# Patient Record
Sex: FEMALE | Race: WHITE | NOT HISPANIC OR LATINO | Employment: FULL TIME | URBAN - METROPOLITAN AREA
[De-identification: names, ages, dates, MRNs, and addresses within clinical notes are randomized per-mention and may not be internally consistent; named-entity substitution may affect disease eponyms.]

---

## 2021-03-16 ENCOUNTER — OCCMED (OUTPATIENT)
Dept: URGENT CARE | Facility: CLINIC | Age: 56
End: 2021-03-16

## 2021-03-16 DIAGNOSIS — Z02.1 PRE-EMPLOYMENT HEALTH SCREENING EXAMINATION: Primary | ICD-10-CM

## 2021-03-16 LAB — RUBV IGG SERPL IA-ACNC: <0.2 IU/ML

## 2021-03-16 PROCEDURE — 86735 MUMPS ANTIBODY: CPT | Performed by: PHYSICIAN ASSISTANT

## 2021-03-16 PROCEDURE — 86765 RUBEOLA ANTIBODY: CPT | Performed by: PHYSICIAN ASSISTANT

## 2021-03-16 PROCEDURE — 86762 RUBELLA ANTIBODY: CPT | Performed by: PHYSICIAN ASSISTANT

## 2021-03-16 PROCEDURE — 86480 TB TEST CELL IMMUN MEASURE: CPT | Performed by: PHYSICIAN ASSISTANT

## 2021-03-16 PROCEDURE — 86787 VARICELLA-ZOSTER ANTIBODY: CPT | Performed by: PHYSICIAN ASSISTANT

## 2021-03-17 LAB
GAMMA INTERFERON BACKGROUND BLD IA-ACNC: 0.03 IU/ML
M TB IFN-G BLD-IMP: NEGATIVE
M TB IFN-G CD4+ BCKGRND COR BLD-ACNC: 0.01 IU/ML
M TB IFN-G CD4+ BCKGRND COR BLD-ACNC: 0.01 IU/ML
MITOGEN IGNF BCKGRD COR BLD-ACNC: 2.88 IU/ML

## 2021-03-18 LAB
MEV IGG SER QL: NORMAL
MUV IGG SER QL: NORMAL
VZV IGG SER IA-ACNC: NORMAL

## 2021-03-20 ENCOUNTER — HOSPITAL ENCOUNTER (EMERGENCY)
Facility: HOSPITAL | Age: 56
Discharge: HOME | End: 2021-03-20
Attending: EMERGENCY MEDICINE
Payer: COMMERCIAL

## 2021-03-20 VITALS
DIASTOLIC BLOOD PRESSURE: 76 MMHG | RESPIRATION RATE: 18 BRPM | SYSTOLIC BLOOD PRESSURE: 139 MMHG | HEART RATE: 89 BPM | TEMPERATURE: 99.1 F | OXYGEN SATURATION: 97 %

## 2021-03-20 DIAGNOSIS — N39.0 URINARY TRACT INFECTION: Primary | ICD-10-CM

## 2021-03-20 LAB
BACTERIA UR QL AUTO: ABNORMAL /HPF
BILIRUB UR QL STRIP: NEGATIVE
CLARITY UR: CLEAR
COLOR UR: YELLOW
GLUCOSE UR STRIP-MCNC: NEGATIVE MG/DL
HGB UR QL STRIP.AUTO: ABNORMAL
KETONES UR STRIP-MCNC: NEGATIVE MG/DL
LEUKOCYTE ESTERASE UR QL STRIP: ABNORMAL
NITRITE UR QL STRIP: NEGATIVE
NON-SQ EPI CELLS URNS QL MICRO: ABNORMAL /HPF
PH UR STRIP.AUTO: 6.5 [PH]
PROT UR STRIP-MCNC: NEGATIVE MG/DL
RBC #/AREA URNS AUTO: ABNORMAL /HPF
SP GR UR STRIP.AUTO: <=1.005 (ref 1–1.03)
UROBILINOGEN UR QL STRIP.AUTO: 0.2 E.U./DL
WBC #/AREA URNS AUTO: ABNORMAL /HPF

## 2021-03-20 PROCEDURE — 87186 SC STD MICRODIL/AGAR DIL: CPT | Performed by: PHYSICIAN ASSISTANT

## 2021-03-20 PROCEDURE — 99284 EMERGENCY DEPT VISIT MOD MDM: CPT | Performed by: PHYSICIAN ASSISTANT

## 2021-03-20 PROCEDURE — 87077 CULTURE AEROBIC IDENTIFY: CPT | Performed by: PHYSICIAN ASSISTANT

## 2021-03-20 PROCEDURE — 99283 EMERGENCY DEPT VISIT LOW MDM: CPT

## 2021-03-20 PROCEDURE — 81001 URINALYSIS AUTO W/SCOPE: CPT | Performed by: PHYSICIAN ASSISTANT

## 2021-03-20 PROCEDURE — 87086 URINE CULTURE/COLONY COUNT: CPT | Performed by: PHYSICIAN ASSISTANT

## 2021-03-20 RX ORDER — PHENAZOPYRIDINE HYDROCHLORIDE 200 MG/1
200 TABLET, FILM COATED ORAL 3 TIMES DAILY
Qty: 6 TABLET | Refills: 0 | Status: SHIPPED | OUTPATIENT
Start: 2021-03-20 | End: 2021-08-13

## 2021-03-20 RX ORDER — CEPHALEXIN 500 MG/1
500 CAPSULE ORAL EVERY 12 HOURS SCHEDULED
Qty: 14 CAPSULE | Refills: 0 | Status: SHIPPED | OUTPATIENT
Start: 2021-03-20 | End: 2021-03-27

## 2021-03-20 NOTE — ED PROVIDER NOTES
History  Chief Complaint   Patient presents with    Possible UTI     pt complaints of urinary urgency and burning while urinating  was put on bactrim by family doctor with no relief      Patient is a 65 y/o F with h/o anxiety, depression that presents to the ED with urinary urgency and dysuria for 4 days  She saw her PCP 3 days ago and was started on bactrim  She states she started feeling better, but then her symptoms worsened  SHe denies fevers, nausea, vomiting or back pain  She states she was shaking today so she called her PCP and was told she would have to f/u with urologist         History provided by:  Patient  Urinary Frequency  Location:  Urinary urgency and dysuria  Severity:  Moderate  Onset quality:  Gradual  Duration:  4 days  Timing:  Constant  Progression:  Unchanged  Chronicity:  New  Context:  Paitent with urgency and dysuria  Relieved by:  Nothing  Worsened by:  Nothing  Ineffective treatments:  Bactrim ds  Associated symptoms: no abdominal pain, no congestion, no cough, no diarrhea, no fever, no nausea, no rash, no shortness of breath and no vomiting        Prior to Admission Medications   Prescriptions Last Dose Informant Patient Reported? Taking?    Methylphenidate HCl (RITALIN PO)  Self Yes No   Sig: Take 18 mg by mouth daily   QUEtiapine (SEROQUEL) 100 mg tablet  Self Yes No   Sig: Take 100 mg by mouth daily at bedtime   Sertraline HCl (ZOLOFT PO)  Self Yes No   Sig: Take 150 mg by mouth daily   Vortioxetine HBr (TRINTELLIX) 5 MG TABS  Self Yes No   Sig: Take 1 tablet by mouth daily   buPROPion (FORFIVO XL) 450 MG 24 hr tablet  Self Yes No   Sig: Take 450 mg by mouth daily   losartan (COZAAR) 100 MG tablet   Yes No   Sig: Take 100 mg by mouth daily      Facility-Administered Medications: None       Past Medical History:   Diagnosis Date    ADHD (attention deficit hyperactivity disorder)     Depression     Hypertension     Panic attack     Psychiatric disorder        Past Surgical History:   Procedure Laterality Date    HYSTEROSCOPY         History reviewed  No pertinent family history  I have reviewed and agree with the history as documented  E-Cigarette/Vaping     E-Cigarette/Vaping Substances     Social History     Tobacco Use    Smoking status: Never Smoker    Smokeless tobacco: Never Used   Substance Use Topics    Alcohol use: No    Drug use: No       Review of Systems   Constitutional: Negative for chills and fever  HENT: Negative for congestion  Respiratory: Negative for cough and shortness of breath  Gastrointestinal: Negative for abdominal pain, diarrhea, nausea and vomiting  Genitourinary: Positive for dysuria, frequency and urgency  Negative for flank pain and hematuria  Musculoskeletal: Negative for back pain and neck pain  Skin: Negative for color change and rash  Neurological: Negative for dizziness, weakness, light-headedness and numbness  Psychiatric/Behavioral: The patient is nervous/anxious  All other systems reviewed and are negative  Physical Exam  Physical Exam  Vitals signs and nursing note reviewed  Constitutional:       General: She is not in acute distress  Appearance: Normal appearance  She is well-developed, well-groomed and overweight  She is not ill-appearing or diaphoretic  HENT:      Head: Normocephalic and atraumatic  Right Ear: Hearing and external ear normal       Left Ear: Hearing and external ear normal       Nose: Nose normal    Eyes:      Conjunctiva/sclera: Conjunctivae normal       Pupils: Pupils are equal    Neck:      Musculoskeletal: Normal range of motion  Cardiovascular:      Rate and Rhythm: Normal rate and regular rhythm  Heart sounds: Normal heart sounds  Pulmonary:      Effort: Pulmonary effort is normal       Breath sounds: Normal breath sounds  No wheezing, rhonchi or rales  Abdominal:      General: Abdomen is flat  Bowel sounds are normal       Palpations: Abdomen is soft  Tenderness: There is no abdominal tenderness  There is no right CVA tenderness or left CVA tenderness  Musculoskeletal: Normal range of motion  Right lower leg: No edema  Left lower leg: No edema  Skin:     General: Skin is warm and dry  Coloration: Skin is not pale  Findings: No rash  Neurological:      General: No focal deficit present  Mental Status: She is alert and oriented to person, place, and time  Motor: No weakness  Psychiatric:         Mood and Affect: Mood is anxious  Speech: Speech normal          Behavior: Behavior is cooperative  Cognition and Memory: Cognition normal          Vital Signs  ED Triage Vitals [03/20/21 1334]   Temperature Pulse Respirations Blood Pressure SpO2   99 1 °F (37 3 °C) 97 18 139/76 97 %      Temp Source Heart Rate Source Patient Position - Orthostatic VS BP Location FiO2 (%)   Tympanic Monitor Sitting Left arm --      Pain Score       --           Vitals:    03/20/21 1334 03/20/21 1429   BP: 139/76    Pulse: 97 89   Patient Position - Orthostatic VS: Sitting          Visual Acuity      ED Medications  Medications - No data to display    Diagnostic Studies  Results Reviewed     Procedure Component Value Units Date/Time    Urine Microscopic [[id number]]  (Abnormal) Collected: 03/20/21 1345    Lab Status: Final result Specimen: Urine, Clean Catch Updated: 03/20/21 1426     RBC, UA 30-50 /hpf      WBC, UA 20-30 /hpf      Epithelial Cells None Seen /hpf      Bacteria, UA Occasional /hpf     Urine culture [[id number]] Collected: 03/20/21 1345    Lab Status:  In process Specimen: Urine, Clean Catch Updated: 03/20/21 1426    UA w Reflex to Microscopic w Reflex to Culture [[id number]]  (Abnormal) Collected: 03/20/21 1345    Lab Status: Final result Specimen: Urine, Clean Catch Updated: 03/20/21 1403     Color, UA Yellow     Clarity, UA Clear     Specific Gravity, UA <=1 005     pH, UA 6 5     Leukocytes, UA Moderate     Nitrite, UA Negative     Protein, UA Negative mg/dl      Glucose, UA Negative mg/dl      Ketones, UA Negative mg/dl      Urobilinogen, UA 0 2 E U /dl      Bilirubin, UA Negative     Blood, UA Large                 No orders to display              Procedures  Procedures         ED Course                             SBIRT 20yo+      Most Recent Value   SBIRT (24 yo +)   In order to provide better care to our patients, we are screening all of our patients for alcohol and drug use  Would it be okay to ask you these screening questions? Yes Filed at: 03/20/2021 1347   Initial Alcohol Screen: US AUDIT-C    1  How often do you have a drink containing alcohol?  0 Filed at: 03/20/2021 1347   2  How many drinks containing alcohol do you have on a typical day you are drinking? 0 Filed at: 03/20/2021 1347   3a  Male UNDER 65: How often do you have five or more drinks on one occasion? 0 Filed at: 03/20/2021 1347   3b  FEMALE Any Age, or MALE 65+: How often do you have 4 or more drinks on one occassion? 0 Filed at: 03/20/2021 1347   Audit-C Score  0 Filed at: 03/20/2021 1347   RUTH: How many times in the past year have you    Used an illegal drug or used a prescription medication for non-medical reasons? Never Filed at: 03/20/2021 1347                    MDM  Number of Diagnoses or Management Options  Urinary tract infection: established and worsening  Diagnosis management comments: Patient with UTI symptoms, no abdominal pain, will recheck urine, prior urine culture not available  VItals stable, will start on keflex, urine culture pending  ADvised f/u with urology if symptoms continue  Return precautions given          Amount and/or Complexity of Data Reviewed  Clinical lab tests: ordered and reviewed    Patient Progress  Patient progress: stable      Disposition  Final diagnoses:   Urinary tract infection     Time reflects when diagnosis was documented in both MDM as applicable and the Disposition within this note     Time User Action Codes Description Comment    3/20/2021  2:29 PM Brian Treviño Add [N39 0] Urinary tract infection       ED Disposition     ED Disposition Condition Date/Time Comment    Discharge Stable Sat Mar 20, 2021  2:29 PM Jeff Moran discharge to home/self care  Follow-up Information     Follow up With Specialties Details Why Contact Info Additional [address] [facility] Schedule an appointment as soon as possible for a visit  For recheck [address] [address] [address] [facility] [address]  706  [facility] [place] [facility] [place], [facility], [facility], [place], [place], [address]          Discharge Medication List as of 3/20/2021  2:31 PM      START taking these medications    Details   cephalexin (KEFLEX) 500 mg capsule Take 1 capsule (500 mg total) by mouth every 12 (twelve) hours for 7 days, Starting Sat 3/20/2021, Until Sat 3/27/2021, Normal      phenazopyridine (PYRIDIUM) 200 mg tablet Take 1 tablet (200 mg total) by mouth 3 (three) times a day, Starting Sat 3/20/2021, Normal         CONTINUE these medications which have NOT CHANGED    Details   buPROPion (FORFIVO XL) 450 MG 24 hr tablet Take 450 mg by mouth daily, Until Discontinued, Historical Med      losartan (COZAAR) 100 MG tablet Take 100 mg by mouth daily, Until Discontinued, Historical Med      Methylphenidate HCl (RITALIN PO) Take 18 mg by mouth daily, Until Discontinued, Historical Med      QUEtiapine (SEROQUEL) 100 mg tablet Take 100 mg by mouth daily at bedtime, Until Discontinued, Historical Med      Sertraline HCl (ZOLOFT PO) Take 150 mg by mouth daily, Until Discontinued, Historical Med      Vortioxetine HBr (TRINTELLIX) 5 MG TABS Take 1 tablet by mouth daily, Starting 9/26/2016, Until Discontinued, Historical Med           No discharge procedures on file      PDMP Review     None          ED Provider  Electronically Signed by           Axel Medeiros Warden Ludy PA-C  03/20/21 6405

## 2021-03-20 NOTE — DISCHARGE INSTRUCTIONS
Rest, increase fluids, especially cranberry juice  Stop taking bactrim, start taking keflex  Take pyridium for discomfort  Return to ER if symptoms worsen, vomiting, fevers  Follow up with urologist for recheck

## 2021-03-22 LAB — BACTERIA UR CULT: ABNORMAL

## 2021-04-13 ENCOUNTER — IMMUNIZATIONS (OUTPATIENT)
Dept: FAMILY MEDICINE CLINIC | Facility: HOSPITAL | Age: 56
End: 2021-04-13

## 2021-04-13 DIAGNOSIS — Z23 ENCOUNTER FOR IMMUNIZATION: Primary | ICD-10-CM

## 2021-04-13 PROCEDURE — 0001A SARS-COV-2 / COVID-19 MRNA VACCINE (PFIZER-BIONTECH) 30 MCG: CPT

## 2021-04-13 PROCEDURE — 91300 SARS-COV-2 / COVID-19 MRNA VACCINE (PFIZER-BIONTECH) 30 MCG: CPT

## 2021-05-05 ENCOUNTER — APPOINTMENT (OUTPATIENT)
Dept: RADIOLOGY | Facility: CLINIC | Age: 56
End: 2021-05-05
Payer: COMMERCIAL

## 2021-05-05 ENCOUNTER — OFFICE VISIT (OUTPATIENT)
Dept: OBGYN CLINIC | Facility: CLINIC | Age: 56
End: 2021-05-05
Payer: COMMERCIAL

## 2021-05-05 VITALS
SYSTOLIC BLOOD PRESSURE: 120 MMHG | BODY MASS INDEX: 33.59 KG/M2 | HEIGHT: 67 IN | DIASTOLIC BLOOD PRESSURE: 72 MMHG | WEIGHT: 214 LBS

## 2021-05-05 DIAGNOSIS — M54.42 CHRONIC LEFT-SIDED LOW BACK PAIN WITH LEFT-SIDED SCIATICA: Primary | ICD-10-CM

## 2021-05-05 DIAGNOSIS — G89.29 CHRONIC LEFT-SIDED LOW BACK PAIN WITH LEFT-SIDED SCIATICA: ICD-10-CM

## 2021-05-05 DIAGNOSIS — G89.29 CHRONIC LEFT-SIDED LOW BACK PAIN WITH LEFT-SIDED SCIATICA: Primary | ICD-10-CM

## 2021-05-05 DIAGNOSIS — M54.42 CHRONIC LEFT-SIDED LOW BACK PAIN WITH LEFT-SIDED SCIATICA: ICD-10-CM

## 2021-05-05 PROCEDURE — 99204 OFFICE O/P NEW MOD 45 MIN: CPT | Performed by: FAMILY MEDICINE

## 2021-05-05 PROCEDURE — 72110 X-RAY EXAM L-2 SPINE 4/>VWS: CPT

## 2021-05-05 RX ORDER — DIAZEPAM 5 MG/1
5 TABLET ORAL
Qty: 2 TABLET | Refills: 0 | Status: SHIPPED | OUTPATIENT
Start: 2021-05-05 | End: 2022-04-20 | Stop reason: ALTCHOICE

## 2021-05-05 RX ORDER — METHYLPREDNISOLONE 4 MG/1
TABLET ORAL
Qty: 21 TABLET | Refills: 0 | Status: SHIPPED | OUTPATIENT
Start: 2021-05-05 | End: 2021-07-12

## 2021-05-05 NOTE — PATIENT INSTRUCTIONS
Patient have MRI performed of the lumbar spine as she has had severe back pain for 1 year and worsen last few weeks with radicular symptoms    I have also ordered Medrol Dosepak as well as physical therapy for home exercise program   Patient follow-up with Pain Management

## 2021-05-05 NOTE — PROGRESS NOTES
1  Chronic left-sided low back pain with left-sided sciatica  XR spine lumbar minimum 4 views non injury    MRI lumbar spine wo contrast    diazepam (VALIUM) 5 mg tablet    methylPREDNISolone 4 MG tablet therapy pack    [facility]    Ambulatory referral to Pain Management     Orders Placed This Encounter   Procedures    XR spine lumbar minimum 4 views non injury    MRI lumbar spine wo contrast    Ambulatory referral to Pain Management    [facility]        Imaging Studies (I personally reviewed images in PACS and report):   x-ray lumbar spine 05/05/2021:   Significant  Concave left lumbar scoliosis with significant disc height loss on the right side at L3-4  Anterior listhesis grade 1 L2-3    IMPRESSION:  Chronic back pain with severe flare and left-sided radiculopathy      Repeat X-ray next visit:   None      Return for Follow-up with Pain  Patient Instructions   Patient have MRI performed of the lumbar spine as she has had severe back pain for 1 year and worsen last few weeks with radicular symptoms  I have also ordered Medrol Dosepak as well as physical therapy for home exercise program   Patient follow-up with Pain Management          CHIEF COMPLAINT:  Back pain    HPI:  Roseline Barros is a 64 y o  female  who presents for       Visit 5/5/2021 :  PMH:  Herniated disc 2014 MRI, chronic intermittent back pain    Evaluation of back pain ongoing intermittently for 7 hand years and with constant recurrent flare within the last 1 year worsen last few weeks  Patient describes moderate to severe intensity knee lower back pain with radiation into the left leg  Associated symptoms do include numbness and tingling of the left leg below and above knee  Her pain is exacerbated by walking, stairs, squatting  Patient described as achy throbbing  Review of Systems   Constitutional: Negative for chills, fever and unexpected weight change     HENT: Negative for hearing loss, nosebleeds and sore throat  Eyes: Negative for pain, redness and visual disturbance  Respiratory: Negative for cough, shortness of breath and wheezing  Cardiovascular: Negative for chest pain, palpitations and leg swelling  Gastrointestinal: Negative for abdominal distention, nausea and vomiting  Endocrine: Negative for polydipsia and polyuria  Genitourinary: Negative for dysuria and hematuria  Skin: Negative for rash and wound  Neurological: Negative for dizziness, numbness and headaches  Psychiatric/Behavioral: Negative for decreased concentration and suicidal ideas  Following history reviewed and update:    Past Medical History:   Diagnosis Date    ADHD (attention deficit hyperactivity disorder)     Depression     Hypertension     Panic attack     Psychiatric disorder      Past Surgical History:   Procedure Laterality Date    HYSTEROSCOPY       Social History   Social History     Substance and Sexual Activity   Alcohol Use No     Social History     Substance and Sexual Activity   Drug Use No     Social History     Tobacco Use   Smoking Status Never Smoker   Smokeless Tobacco Never Used     History reviewed  No pertinent family history  No Known Allergies       Physical Exam  /72   Ht 5' 7" (1 702 m)   Wt 97 1 kg (214 lb)   BMI 33 52 kg/m²     Constitutional:  see vital signs  Gen: well-developed, normocephalic/atraumatic, well-groomed  Eyes: No inflammation or discharge of conjunctiva or lids; sclera clear   Pharynx: no inflammation, lesion, or mass of lips  Neck: supple, no masses, non-distended  MSK: no inflammation, lesion, mass, or clubbing of nails and digits except for other than mentioned below  SKIN: no visible rashes or skin lesions  Pulmonary/Chest: Effort normal  No respiratory distress     NEURO: cranial nerves grossly intact  PSYCH:  Alert and oriented to person, place, and time; recent and remote memory intact; mood normal, no depression, anxiety, or agitation, judgment and insight good and intact     Ortho Exam    BACK EXAM:  Gait: normal, no trendelenberg gait, no antalgic gait    BACK TENDERNESS:  Spinous Processes: no  Paraspinal Muscles: + bilateral lower lumbar  SI Joint: no  Sacrum: no    ROM:  Flexion: intact  Extension: intact  Sidebending: intact    DERMATOMAL SENSATION:  L1: normal   L2: normal   L3: normal   L4: normal   L5: normal   S1: normal    STRENGTH (bilateral):  Knee Extension: 5/5  Knee Flexion: 5/5  Foot Dorsiflexion: 5/5  Great Toe Extension: 5/5  Foot Plantarflexion: 5/5  Hip Flexion: 5/5  Hip Abduction: 5/5    REFLEXES:  Patellar: 2+ bilateral  Achilles: 2+ bilateral  Clonus: negative bilateral    BACK:   SUPINE STRAIGHT LEG: negative  PRONE STRAIGHT LEG:  SLUMP: negative    RIGHT HIP:  LOG ROLL: negative  BRADFORD: negative  FADIR: negative    LEFT HIP:  LOG ROLL: negative  BRADFORD: negative  FADIR: negative    Procedures

## 2021-05-07 ENCOUNTER — IMMUNIZATIONS (OUTPATIENT)
Dept: FAMILY MEDICINE CLINIC | Facility: HOSPITAL | Age: 56
End: 2021-05-07

## 2021-05-07 DIAGNOSIS — Z23 ENCOUNTER FOR IMMUNIZATION: Primary | ICD-10-CM

## 2021-05-07 PROCEDURE — 91300 SARS-COV-2 / COVID-19 MRNA VACCINE (PFIZER-BIONTECH) 30 MCG: CPT

## 2021-05-07 PROCEDURE — 0002A SARS-COV-2 / COVID-19 MRNA VACCINE (PFIZER-BIONTECH) 30 MCG: CPT

## 2021-05-13 NOTE — PROGRESS NOTES
PT Evaluation     Today's date: 2021  Patient name: Roseline Barros  : 1965  MRN: [id number]  Referring provider: David Corea  Dx:   Encounter Diagnosis     ICD-10-CM    1  Chronic left-sided low back pain with left-sided sciatica  M54 42     G89 29                   Assessment  Assessment details: Roseline Barros is a 64 y o  female who presents with increased pain consistent with referring diagnosis of Chronic left-sided low back pain with left-sided sciatica  (primary encounter diagnosis) that is highly complex secondary to chronic onset, high fear avoidance and high pain levels with positive imaging  Clinically demonstrates decreased lumbar ROM, strength, and stability, + NT signs and slight slump testing with potential lateral protrusion and limited proprioception leading to pain with ADLs and exercise  This suggests the need for skilled OPPT to address the above listed impairments, achieve established goals and return to PLOF pain-free  If you have any questions or concerns please contact me at [phone]  Thank you!   Impairments: abnormal coordination, abnormal muscle firing, abnormal or restricted ROM, abnormal movement, activity intolerance, impaired balance, impaired physical strength, lacks appropriate home exercise program, pain with function, safety issue, poor posture  and poor body mechanics    Symptom irritability: highBarriers to therapy: + imaging, chronic sxs, fear avoidance  Understanding of Dx/Px/POC: good   Prognosis: fair    Goals  Short Term Goals (4 weeks)  1 ) Establish independence with HEP  2 ) Decrease subjective pain levels from NPRS at least to 2-5/10 at rest and with activity  3 ) Improve lumbar ROM at least 5-10 degrees into all planes to allow for improved ease of movement with less guarding    Long Term Goals (8 weeks)  1 ) Improve lumbar ROM to [facility] in all planes to restore normal movement with ADLs and function and no referral down LEs  2 ) Improve hip ABD and extension strength to 5/5 in all planes in order to return to pain-free ADLs and function  3 ) Improve FOTO score at least to 75 points showing improved self reported disability   4 ) No longer with radicular sxs down LEs    Plan  Plan details: Initiate POC for L/S stability, centralization, monitor sxs and progress as able  Patient would benefit from: PT eval and skilled physical therapy  Planned modality interventions: biofeedback, electrical stimulation/Russian stimulation, cryotherapy, TENS and traction  Planned therapy interventions: IADL retraining, joint mobilization, ADL retraining, ADL training, balance, massage, manual therapy, muscle pump exercises, neuromuscular re-education, postural training, patient education, body mechanics training, behavior modification, strengthening, sensory integrative techniques, self care, coordination, flexibility, functional ROM exercises, gait training, graded activity, graded exercise, therapeutic exercise, therapeutic activities, therapeutic training and home exercise program  Frequency: 2x week  Duration in visits: 16  Duration in weeks: 8  Plan of Care beginning date: 5/14/2021  Plan of Care expiration date: 7/16/2021  Treatment plan discussed with: patient        Subjective Evaluation    History of Present Illness  Date of onset: 5/14/2020  Mechanism of injury: Marika Ley is a 64 y o  female who presents with increased L sided LBP and sciatica starting ~11 months ago with LULÚ where she feels unable to walk (the leg gets numb and hurts and feels like she is just dragging it along)  Notes having a long history of lumbar pain starting in 2014- notes having trouble moving from sitting to standing  Now reports only relief in sxs with sitting postures  Had prior OPPT Decided to seek out MD consult where X-rays were (-) for fx and script for OPPT provided    Denies night pain or changes in bowel or bladder function (slight incontinence related to post menopausal sxs according to patient report)  Denies any NT signs or sxs with sitting worse with standing or walking  F/U with MD in 1 month  Wishes to return to PLOF pain-free  Recurrent probem    Quality of life: good    Pain  Current pain ratin  At best pain ratin  At worst pain ratin  Location: L sided L/S  Quality: radiating, tight, sharp, knife-like and cramping  Relieving factors: change in position, relaxation, ice, rest and medications  Aggravating factors: lifting, standing and walking  Progression: worsening    Social Support  Steps to enter house: no  Stairs in house: yes   12  Lives in: multiple-level home  Lives with: brother      Employment status: working  Exercise history: None now  Life stress: New Job      Diagnostic Tests  X-ray: abnormal (DDD and scoliosis, lateral shift)  Treatments  Previous treatment: physical therapy and medication  Current treatment: physical therapy  Patient Goals  Patient goals for therapy: decreased edema, decreased pain, improved balance, increased motion, increased strength, independence with ADLs/IADLs and return to sport/leisure activities          Objective     Postural Observations  Seated posture: fair  Standing posture: fair  Correction of posture: makes symptoms worse        Neurological Testing     Sensation     Lumbar   Left   Diminished: light touch    Comments   Left light touch: L2-L3    Reflexes   Left   Patellar (L4): brisk (3+)  Achilles (S1): trace (1+)    Right   Patellar (L4): brisk (3+)  Achilles (S1): trace (1+)    Active Range of Motion     Lumbar   Flexion: 100 degrees   Extension: 25 degrees   Left lateral flexion: 30 degrees       Right lateral flexion: 20 degrees  with pain Restriction level: minimal  Left rotation:  WFL  Right rotation:  WFL  Left Hip   External rotation (90/90): 44 degrees   Internal rotation (90/90): 20 degrees     Right Hip   External rotation (90/90): 35 degrees   Internal rotation (90/90): 34 degrees     Additional Active Range of Motion Details  Pain with SBing   Lumbar quadrant B/L without radicular referral    Strength/Myotome Testing     Lumbar   Left   Heel walk: normal  Toe walk: normal    Right   Toe walk: normal    Left Hip   Planes of Motion   Flexion: 4+  Adduction: 5  External rotation: 5  Internal rotation: 4+    Right Hip   Planes of Motion   Flexion: 4+  Adduction: 5  External rotation: 5  Internal rotation: 5    Left Knee   Flexion: 4  Extension: 4+    Right Knee   Flexion: 5  Extension: 5    Left Ankle/Foot   Dorsiflexion: 4+  Plantar flexion: 5    Right Ankle/Foot   Dorsiflexion: 4+  Plantar flexion: 5    Tests     Lumbar     Left   Positive passive SLR and quadrant  Negative slump test      Right   Positive passive SLR and quadrant  Negative slump test      Functional Assessment      Squat    Pain and trunk lean right       Single Leg Stance   Left: 2 seconds  Right: 10 seconds             Precautions: Chronic pain, HTN      Manuals 5/14            LE PROM, HS stretching             Lumbar STM                                       Neuro Re-Ed             NSP marching 2x10            NSP heel slides 2x10            NSP BKFO                                                                 Ther Ex             Hip ABD 10x            Clamshell PeachTB 2x10            Bridges                                                                              Ther Activity                                       Gait Training                                       Modalities

## 2021-05-14 ENCOUNTER — EVALUATION (OUTPATIENT)
Dept: PHYSICAL THERAPY | Facility: CLINIC | Age: 56
End: 2021-05-14
Payer: COMMERCIAL

## 2021-05-14 DIAGNOSIS — M54.42 CHRONIC LEFT-SIDED LOW BACK PAIN WITH LEFT-SIDED SCIATICA: Primary | ICD-10-CM

## 2021-05-14 DIAGNOSIS — G89.29 CHRONIC LEFT-SIDED LOW BACK PAIN WITH LEFT-SIDED SCIATICA: Primary | ICD-10-CM

## 2021-05-14 PROCEDURE — 97162 PT EVAL MOD COMPLEX 30 MIN: CPT | Performed by: PHYSICAL THERAPIST

## 2021-05-14 PROCEDURE — 97110 THERAPEUTIC EXERCISES: CPT | Performed by: PHYSICAL THERAPIST

## 2021-05-19 ENCOUNTER — OFFICE VISIT (OUTPATIENT)
Dept: PHYSICAL THERAPY | Facility: CLINIC | Age: 56
End: 2021-05-19
Payer: COMMERCIAL

## 2021-05-19 DIAGNOSIS — M54.42 CHRONIC LEFT-SIDED LOW BACK PAIN WITH LEFT-SIDED SCIATICA: Primary | ICD-10-CM

## 2021-05-19 DIAGNOSIS — G89.29 CHRONIC LEFT-SIDED LOW BACK PAIN WITH LEFT-SIDED SCIATICA: Primary | ICD-10-CM

## 2021-05-19 PROCEDURE — 97110 THERAPEUTIC EXERCISES: CPT | Performed by: PHYSICAL THERAPIST

## 2021-05-19 PROCEDURE — 97140 MANUAL THERAPY 1/> REGIONS: CPT | Performed by: PHYSICAL THERAPIST

## 2021-05-19 NOTE — PROGRESS NOTES
Daily Note     Today's date: 2021  Patient name: Lo Mc  : 1965  MRN: [id number]  Referring provider: Marivel Aceves  Dx:   Encounter Diagnosis     ICD-10-CM    1  Chronic left-sided low back pain with left-sided sciatica  M54 42     G89 29        Start Time: 1715  Stop Time: 1803  Total time in clinic (min): 48 minutes    Subjective: Notes compliance with HEP  Still with R sided LBP and discomfort overall  Objective: See treatment diary below      Assessment: Good control of NSP with performance of marching and heel slides  Still with pain and weakness with performance of Sling hip ABD and clamshells  Trial of MFR and deep pressure to gluteal mms with fair response  Increased B/L hip flexor restriction- provided home elliot stretch for HEP  Noting increased LBP with B/L prone quad stretch improved with cues for TA and PA mobs to sacrum  Plan: Continue per plan of care        Precautions: Chronic pain, HTN      Manuals            LE PROM, HS stretching  PF           Lumbar STM  PF             20'                        Neuro Re-Ed             NSP marching 2x10 2x10           NSP heel slides 2x10 2x10           NSP BKFO  2x10                                                               Ther Ex             Hip ABD 10x 2x10           Clamshell PeachTB 2x10 OTB 2x10           Bridges  2x10           Iso ABD/ADD  10x"10                                                               Ther Activity                                       Gait Training                                       Modalities

## 2021-05-25 ENCOUNTER — HOSPITAL ENCOUNTER (OUTPATIENT)
Dept: MRI IMAGING | Facility: HOSPITAL | Age: 56
Discharge: HOME | End: 2021-05-25
Attending: FAMILY MEDICINE
Payer: COMMERCIAL

## 2021-05-25 DIAGNOSIS — G89.29 CHRONIC LEFT-SIDED LOW BACK PAIN WITH LEFT-SIDED SCIATICA: ICD-10-CM

## 2021-05-25 DIAGNOSIS — M54.42 CHRONIC LEFT-SIDED LOW BACK PAIN WITH LEFT-SIDED SCIATICA: ICD-10-CM

## 2021-05-25 PROCEDURE — G1004 CDSM NDSC: HCPCS

## 2021-05-25 PROCEDURE — 72148 MRI LUMBAR SPINE W/O DYE: CPT

## 2021-05-26 ENCOUNTER — OFFICE VISIT (OUTPATIENT)
Dept: PHYSICAL THERAPY | Facility: CLINIC | Age: 56
End: 2021-05-26
Payer: COMMERCIAL

## 2021-05-26 DIAGNOSIS — M54.42 CHRONIC LEFT-SIDED LOW BACK PAIN WITH LEFT-SIDED SCIATICA: Primary | ICD-10-CM

## 2021-05-26 DIAGNOSIS — G89.29 CHRONIC LEFT-SIDED LOW BACK PAIN WITH LEFT-SIDED SCIATICA: Primary | ICD-10-CM

## 2021-05-26 PROCEDURE — 97112 NEUROMUSCULAR REEDUCATION: CPT | Performed by: PHYSICAL THERAPIST

## 2021-05-26 PROCEDURE — 97110 THERAPEUTIC EXERCISES: CPT | Performed by: PHYSICAL THERAPIST

## 2021-05-26 NOTE — PROGRESS NOTES
Daily Note     Today's date: 2021  Patient name: Cara Wills  : 1965  MRN: [id number]  Referring provider: Jenni Vogel  Dx:   Encounter Diagnosis     ICD-10-CM    1  Chronic left-sided low back pain with left-sided sciatica  M54 42     G89 29                   Subjective: Reports having okay sxs today but still severe sharp pain and stiffness in the AM   Notes being a side sleeper still contributing to sxs  Objective: See treatment diary below      Assessment: Deferred manual treatment today- more active approach in order to work on improved core activation and tolerance to all TE  Improved LBP with performance of good morning and hip extension off table  Stabilization bias still most beneficial        Plan: Continue per plan of care        Precautions: Chronic pain, HTN      Manuals           LE PROM, HS stretching  PF NV          Lumbar STM  PF NV pt deferred            '                        Neuro Re-Ed             NSP marching 2x10 2x10 2x10          NSP heel slides 2x10 2x10 2x10          NSP BKFO  2x10 2x10          Quad Alt UE/LE   20x          Cat/ cow with cues for pelvic tilting   10x5" each                                    Ther Ex             HS stretch seated/piriformis seated    3x15" ea          Hip ABD 10x 2x10 2x10          Clamshell PeachTB 2x10 OTB 2x10 OTB 2x10          Bridges  2x10 2x10          Iso ABD/ADD  10x"10 10x10"          Good morning   10x          LE lift off table   20x          Sit to stands   2x10          PB rollout    10x5"          Ther Activity                                       Gait Training                                       Modalities

## 2021-06-01 ENCOUNTER — CONSULT (OUTPATIENT)
Dept: PAIN MEDICINE | Facility: CLINIC | Age: 56
End: 2021-06-01
Payer: COMMERCIAL

## 2021-06-01 ENCOUNTER — TELEPHONE (OUTPATIENT)
Dept: PAIN MEDICINE | Facility: CLINIC | Age: 56
End: 2021-06-01

## 2021-06-01 VITALS
HEIGHT: 67 IN | WEIGHT: 219 LBS | BODY MASS INDEX: 34.37 KG/M2 | HEART RATE: 81 BPM | SYSTOLIC BLOOD PRESSURE: 114 MMHG | TEMPERATURE: 97.3 F | DIASTOLIC BLOOD PRESSURE: 70 MMHG

## 2021-06-01 DIAGNOSIS — M51.26 LUMBAR DISC HERNIATION: Primary | ICD-10-CM

## 2021-06-01 DIAGNOSIS — M48.062 SPINAL STENOSIS OF LUMBAR REGION WITH NEUROGENIC CLAUDICATION: ICD-10-CM

## 2021-06-01 DIAGNOSIS — M54.16 LUMBAR RADICULOPATHY: ICD-10-CM

## 2021-06-01 DIAGNOSIS — M54.42 CHRONIC LEFT-SIDED LOW BACK PAIN WITH LEFT-SIDED SCIATICA: ICD-10-CM

## 2021-06-01 DIAGNOSIS — G89.29 CHRONIC LEFT-SIDED LOW BACK PAIN WITH LEFT-SIDED SCIATICA: ICD-10-CM

## 2021-06-01 PROCEDURE — 99244 OFF/OP CNSLTJ NEW/EST MOD 40: CPT | Performed by: ANESTHESIOLOGY

## 2021-06-01 RX ORDER — OMEPRAZOLE 40 MG/1
40 CAPSULE, DELAYED RELEASE ORAL DAILY
COMMUNITY
Start: 2021-04-02

## 2021-06-01 RX ORDER — HYDROCHLOROTHIAZIDE 12.5 MG/1
12.5 TABLET ORAL DAILY
COMMUNITY
Start: 2021-04-21

## 2021-06-01 NOTE — TELEPHONE ENCOUNTER
S/w pt, advised of above  Pt verbalized understanding and appreciation  Will proceed w/ injection as planned

## 2021-06-01 NOTE — PROGRESS NOTES
Assessment  1  Lumbar disc herniation    2  Chronic left-sided low back pain with left-sided sciatica    3  Lumbar radiculopathy        Plan    The patient's pain persists despite time, relative rest, activity modification and therapy  I believe that Oralia Joseph would benefit from a lumbar epidural steroid injection to diminish any inflammatory component of her pain  I will initially use a transforaminal approach to better concentrate the steroid along the affected nerve root  The injection may need to be repeated based on the degree of pain relief following the initial injection  In the office today, we reviewed the nature of the patient's pathology in depth using  diagrams and models  I discussed the approach I would use for the epidural steroid injection and provided literature for home review  The patient understands the risks associated with the procedure including but not limited to bleeding, infection, tissue injury, decreased immunity secondary to steroid injection, exacerbation of symptoms, allergic reaction, spinal headache, and paralysis and provided verbal consent  My impressions and treatment recommendations were discussed in detail with the patient who verbalized understanding and had no further questions  Discharge instructions were provided  I personally saw and examined the patient and I agree with the above discussed plan of care  This note is created using dictation transcription  It may contain typographical errors, grammatical errors, improperly dictated words, background noise and other errors        New Medications Ordered This Visit   Medications    sertraline (ZOLOFT) 50 mg tablet     Sig: Take 50 mg by mouth daily    hydrochlorothiazide (HYDRODIURIL) 12 5 mg tablet     Sig: Take 12 5 mg by mouth daily    omeprazole (PriLOSEC) 40 MG capsule     Sig: Take 40 mg by mouth daily Take before a meal     Referred By: Derrek Collins III, DO  History of Present Illness    Oralia Joseph NGUYEN HENRY [facility] is a 64 y o  female with longstanding history of low back and left plain gradually worsening over the past few months  She is unaware of any clear precipitating event denies any trauma or injury  Pain is severe she rates her pain eight to 9/10 on the visual analog scale most completely interfering with daily living activities  Pain is nearly constant worse in the morning her side is cramping pins needle pressure-like with a numbing sensation and subjective weakness of her lower limbs  She reports that sitting decreases symptoms will most activities such as standing bending and walking aggravate her pain  She has been undergoing physical therapy but her pain persists  She denies bowel or bladder dysfunction  I have personally reviewed and/or updated the patient's past medical history, past surgical history, family history, social history, current medications, allergies, and vital signs today  Review of Systems   Constitutional: Negative for fever and unexpected weight change  HENT: Negative for trouble swallowing  Eyes: Negative for visual disturbance  Respiratory: Negative for shortness of breath and wheezing  Cardiovascular: Negative for chest pain and palpitations  Gastrointestinal: Negative for constipation, diarrhea, nausea and vomiting  Endocrine: Negative for cold intolerance, heat intolerance and polydipsia  Genitourinary: Negative for difficulty urinating and frequency  Musculoskeletal: Positive for arthralgias, joint swelling and myalgias  Negative for gait problem  Skin: Negative for rash  Neurological: Positive for numbness  Negative for dizziness, seizures, syncope, weakness and headaches  Hematological: Does not bruise/bleed easily  Psychiatric/Behavioral: Positive for dysphoric mood  All other systems reviewed and are negative  There is no problem list on file for this patient        Past Medical History:   Diagnosis Date    ADHD (attention deficit hyperactivity disorder)     Anxiety     Arthritis     Depression     Fibromyalgia, primary     GERD (gastroesophageal reflux disease)     History of stomach ulcers     Hypertension     Panic attack     Psychiatric disorder        Past Surgical History:   Procedure Laterality Date    HYSTEROSCOPY         History reviewed  No pertinent family history  Social History     Occupational History    Not on file   Tobacco Use    Smoking status: Never Smoker    Smokeless tobacco: Never Used   Substance and Sexual Activity    Alcohol use: No    Drug use: No    Sexual activity: Not on file       Current Outpatient Medications on File Prior to Visit   Medication Sig    buPROPion (FORFIVO XL) 450 MG 24 hr tablet Take 450 mg by mouth daily    hydrochlorothiazide (HYDRODIURIL) 12 5 mg tablet Take 12 5 mg by mouth daily    losartan (COZAAR) 100 MG tablet Take 100 mg by mouth daily    omeprazole (PriLOSEC) 40 MG capsule Take 40 mg by mouth daily Take before a meal    QUEtiapine (SEROQUEL) 100 mg tablet Take 100 mg by mouth daily at bedtime    sertraline (ZOLOFT) 50 mg tablet Take 50 mg by mouth daily    diazepam (VALIUM) 5 mg tablet Take 1 tablet (5 mg total) by mouth once in imaging for anxiety for up to 2 doses (Patient not taking: Reported on 6/1/2021)    Methylphenidate HCl (RITALIN PO) Take 18 mg by mouth daily    methylPREDNISolone 4 MG tablet therapy pack Use as directed on package    phenazopyridine (PYRIDIUM) 200 mg tablet Take 1 tablet (200 mg total) by mouth 3 (three) times a day    Sertraline HCl (ZOLOFT PO) Take 150 mg by mouth daily    Vortioxetine HBr (TRINTELLIX) 5 MG TABS Take 1 tablet by mouth daily     No current facility-administered medications on file prior to visit          No Known Allergies    Physical Exam    /70 (BP Location: Left arm, Patient Position: Sitting, Cuff Size: Standard)   Pulse 81   Temp (!) 97 3 °F (36 3 °C)   Ht 5' 7" (1 702 m)   Wt 99 3 kg (219 lb) BMI 34 30 kg/m²     Constitutional: normal, well developed, well nourished, alert, in no distress and non-toxic and no overt pain behavior  and overweight  Eyes: anicteric  HEENT: grossly intact  Neck: supple, symmetric, trachea midline and no masses   Pulmonary:even and unlabored  Cardiovascular:No edema or pitting edema present  Skin:Normal without rashes or lesions and well hydrated  Psychiatric:Mood and affect appropriate  Neurologic:Cranial Nerves II-XII grossly intact  Musculoskeletal:normal, difficulty going from sitting to standing sitting position, no obvious skin lesions or erythema lumbar sacral spine, no tenderness to palpation lumbar sacral spine spinous process sacroiliac joint or greater trochanter bilateral, deep tendon reflexes diminished but symmetrical bilateral patella Achilles, decreased sensation left L5 distribution to pinwheel, no focal motor deficit appreciated lower limbs, positive straight leg raising left negative on the right    Imaging  LUMBAR SPINE @  5-5-21     INDICATION:   M54 42: Lumbago with sciatica, left side  G89 29: Other chronic pain      COMPARISON:  Lumbar spine radiograph 11/28/2014     VIEWS:  XR SPINE LUMBAR MINIMUM 4 VIEWS NON INJURY        FINDINGS:     There are 5 non rib bearing lumbar vertebral bodies       There is no evidence of acute fracture or destructive osseous lesion      Moderate scoliotic deformity is noted  Alignment is otherwise unremarkable       There are endplate and facet joint degenerative changes at L2-3 through L5-S1      The pedicles appear intact      Soft tissues are unremarkable      IMPRESSION:     No acute osseous abnormality        Degenerative changes as described  MRI LUMBAR SPINE WITHOUT CONTRAST     INDICATION: M54 42: Lumbago with sciatica, left side  G89 29: Other chronic pain     Chronic low back pain with left lower extremity numbness     COMPARISON:  5/5/2021; n 11/28/2014     TECHNIQUE:  Sagittal T1, sagittal T2, sagittal inversion recovery, axial T1 and axial T2, coronal T2     IMAGE QUALITY:  Diagnostic     FINDINGS:     VERTEBRAL BODIES:  There are 5 lumbar type vertebral bodies  Mild to moderate levoscoliosis of the midlumbar spine centered at the L3 segment  There is a grade 1 anterolisthesis of L2 on L3  There is a grade 1 anterolisthesis L5  Scattered   degenerative endplate changes  No focally suspicious marrow lesions  No bone marrow edema or compression abnormality      SACRUM:  Normal signal within the sacrum  No evidence of insufficiency or stress fracture      DISTAL CORD AND CONUS:  Normal size and signal within the distal cord and conus  The conus medullaris terminates at the L1 level      PARASPINAL SOFT TISSUES:  Paraspinal soft tissues are unremarkable      LOWER THORACIC DISC SPACES:  T10-T11: There is a tiny central disc herniation, protrusion type  No significant central canal or neural foraminal narrowing      T11-T12: There is no focal disk herniation, central canal or neural foraminal narrowing      T12-L1: There is no focal disk herniation, central canal or neural foraminal narrowing      LUMBAR DISC SPACES:     L1-L2:  Normal      L2-L3:  There is mild uncovering of the intervertebral disc space  There is bilateral facet hypertrophy  Mild tricompartmental narrowing      L3-L4:  There is a right paracentral/foraminal disc herniation, protrusion type  Moderate to severe central canal narrowing  Severe right neural foraminal narrowing  Mild left neural foraminal narrowing      L4-L5:  There is bilateral facet hypertrophy  There is a diffuse disc bulge  Mild central canal narrowing  Moderate bilateral neural foraminal narrowing      L5-S1:  There is bilateral facet hypertrophy  There is a left neural foraminal disc protrusion  Severe left neural foraminal narrowing  Mild central canal narrowing    Mild right neural foraminal narrowing      IMPRESSION:     Multilevel spondylosis most pronounced at L3-4 and L5-S1 as described  I have personally reviewed pertinent films in PACS and my interpretation is Multilevel lumbar spondylosis

## 2021-06-01 NOTE — PATIENT INSTRUCTIONS
Epidural Steroid Injection   AMBULATORY CARE:   What you need to know about an epidural steroid injection (NEVILLE):  An NEVILLE is a procedure to inject steroid medicine into the epidural space  The epidural space is between your spinal cord and vertebrae  Steroids reduce inflammation and fluid buildup in your spine that may be causing pain  You may be given pain medicine along with the steroids  How to prepare for an NEVILLE:  Your healthcare provider will talk to you about how to prepare for your procedure  He or she will tell you what medicines to take or not take on the day of your procedure  You may need to stop taking blood thinners or other medicines several days before your procedure  You may need to adjust any diabetes medicine you take on the day of your procedure  Steroid medicine can increase your blood sugar level  Arrange for someone to drive you home when you are discharged  What will happen during an NEVILLE:   · You will be given medicine to numb the procedure area  You will be awake for the procedure, but you will not feel pain  You may also be given medicine to help you relax  Contrast liquid will be used to help your healthcare provider see the area better  Tell the healthcare provider if you have ever had an allergic reaction to contrast liquid  · Your healthcare provider may place the needle into your neck area, middle of your back, or tailbone area  He may inject the medicine next to the nerves that are causing your pain  He may instead inject the medicine into a larger area of the epidural space  This helps the medicine spread to more nerves  Your healthcare provider will use a fluoroscope to help guide the needle to the right place  A fluoroscope is a type of x-ray  After the procedure, a bandage will be placed over the injection site to prevent infection  What will happen after an NEVILLE:  You will have a bandage over the injection site to prevent infection   Your healthcare provider will tell you when you can bathe and any activity guidelines  You will be able to go home  Risks of an NEVILLE:  You may have temporary or permanent nerve damage or paralysis  You may have bleeding or develop a serious infection, such as meningitis (swelling of the brain coverings)  An abscess may also develop  An abscess is a pus-filled area under the skin  You may need surgery to fix the abscess  You may have a seizure, anxiety, or trouble sleeping  If you are a man, you may have temporary erectile dysfunction (not able to have an erection)  Call your local emergency number (911 in the [address] [place] [address],[address]) if:   · You have a seizure  · You have trouble moving your legs  Seek care immediately if:   · Blood soaks through your bandage  · You have a fever or chills, severe back pain, and the procedure area is sensitive to the touch  · You cannot control when you urinate or have a bowel movement  Call your doctor if:   · You have weakness or numbness in your legs  · Your wound is red, swollen, or draining pus  · You have nausea or are vomiting  · Your face or neck is red and you feel warm  · You have more pain than you had before the procedure  · You have swelling in your hands or feet  · You have questions or concerns about your condition or care  Care for your wound as directed: You may remove the bandage before you go to bed the day of your procedure  You may take a shower, but do not take a bath for at least 24 hours  Self-care:   · Do not drive,  use machines, or do strenuous activity for 24 hours after your procedure or as directed  · Continue other treatments  as directed  Steroid injections alone will not control your pain  The injections are meant to be used with other treatments, such as physical therapy  Follow up with your doctor as directed:  Write down your questions so you remember to ask them during your visits     © Copyright [employer] 2020 Information is for End User's use only and may not be sold, redistributed or otherwise used for commercial purposes  All illustrations and images included in CareNotes® are the copyrighted property of A D [employer] M [employer] Inc  or Patricia Nicolas  The above information is an  only  It is not intended as medical advice for individual conditions or treatments  Talk to your doctor, nurse or pharmacist before following any medical regimen to see if it is safe and effective for you

## 2021-06-01 NOTE — TELEPHONE ENCOUNTER
----- Message from Susanne Acosta DO sent at 6/1/2021  9:33 AM EDT -----  Please let patient know that as I mentioned the official report agrees that her MRI reveals disc protrusion at L3-4 producing moderate central stenosis as well as severe left foraminal stenosis at L5-S1    Will proceed with the injection as scheduled

## 2021-06-02 ENCOUNTER — OFFICE VISIT (OUTPATIENT)
Dept: PHYSICAL THERAPY | Facility: CLINIC | Age: 56
End: 2021-06-02
Payer: COMMERCIAL

## 2021-06-02 DIAGNOSIS — G89.29 CHRONIC LEFT-SIDED LOW BACK PAIN WITH LEFT-SIDED SCIATICA: Primary | ICD-10-CM

## 2021-06-02 DIAGNOSIS — M54.42 CHRONIC LEFT-SIDED LOW BACK PAIN WITH LEFT-SIDED SCIATICA: Primary | ICD-10-CM

## 2021-06-02 PROCEDURE — 97112 NEUROMUSCULAR REEDUCATION: CPT | Performed by: PHYSICAL THERAPIST

## 2021-06-02 PROCEDURE — 97140 MANUAL THERAPY 1/> REGIONS: CPT | Performed by: PHYSICAL THERAPIST

## 2021-06-02 PROCEDURE — 97110 THERAPEUTIC EXERCISES: CPT | Performed by: PHYSICAL THERAPIST

## 2021-06-02 NOTE — PROGRESS NOTES
Daily Note     Today's date: 2021  Patient name: Petra Neff  : 1965  MRN: [id number]  Referring provider: Esteban Chacon  Dx:   Encounter Diagnosis     ICD-10-CM    1  Chronic left-sided low back pain with left-sided sciatica  M54 42     G89 29        Start Time: 1706  Stop Time: 1805  Total time in clinic (min): 59 minutes    Subjective: Reports feeling okay, still with L sided leg pain and burning  Continues to have discomfort with prolonged sitting or standing  Compliant with HEP  Will be getting an injection for pain this upcoming  from Dr Lima Duvall  Objective: See treatment diary below      Assessment: Continues to have SLR signs on R LE but no referral with flexion or repeated SARAH or roadkill position  Improved hip ABD strength to 4+/5 B/L but still weakness at L hip flexor leading to quad referral and pain  Will re-assess next session  Plan: Continue per plan of care        Precautions: Chronic pain, HTN      Manuals          LE PROM, HS stretching  PF NV PF         Lumbar STM  PF NV pt deferred PF           20'  10'                      Neuro Re-Ed             NSP marching 2x10 2x10 2x10 2x10         NSP heel slides 2x10 2x10 2x10 2x10         NSP BKFO  2x10 2x10 2x10         Quad Alt UE/LE   20x 20x         Cat/ cow with cues for pelvic tilting   10x5" each 10x5"                                   Ther Ex             HS stretch seated/piriformis seated    3x15" ea 3x15"         Hip ABD 10x 2x10 2x10 2x10         Clamshell PeachTB 2x10 OTB 2x10 OTB 2x10 OTB 2x10         Bridges  2x10 2x10          Iso ABD/ADD  10x"10 10x10"          Good morning   10x 10x         LE lift off table   20x 20x         Sit to stands   2x10 2x10         PB rollout    10x5" 10x5"         Ther Activity                                       Gait Training                                       Modalities

## 2021-06-09 ENCOUNTER — EVALUATION (OUTPATIENT)
Dept: PHYSICAL THERAPY | Facility: CLINIC | Age: 56
End: 2021-06-09
Payer: COMMERCIAL

## 2021-06-09 DIAGNOSIS — M54.42 CHRONIC LEFT-SIDED LOW BACK PAIN WITH LEFT-SIDED SCIATICA: Primary | ICD-10-CM

## 2021-06-09 DIAGNOSIS — G89.29 CHRONIC LEFT-SIDED LOW BACK PAIN WITH LEFT-SIDED SCIATICA: Primary | ICD-10-CM

## 2021-06-09 PROCEDURE — 97164 PT RE-EVAL EST PLAN CARE: CPT | Performed by: PHYSICAL THERAPIST

## 2021-06-09 PROCEDURE — 97112 NEUROMUSCULAR REEDUCATION: CPT | Performed by: PHYSICAL THERAPIST

## 2021-06-09 PROCEDURE — 97110 THERAPEUTIC EXERCISES: CPT | Performed by: PHYSICAL THERAPIST

## 2021-06-09 NOTE — PROGRESS NOTES
PT Discharge   Failure to F/U with PT resulting in D/C    Today's date: 2021  Patient name: Rachel Baker  : 1965  MRN: [id number]  Referring provider: Tenisha Vang  Dx:   Encounter Diagnosis     ICD-10-CM    1  Chronic left-sided low back pain with left-sided sciatica  M54 42     G89 29                   Assessment  Assessment details: Rachel Baker is a 64 y o  female who presents with increased pain consistent with referring diagnosis of Chronic left-sided low back pain with left-sided sciatica  (primary encounter diagnosis) that is highly complex secondary to chronic onset, high fear avoidance and high pain levels with positive imaging  Clinically demonstrates decreased lumbar ROM, strength, and stability, + NT signs and slight slump testing with potential lateral protrusion and limited proprioception leading to pain with ADLs and exercise  This suggests the need for skilled OPPT to address the above listed impairments, achieve established goals and return to PLOF pain-free  If you have any questions or concerns please contact me at [phone]  Thank you! Re-assessment 21:  Reji Stephenson reports a 20% GROC since the initiation of PT  Demonstrates improved lumbar ROM and hip strength in all planes as well as improved core stability improvements noted with BP cuff feedback- still limited ability to perform planks as a result of continued weakness and pain  Still with L sided SLR signs and slump signs suggesting continued nerve root irritation- scheduled for injection for pain management on (21)  Despite continued NT signs, her overall strength and core improvements and understanding of pain control serve to bolster continued progression in strength and function  This suggests the need for continued skilled OPPT to address remaining deficits, achieve established goals and return to PLOF pain-free     Impairments: abnormal coordination, abnormal muscle firing, abnormal or restricted ROM, abnormal movement, activity intolerance, impaired balance, impaired physical strength, lacks appropriate home exercise program, pain with function, safety issue, poor posture  and poor body mechanics    Symptom irritability: highBarriers to therapy: + imaging, chronic sxs, fear avoidance  Understanding of Dx/Px/POC: good   Prognosis: fair    Goals  Short Term Goals (4 weeks)  1 ) Establish independence with HEP- MET  2 ) Decrease subjective pain levels from NPRS at least to 2-5/10 at rest and with activity- MET  3 ) Improve lumbar ROM at least 5-10 degrees into all planes to allow for improved ease of movement with less guarding- MET    Long Term Goals (8 weeks)  1 ) Improve lumbar ROM to [facility] in all planes to restore normal movement with ADLs and function and no referral down LEs- Not met  2 ) Improve hip ABD and extension strength to 5/5 in all planes in order to return to pain-free ADLs and function- NOT met 4/5 ABD now  3 ) Improve FOTO score at least to 75 points showing improved self reported disability- Not Met   4 ) No longer with radicular sxs down LEs- Not met    Plan  Plan details: Continue POC for L/S stability and strength, pain control and monitor sxs as able  Patient would benefit from: PT eval and skilled physical therapy  Planned modality interventions: biofeedback, electrical stimulation/Russian stimulation, cryotherapy, TENS and traction  Planned therapy interventions: IADL retraining, joint mobilization, ADL retraining, ADL training, balance, massage, manual therapy, muscle pump exercises, neuromuscular re-education, postural training, patient education, body mechanics training, behavior modification, strengthening, sensory integrative techniques, self care, coordination, flexibility, functional ROM exercises, gait training, graded activity, graded exercise, therapeutic exercise, therapeutic activities, therapeutic training and home exercise program  Frequency: 2x week  Duration in visits: 16  Duration in weeks: 6  Plan of Care beginning date: 2021  Plan of Care expiration date: 2021  Treatment plan discussed with: patient        Subjective Evaluation    History of Present Illness  Date of onset: 2020  Mechanism of injury: Maria R Zhang is a 64 y o  female who presents with increased L sided LBP and sciatica starting ~11 months ago with LULÚ where she feels unable to walk (the leg gets numb and hurts and feels like she is just dragging it along)  Notes having a long history of lumbar pain starting in - notes having trouble moving from sitting to standing  Now reports only relief in sxs with sitting postures  Had prior OPPT Decided to seek out MD consult where X-rays were (-) for fx and script for OPPT provided  Denies night pain or changes in bowel or bladder function (slight incontinence related to post menopausal sxs according to patient report)  Denies any NT signs or sxs with sitting worse with standing or walking  F/U with MD in 1 month  Wishes to return to PLOF pain-free  Re-evaluation 21: Julius Adjutant reports a GROC of 20% since the initiation of PT  Reports that sxs are more manageable with performance of HEP and exercise  Still having major pain and discomfort with getting in and out of bed with stiffness and discomfort  Still with L leg pain and discomfort but now able to go to the store and able to manage it due to continued L hip numbness and not being able to walk  "I don't dread going into the store now"  Denies numbness with sitting  Getting out of chair is easier but still harder getting out of a deep chair  Still having pain going up and down stairs  Getting epidural at L/S on 21  No F/U with Martir del angel    Quality of life: good    Pain  Current pain ratin  At best pain ratin  At worst pain ratin  Location: L sided L/S  Quality: radiating, tight, sharp, knife-like and cramping  Relieving factors: change in position, relaxation, ice, rest and medications  Aggravating factors: lifting, standing and walking  Progression: worsening    Social Support  Steps to enter house: no  Stairs in house: yes   12  Lives in: multiple-level home  Lives with: brother      Employment status: working  Exercise history: None now  Life stress: New Job      Diagnostic Tests  X-ray: abnormal (DDD and scoliosis, lateral shift)  Treatments  Previous treatment: physical therapy and medication  Current treatment: physical therapy  Patient Goals  Patient goals for therapy: decreased edema, decreased pain, improved balance, increased motion, increased strength, independence with ADLs/IADLs and return to sport/leisure activities          Objective     Postural Observations  Seated posture: fair  Standing posture: fair  Correction of posture: makes symptoms worse        Neurological Testing     Sensation     Lumbar   Left   Diminished: light touch    Comments   Left light touch: L2-L3    Reflexes   Left   Patellar (L4): brisk (3+)  Achilles (S1): trace (1+)    Right   Patellar (L4): brisk (3+)  Achilles (S1): trace (1+)    Active Range of Motion     Lumbar   Flexion: 120 degrees   Extension: 30 degrees   Left lateral flexion: 35 degrees       Right lateral flexion: 35 degrees  with pain Restriction level: minimal  Left rotation: 90 degrees  WFL  Right rotation: 90 degrees  WFL  Left Hip   External rotation (90/90): 44 degrees   Internal rotation (90/90): 20 degrees     Right Hip   External rotation (90/90): 35 degrees   Internal rotation (90/90): 34 degrees     Additional Active Range of Motion Details  Pain with SBing   Lumbar quadrant B/L without radicular referral    Strength/Myotome Testing     Lumbar   Left   Heel walk: normal  Toe walk: normal    Right   Toe walk: normal    Left Hip   Planes of Motion   Flexion: 5  Abduction: 4  Adduction: 5  External rotation: 5  Internal rotation: 5    Right Hip   Planes of Motion   Flexion: 5  Abduction: 4  Adduction: 5  External rotation: 5  Internal rotation: 5    Left Knee   Flexion: 5  Extension: 5    Right Knee   Flexion: 5  Extension: 5    Left Ankle/Foot   Dorsiflexion: 5  Plantar flexion: 5    Right Ankle/Foot   Dorsiflexion: 5  Plantar flexion: 5    Tests     Lumbar     Left   Positive passive SLR, quadrant and slump test      Right   Positive passive SLR and quadrant  Negative slump test      Functional Assessment      Squat    Pain  No trunk lean right       Single Leg Stance   Left: 8 seconds  Right: 15 seconds             Precautions: Chronic pain, HTN  EPOC: 7/21/21    Manuals 5/14 5/19 5/26 6/2 6/9        LE PROM, HS stretching  PF NV PF PF        Lumbar STM  PF NV pt deferred PF           20'  10'         Re-eval     25'        Neuro Re-Ed             NSP marching 2x10 2x10 2x10 2x10 2x10        NSP heel slides 2x10 2x10 2x10 2x10 2x10        NSP BKFO  2x10 2x10 2x10 2x10        Quad Alt UE/LE   20x 20x 20x        Cat/ cow with cues for pelvic tilting   10x5" each 10x5" 10x5"        Table planks     3x30"                     Ther Ex             HS stretch seated/piriformis seated    3x15" ea 3x15" 3x15"        Hip ABD 10x 2x10 2x10 2x10 2x10        Clamshell PeachTB 2x10 OTB 2x10 OTB 2x10 OTB 2x10 OTB 2x10        Bridges  2x10 2x10          Iso ABD/ADD  10x"10 10x10"          Good morning   10x 10x 10x        LE lift off table   20x 20x 20x        Sit to stands   2x10 2x10         Lateral step down     4" 2x10        STD hip ABD      OTB 2x10 each        Side stepping     OTB 5 laps        PB rollout    10x5" 10x5"         Ther Activity                                       Gait Training                                       Modalities

## 2021-06-14 ENCOUNTER — HOSPITAL ENCOUNTER (OUTPATIENT)
Dept: RADIOLOGY | Facility: CLINIC | Age: 56
Discharge: HOME | End: 2021-06-14
Attending: ANESTHESIOLOGY | Admitting: ANESTHESIOLOGY
Payer: COMMERCIAL

## 2021-06-14 VITALS
HEART RATE: 70 BPM | DIASTOLIC BLOOD PRESSURE: 78 MMHG | OXYGEN SATURATION: 97 % | SYSTOLIC BLOOD PRESSURE: 123 MMHG | TEMPERATURE: 97.5 F | RESPIRATION RATE: 18 BRPM

## 2021-06-14 DIAGNOSIS — M51.26 LUMBAR DISC HERNIATION: ICD-10-CM

## 2021-06-14 DIAGNOSIS — M48.062 SPINAL STENOSIS OF LUMBAR REGION WITH NEUROGENIC CLAUDICATION: ICD-10-CM

## 2021-06-14 PROCEDURE — 64483 NJX AA&/STRD TFRM EPI L/S 1: CPT | Performed by: ANESTHESIOLOGY

## 2021-06-14 PROCEDURE — 64484 NJX AA&/STRD TFRM EPI L/S EA: CPT | Performed by: ANESTHESIOLOGY

## 2021-06-14 RX ORDER — METHYLPREDNISOLONE ACETATE 80 MG/ML
160 INJECTION, SUSPENSION INTRA-ARTICULAR; INTRALESIONAL; INTRAMUSCULAR; PARENTERAL; SOFT TISSUE ONCE
Status: COMPLETED | OUTPATIENT
Start: 2021-06-14 | End: 2021-06-14

## 2021-06-14 RX ADMIN — IOHEXOL 1 ML: 300 INJECTION, SOLUTION INTRAVENOUS at 09:31

## 2021-06-14 RX ADMIN — LIDOCAINE HYDROCHLORIDE 3 ML: 20 INJECTION, SOLUTION EPIDURAL; INFILTRATION; INTRACAUDAL at 09:30

## 2021-06-14 RX ADMIN — METHYLPREDNISOLONE ACETATE 160 MG: 80 INJECTION, SUSPENSION INTRA-ARTICULAR; INTRALESIONAL; INTRAMUSCULAR; SOFT TISSUE at 09:31

## 2021-06-14 NOTE — DISCHARGE INSTRUCTIONS
Epidural Steroid Injection   WHAT YOU NEED TO KNOW:   An epidural steroid injection (NEVILLE) is a procedure to inject steroid medicine into the epidural space  The epidural space is between your spinal cord and vertebrae  Steroids reduce inflammation and fluid buildup in your spine that may be causing pain  You may be given pain medicine along with the steroids  ACTIVITY  · Do not drive or operate machinery today  · No strenuous activity today - bending, lifting, etc   · You may resume normal activites starting tomorrow - start slowly and as tolerated  · You may shower today, but no tub baths or hot tubs  · You may have numbness for several hours from the local anesthetic  Please use caution and common sense, especially with weight-bearing activities  CARE OF THE INJECTION SITE  · If you have soreness or pain, apply ice to the area today (20 minutes on/20 minutes off)  · Starting tomorrow, you may use warm, moist heat or ice if needed  · You may have an increase or change in your discomfort for 36-48 hours after your treatment  · Apply ice and continue with any pain medication you have been prescribed  · [facility]ify [facility] if you have any of the following: redness, drainage, swelling, headache, stiff neck or fever above 100°F     SPECIAL INSTRUCTIONS  · Our office will contact you in approximately 7 days for a progress report  MEDICATIONS  · Continue to take all routine medications  · Our office may have instructed you to hold some medications  As no general anesthesia was used in today's procedure, you should not experience any side effects related to anesthesia  If you have a problem specifically related to your procedure, please call our office at [phone]  Problems not related to your procedure should be directed to your primary care physician

## 2021-06-14 NOTE — H&P
History of Present Illness: The patient is a 64 y o  female who presents with complaints of low back and leg pain      Patient Active Problem List   Diagnosis    Lumbar disc herniation    Spinal stenosis of lumbar region with neurogenic claudication       Past Medical History:   Diagnosis Date    ADHD (attention deficit hyperactivity disorder)     Anxiety     Arthritis     Depression     Fibromyalgia, primary     GERD (gastroesophageal reflux disease)     History of stomach ulcers     Hypertension     Panic attack     Psychiatric disorder        Past Surgical History:   Procedure Laterality Date    HYSTEROSCOPY           Current Outpatient Medications:     buPROPion (FORFIVO XL) 450 MG 24 hr tablet, Take 450 mg by mouth daily, Disp: , Rfl:     diazepam (VALIUM) 5 mg tablet, Take 1 tablet (5 mg total) by mouth once in imaging for anxiety for up to 2 doses (Patient not taking: Reported on 6/1/2021), Disp: 2 tablet, Rfl: 0    hydrochlorothiazide (HYDRODIURIL) 12 5 mg tablet, Take 12 5 mg by mouth daily, Disp: , Rfl:     losartan (COZAAR) 100 MG tablet, Take 100 mg by mouth daily, Disp: , Rfl:     Methylphenidate HCl (RITALIN PO), Take 18 mg by mouth daily, Disp: , Rfl:     methylPREDNISolone 4 MG tablet therapy pack, Use as directed on package, Disp: 21 tablet, Rfl: 0    omeprazole (PriLOSEC) 40 MG capsule, Take 40 mg by mouth daily Take before a meal, Disp: , Rfl:     phenazopyridine (PYRIDIUM) 200 mg tablet, Take 1 tablet (200 mg total) by mouth 3 (three) times a day, Disp: 6 tablet, Rfl: 0    QUEtiapine (SEROQUEL) 100 mg tablet, Take 100 mg by mouth daily at bedtime, Disp: , Rfl:     sertraline (ZOLOFT) 50 mg tablet, Take 50 mg by mouth daily, Disp: , Rfl:     Sertraline HCl (ZOLOFT PO), Take 150 mg by mouth daily, Disp: , Rfl:     Vortioxetine HBr (TRINTELLIX) 5 MG TABS, Take 1 tablet by mouth daily, Disp: , Rfl:     Current Facility-Administered Medications:     iohexol (OMNIPAQUE) 300 mg/mL injection 50 mL, 50 mL, Epidural, Once, Remigio Arenas DO    lidocaine (PF) (XYLOCAINE-MPF) 2 % injection 5 mL, 5 mL, Epidural, Once, Remigio Arenas DO    methylPREDNISolone acetate (DEPO-MEDROL) injection 160 mg, 160 mg, Epidural, Once, Remigio Arenas DO    Allergies   Allergen Reactions    Latex Rash       Physical Exam:   Vitals:    06/14/21 0922   BP: 128/76   Pulse: 74   Resp: 20   Temp: 97 5 °F (36 4 °C)   SpO2: 98%     General: Awake, Alert, Oriented x 3, Mood and affect appropriate  Respiratory: Respirations even and unlabored  Cardiovascular: Peripheral pulses intact; no edema  Musculoskeletal Exam:  Decreased range of motion lumbar spine    ASA Score: II    Patient/Chart Verification  Patient ID Verified: Verbal  ID Band Applied: No  Consents Confirmed: Procedural  H&P( within 30 days) Verified: To be obtained in the Pre-Procedure area  Interval H&P(within 24 hr) Complete (required for Outpatients and Surgery Admit only): To be obtained in the Pre-Procedure area  Allergies Reviewed: Yes  Anticoag/NSAID held?: No  Currently on antibiotics?: No  Pregnancy denied?: Yes  Pre-op Lab/Test Results Available: In chart  Pregnancy Lab Collected: N/A comment    Assessment:   1  Lumbar disc herniation    2  Spinal stenosis of lumbar region with neurogenic claudication        Plan: Left L5 and left S1 TFESI 1

## 2021-06-21 ENCOUNTER — TELEPHONE (OUTPATIENT)
Dept: PAIN MEDICINE | Facility: CLINIC | Age: 56
End: 2021-06-21

## 2021-06-21 NOTE — TELEPHONE ENCOUNTER
1st attempt  Lm to cb with % improvement and pain level.       S/p Lt L5 and Lt S1 TFESI #1 6/14, 7/12 TFESI #2

## 2021-06-23 ENCOUNTER — APPOINTMENT (OUTPATIENT)
Dept: PHYSICAL THERAPY | Facility: CLINIC | Age: 56
End: 2021-06-23
Payer: COMMERCIAL

## 2021-06-28 ENCOUNTER — TELEPHONE (OUTPATIENT)
Dept: RADIOLOGY | Facility: CLINIC | Age: 56
End: 2021-06-28

## 2021-06-28 NOTE — TELEPHONE ENCOUNTER
Pt messaged me to to ask questions about her procedure and PT  Please call pt back for more information and what she needs to do  Barbee Ahumada Dora  Am I still okay to have the 2nd epidural on July 12? I have a little bit of improvement but not a lot  Do you know if I should continue with PT? I got a lot of exercises to do at home  Anirudh Bonilla will send nursing staff a message and they will call you back to get that information  i am not clinical yes you are okay on an ins stand point to get the next one but you will have to talk to the nurses

## 2021-06-29 ENCOUNTER — TELEPHONE (OUTPATIENT)
Dept: PAIN MEDICINE | Facility: CLINIC | Age: 56
End: 2021-06-29

## 2021-06-29 NOTE — TELEPHONE ENCOUNTER
S/w pt, questioned if she should continue PT as ordered by Dr Shaheed Ford  Advised pt, unless she is having an issue with PT such as increased pain / decreased or worsening mobility - ok to continue PT from [facility] perspective  Advised pt to confirm w/ Dr Shaheed Ford  Pt stated that she did discuss her repeat procedure scheduled for 7/12 w/ spa surg coord and has been advised that there are no issues w/ insurance  Advised pt, as long as she continues with pain - ok to proceed w/ injection as scheduled  Advised pt to cb if pain changes or if her pain resolves  Pt verbalized understanding and appreciation

## 2021-06-29 NOTE — TELEPHONE ENCOUNTER
Patient returning call please call patient back, patient can be reached at work [phone] does not have her cell phone

## 2021-07-07 ENCOUNTER — TELEPHONE (OUTPATIENT)
Dept: OBGYN CLINIC | Facility: CLINIC | Age: 56
End: 2021-07-07

## 2021-07-07 NOTE — TELEPHONE ENCOUNTER
Dr Jayson Tiwari was wondering if she should continue with PT  She think she might still benefit from it but needs a new referral from you  She had one epidural with Dr Kt Kay and the second one is scheduled for 7/12

## 2021-07-12 ENCOUNTER — HOSPITAL ENCOUNTER (OUTPATIENT)
Dept: RADIOLOGY | Facility: CLINIC | Age: 56
Discharge: HOME | End: 2021-07-12
Attending: ANESTHESIOLOGY
Payer: COMMERCIAL

## 2021-07-12 VITALS
DIASTOLIC BLOOD PRESSURE: 72 MMHG | TEMPERATURE: 97.2 F | HEART RATE: 76 BPM | OXYGEN SATURATION: 97 % | RESPIRATION RATE: 20 BRPM | SYSTOLIC BLOOD PRESSURE: 112 MMHG

## 2021-07-12 DIAGNOSIS — M48.062 SPINAL STENOSIS OF LUMBAR REGION WITH NEUROGENIC CLAUDICATION: ICD-10-CM

## 2021-07-12 DIAGNOSIS — M51.26 LUMBAR DISC HERNIATION: ICD-10-CM

## 2021-07-12 PROCEDURE — 64484 NJX AA&/STRD TFRM EPI L/S EA: CPT | Performed by: ANESTHESIOLOGY

## 2021-07-12 PROCEDURE — 64483 NJX AA&/STRD TFRM EPI L/S 1: CPT | Performed by: ANESTHESIOLOGY

## 2021-07-12 RX ORDER — METHYLPREDNISOLONE ACETATE 80 MG/ML
160 INJECTION, SUSPENSION INTRA-ARTICULAR; INTRALESIONAL; INTRAMUSCULAR; PARENTERAL; SOFT TISSUE ONCE
Status: COMPLETED | OUTPATIENT
Start: 2021-07-12 | End: 2021-07-12

## 2021-07-12 RX ORDER — PAPAVERINE HCL 150 MG
20 CAPSULE, EXTENDED RELEASE ORAL ONCE
Status: DISCONTINUED | OUTPATIENT
Start: 2021-07-12 | End: 2021-07-13 | Stop reason: HOSPADM

## 2021-07-12 RX ADMIN — METHYLPREDNISOLONE ACETATE 160 MG: 80 INJECTION, SUSPENSION INTRA-ARTICULAR; INTRALESIONAL; INTRAMUSCULAR; SOFT TISSUE at 09:40

## 2021-07-12 RX ADMIN — LIDOCAINE HYDROCHLORIDE 2 ML: 20 INJECTION, SOLUTION EPIDURAL; INFILTRATION; INTRACAUDAL at 09:39

## 2021-07-12 RX ADMIN — IOHEXOL 1 ML: 300 INJECTION, SOLUTION INTRAVENOUS at 09:40

## 2021-07-12 NOTE — H&P
History of Present Illness: The patient is a 64 y o  female who presents with complaints of low back and leg pain      Patient Active Problem List   Diagnosis    Lumbar disc herniation    Spinal stenosis of lumbar region with neurogenic claudication       Past Medical History:   Diagnosis Date    ADHD (attention deficit hyperactivity disorder)     Anxiety     Arthritis     Depression     Fibromyalgia, primary     GERD (gastroesophageal reflux disease)     History of stomach ulcers     Hypertension     Panic attack     Papanicolaou smear 2020    Psychiatric disorder        Past Surgical History:   Procedure Laterality Date    HYSTEROSCOPY  2015    MAMMO (HISTORICAL)  2020         Current Outpatient Medications:     buPROPion (FORFIVO XL) 450 MG 24 hr tablet, Take 450 mg by mouth daily, Disp: , Rfl:     diazepam (VALIUM) 5 mg tablet, Take 1 tablet (5 mg total) by mouth once in imaging for anxiety for up to 2 doses (Patient not taking: Reported on 6/1/2021), Disp: 2 tablet, Rfl: 0    hydrochlorothiazide (HYDRODIURIL) 12 5 mg tablet, Take 12 5 mg by mouth daily, Disp: , Rfl:     losartan (COZAAR) 100 MG tablet, Take 100 mg by mouth daily, Disp: , Rfl:     Methylphenidate HCl (RITALIN PO), Take 18 mg by mouth daily, Disp: , Rfl:     methylPREDNISolone 4 MG tablet therapy pack, Use as directed on package, Disp: 21 tablet, Rfl: 0    omeprazole (PriLOSEC) 40 MG capsule, Take 40 mg by mouth daily Take before a meal, Disp: , Rfl:     phenazopyridine (PYRIDIUM) 200 mg tablet, Take 1 tablet (200 mg total) by mouth 3 (three) times a day, Disp: 6 tablet, Rfl: 0    QUEtiapine (SEROQUEL) 100 mg tablet, Take 100 mg by mouth daily at bedtime, Disp: , Rfl:     sertraline (ZOLOFT) 50 mg tablet, Take 50 mg by mouth daily, Disp: , Rfl:     Sertraline HCl (ZOLOFT PO), Take 150 mg by mouth daily, Disp: , Rfl:     Vortioxetine HBr (TRINTELLIX) 5 MG TABS, Take 1 tablet by mouth daily, Disp: , Rfl:     Current Facility-Administered Medications:     dexamethasone (PF) (DECADRON) injection 20 mg, 20 mg, Epidural, Once, Remigio Arenas DO    iohexol (OMNIPAQUE) 300 mg/mL injection 50 mL, 50 mL, Epidural, Once, Remigio Arenas DO    lidocaine (PF) (XYLOCAINE-MPF) 2 % injection 5 mL, 5 mL, Epidural, Once, Remigio Arenas DO    Allergies   Allergen Reactions    Latex Rash       Physical Exam:   General: Awake, Alert, Oriented x 3, Mood and affect appropriate  Respiratory: Respirations even and unlabored  Cardiovascular: Peripheral pulses intact; no edema  Musculoskeletal Exam:  Decreased range of motion lumbar spine    ASA Score: III         Assessment:   1  Lumbar disc herniation    2  Spinal stenosis of lumbar region with neurogenic claudication        Plan: Left L5 and left S1 TFESI 2

## 2021-07-12 NOTE — DISCHARGE INSTRUCTIONS
Epidural Steroid Injection   WHAT YOU NEED TO KNOW:   An epidural steroid injection (NEVILLE) is a procedure to inject steroid medicine into the epidural space  The epidural space is between your spinal cord and vertebrae  Steroids reduce inflammation and fluid buildup in your spine that may be causing pain  You may be given pain medicine along with the steroids  ACTIVITY  · Do not drive or operate machinery today  · No strenuous activity today - bending, lifting, etc   · You may resume normal activites starting tomorrow - start slowly and as tolerated  · You may shower today, but no tub baths or hot tubs  · You may have numbness for several hours from the local anesthetic  Please use caution and common sense, especially with weight-bearing activities  CARE OF THE INJECTION SITE  · If you have soreness or pain, apply ice to the area today (20 minutes on/20 minutes off)  · Starting tomorrow, you may use warm, moist heat or ice if needed  · You may have an increase or change in your discomfort for 36-48 hours after your treatment  · Apply ice and continue with any pain medication you have been prescribed  · [facility]ify [facility] if you have any of the following: redness, drainage, swelling, headache, stiff neck or fever above 100°F     SPECIAL INSTRUCTIONS  · Our office will contact you in approximately 7 days for a progress report  MEDICATIONS  · Continue to take all routine medications  · Our office may have instructed you to hold some medications  As no general anesthesia was used in today's procedure, you should not experience any side effects related to anesthesia  If you have a problem specifically related to your procedure, please call our office at [phone]  Problems not related to your procedure should be directed to your primary care physician

## 2021-07-13 ENCOUNTER — VBI (OUTPATIENT)
Dept: ADMINISTRATIVE | Facility: OTHER | Age: 56
End: 2021-07-13

## 2021-07-13 NOTE — TELEPHONE ENCOUNTER
Upon review of the In Basket request we were able to locate, review, and update the patient chart as requested for Mammogram and Pap Smear (HPV) aka Cervical Cancer Screening  Any additional questions or concerns should be emailed to the Practice Liaisons via [email or web address] email, please do not reply via In Basket      Thank you  Santosh Montiel

## 2021-07-17 ENCOUNTER — LAB (OUTPATIENT)
Dept: LAB | Facility: HOSPITAL | Age: 56
End: 2021-07-17
Payer: COMMERCIAL

## 2021-07-17 DIAGNOSIS — E55.9 AVITAMINOSIS D: ICD-10-CM

## 2021-07-17 DIAGNOSIS — E66.3 SEVERELY OVERWEIGHT: ICD-10-CM

## 2021-07-17 DIAGNOSIS — I51.9 MYXEDEMA HEART DISEASE: ICD-10-CM

## 2021-07-17 DIAGNOSIS — E03.9 MYXEDEMA HEART DISEASE: ICD-10-CM

## 2021-07-17 DIAGNOSIS — I10 ESSENTIAL HYPERTENSION, MALIGNANT: ICD-10-CM

## 2021-07-17 DIAGNOSIS — E78.00 PURE HYPERCHOLESTEROLEMIA: ICD-10-CM

## 2021-07-17 LAB
25(OH)D3 SERPL-MCNC: 48.5 NG/ML (ref 30–100)
ALBUMIN SERPL BCP-MCNC: 3.6 G/DL (ref 3.5–5)
ALP SERPL-CCNC: 107 U/L (ref 46–116)
ALT SERPL W P-5'-P-CCNC: 26 U/L (ref 12–78)
ANION GAP SERPL CALCULATED.3IONS-SCNC: 4 MMOL/L (ref 4–13)
AST SERPL W P-5'-P-CCNC: 10 U/L (ref 5–45)
BASOPHILS # BLD AUTO: 0.05 THOUSANDS/ΜL (ref 0–0.1)
BASOPHILS NFR BLD AUTO: 1 % (ref 0–1)
BILIRUB SERPL-MCNC: 0.32 MG/DL (ref 0.2–1)
BUN SERPL-MCNC: 16 MG/DL (ref 5–25)
CALCIUM SERPL-MCNC: 9.4 MG/DL (ref 8.3–10.1)
CHLORIDE SERPL-SCNC: 104 MMOL/L (ref 100–108)
CHOLEST SERPL-MCNC: 203 MG/DL (ref 50–200)
CO2 SERPL-SCNC: 29 MMOL/L (ref 21–32)
CREAT SERPL-MCNC: 0.74 MG/DL (ref 0.6–1.3)
EOSINOPHIL # BLD AUTO: 0.15 THOUSAND/ΜL (ref 0–0.61)
EOSINOPHIL NFR BLD AUTO: 2 % (ref 0–6)
ERYTHROCYTE [DISTWIDTH] IN BLOOD BY AUTOMATED COUNT: 15.9 % (ref 11.6–15.1)
GFR SERPL CREATININE-BSD FRML MDRD: 91 ML/MIN/1.73SQ M
GLUCOSE P FAST SERPL-MCNC: 89 MG/DL (ref 65–99)
HCT VFR BLD AUTO: 37.1 % (ref 34.8–46.1)
HDLC SERPL-MCNC: 119 MG/DL
HGB BLD-MCNC: 11.6 G/DL (ref 11.5–15.4)
IMM GRANULOCYTES # BLD AUTO: 0.04 THOUSAND/UL (ref 0–0.2)
IMM GRANULOCYTES NFR BLD AUTO: 1 % (ref 0–2)
LDLC SERPL CALC-MCNC: 68 MG/DL (ref 0–100)
LYMPHOCYTES # BLD AUTO: 1.24 THOUSANDS/ΜL (ref 0.6–4.47)
LYMPHOCYTES NFR BLD AUTO: 16 % (ref 14–44)
MCH RBC QN AUTO: 25.4 PG (ref 26.8–34.3)
MCHC RBC AUTO-ENTMCNC: 31.3 G/DL (ref 31.4–37.4)
MCV RBC AUTO: 81 FL (ref 82–98)
MONOCYTES # BLD AUTO: 0.75 THOUSAND/ΜL (ref 0.17–1.22)
MONOCYTES NFR BLD AUTO: 10 % (ref 4–12)
NEUTROPHILS # BLD AUTO: 5.36 THOUSANDS/ΜL (ref 1.85–7.62)
NEUTS SEG NFR BLD AUTO: 70 % (ref 43–75)
NONHDLC SERPL-MCNC: 84 MG/DL
NRBC BLD AUTO-RTO: 0 /100 WBCS
PLATELET # BLD AUTO: 381 THOUSANDS/UL (ref 149–390)
PMV BLD AUTO: 11.1 FL (ref 8.9–12.7)
POTASSIUM SERPL-SCNC: 3.9 MMOL/L (ref 3.5–5.3)
PROT SERPL-MCNC: 7.6 G/DL (ref 6.4–8.2)
RBC # BLD AUTO: 4.57 MILLION/UL (ref 3.81–5.12)
SODIUM SERPL-SCNC: 137 MMOL/L (ref 136–145)
T4 FREE SERPL-MCNC: 0.66 NG/DL (ref 0.76–1.46)
TRIGL SERPL-MCNC: 78 MG/DL
TSH SERPL DL<=0.05 MIU/L-ACNC: 2.18 UIU/ML (ref 0.36–3.74)
VIT B12 SERPL-MCNC: 435 PG/ML (ref 100–900)
WBC # BLD AUTO: 7.59 THOUSAND/UL (ref 4.31–10.16)

## 2021-07-17 PROCEDURE — 82607 VITAMIN B-12: CPT

## 2021-07-17 PROCEDURE — 80053 COMPREHEN METABOLIC PANEL: CPT

## 2021-07-17 PROCEDURE — 84439 ASSAY OF FREE THYROXINE: CPT

## 2021-07-17 PROCEDURE — 80061 LIPID PANEL: CPT

## 2021-07-17 PROCEDURE — 36415 COLL VENOUS BLD VENIPUNCTURE: CPT

## 2021-07-17 PROCEDURE — 84443 ASSAY THYROID STIM HORMONE: CPT

## 2021-07-17 PROCEDURE — 85025 COMPLETE CBC W/AUTO DIFF WBC: CPT

## 2021-07-17 PROCEDURE — 82306 VITAMIN D 25 HYDROXY: CPT

## 2021-07-19 ENCOUNTER — TELEPHONE (OUTPATIENT)
Dept: PAIN MEDICINE | Facility: CLINIC | Age: 56
End: 2021-07-19

## 2021-07-19 NOTE — TELEPHONE ENCOUNTER
1st attempt  Lm to cb with % improvement and pain level.       S/p Lt L5 and Lt S1 #2 7/12 F/u 8/13

## 2021-07-22 ENCOUNTER — TELEPHONE (OUTPATIENT)
Dept: PAIN MEDICINE | Facility: CLINIC | Age: 56
End: 2021-07-22

## 2021-07-22 NOTE — TELEPHONE ENCOUNTER
Procedure  /  07/12/21    Patient is returning a call from RN regarding her pain     Still has pain when she is standing however it  does seem to improve when she is up and about walking      Over all she is feeling better     pain level 4/10        Best Contact number for José Luis Ford at  [facility]    She is down in Ortho

## 2021-07-23 NOTE — TELEPHONE ENCOUNTER
S/w pt, states she has 80 - 85% improvement after her most recent procedure and is happy with the amt of relief  Advised pt to fu as scheduled w/ DG on 8/13  Cb sooner if questions / concerns arise  Pt verbalized understanding and appreciation  Pt stated that she did get a denial for procedure #2 s/t poor relief after the first procedure  Pt stated that she has reached out to [address] [place] [address] to discuss

## 2021-07-27 NOTE — TELEPHONE ENCOUNTER
Pt states that her pain level and % of releif have gotten better and that she feels that she has about 45-50% relief at the 2 week maude

## 2021-08-13 ENCOUNTER — OFFICE VISIT (OUTPATIENT)
Dept: PAIN MEDICINE | Facility: CLINIC | Age: 56
End: 2021-08-13
Payer: COMMERCIAL

## 2021-08-13 VITALS
HEIGHT: 67 IN | TEMPERATURE: 97.9 F | SYSTOLIC BLOOD PRESSURE: 116 MMHG | BODY MASS INDEX: 34.37 KG/M2 | HEART RATE: 64 BPM | WEIGHT: 219 LBS | DIASTOLIC BLOOD PRESSURE: 70 MMHG

## 2021-08-13 DIAGNOSIS — M48.062 SPINAL STENOSIS OF LUMBAR REGION WITH NEUROGENIC CLAUDICATION: ICD-10-CM

## 2021-08-13 DIAGNOSIS — M54.16 LUMBAR RADICULOPATHY: ICD-10-CM

## 2021-08-13 DIAGNOSIS — G89.29 CHRONIC BILATERAL LOW BACK PAIN WITHOUT SCIATICA: Primary | ICD-10-CM

## 2021-08-13 DIAGNOSIS — M54.50 CHRONIC BILATERAL LOW BACK PAIN WITHOUT SCIATICA: Primary | ICD-10-CM

## 2021-08-13 DIAGNOSIS — M51.26 LUMBAR DISC HERNIATION: ICD-10-CM

## 2021-08-13 PROCEDURE — 99213 OFFICE O/P EST LOW 20 MIN: CPT | Performed by: NURSE PRACTITIONER

## 2021-08-13 NOTE — PROGRESS NOTES
Assessment:  1  Chronic bilateral low back pain without sciatica    2  Lumbar radiculopathy    3  Lumbar disc herniation    4  Spinal stenosis of lumbar region with neurogenic claudication        Plan:    While the patient was in the office today, I discussed with the patient that at this point time, since it is her wishes and she is noting moderate to significant overall relief, for now, we will hold off any repeat injections and see how she does  However, explained to the patient that if in the next 2-3 months she would start to feel as though her pain symptoms or returning and she would like to proceed with a repeat injection, she should call our office and we will proceed from there as appropriate  The patient was agreeable and verbalized an understanding  I did discuss with the patient that another option would be to consider proceeding with a surgical consultation to see what her surgical options would be, however, for now, the patient was not interested in a surgical consultation  I encouraged the patient to continue and be more diligent about her home exercise and stretching program, advising her to slowly and steadily increase her activity, as tolerated, allowing pain be her guide  The patient was agreeable and verbalized an understanding  I discussed with the patient that at this point time she can followup with our office on an as-needed basis  I did review the patient that if her pain symptoms should change, worsen, and/or if she would experience any new symptoms as she would like to be evaluated for, she should give our office a call  The patient was agreeable and verbalized an understanding  History of Present Illness: The patient is a 64 y o  female last seen on 7/12/2021 who presents for a follow up office visit in regards to Chronic low back pain with radiculopathysecondary to herniated lumbar disc with stenosis    The patient currently reports that since her last office visit as she is status post her 2nd left L5 and S1 transforaminal epidural steroid injection on July 12, 2021 with Dr Ge Bragg, that she is noting at least 65% overall relief of her low back and leg pain as a result of the injections  The patient reports that the leg pain itself is what is improve the most and although she does continue with the chronic and dull aching low back pain she does feel it is a little bit more manageable since the injections  The patient reports that this point she feels what would help her back pain is if she was more diligent in consistent with her home exercise and stretching program and is really going to take the next several weeks to work on that  She presents today for regular postprocedure follow-up visit  I have personally reviewed and/or updated the patient's past medical history, past surgical history, family history, social history, current medications, allergies, and vital signs today  Review of Systems:    Review of Systems   Respiratory: Negative for shortness of breath  Cardiovascular: Negative for chest pain  Gastrointestinal: Negative for constipation, diarrhea, nausea and vomiting  Musculoskeletal: Positive for gait problem  Negative for arthralgias, joint swelling (joint stiffness) and myalgias  Skin: Positive for rash  Neurological: Positive for weakness  Negative for dizziness and seizures  All other systems reviewed and are negative          Past Medical History:   Diagnosis Date    ADHD (attention deficit hyperactivity disorder)     Anxiety     Arthritis     Depression     Fibromyalgia, primary     GERD (gastroesophageal reflux disease)     History of stomach ulcers     Hypertension     Panic attack     Papanicolaou smear 2020    Psychiatric disorder        Past Surgical History:   Procedure Laterality Date    HYSTEROSCOPY  2015    MAMMO (HISTORICAL)  2020       Family History   Problem Relation Age of Onset    Hypertension Mother     Lung cancer Mother     Skin cancer Father        Social History     Occupational History    Occupation: Tech  Tobacco Use    Smoking status: Never Smoker    Smokeless tobacco: Never Used   Substance and Sexual Activity    Alcohol use: No    Drug use: No    Sexual activity: Not Currently     Birth control/protection: Post-menopausal         Current Outpatient Medications:     buPROPion (FORFIVO XL) 450 MG 24 hr tablet, Take 450 mg by mouth daily, Disp: , Rfl:     hydrochlorothiazide (HYDRODIURIL) 12 5 mg tablet, Take 12 5 mg by mouth daily, Disp: , Rfl:     losartan (COZAAR) 100 MG tablet, Take 100 mg by mouth daily, Disp: , Rfl:     omeprazole (PriLOSEC) 40 MG capsule, Take 40 mg by mouth daily Take before a meal, Disp: , Rfl:     QUEtiapine (SEROQUEL) 100 mg tablet, Take 100 mg by mouth daily at bedtime, Disp: , Rfl:     sertraline (ZOLOFT) 50 mg tablet, Take 50 mg by mouth daily, Disp: , Rfl:     diazepam (VALIUM) 5 mg tablet, Take 1 tablet (5 mg total) by mouth once in imaging for anxiety for up to 2 doses (Patient not taking: Reported on 6/1/2021), Disp: 2 tablet, Rfl: 0    Allergies   Allergen Reactions    Latex Rash       Physical Exam:    /70 (BP Location: Left arm, Patient Position: Sitting, Cuff Size: Standard)   Pulse 64   Temp 97 9 °F (36 6 °C)   Ht 5' 7" (1 702 m)   Wt 99 3 kg (219 lb)   BMI 34 30 kg/m²     Constitutional:normal, well developed, well nourished, alert, in no distress and non-toxic and no overt pain behavior    Eyes:anicteric  HEENT:grossly intact  Neck:supple, symmetric, trachea midline and no masses   Pulmonary:even and unlabored  Cardiovascular:No edema or pitting edema present  Skin:Normal without rashes or lesions and well hydrated  Psychiatric:Mood and affect appropriate  Neurologic:Cranial Nerves II-XII grossly intact  Musculoskeletal:The patient's gait was slightly antalgic, but steady without the use of any assistive devices  Imaging  No orders to display         No orders of the defined types were placed in this encounter

## 2021-08-18 NOTE — PROGRESS NOTES
Assessment/Plan:  Calcium 1200-1500mg + 600-1000 IU Vit D daily  Exercise 150 minutes per week minimum including weight bearing exercises  Pap with high risk HPV Q 3- 5 years  Annual mammogram and monthly breast self exam recommended  Colonoscopy-UTD   Kegels 20 times twice daily  Eval with urogynecologist for increased incont grade 1-2 cystocele  Left breast pain Dx mammo and US ordered  Limit caffiene       Diagnoses and all orders for this visit:    Encounter for gynecological examination without abnormal finding    Mastodynia of left breast  Comments:  normal exam  Orders:  -     Mammo diagnostic bilateral w 3d & cad; Future  -     US breast left limited (diagnostic); Future    Mixed stress and urge urinary incontinence  Comments:  recommend eval urogyn  Slight bladder prolapse  Other orders  -     Ferrous Sulfate (IRON PO); Take by mouth          Subjective:      Patient ID: Antoinette Bhakta is a 64 y o  female  Here for annual gyn Last [facility] 1/2020  PAP neg/neg HPV  NSA x 10 years  G0  H/o mixed UI  Starting to effect her life  Get up to pee in middle of night and is incont  Had elevated ALT followed w/ PCP all ok since then  New Pain left breast few weeks around areola/nipple  when takes bra off it aches  + caffiene Has gained weight ? Contributing  Overdue for mammo New iron deficiency anemia On Iron supplement  through PCP  No vaginal bleeding  Bowels normal  Colonoscopy UTD 2 years in Sept normal 5 year repeat  Weight is bad making back hurt more Seems when back hurts she cannot control her urine  ?  Nerve involvement  Had bad UTI in April and one the year before  Nocturia past 5-6 months  Not doing Kegel exercise  H/o spinal stenosis and herniated disc spine is curving did PT  2 epidurals  Works at [employer]         The following portions of the patient's history were reviewed and updated as appropriate: allergies, current medications, past family history, past medical history, past social history, past surgical history and problem list     Review of Systems   Constitutional: Positive for unexpected weight change  Negative for fatigue  Gastrointestinal: Negative for abdominal distention, abdominal pain, constipation and diarrhea  Genitourinary: Positive for enuresis and urgency  Negative for difficulty urinating, dyspareunia, dysuria, frequency, genital sores, pelvic pain, vaginal bleeding, vaginal discharge and vaginal pain  Musculoskeletal: Positive for back pain and myalgias  Neurological: Negative for headaches  Psychiatric/Behavioral: Negative  Negative for dysphoric mood  The patient is not nervous/anxious  Objective:      /70   Ht 5' 6" (1 676 m)   Wt 99 2 kg (218 lb 9 6 oz)   BMI 35 28 kg/m²          Physical Exam  Vitals and nursing note reviewed  Constitutional:       General: She is not in acute distress  Appearance: Normal appearance  HENT:      Head: Normocephalic and atraumatic  Cardiovascular:      Rate and Rhythm: Normal rate and regular rhythm  Pulmonary:      Effort: Pulmonary effort is normal       Breath sounds: Normal breath sounds  Chest:      Breasts: Breasts are symmetrical          Right: Normal  No mass, nipple discharge, skin change or tenderness  Left: Normal  No mass, nipple discharge, skin change or tenderness  Abdominal:      General: There is no distension  Palpations: Abdomen is soft  Tenderness: There is no abdominal tenderness  There is no guarding or rebound  Genitourinary:     General: Normal vulva  Exam position: Lithotomy position  Labia:         Right: No rash, tenderness, lesion or injury  Left: No rash, tenderness, lesion or injury  Urethra: No prolapse, urethral pain, urethral swelling or urethral lesion  Vagina: Normal  No erythema or lesions  Cervix: No cervical motion tenderness, discharge, lesion or cervical bleeding        Uterus: Normal        Adnexa: Right adnexa normal and left adnexa normal         Right: No mass or tenderness  Left: No mass or tenderness  Rectum: No mass or external hemorrhoid  Comments: Grade 1-2 cystocele, atrophic mucosa  Musculoskeletal:         General: Normal range of motion  Lymphadenopathy:      Upper Body:      Right upper body: No axillary adenopathy  Left upper body: No axillary adenopathy  Lower Body: No right inguinal adenopathy  No left inguinal adenopathy  Skin:     General: Skin is warm and dry  Neurological:      Mental Status: She is alert and oriented to person, place, and time  Psychiatric:         Mood and Affect: Mood normal          Behavior: Behavior normal          Thought Content:  Thought content normal          Judgment: Judgment normal

## 2021-08-18 NOTE — PATIENT INSTRUCTIONS
Calcium 1200-1500mg + 600-1000 IU Vit D daily  Exercise 150 minutes per week minimum including weight bearing exercises  Pap with high risk HPV Q 3- 5 years  Annual mammogram and monthly breast self exam recommended  Colonoscopy-UTD   Kegels 20 times twice daily    Eval with urogynecologist for increased incont grade 1-2 cystocele

## 2021-08-19 ENCOUNTER — ANNUAL EXAM (OUTPATIENT)
Dept: OBGYN CLINIC | Facility: CLINIC | Age: 56
End: 2021-08-19
Payer: COMMERCIAL

## 2021-08-19 VITALS
SYSTOLIC BLOOD PRESSURE: 118 MMHG | WEIGHT: 218.6 LBS | DIASTOLIC BLOOD PRESSURE: 70 MMHG | HEIGHT: 66 IN | BODY MASS INDEX: 35.13 KG/M2

## 2021-08-19 DIAGNOSIS — N64.4 MASTODYNIA OF LEFT BREAST: ICD-10-CM

## 2021-08-19 DIAGNOSIS — Z01.419 ENCOUNTER FOR GYNECOLOGICAL EXAMINATION WITHOUT ABNORMAL FINDING: Primary | ICD-10-CM

## 2021-08-19 DIAGNOSIS — N39.46 MIXED STRESS AND URGE URINARY INCONTINENCE: ICD-10-CM

## 2021-08-19 PROCEDURE — S0612 ANNUAL GYNECOLOGICAL EXAMINA: HCPCS | Performed by: NURSE PRACTITIONER

## 2021-11-02 ENCOUNTER — HOSPITAL ENCOUNTER (OUTPATIENT)
Dept: ULTRASOUND IMAGING | Facility: CLINIC | Age: 56
Discharge: HOME | End: 2021-11-02
Payer: COMMERCIAL

## 2021-11-02 ENCOUNTER — HOSPITAL ENCOUNTER (OUTPATIENT)
Dept: MAMMOGRAPHY | Facility: CLINIC | Age: 56
Discharge: HOME | End: 2021-11-02
Payer: COMMERCIAL

## 2021-11-02 VITALS — WEIGHT: 218 LBS | BODY MASS INDEX: 35.03 KG/M2 | HEIGHT: 66 IN

## 2021-11-02 DIAGNOSIS — N64.4 MASTODYNIA OF LEFT BREAST: ICD-10-CM

## 2021-11-02 PROCEDURE — 76642 ULTRASOUND BREAST LIMITED: CPT

## 2021-11-02 PROCEDURE — G0279 TOMOSYNTHESIS, MAMMO: HCPCS

## 2021-11-02 PROCEDURE — 77066 DX MAMMO INCL CAD BI: CPT

## 2021-11-19 ENCOUNTER — APPOINTMENT (OUTPATIENT)
Dept: RADIOLOGY | Facility: CLINIC | Age: 56
End: 2021-11-19
Payer: COMMERCIAL

## 2021-11-19 ENCOUNTER — OFFICE VISIT (OUTPATIENT)
Dept: OBGYN CLINIC | Facility: CLINIC | Age: 56
End: 2021-11-19
Payer: COMMERCIAL

## 2021-11-19 VITALS
SYSTOLIC BLOOD PRESSURE: 130 MMHG | BODY MASS INDEX: 34.69 KG/M2 | WEIGHT: 221 LBS | HEIGHT: 67 IN | DIASTOLIC BLOOD PRESSURE: 85 MMHG

## 2021-11-19 DIAGNOSIS — M25.561 ACUTE PAIN OF RIGHT KNEE: ICD-10-CM

## 2021-11-19 DIAGNOSIS — M17.0 PRIMARY OSTEOARTHRITIS OF BOTH KNEES: Primary | ICD-10-CM

## 2021-11-19 DIAGNOSIS — M25.562 ACUTE PAIN OF LEFT KNEE: ICD-10-CM

## 2021-11-19 PROCEDURE — 73562 X-RAY EXAM OF KNEE 3: CPT

## 2021-11-19 PROCEDURE — 99243 OFF/OP CNSLTJ NEW/EST LOW 30: CPT | Performed by: ORTHOPAEDIC SURGERY

## 2021-11-19 PROCEDURE — 73564 X-RAY EXAM KNEE 4 OR MORE: CPT

## 2021-12-06 ENCOUNTER — EVALUATION (OUTPATIENT)
Dept: PHYSICAL THERAPY | Facility: CLINIC | Age: 56
End: 2021-12-06
Payer: COMMERCIAL

## 2021-12-06 DIAGNOSIS — M25.561 ACUTE PAIN OF RIGHT KNEE: ICD-10-CM

## 2021-12-06 DIAGNOSIS — M25.562 ACUTE PAIN OF LEFT KNEE: ICD-10-CM

## 2021-12-06 DIAGNOSIS — M17.0 PRIMARY OSTEOARTHRITIS OF BOTH KNEES: Primary | ICD-10-CM

## 2021-12-06 PROCEDURE — 97162 PT EVAL MOD COMPLEX 30 MIN: CPT | Performed by: PHYSICAL THERAPIST

## 2021-12-06 PROCEDURE — 97110 THERAPEUTIC EXERCISES: CPT | Performed by: PHYSICAL THERAPIST

## 2021-12-09 ENCOUNTER — OFFICE VISIT (OUTPATIENT)
Dept: PHYSICAL THERAPY | Facility: CLINIC | Age: 56
End: 2021-12-09
Payer: COMMERCIAL

## 2021-12-09 DIAGNOSIS — M25.561 ACUTE PAIN OF RIGHT KNEE: ICD-10-CM

## 2021-12-09 DIAGNOSIS — M25.562 ACUTE PAIN OF LEFT KNEE: ICD-10-CM

## 2021-12-09 DIAGNOSIS — M17.0 PRIMARY OSTEOARTHRITIS OF BOTH KNEES: Primary | ICD-10-CM

## 2021-12-09 PROCEDURE — 97110 THERAPEUTIC EXERCISES: CPT | Performed by: PHYSICAL THERAPIST

## 2021-12-09 PROCEDURE — 97140 MANUAL THERAPY 1/> REGIONS: CPT | Performed by: PHYSICAL THERAPIST

## 2021-12-13 ENCOUNTER — APPOINTMENT (OUTPATIENT)
Dept: PHYSICAL THERAPY | Facility: CLINIC | Age: 56
End: 2021-12-13
Payer: COMMERCIAL

## 2021-12-14 ENCOUNTER — OFFICE VISIT (OUTPATIENT)
Dept: PHYSICAL THERAPY | Facility: CLINIC | Age: 56
End: 2021-12-14
Payer: COMMERCIAL

## 2021-12-14 DIAGNOSIS — M17.0 PRIMARY OSTEOARTHRITIS OF BOTH KNEES: Primary | ICD-10-CM

## 2021-12-14 DIAGNOSIS — M25.561 ACUTE PAIN OF RIGHT KNEE: ICD-10-CM

## 2021-12-14 DIAGNOSIS — M25.562 ACUTE PAIN OF LEFT KNEE: ICD-10-CM

## 2021-12-14 PROCEDURE — 97110 THERAPEUTIC EXERCISES: CPT

## 2021-12-16 ENCOUNTER — OFFICE VISIT (OUTPATIENT)
Dept: PHYSICAL THERAPY | Facility: CLINIC | Age: 56
End: 2021-12-16
Payer: COMMERCIAL

## 2021-12-16 DIAGNOSIS — M25.562 ACUTE PAIN OF LEFT KNEE: ICD-10-CM

## 2021-12-16 DIAGNOSIS — M25.561 ACUTE PAIN OF RIGHT KNEE: ICD-10-CM

## 2021-12-16 DIAGNOSIS — M17.0 PRIMARY OSTEOARTHRITIS OF BOTH KNEES: Primary | ICD-10-CM

## 2021-12-16 PROCEDURE — 97110 THERAPEUTIC EXERCISES: CPT | Performed by: PHYSICAL THERAPIST

## 2021-12-16 PROCEDURE — 97140 MANUAL THERAPY 1/> REGIONS: CPT | Performed by: PHYSICAL THERAPIST

## 2021-12-21 ENCOUNTER — OFFICE VISIT (OUTPATIENT)
Dept: PHYSICAL THERAPY | Facility: CLINIC | Age: 56
End: 2021-12-21
Payer: COMMERCIAL

## 2021-12-21 DIAGNOSIS — M25.562 ACUTE PAIN OF LEFT KNEE: ICD-10-CM

## 2021-12-21 DIAGNOSIS — M25.561 ACUTE PAIN OF RIGHT KNEE: ICD-10-CM

## 2021-12-21 DIAGNOSIS — M17.0 PRIMARY OSTEOARTHRITIS OF BOTH KNEES: Primary | ICD-10-CM

## 2021-12-21 PROCEDURE — 97110 THERAPEUTIC EXERCISES: CPT | Performed by: PHYSICAL THERAPIST

## 2021-12-21 PROCEDURE — 97112 NEUROMUSCULAR REEDUCATION: CPT | Performed by: PHYSICAL THERAPIST

## 2021-12-21 PROCEDURE — 97140 MANUAL THERAPY 1/> REGIONS: CPT | Performed by: PHYSICAL THERAPIST

## 2021-12-23 ENCOUNTER — OFFICE VISIT (OUTPATIENT)
Dept: PHYSICAL THERAPY | Facility: CLINIC | Age: 56
End: 2021-12-23
Payer: COMMERCIAL

## 2021-12-23 DIAGNOSIS — M25.562 ACUTE PAIN OF LEFT KNEE: ICD-10-CM

## 2021-12-23 DIAGNOSIS — M25.561 ACUTE PAIN OF RIGHT KNEE: ICD-10-CM

## 2021-12-23 DIAGNOSIS — M17.0 PRIMARY OSTEOARTHRITIS OF BOTH KNEES: Primary | ICD-10-CM

## 2021-12-23 PROCEDURE — 97112 NEUROMUSCULAR REEDUCATION: CPT | Performed by: PHYSICAL THERAPIST

## 2021-12-23 PROCEDURE — 97140 MANUAL THERAPY 1/> REGIONS: CPT | Performed by: PHYSICAL THERAPIST

## 2021-12-23 PROCEDURE — 97110 THERAPEUTIC EXERCISES: CPT | Performed by: PHYSICAL THERAPIST

## 2021-12-28 ENCOUNTER — OFFICE VISIT (OUTPATIENT)
Dept: PHYSICAL THERAPY | Facility: CLINIC | Age: 56
End: 2021-12-28
Payer: COMMERCIAL

## 2021-12-28 DIAGNOSIS — M17.0 PRIMARY OSTEOARTHRITIS OF BOTH KNEES: Primary | ICD-10-CM

## 2021-12-28 DIAGNOSIS — M25.561 ACUTE PAIN OF RIGHT KNEE: ICD-10-CM

## 2021-12-28 DIAGNOSIS — M25.562 ACUTE PAIN OF LEFT KNEE: ICD-10-CM

## 2021-12-28 PROCEDURE — 97110 THERAPEUTIC EXERCISES: CPT | Performed by: PHYSICAL THERAPIST

## 2021-12-28 PROCEDURE — 97112 NEUROMUSCULAR REEDUCATION: CPT | Performed by: PHYSICAL THERAPIST

## 2021-12-28 PROCEDURE — 97140 MANUAL THERAPY 1/> REGIONS: CPT | Performed by: PHYSICAL THERAPIST

## 2021-12-30 ENCOUNTER — OFFICE VISIT (OUTPATIENT)
Dept: PHYSICAL THERAPY | Facility: CLINIC | Age: 56
End: 2021-12-30
Payer: COMMERCIAL

## 2021-12-30 DIAGNOSIS — M25.561 ACUTE PAIN OF RIGHT KNEE: ICD-10-CM

## 2021-12-30 DIAGNOSIS — M25.562 ACUTE PAIN OF LEFT KNEE: ICD-10-CM

## 2021-12-30 DIAGNOSIS — M17.0 PRIMARY OSTEOARTHRITIS OF BOTH KNEES: Primary | ICD-10-CM

## 2021-12-30 PROCEDURE — 97110 THERAPEUTIC EXERCISES: CPT

## 2021-12-30 PROCEDURE — 97140 MANUAL THERAPY 1/> REGIONS: CPT

## 2022-01-04 ENCOUNTER — OFFICE VISIT (OUTPATIENT)
Dept: PHYSICAL THERAPY | Facility: CLINIC | Age: 57
End: 2022-01-04
Payer: COMMERCIAL

## 2022-01-04 DIAGNOSIS — M17.0 PRIMARY OSTEOARTHRITIS OF BOTH KNEES: Primary | ICD-10-CM

## 2022-01-04 DIAGNOSIS — M25.561 ACUTE PAIN OF RIGHT KNEE: ICD-10-CM

## 2022-01-04 DIAGNOSIS — M25.562 ACUTE PAIN OF LEFT KNEE: ICD-10-CM

## 2022-01-04 PROCEDURE — 97110 THERAPEUTIC EXERCISES: CPT | Performed by: PHYSICAL THERAPIST

## 2022-01-04 PROCEDURE — 97140 MANUAL THERAPY 1/> REGIONS: CPT | Performed by: PHYSICAL THERAPIST

## 2022-01-04 PROCEDURE — 97112 NEUROMUSCULAR REEDUCATION: CPT | Performed by: PHYSICAL THERAPIST

## 2022-01-04 NOTE — PROGRESS NOTES
Daily Note     Today's date: 2022  Patient name: Anand Mosher  : 1965  MRN: [id number]  Referring provider: Tito Alvarado DO  Dx:   Encounter Diagnosis     ICD-10-CM    1  Primary osteoarthritis of both knees  M17 0    2  Acute pain of right knee  M25 561    3  Acute pain of left knee  M25 562                   Subjective: Patient reports increased pain over the weekend caring for her dog  She notes her hips and back are really painful today  Objective: See treatment diary below      Assessment:   Stage: acute on chronic   Stability of Symptoms:  At Evaluation: stable - unchanged  Global Rating of Change:   Symptom Irritability Level: moderate  Primary Movement Diagnosis: poor motor control   Patient Specific Functional Scale:   21: return to an active lifestyle - walk dogs 0/10, return to regular exercise program 0/10, return to hiking - over 2 miles 0/10; Total   FOTO Prediction: 57 by visit 12  FOTO Progress: 40 at evaluation; 22: 72  Greatest Concern: not being as active as she would like   Current Activity Recommendations: added to HEP ()  Current Educational Needs: progressions    Patient had good tolerance to manual treatment - decreased knee and hip pain following  She had good overall tolerance to her exercise treatment - demonstrated I with HEP  She improved significantly with FOTO score for her knee        Plan: Evaluate Lower Back NV       Daily Treatment Diary     DX: (B) Knee pain  EPOC: 22  Precautions: standard  CO-MORBIDITIES: Fibromyalgia, HTN   HEP ACCESS CODE: [facility]      Treatment Diary        Manual Therapy         (B) Knee Traction 2 min ea 2 min ea 2 min ea      (B) Hip Mobs Inf, LAD  Gr 3/4  4 min  Total  LAD Inf, LAD  Gr 3/4  4 min  Total                                                    Therapeutic Exercise  HEP         Recumbent Bike  6 min 6 min 6 min                 Heel Slides          SLR  15x ea 15x ea 15x ea S/L Hip ABD 12/6 15x ea 15x ea 15x ea      Bridge 12/6 5"x15 15x5" 5"x15      PHE 12/6 15x ea 15x ea 15x ea                LAQ  3#   20x ea 3#   20x ea 3#  20x ea                Neuromuscular Reeducation          TB Counter Balance                    FWD Mini Lunge          LAT Mini Lunge          Mini Squat                    FWD Step Up  6" Step  15x ea 6" Step  15x ea 6" step  15x ea      LAT Step Up                                        Therapeutic Activity                              Gait Training                                        Modalities

## 2022-01-06 ENCOUNTER — OFFICE VISIT (OUTPATIENT)
Dept: PHYSICAL THERAPY | Facility: CLINIC | Age: 57
End: 2022-01-06
Payer: COMMERCIAL

## 2022-01-06 DIAGNOSIS — G89.29 CHRONIC MIDLINE LOW BACK PAIN WITH BILATERAL SCIATICA: Primary | ICD-10-CM

## 2022-01-06 DIAGNOSIS — M54.41 CHRONIC MIDLINE LOW BACK PAIN WITH BILATERAL SCIATICA: Primary | ICD-10-CM

## 2022-01-06 DIAGNOSIS — M54.42 CHRONIC MIDLINE LOW BACK PAIN WITH BILATERAL SCIATICA: Primary | ICD-10-CM

## 2022-01-06 PROCEDURE — 97162 PT EVAL MOD COMPLEX 30 MIN: CPT | Performed by: PHYSICAL THERAPIST

## 2022-01-06 PROCEDURE — 97110 THERAPEUTIC EXERCISES: CPT | Performed by: PHYSICAL THERAPIST

## 2022-01-06 NOTE — PROGRESS NOTES
PT Evaluation     Today's date: 2022  Patient name: Leticia Diaz  : 1965  MRN: [id number]  Referring provider: Janes Singh PT  Dx:   Encounter Diagnosis     ICD-10-CM    1  Chronic midline low back pain with bilateral sciatica  M54 41     M54 42     G89 29                   Assessment  Assessment details: Leticia Diaz is a 64 y o  female presenting to outpatient physical therapy with chief complaints of lower back pain with (B) radiating symptoms  Patient describes onset of pain about 7 years ago - progressively worsened over the past 7 years  Patient displays with abnormal gait, abnormal posture, no myotomal strength deficits, (-) SLR/ Crossed SLR, minimal lumbar ROM restrictions, (-) Hip testing, extension bias with mechanical testing, and functional restrictions  Patient's symptoms are multifactorial in nature with a primary movement diagnosis of poor motor control resulting in pathoanatomical signs and symptoms consistent with Chronic midline low back pain with bilateral sciatica  (primary encounter diagnosis) resulting in limitations in her ability to walk, perform household tasks, and exercise as desired  Based upon examination results it has been recommended that the patient follow up with pelvic  to address incontinence as well as completing plan of care for lower back pain  Impairments: abnormal gait, abnormal muscle tone, abnormal or restricted ROM, abnormal movement, activity intolerance, impaired physical strength, lacks appropriate home exercise program, pain with function, poor posture  and poor body mechanics    Symptom irritability: moderateUnderstanding of Dx/Px/POC: good   Prognosis: fair  Prognosis details: Negative prognostic indicators include chronicity of symptoms  Goals  STG to be met in 2 weeks:  - Increase Lumbar AROM: unrestricted all planes  - Increase (B) LE strength by 1/2 MMT grade    - I with HEP   - Patient will be able to return to household cooking, baking etc    LTG to be met in 4 weeks:  - Increase (B) LE strength to 4+-5/5 all planes  - I with updated HEP   - Patient will be able to return to walking over 1 mile  - Patient will be able to perform exercise program for weight loss  Plan  Plan details: Prognosis above is given PT services 2x/week tapering to 1x/week over the next 4 weeks and home program adherence  Patient would benefit from: skilled physical therapy  Planned modality interventions: cryotherapy and thermotherapy: hydrocollator packs  Planned therapy interventions: activity modification, joint mobilization, manual therapy, neuromuscular re-education, patient education, postural training, strengthening, stretching, therapeutic exercise, therapeutic activities, functional ROM exercises, home exercise program, gait training and body mechanics training  Plan of Care beginning date: 1/6/2022  Plan of Care expiration date: 2/3/2022  Treatment plan discussed with: patient        Subjective Evaluation    History of Present Illness  Mechanism of injury: At Evaluation (January 6, 2022): Patient reports onset of lower back pain about 7 years ago  She reports having difficulty standing up that day - reports no radiating symptoms at the time  She reports over the years her back pain has remained consistent - leg pain has been fluctuating - recently she has experienced (B) radiating symptoms - back of the legs to the ankles  Over the years she has been treated with PT, chiropractic, massage, injections, and OTC medication  Recently she was being treated for knee pain - lumbar spine has been limiting her more        Red Flag Screen:  Patient denies recent fever, nausea and vomiting, weakness, unexplained weight loss, pain with coughing, or traumatic MARY   Patient reports urinary incontinence, intermittent numbness in both legs (L>R)       Greatest concern: "being in a wheelchair before I'm 60"            Recurrent probem    Quality of life: good    Pain  Current pain ratin  At best pain ratin  At worst pain ratin  Location: Central lower back - radiating down (B) LE (L>R)  Quality: radiating, needle-like and dull ache    Social Support  Steps to enter house: yes  Stairs in house: yes   Lives in: multiple-level home  Lives with: Family  Employment status: working    Diagnostic Tests  MRI studies: abnormal  Treatments  Previous treatment: chiropractic, injection treatment, physical therapy, medication and massage  Patient Goals  Patient goal: Patient Specific Functional Scale: return to walking around the block 0/10, return to standing for housework - cooking, baking, and cleaning 1/10, exercise for weight loss 0/10; Total         Objective     Static Posture   General Observations  Symmetrical weight bearing  Lumbar Spine   Increased lordosis  Postural Observations  Seated posture: fair  Standing posture: fair        Palpation     Additional Palpation Details  TTP lower lumbar spine     Active Range of Motion     Lumbar   Flexion:  WFL  Extension:  Restriction level: minimal    Additional Active Range of Motion Details  (B) SG: minimal restriction - no increased pain     Strength/Myotome Testing     Left Hip   Planes of Motion   Flexion: 4+    Right Hip   Planes of Motion   Flexion: 4+    Left Knee   Flexion: 4+  Extension: 4+    Right Knee   Flexion: 4+  Extension: 5    Left Ankle/Foot   Dorsiflexion: 4+  Plantar flexion: 4+  Great toe extension: 4+    Right Ankle/Foot   Dorsiflexion: 4+  Plantar flexion: 4+  Great toe extension: 4+    Tests     Lumbar   Negative SIJ compression and sacroiliac distraction  Left   Negative crossed SLR and passive SLR  Right   Negative crossed SLR and passive SLR  Left Hip   Negative BRADFORD and FADIR  Right Hip   Negative BRADFORD and FADIR       Additional Tests Details  Repeated Movement Testing:   Repeated Flexion in Standing: Increased, Worse  Repeated Extension in Standing: Decreased, Better    Static Position Testing:   Prone position: Decreased pain  Prone on elbows: Decreased pain        Ambulation     Observational Gait   Gait: antalgic   Left step length and right step length within functional limits  Decreased walking speed and left stance time  Left foot contact pattern: foot flat  Right foot contact pattern: foot flat             Daily Treatment Diary     DX: LBP  EPOC: 2/3/22  Precautions: NA  CO-MORBIDITIES: Fibromyalgia, HTN  HEP ACCESS CODE: [id number]    Stage: chronic  Stability of Symptoms:  At Evaluation: stable - unchanged  Global Rating of Change:   Symptom Irritability Level: moderate  Primary Movement Diagnosis: poor motor control   Patient Specific Functional Scale:   1/6/22: return to walking around the block 0/10, return to standing for housework - cooking, baking, and cleaning 1/10, exercise for weight loss 0/10;  Total 1/30  FOTO Prediction: 55 by visit 12  FOTO Progress: 40 at evaluation   Greatest Concern:  "being in a wheelchair before I'm 60"  Current Activity Recommendations: added to HEP, sit with towel roll (1/6)  Current Educational Needs: progressions      Treatment Diary          Manual Therapy         Lumbar Mobs                                                               Therapeutic Exercise  HEP         Recumbent Bike                    Standing Lumbar Ext 1/6         Prone on Elbows 1/6                   Abdominal Brace          LFS: Alt LE          H/L Hip ADD Isometric                    SLR          S/L Hip ABD          Bridge                    LAQ                    Neuromuscular Reeducation                    FWD Mini Lunge          LAT Mini Lunge          Mini Squat                    TB Side Stepping                              FWD Step Up          LAT Step Up                    Therapeutic Activity                              Gait Training                                        Modalities

## 2022-01-10 ENCOUNTER — OFFICE VISIT (OUTPATIENT)
Dept: PHYSICAL THERAPY | Facility: CLINIC | Age: 57
End: 2022-01-10
Payer: COMMERCIAL

## 2022-01-10 DIAGNOSIS — M54.42 CHRONIC MIDLINE LOW BACK PAIN WITH BILATERAL SCIATICA: Primary | ICD-10-CM

## 2022-01-10 DIAGNOSIS — M54.41 CHRONIC MIDLINE LOW BACK PAIN WITH BILATERAL SCIATICA: Primary | ICD-10-CM

## 2022-01-10 DIAGNOSIS — G89.29 CHRONIC MIDLINE LOW BACK PAIN WITH BILATERAL SCIATICA: Primary | ICD-10-CM

## 2022-01-10 PROCEDURE — 97110 THERAPEUTIC EXERCISES: CPT | Performed by: PHYSICAL THERAPIST

## 2022-01-10 PROCEDURE — 97140 MANUAL THERAPY 1/> REGIONS: CPT | Performed by: PHYSICAL THERAPIST

## 2022-01-10 NOTE — PROGRESS NOTES
Daily Note     Today's date: 1/10/2022  Patient name: Cara Wills  : 1965  MRN: [id number]  Referring provider: Faith Workman PT  Dx:   Encounter Diagnosis     ICD-10-CM    1  Chronic midline low back pain with bilateral sciatica  M54 41     M54 42     G89 29                 Subjective: Patient reports good tolerance to her IE and initial HEP  She notes some relief with performing repeated movements  She also notes having difficulty using a towel roll with sitting at work - is working on getting a better chair which will allow her to use the towel roll  Objective: See treatment diary below      Assessment:   Stage: chronic  Stability of Symptoms:  At Evaluation: stable - unchanged  Global Rating of Change:   Symptom Irritability Level: moderate  Primary Movement Diagnosis: poor motor control   Patient Specific Functional Scale:   22: return to walking around the block 0/10, return to standing for housework - cooking, baking, and cleaning 1/10, exercise for weight loss 0/10; Total   FOTO Prediction: 55 by visit 12  FOTO Progress: 40 at evaluation   Greatest Concern:  "being in a wheelchair before I'm 60"  Current Activity Recommendations: added to HEP, sit with towel roll (); added to HEP (1/10)  Current Educational Needs: progressions    Patient had good tolerance to manual treatment - improved mobility and decreased pain following  She continues to respond well to extension based exercises  HEP was updated with gentle core exercises  She had no complaints of increased hip or knee pain post treatment  Skilled PT continues to be required to guide progression of lumbar mobility and control to allow her to return to regular walking and exercise program        Plan: Continue with POC - monitor tolerance to treatment and updated HEP        Daily Treatment Diary     DX: LBP  EPOC: 2/3/22  Precautions: NA  CO-MORBIDITIES: Fibromyalgia, HTN  HEP ACCESS CODE: [facility]      Treatment Diary 1/10        Manual Therapy         Lumbar Mobs PA Gr 2/3  8 min                                                              Therapeutic Exercise  HEP         Recumbent Bike  6 min                  Standing Lumbar Ext 1/6 15x        Prone on Elbows 1/6 2 min                  Abdominal Brace 1/10 5"x10        LFS: Alt LE 1/10 15x ea        H/L Hip ADD Isometric 1/10 5"x15                  SLR 1/6 15x ea        S/L Hip ABD 1/6 15x ea        Bridge 1/6 5"x15                  LAQ                    Neuromuscular Reeducation                    FWD Mini Lunge          LAT Mini Lunge          Mini Squat                    TB Side Stepping                              FWD Step Up          LAT Step Up                    Therapeutic Activity                              Gait Training                                        Modalities

## 2022-01-12 ENCOUNTER — OFFICE VISIT (OUTPATIENT)
Dept: PHYSICAL THERAPY | Facility: CLINIC | Age: 57
End: 2022-01-12
Payer: COMMERCIAL

## 2022-01-12 DIAGNOSIS — M54.41 CHRONIC MIDLINE LOW BACK PAIN WITH BILATERAL SCIATICA: Primary | ICD-10-CM

## 2022-01-12 DIAGNOSIS — M54.42 CHRONIC MIDLINE LOW BACK PAIN WITH BILATERAL SCIATICA: Primary | ICD-10-CM

## 2022-01-12 DIAGNOSIS — G89.29 CHRONIC MIDLINE LOW BACK PAIN WITH BILATERAL SCIATICA: Primary | ICD-10-CM

## 2022-01-12 PROCEDURE — 97110 THERAPEUTIC EXERCISES: CPT | Performed by: PHYSICAL THERAPIST

## 2022-01-12 PROCEDURE — 97140 MANUAL THERAPY 1/> REGIONS: CPT | Performed by: PHYSICAL THERAPIST

## 2022-01-17 ENCOUNTER — APPOINTMENT (OUTPATIENT)
Dept: PHYSICAL THERAPY | Facility: CLINIC | Age: 57
End: 2022-01-17
Payer: COMMERCIAL

## 2022-01-20 ENCOUNTER — OFFICE VISIT (OUTPATIENT)
Dept: PHYSICAL THERAPY | Facility: CLINIC | Age: 57
End: 2022-01-20
Payer: COMMERCIAL

## 2022-01-20 DIAGNOSIS — G89.29 CHRONIC MIDLINE LOW BACK PAIN WITH BILATERAL SCIATICA: Primary | ICD-10-CM

## 2022-01-20 DIAGNOSIS — M54.42 CHRONIC MIDLINE LOW BACK PAIN WITH BILATERAL SCIATICA: Primary | ICD-10-CM

## 2022-01-20 DIAGNOSIS — M54.41 CHRONIC MIDLINE LOW BACK PAIN WITH BILATERAL SCIATICA: Primary | ICD-10-CM

## 2022-01-20 PROCEDURE — 97110 THERAPEUTIC EXERCISES: CPT | Performed by: PHYSICAL THERAPIST

## 2022-01-20 PROCEDURE — 97112 NEUROMUSCULAR REEDUCATION: CPT | Performed by: PHYSICAL THERAPIST

## 2022-01-20 NOTE — PROGRESS NOTES
Daily Note     Today's date: 2022  Patient name: Cara Wills  : 1965  MRN: [id number]  Referring provider: Jay España DO  Dx:   Encounter Diagnosis     ICD-10-CM    1  Chronic midline low back pain with bilateral sciatica  M54 41     M54 42     G89 29                 Subjective: Patient reports good tolerance to her LV  She notes her back has been bothering her - attributed to carrying her dog around  She notes her sitting at work has been difficult and painful  She notes consistently riding her bike is helpful for her back  Objective: See treatment diary below      Assessment:   Stage: chronic  Stability of Symptoms:  At Evaluation: stable - unchanged  Global Rating of Change:   Symptom Irritability Level: moderate  Primary Movement Diagnosis: poor motor control   Patient Specific Functional Scale:   22: return to walking around the block 0/10, return to standing for housework - cooking, baking, and cleaning 1/10, exercise for weight loss 0/10; Total   FOTO Prediction: 55 by visit 12  FOTO Progress: 40 at evaluation   Greatest Concern:  "being in a wheelchair before I'm 60"  Current Activity Recommendations: added to HEP, sit with towel roll (); added to HEP (1/10)  Current Educational Needs: progressions    Patient continues to respond well to exercise treatment - held manual treatment today  She had good form throughout treatment - was able to progress with standing exercises  Skilled PT continues to be required to guide progression of lumbar mobility and control to allow her to return to regular walking and exercise program        Plan: Continue with POC - monitor tolerance to treatment and updated HEP        Daily Treatment Diary     DX: LBP  EPOC: 2/3/22  Precautions: NA  CO-MORBIDITIES: Fibromyalgia, HTN  HEP ACCESS CODE: [email or web address]      Treatment Diary  1/10 1/12 1/20      Manual Therapy         Lumbar Mobs PA Gr 2/3  8 min PA Gr 2/3  8 min Therapeutic Exercise  HEP         Recumbent Bike  6 min 6 min 6 min                Standing Lumbar Ext 1/6 15x 15x  15x      Prone on Elbows 1/6 2 min 2 min 2 min                Abdominal Brace 1/10 5"x10 5"x10 5"x10      LFS: Alt LE 1/10 15x ea 15x ea 15x ea      H/L Hip ADD Isometric 1/10 5"x15 5"x15 5"x15                SLR 1/6 15x ea 15x ea 15x ea      S/L Hip ABD 1/6 15x ea 15x ea 15x ea      Bridge 1/6 5"x15 5"x15 5"x15                LAQ                    Neuromuscular Reeducation                    FWD Mini Lunge    10x ea      LAT Mini Lunge    10x ea      Mini Squat                    TB Side Stepping                              FWD Step Up          LAT Step Up                    Therapeutic Activity                              Gait Training                                        Modalities

## 2022-01-24 ENCOUNTER — APPOINTMENT (OUTPATIENT)
Dept: PHYSICAL THERAPY | Facility: CLINIC | Age: 57
End: 2022-01-24
Payer: COMMERCIAL

## 2022-01-26 ENCOUNTER — APPOINTMENT (OUTPATIENT)
Dept: PHYSICAL THERAPY | Facility: CLINIC | Age: 57
End: 2022-01-26
Payer: COMMERCIAL

## 2022-01-31 ENCOUNTER — APPOINTMENT (OUTPATIENT)
Dept: PHYSICAL THERAPY | Facility: CLINIC | Age: 57
End: 2022-01-31
Payer: COMMERCIAL

## 2022-02-02 ENCOUNTER — APPOINTMENT (OUTPATIENT)
Dept: PHYSICAL THERAPY | Facility: CLINIC | Age: 57
End: 2022-02-02
Payer: COMMERCIAL

## 2022-02-09 ENCOUNTER — OFFICE VISIT (OUTPATIENT)
Dept: PHYSICAL THERAPY | Facility: CLINIC | Age: 57
End: 2022-02-09
Payer: COMMERCIAL

## 2022-02-09 DIAGNOSIS — G89.29 CHRONIC MIDLINE LOW BACK PAIN WITH BILATERAL SCIATICA: Primary | ICD-10-CM

## 2022-02-09 DIAGNOSIS — M54.42 CHRONIC MIDLINE LOW BACK PAIN WITH BILATERAL SCIATICA: Primary | ICD-10-CM

## 2022-02-09 DIAGNOSIS — M54.41 CHRONIC MIDLINE LOW BACK PAIN WITH BILATERAL SCIATICA: Primary | ICD-10-CM

## 2022-02-09 PROCEDURE — 97110 THERAPEUTIC EXERCISES: CPT | Performed by: PHYSICAL THERAPIST

## 2022-02-09 NOTE — PROGRESS NOTES
PT Re-Evaluation     Today's date: 2022  Patient name: Mahad Found  : 1965  MRN: [id number]  Referring provider: Jessa Sorenson PT  Dx:   Encounter Diagnosis     ICD-10-CM    1  Chronic midline low back pain with bilateral sciatica  M54 41     M54 42     G89 29                   Assessment  Assessment details: Patient has attended 5 PT treatment sessions from 22 to 22  Since initial evaluation patient displays with objective improvements with pain levels, strength, ROM, and function  Patient has achieved a 2 point increase on the Global Rating of Change scale  Patient demonstrates a 7 point improvement on the Patient Specific Functional Scale and a 21 point improvement on FOTO indicating good progress towards her goals  She continues to have some difficulty standing for extended times and walking longer distances  Skilled PT continues to be required to guide progression of strength and activity tolerance to help her increase her walking tolerance and participate in a regular exercise program for weight loss  Please contact me if you have any questions ([phone])  Thank you for the opportunity to share in the care of this patient             Impairments: abnormal gait, abnormal muscle tone, abnormal or restricted ROM, abnormal movement, activity intolerance, impaired physical strength, lacks appropriate home exercise program, pain with function, poor posture  and poor body mechanics    Symptom irritability: moderateUnderstanding of Dx/Px/POC: good   Prognosis: fair  Prognosis details: Negative prognostic indicators include chronicity of symptoms  Goals  STG to be met in 2 weeks:  - Increase Lumbar AROM: unrestricted all planes  - in progress  - Increase (B) LE strength by 1/2 MMT grade  - met  - I with HEP  - met  - Patient will be able to return to household cooking, baking etc  - in progress  LTG to be met in 4 weeks:  - Increase (B) LE strength to 4+-5/5 all planes    - I with updated HEP   - Patient will be able to return to walking over 1 mile  - in progress  - Patient will be able to perform exercise program for weight loss  - in progress       Plan  Plan details: Prognosis above is given PT services 2x/week tapering to 1x/week over the next 4 weeks and home program adherence  Patient would benefit from: skilled physical therapy  Planned modality interventions: cryotherapy and thermotherapy: hydrocollator packs  Planned therapy interventions: activity modification, joint mobilization, manual therapy, neuromuscular re-education, patient education, postural training, strengthening, stretching, therapeutic exercise, therapeutic activities, functional ROM exercises, home exercise program, gait training and body mechanics training  Plan of Care beginning date: 2/9/2022  Plan of Care expiration date: 3/9/2022  Treatment plan discussed with: patient        Subjective Evaluation    History of Present Illness  Mechanism of injury: At Evaluation (January 6, 2022): Patient reports onset of lower back pain about 7 years ago  She reports having difficulty standing up that day - reports no radiating symptoms at the time  She reports over the years her back pain has remained consistent - leg pain has been fluctuating - recently she has experienced (B) radiating symptoms - back of the legs to the ankles  Over the years she has been treated with PT, chiropractic, massage, injections, and OTC medication  Recently she was being treated for knee pain - lumbar spine has been limiting her more  Red Flag Screen:  Patient denies recent fever, nausea and vomiting, weakness, unexplained weight loss, pain with coughing, or traumatic MARY   Patient reports urinary incontinence, intermittent numbness in both legs (L>R)       Greatest concern: "being in a wheelchair before I'm 60"    (2/9/22):  Patient reports since starting PT she sees improvements in pain levels (intensity), mobility, strength, and function  Global Rating of Change: +2   She notes particular improvement in being able to walk at the grocery store with minimal pain  She also notes improvement in standing tolerance  She sees some improvement in her ability to exercise  She has been performing her HEP regularly  Recurrent probem    Quality of life: good    Pain  Current pain ratin  At best pain ratin  At worst pain ratin  Location: Central lower back - radiating down (B) LE (L>R)  Quality: radiating, needle-like and dull ache    Social Support  Steps to enter house: yes  Stairs in house: yes   Lives in: multiple-level home  Lives with: Family  Employment status: working    Diagnostic Tests  MRI studies: abnormal  Treatments  Previous treatment: chiropractic, injection treatment, physical therapy, medication and massage  Patient Goals  Patient goal: Patient Specific Functional Scale: return to walking around the block 3/10, return to standing for housework - cooking, baking, and cleaning 3/10, exercise for weight loss 2/10; Total         Objective     Static Posture   General Observations  Symmetrical weight bearing  Lumbar Spine   Increased lordosis  Postural Observations  Seated posture: fair  Standing posture: fair        Palpation     Additional Palpation Details  TTP lower lumbar spine     Active Range of Motion     Lumbar   Flexion:  WFL  Extension:  WFL    Additional Active Range of Motion Details  (B) SG: minimal restriction - no increased pain     Strength/Myotome Testing     Left Hip   Planes of Motion   Flexion: 5    Right Hip   Planes of Motion   Flexion: 5    Left Knee   Flexion: 5  Extension: 5    Right Knee   Flexion: 5  Extension: 5    Left Ankle/Foot   Dorsiflexion: 5  Plantar flexion: 4+  Great toe extension: 4+    Right Ankle/Foot   Dorsiflexion: 5  Plantar flexion: 4+  Great toe extension: 4+    Tests     Lumbar   Negative SIJ compression and sacroiliac distraction       Left Negative crossed SLR and passive SLR  Right   Negative crossed SLR and passive SLR  Left Hip   Negative BRADFORD and FADIR  Right Hip   Negative BRADFORD and FADIR  Additional Tests Details  Repeated Movement Testing:   Repeated Flexion in Standing: Increased, Worse  Repeated Extension in Standing: Decreased, Better    Static Position Testing:   Prone position: Decreased pain  Prone on elbows: Decreased pain        Ambulation     Observational Gait   Gait: within functional limits   Left stance time, right stance time, left step length and right step length within functional limits  Decreased walking speed  Left foot contact pattern: foot flat  Right foot contact pattern: foot flat             Daily Treatment Diary     DX: LBP  EPOC: 2/3/22  Precautions: NA  CO-MORBIDITIES: Fibromyalgia, HTN  HEP ACCESS CODE: [email or web address]    Stage: chronic  Stability of Symptoms:  At Evaluation: stable - unchanged  Global Rating of Change:   Symptom Irritability Level: moderate  Primary Movement Diagnosis: poor motor control   Patient Specific Functional Scale:   1/6/22: return to walking around the block 0/10, return to standing for housework - cooking, baking, and cleaning 1/10, exercise for weight loss 0/10; Total 1/30 2/9/22: return to walking around the block 3/10, return to standing for housework - cooking, baking, and cleaning 3/10, exercise for weight loss 2/10;  Total 8/30  FOTO Prediction: 55 by visit 12  FOTO Progress: 40 at evaluation; 2/9/22: 61  Greatest Concern:  "being in a wheelchair before I'm 60"  Current Activity Recommendations: added to HEP, sit with towel roll (1/6); added to HEP (1/10)  Current Educational Needs: progressions      Treatment Diary  1/10 1/12 1/20 2/9     Manual Therapy         Lumbar Mobs PA Gr 2/3  8 min PA Gr 2/3  8 min                                                             Therapeutic Exercise  HEP         Recumbent Bike  6 min 6 min 6 min 6 min               Standing Lumbar Ext 1/6 15x 15x  15x      Prone on Elbows 1/6 2 min 2 min 2 min                Abdominal Brace 1/10 5"x10 5"x10 5"x10      LFS: Alt LE 1/10 15x ea 15x ea 15x ea      H/L Hip ADD Isometric 1/10 5"x15 5"x15 5"x15                SLR 1/6 15x ea 15x ea 15x ea      S/L Hip ABD 1/6 15x ea 15x ea 15x ea      Bridge 1/6 5"x15 5"x15 5"x15                LAQ                    Neuromuscular Reeducation                    FWD Mini Lunge    10x ea      LAT Mini Lunge    10x ea      Mini Squat                    TB Side Stepping                              FWD Step Up          LAT Step Up                    Therapeutic Activity                              Gait Training                                        Modalities

## 2022-02-16 ENCOUNTER — OFFICE VISIT (OUTPATIENT)
Dept: PHYSICAL THERAPY | Facility: CLINIC | Age: 57
End: 2022-02-16
Payer: COMMERCIAL

## 2022-02-16 DIAGNOSIS — M54.41 CHRONIC MIDLINE LOW BACK PAIN WITH BILATERAL SCIATICA: Primary | ICD-10-CM

## 2022-02-16 DIAGNOSIS — G89.29 CHRONIC MIDLINE LOW BACK PAIN WITH BILATERAL SCIATICA: Primary | ICD-10-CM

## 2022-02-16 DIAGNOSIS — M54.42 CHRONIC MIDLINE LOW BACK PAIN WITH BILATERAL SCIATICA: Primary | ICD-10-CM

## 2022-02-16 PROCEDURE — 97110 THERAPEUTIC EXERCISES: CPT | Performed by: PHYSICAL THERAPIST

## 2022-02-16 PROCEDURE — 97112 NEUROMUSCULAR REEDUCATION: CPT | Performed by: PHYSICAL THERAPIST

## 2022-02-16 NOTE — PROGRESS NOTES
Daily Note     Today's date: 2022  Patient name: Rachel Baker  : 1965  MRN: [id number]  Referring provider: Shaka Lieberman, PT  Dx:   Encounter Diagnosis     ICD-10-CM    1  Chronic midline low back pain with bilateral sciatica  M54 41     M54 42     G89 29                   Subjective: Patient reports good tolerance to her LV  She notes her back is feeling ok - had some increased pain with lifting her dog this morning  Objective: See treatment diary below      Assessment:   Stage: chronic  Stability of Symptoms:  At Evaluation: stable - unchanged  Global Rating of Change:   Symptom Irritability Level: moderate  Primary Movement Diagnosis: poor motor control   Patient Specific Functional Scale:   22: return to walking around the block 0/10, return to standing for housework - cooking, baking, and cleaning 1/10, exercise for weight loss 0/10; Total 22: return to walking around the block 3/10, return to standing for housework - cooking, baking, and cleaning 3/10, exercise for weight loss 2/10; Total   FOTO Prediction: 55 by visit 12  FOTO Progress: 40 at evaluation; 22: 61  Greatest Concern:  "being in a wheelchair before I'm 60"  Current Activity Recommendations: added to HEP, sit with towel roll (); added to HEP (1/10)  Current Educational Needs: progressions    Patient had good tolerance to strengthening exercises today  She was able to perform more weightbearing exercises without increased pain  Overall she is making good progress with PT  Skilled PT continues to be required to guide progression of strength and activity tolerance to help her increase her walking tolerance and participate in a regular exercise program for weight loss       Plan: Continue with POC - monitor tolerance to treatment - progress as able     Daily Treatment Diary     DX: LBP  EPOC: 3/9/22  Precautions: NA  CO-MORBIDITIES: Fibromyalgia, HTN  HEP ACCESS CODE: [facility]      Treatment Diary 1/20 2/9 2/16      Manual Therapy         Lumbar Mobs                                                               Therapeutic Exercise  HEP         Recumbent Bike  6 min 6 min 6 min                Standing Lumbar Ext 1/6 15x        Prone on Elbows 1/6 2 min                  Abdominal Brace 1/10 5"x10  5"x10      LFS: Alt LE 1/10 15x ea  15x ea      H/L Hip ADD Isometric 1/10 5"x15  5"x15                SLR 1/6 15x ea  15x ea      S/L Hip ABD 1/6 15x ea  15x ea      Bridge 1/6 5"x15  5"x15                LAQ                    Neuromuscular Reeducation                    FWD Mini Lunge  10x ea  10x ea      LAT Mini Lunge  10x ea  10x ea      Mini Squat    10x                TB Side Stepping                              FWD Step Up    6" Step  10x ea      LAT Step Up    6" Step  10x ea                Therapeutic Activity                              Gait Training                                        Modalities

## 2022-02-21 ENCOUNTER — OFFICE VISIT (OUTPATIENT)
Dept: PHYSICAL THERAPY | Facility: CLINIC | Age: 57
End: 2022-02-21
Payer: COMMERCIAL

## 2022-02-21 DIAGNOSIS — G89.29 CHRONIC MIDLINE LOW BACK PAIN WITH BILATERAL SCIATICA: Primary | ICD-10-CM

## 2022-02-21 DIAGNOSIS — M54.41 CHRONIC MIDLINE LOW BACK PAIN WITH BILATERAL SCIATICA: Primary | ICD-10-CM

## 2022-02-21 DIAGNOSIS — M54.42 CHRONIC MIDLINE LOW BACK PAIN WITH BILATERAL SCIATICA: Primary | ICD-10-CM

## 2022-02-21 PROCEDURE — 97112 NEUROMUSCULAR REEDUCATION: CPT | Performed by: PHYSICAL THERAPIST

## 2022-02-21 PROCEDURE — 97110 THERAPEUTIC EXERCISES: CPT | Performed by: PHYSICAL THERAPIST

## 2022-02-21 NOTE — PROGRESS NOTES
Daily Note     Today's date: 2022  Patient name: Michelle Polo  : 1965  MRN: [id number]  Referring provider: Josue Colunga PT  Dx:   Encounter Diagnosis     ICD-10-CM    1  Chronic midline low back pain with bilateral sciatica  M54 41     M54 42     G89 29                   Subjective: Patient reports no adverse reaction to her LV  She notes lifting her dog continues to be painful although she notes improvements in pain levels with lifting and how long increased pain lasts after lifting  Objective: See treatment diary below      Assessment:   Stage: chronic  Stability of Symptoms:  At Evaluation: stable - unchanged  Global Rating of Change:   Symptom Irritability Level: moderate  Primary Movement Diagnosis: poor motor control   Patient Specific Functional Scale:   22: return to walking around the block 0/10, return to standing for housework - cooking, baking, and cleaning 1/10, exercise for weight loss 0/10; Total 22: return to walking around the block 3/10, return to standing for housework - cooking, baking, and cleaning 3/10, exercise for weight loss 2/10; Total   FOTO Prediction: 55 by visit 12  FOTO Progress: 40 at evaluation; 22: 61  Greatest Concern:  "being in a wheelchair before I'm 60"  Current Activity Recommendations: added to HEP, sit with towel roll (); added to HEP (1/10)  Current Educational Needs: progressions    Patient continues to tolerate strengthening exercises well  She had no complaints of increased pain with exercises and noted feeling less fatigued as the treatment went on  She continues to make good progress with PT  Skilled PT continues to be required to guide progression of strength and activity tolerance to help her increase her walking tolerance and participate in a regular exercise program for weight loss       Plan: Continue with POC - monitor tolerance to treatment - progress as able     Daily Treatment Diary     DX: LBP  EPOC: 3/9/22  Precautions: NA  CO-MORBIDITIES: Fibromyalgia, HTN  HEP ACCESS CODE: [facility]      Treatment Diary  1/20 2/9 2/16 2/21     Manual Therapy         Lumbar Mobs                                                               Therapeutic Exercise  HEP         Recumbent Bike  6 min 6 min 6 min 6 min               Standing Lumbar Ext 1/6 15x        Prone on Elbows 1/6 2 min                  Abdominal Brace 1/10 5"x10  5"x10 5"x10     LFS: Alt LE 1/10 15x ea  15x ea 15x ea     H/L Hip ADD Isometric 1/10 5"x15  5"x15 5"x15               SLR 1/6 15x ea  15x ea 15x ea     S/L Hip ABD 1/6 15x ea  15x ea 15x ea     Bridge 1/6 5"x15  5"x15 5"x15               LAQ                    Neuromuscular Reeducation                    FWD Mini Lunge  10x ea  10x ea 10x ea     LAT Mini Lunge  10x ea  10x ea 10x ea     Mini Squat    10x 10x                TB Trunk Rotatoin     GTB  10x ea                         FWD Step Up    6" Step  10x ea 6" Step  10x ea     LAT Step Up    6" Step  10x ea 6" Step  10x ea               Therapeutic Activity                              Gait Training                                        Modalities

## 2022-02-23 ENCOUNTER — OFFICE VISIT (OUTPATIENT)
Dept: PHYSICAL THERAPY | Facility: CLINIC | Age: 57
End: 2022-02-23
Payer: COMMERCIAL

## 2022-02-23 DIAGNOSIS — M54.41 CHRONIC MIDLINE LOW BACK PAIN WITH BILATERAL SCIATICA: Primary | ICD-10-CM

## 2022-02-23 DIAGNOSIS — M54.42 CHRONIC MIDLINE LOW BACK PAIN WITH BILATERAL SCIATICA: Primary | ICD-10-CM

## 2022-02-23 DIAGNOSIS — G89.29 CHRONIC MIDLINE LOW BACK PAIN WITH BILATERAL SCIATICA: Primary | ICD-10-CM

## 2022-02-23 PROCEDURE — 97110 THERAPEUTIC EXERCISES: CPT | Performed by: PHYSICAL THERAPIST

## 2022-02-23 PROCEDURE — 97112 NEUROMUSCULAR REEDUCATION: CPT | Performed by: PHYSICAL THERAPIST

## 2022-02-23 NOTE — PROGRESS NOTES
Daily Note     Today's date: 2022  Patient name: Terence Lucio  : 1965  MRN: [id number]  Referring provider: Chuckie Aragon, PT  Dx:   Encounter Diagnosis     ICD-10-CM    1  Chronic midline low back pain with bilateral sciatica  M54 41     M54 42     G89 29                   Subjective: Patient reports good tolerance to her LV  She notes having less pain following her LV  She is starting to see some steady improvements in function  Objective: See treatment diary below      Assessment:   Stage: chronic  Stability of Symptoms:  At Evaluation: stable - unchanged  Global Rating of Change:   Symptom Irritability Level: moderate  Primary Movement Diagnosis: poor motor control   Patient Specific Functional Scale:   22: return to walking around the block 0/10, return to standing for housework - cooking, baking, and cleaning 1/10, exercise for weight loss 0/10; Total 22: return to walking around the block 3/10, return to standing for housework - cooking, baking, and cleaning 3/10, exercise for weight loss 2/10; Total   FOTO Prediction: 55 by visit 12  FOTO Progress: 40 at evaluation; 22: 61  Greatest Concern:  "being in a wheelchair before I'm 60"  Current Activity Recommendations: added to HEP, sit with towel roll (); added to HEP (1/10)  Current Educational Needs: progressions    Patient continues to progress well with strengthening exercises - tolerated progressions with repetitions without increased pain  She demonstrated good form throughout treatment  She had no complaints of increased lower back pain post treatment  Skilled PT continues to be required to guide progression of strength and activity tolerance to help her increase her walking tolerance and participate in a regular exercise program for weight loss       Plan: Continue with POC - monitor tolerance to treatment - progress as able     Daily Treatment Diary     DX: LBP  EPOC: 3/9/22  Precautions: NA  CO-MORBIDITIES: Fibromyalgia, HTN  HEP ACCESS CODE: [facility]      Treatment Diary  2/16 2/21 2/23      Manual Therapy         Lumbar Mobs                                                               Therapeutic Exercise  HEP         Recumbent Bike  6 min 6 min 6 min                Standing Lumbar Ext 1/6         Prone on Elbows 1/6                   Abdominal Brace 1/10 5"x10 5"x10 5"x10      LFS: Alt LE 1/10 15x ea 15x ea 15x ea      H/L Hip ADD Isometric 1/10 5"x15 5"x15 5"x15                SLR 1/6 15x ea 15x ea 20x ea      S/L Hip ABD 1/6 15x ea 15x ea 20x ea      Bridge 1/6 5"x15 5"x15 5"x20                LAQ                    Neuromuscular Reeducation                    FWD Mini Lunge  10x ea 10x ea 15x ea      LAT Mini Lunge  10x ea 10x ea 15x ea      Mini Squat  10x 10x  15x                TB Trunk Rotatoin   GTB  10x ea GTB  15x ea                          FWD Step Up  6" Step  10x ea 6" Step  10x ea       LAT Step Up  6" Step  10x ea 6" Step  10x ea                 Therapeutic Activity                              Gait Training                                        Modalities

## 2022-02-28 ENCOUNTER — OFFICE VISIT (OUTPATIENT)
Dept: PHYSICAL THERAPY | Facility: CLINIC | Age: 57
End: 2022-02-28
Payer: COMMERCIAL

## 2022-02-28 DIAGNOSIS — G89.29 CHRONIC MIDLINE LOW BACK PAIN WITH BILATERAL SCIATICA: Primary | ICD-10-CM

## 2022-02-28 DIAGNOSIS — M54.41 CHRONIC MIDLINE LOW BACK PAIN WITH BILATERAL SCIATICA: Primary | ICD-10-CM

## 2022-02-28 DIAGNOSIS — M54.42 CHRONIC MIDLINE LOW BACK PAIN WITH BILATERAL SCIATICA: Primary | ICD-10-CM

## 2022-02-28 PROCEDURE — 97112 NEUROMUSCULAR REEDUCATION: CPT | Performed by: PHYSICAL THERAPIST

## 2022-02-28 PROCEDURE — 97110 THERAPEUTIC EXERCISES: CPT | Performed by: PHYSICAL THERAPIST

## 2022-02-28 NOTE — PROGRESS NOTES
Daily Note     Today's date: 2022  Patient name: Kirill Haile  : 1965  MRN: [id number]  Referring provider: Lezlie Hamman, SUBHASH  Dx:   Encounter Diagnosis     ICD-10-CM    1  Chronic midline low back pain with bilateral sciatica  M54 41     M54 42     G89 29                   Subjective: Patient reports no adverse reaction to her LV  She notes her lower back was very sore over the weekend with performing housework   She was happy that her knees felt good but her back was very achy- particularly with vacuuming  Objective: See treatment diary below      Assessment:   Stage: chronic  Stability of Symptoms:  At Evaluation: stable - unchanged  Global Rating of Change:   Symptom Irritability Level: moderate  Primary Movement Diagnosis: poor motor control   Patient Specific Functional Scale:   22: return to walking around the block 0/10, return to standing for housework - cooking, baking, and cleaning 1/10, exercise for weight loss 0/10; Total 22: return to walking around the block 3/10, return to standing for housework - cooking, baking, and cleaning 3/10, exercise for weight loss 2/10; Total   FOTO Prediction: 55 by visit 12  FOTO Progress: 40 at evaluation; 22: 61  Greatest Concern:  "being in a wheelchair before I'm 60"  Current Activity Recommendations: added to HEP, sit with towel roll (); added to HEP (1/10)  Current Educational Needs: progressions    Patient had good tolerance to exercise treatment  She continues to fatigue easily with WB exercises  She had better form and control with mat-based exercises  Skilled PT continues to be required to guide progression of strength and activity tolerance to help her increase her walking tolerance and participate in a regular exercise program for weight loss       Plan: Continue with POC - monitor tolerance to treatment - progress as able     Daily Treatment Diary     DX: LBP  EPOC: 3/9/22  Precautions: NA  CO-MORBIDITIES: Fibromyalgia, HTN  HEP ACCESS CODE: [facility]      Treatment Diary  2/16 2/21 2/23 2/28     Manual Therapy         Lumbar Mobs                                                               Therapeutic Exercise  HEP         Recumbent Bike  6 min 6 min 6 min 6 min               Standing Lumbar Ext 1/6         Prone on Elbows 1/6                   Abdominal Brace 1/10 5"x10 5"x10 5"x10 5"x10     LFS: Alt LE 1/10 15x ea 15x ea 15x ea 15x ea     H/L Hip ADD Isometric 1/10 5"x15 5"x15 5"x15 5"x15               SLR 1/6 15x ea 15x ea 20x ea 20x ea     S/L Hip ABD 1/6 15x ea 15x ea 20x ea 20x ea     Bridge 1/6 5"x15 5"x15 5"x20 5"x20               LAQ                    Neuromuscular Reeducation                    FWD Mini Lunge  10x ea 10x ea 15x ea 15x ea     LAT Mini Lunge  10x ea 10x ea 15x ea 15x ea     Mini Squat  10x 10x  15x 15x               TB Trunk Rotatoin   GTB  10x ea GTB  15x ea                          FWD Step Up  6" Step  10x ea 6" Step  10x ea       LAT Step Up  6" Step  10x ea 6" Step  10x ea                 Therapeutic Activity                              Gait Training                                        Modalities

## 2022-03-02 ENCOUNTER — OFFICE VISIT (OUTPATIENT)
Dept: PHYSICAL THERAPY | Facility: CLINIC | Age: 57
End: 2022-03-02
Payer: COMMERCIAL

## 2022-03-02 DIAGNOSIS — M54.42 CHRONIC MIDLINE LOW BACK PAIN WITH BILATERAL SCIATICA: Primary | ICD-10-CM

## 2022-03-02 DIAGNOSIS — M54.41 CHRONIC MIDLINE LOW BACK PAIN WITH BILATERAL SCIATICA: Primary | ICD-10-CM

## 2022-03-02 DIAGNOSIS — G89.29 CHRONIC MIDLINE LOW BACK PAIN WITH BILATERAL SCIATICA: Primary | ICD-10-CM

## 2022-03-02 PROCEDURE — 97110 THERAPEUTIC EXERCISES: CPT | Performed by: PHYSICAL THERAPIST

## 2022-03-02 PROCEDURE — 97112 NEUROMUSCULAR REEDUCATION: CPT | Performed by: PHYSICAL THERAPIST

## 2022-03-02 NOTE — PROGRESS NOTES
Daily Note     Today's date: 3/2/2022  Patient name: Karen Mendez  : 1965  MRN: [id number]  Referring provider: Arianna Ramos, SUBHASH  Dx:   Encounter Diagnosis     ICD-10-CM    1  Chronic midline low back pain with bilateral sciatica  M54 41     M54 42     G89 29                   Subjective: Patient reports good tolerance to her LV  She reports her back continues to be painful but she is managing her pain with activity  She reports she feels ready to transition to an I HEP for now  Objective: See treatment diary below      Assessment:   Stage: chronic  Stability of Symptoms:  At Evaluation: stable - unchanged  Global Rating of Change:   Symptom Irritability Level: moderate  Primary Movement Diagnosis: poor motor control   Patient Specific Functional Scale:   22: return to walking around the block 010, return to standing for housework - cooking, baking, and cleaning 1/10, exercise for weight loss 0/10; Total 22: return to walking around the block 3/10, return to standing for housework - cooking, baking, and cleaning 3/10, exercise for weight loss 2/10; Total   FOTO Prediction: 55 by visit 12  FOTO Progress: 40 at evaluation; 22: 61  Greatest Concern:  "being in a wheelchair before I'm 60"  Current Activity Recommendations: added to HEP, sit with towel roll (); added to HEP (1/10)  Current Educational Needs: progressions    Patient had good tolerance to exercises  She was able to complete exercises with good form throughout treatment  She appears ready to transition to an I HEP  At this time it is recommended that she continue with an I HEP  She is to contact me if she has return of symptoms or any questions/ concerns          Plan: DC to I HEP      Daily Treatment Diary     DX: LBP  EPOC: 3/9/22  Precautions: NA  CO-MORBIDITIES: Fibromyalgia, HTN  HEP ACCESS CODE: [facility]      Treatment Diary  2/23 2/28 3/2      Manual Therapy         Lumbar Mobs Therapeutic Exercise  HEP         Recumbent Bike  6 min 6 min 6 min                Standing Lumbar Ext 1/6         Prone on Elbows 1/6                   Abdominal Brace 1/10 5"x10 5"x10 5"x10      LFS: Alt LE 1/10 15x ea 15x ea 20x ea      H/L Hip ADD Isometric 1/10 5"x15 5"x15 5"x20                SLR 1/6 20x ea 20x ea 20x ea       S/L Hip ABD 1/6 20x ea 20x ea 20x ea      Bridge 1/6 5"x20 5"x20 5"x20                LAQ                    Neuromuscular Reeducation                    FWD Mini Lunge  15x ea 15x ea 15x ea      LAT Mini Lunge  15x ea 15x ea 15x ea      Mini Squat  15x 15x 15x                 TB Trunk Rotatoin  GTB  15x ea  GTB  15x ea                          FWD Step Up          LAT Step Up                    Therapeutic Activity                              Gait Training                                        Modalities

## 2022-03-26 ENCOUNTER — APPOINTMENT (OUTPATIENT)
Dept: LAB | Facility: HOSPITAL | Age: 57
End: 2022-03-26
Payer: COMMERCIAL

## 2022-03-26 DIAGNOSIS — E78.5 HYPERLIPIDEMIA, UNSPECIFIED HYPERLIPIDEMIA TYPE: ICD-10-CM

## 2022-03-26 DIAGNOSIS — I51.9 MYXEDEMA HEART DISEASE: ICD-10-CM

## 2022-03-26 DIAGNOSIS — E55.9 AVITAMINOSIS D: ICD-10-CM

## 2022-03-26 DIAGNOSIS — E03.9 MYXEDEMA HEART DISEASE: ICD-10-CM

## 2022-03-26 DIAGNOSIS — I10 ESSENTIAL HYPERTENSION, MALIGNANT: ICD-10-CM

## 2022-03-26 DIAGNOSIS — E53.8 BIOTIN-(PROPIONYL-COA-CARBOXYLASE) LIGASE DEFICIENCY: ICD-10-CM

## 2022-03-26 DIAGNOSIS — E11.9 TYPE 2 DIABETES MELLITUS WITHOUT COMPLICATION, UNSPECIFIED WHETHER LONG TERM INSULIN USE (HCC): ICD-10-CM

## 2022-03-26 LAB
25(OH)D3 SERPL-MCNC: 56.6 NG/ML (ref 30–100)
ALBUMIN SERPL BCP-MCNC: 3.5 G/DL (ref 3.5–5)
ALP SERPL-CCNC: 93 U/L (ref 46–116)
ALT SERPL W P-5'-P-CCNC: 30 U/L (ref 12–78)
ANION GAP SERPL CALCULATED.3IONS-SCNC: 6 MMOL/L (ref 4–13)
AST SERPL W P-5'-P-CCNC: 22 U/L (ref 5–45)
BASOPHILS # BLD AUTO: 0.03 THOUSANDS/ΜL (ref 0–0.1)
BASOPHILS NFR BLD AUTO: 1 % (ref 0–1)
BILIRUB SERPL-MCNC: 0.2 MG/DL (ref 0.2–1)
BUN SERPL-MCNC: 15 MG/DL (ref 5–25)
CALCIUM SERPL-MCNC: 8.6 MG/DL (ref 8.3–10.1)
CHLORIDE SERPL-SCNC: 104 MMOL/L (ref 100–108)
CHOLEST SERPL-MCNC: 189 MG/DL
CO2 SERPL-SCNC: 30 MMOL/L (ref 21–32)
CREAT SERPL-MCNC: 0.68 MG/DL (ref 0.6–1.3)
EOSINOPHIL # BLD AUTO: 0.27 THOUSAND/ΜL (ref 0–0.61)
EOSINOPHIL NFR BLD AUTO: 5 % (ref 0–6)
ERYTHROCYTE [DISTWIDTH] IN BLOOD BY AUTOMATED COUNT: 13.1 % (ref 11.6–15.1)
EST. AVERAGE GLUCOSE BLD GHB EST-MCNC: 114 MG/DL
GFR SERPL CREATININE-BSD FRML MDRD: 97 ML/MIN/1.73SQ M
GLUCOSE P FAST SERPL-MCNC: 81 MG/DL (ref 65–99)
HBA1C MFR BLD: 5.6 %
HCT VFR BLD AUTO: 39.4 % (ref 34.8–46.1)
HDLC SERPL-MCNC: 102 MG/DL
HGB BLD-MCNC: 13.2 G/DL (ref 11.5–15.4)
IMM GRANULOCYTES # BLD AUTO: 0.02 THOUSAND/UL (ref 0–0.2)
IMM GRANULOCYTES NFR BLD AUTO: 0 % (ref 0–2)
LDLC SERPL CALC-MCNC: 75 MG/DL (ref 0–100)
LYMPHOCYTES # BLD AUTO: 0.97 THOUSANDS/ΜL (ref 0.6–4.47)
LYMPHOCYTES NFR BLD AUTO: 17 % (ref 14–44)
MCH RBC QN AUTO: 28.4 PG (ref 26.8–34.3)
MCHC RBC AUTO-ENTMCNC: 33.5 G/DL (ref 31.4–37.4)
MCV RBC AUTO: 85 FL (ref 82–98)
MONOCYTES # BLD AUTO: 0.62 THOUSAND/ΜL (ref 0.17–1.22)
MONOCYTES NFR BLD AUTO: 11 % (ref 4–12)
NEUTROPHILS # BLD AUTO: 3.74 THOUSANDS/ΜL (ref 1.85–7.62)
NEUTS SEG NFR BLD AUTO: 66 % (ref 43–75)
NONHDLC SERPL-MCNC: 87 MG/DL
NRBC BLD AUTO-RTO: 0 /100 WBCS
PLATELET # BLD AUTO: 260 THOUSANDS/UL (ref 149–390)
PMV BLD AUTO: 10.7 FL (ref 8.9–12.7)
POTASSIUM SERPL-SCNC: 3.7 MMOL/L (ref 3.5–5.3)
PROT SERPL-MCNC: 7 G/DL (ref 6.4–8.2)
RBC # BLD AUTO: 4.65 MILLION/UL (ref 3.81–5.12)
SODIUM SERPL-SCNC: 140 MMOL/L (ref 136–145)
TRIGL SERPL-MCNC: 61 MG/DL
TSH SERPL DL<=0.05 MIU/L-ACNC: 1.81 UIU/ML (ref 0.36–3.74)
VIT B12 SERPL-MCNC: 540 PG/ML (ref 100–900)
WBC # BLD AUTO: 5.65 THOUSAND/UL (ref 4.31–10.16)

## 2022-03-26 PROCEDURE — 82607 VITAMIN B-12: CPT

## 2022-03-26 PROCEDURE — 80053 COMPREHEN METABOLIC PANEL: CPT

## 2022-03-26 PROCEDURE — 83036 HEMOGLOBIN GLYCOSYLATED A1C: CPT

## 2022-03-26 PROCEDURE — 80061 LIPID PANEL: CPT

## 2022-03-26 PROCEDURE — 84443 ASSAY THYROID STIM HORMONE: CPT

## 2022-03-26 PROCEDURE — 82306 VITAMIN D 25 HYDROXY: CPT

## 2022-03-26 PROCEDURE — 36415 COLL VENOUS BLD VENIPUNCTURE: CPT

## 2022-03-26 PROCEDURE — 85025 COMPLETE CBC W/AUTO DIFF WBC: CPT

## 2022-04-20 ENCOUNTER — OFFICE VISIT (OUTPATIENT)
Dept: FAMILY MEDICINE CLINIC | Facility: CLINIC | Age: 57
End: 2022-04-20
Payer: COMMERCIAL

## 2022-04-20 VITALS
WEIGHT: 217.8 LBS | HEIGHT: 66 IN | BODY MASS INDEX: 35 KG/M2 | RESPIRATION RATE: 16 BRPM | HEART RATE: 76 BPM | SYSTOLIC BLOOD PRESSURE: 122 MMHG | DIASTOLIC BLOOD PRESSURE: 70 MMHG

## 2022-04-20 DIAGNOSIS — F33.1 MODERATE EPISODE OF RECURRENT MAJOR DEPRESSIVE DISORDER (HCC): ICD-10-CM

## 2022-04-20 DIAGNOSIS — I10 PRIMARY HYPERTENSION: ICD-10-CM

## 2022-04-20 DIAGNOSIS — B35.1 TOENAIL FUNGUS: ICD-10-CM

## 2022-04-20 DIAGNOSIS — Z00.00 ANNUAL PHYSICAL EXAM: Primary | ICD-10-CM

## 2022-04-20 DIAGNOSIS — Z12.11 COLON CANCER SCREENING: ICD-10-CM

## 2022-04-20 PROBLEM — E61.1 LOW IRON: Status: ACTIVE | Noted: 2022-04-20

## 2022-04-20 PROCEDURE — 99204 OFFICE O/P NEW MOD 45 MIN: CPT | Performed by: NURSE PRACTITIONER

## 2022-04-20 PROCEDURE — 99396 PREV VISIT EST AGE 40-64: CPT | Performed by: NURSE PRACTITIONER

## 2022-04-20 RX ORDER — 1.1% SODIUM FLUORIDE PRESCRIPTION DENTAL CREAM 5 MG/G
CREAM DENTAL
COMMUNITY
Start: 2022-02-01

## 2022-04-20 RX ORDER — CLOCORTOLONE PIVALATE 0 G/G
CREAM TOPICAL 2 TIMES DAILY
COMMUNITY

## 2022-04-20 NOTE — PROGRESS NOTES
Assessment/Plan:     Diagnoses and all orders for this visit:    Colon cancer screening  -     Ambulatory referral for colonoscopy; Future    Toenail fungus  -     ciclopirox (PENLAC) 8 % solution; Apply topically daily at bedtime    Patient may apply this nightly knowing that this may take 40+ weeks to resolve  Patient is encouraged to call our office for any questions/concerns, persistent or worsening symptoms  Patient states they understand and agree with treatment plan  BMI 35 0-35 9,adult  -     Ambulatory Referral to Weight Management; Future    We discussed appropriate lifestyle recommendations & goal BMI range  Pt was educated on appropriate diet and exercise modifications  We discussed appropriate lifestyle recommendations & goal BMI range  Pt was educated on appropriate diet and exercise modifications  Primary hypertension    Stable with Lisinopril & HCTZ  Continue same  Moderate episode of recurrent major depressive disorder (HCC)    Stable with medications  Continue to refill meds until patient finds psychiatrist         Pt to f/u in 6 months for recheck or sooner PRN  Subjective:      Patient ID: Tess Gonzales is a 62 y o  female  New patient here to establish from Dr Aimee Zaragoza office  Overall she notes she is feeling well  She does have 40+ year history of depression and sees a psychiatrist but now needs to change providers as her insurance has changed  Patient also has hx of HTN which she is managed with medication  She does have hx of toenail fungus and would like penlac solution  The following portions of the patient's history were reviewed and updated as appropriate: allergies, current medications, past family history, past medical history, past social history, past surgical history and problem list     Review of Systems   Constitutional: Negative  Negative for chills and fatigue  HENT: Negative  Respiratory: Negative    Negative for cough and shortness of breath  Cardiovascular: Negative  Negative for chest pain  Gastrointestinal: Negative  Genitourinary: Negative  Musculoskeletal: Negative  Negative for myalgias  Neurological: Negative  Objective:      /70 (BP Location: Left arm, Patient Position: Sitting, Cuff Size: Large)   Pulse 76   Resp 16   Ht 5' 6" (1 676 m)   Wt 98 8 kg (217 lb 12 8 oz)   Breastfeeding No   BMI 35 15 kg/m²          Physical Exam  Vitals reviewed  Constitutional:       General: She is not in acute distress  Appearance: Normal appearance  She is not ill-appearing  HENT:      Head: Normocephalic  Neck:      Vascular: No carotid bruit  Cardiovascular:      Rate and Rhythm: Normal rate and regular rhythm  Pulses: Normal pulses  Heart sounds: Normal heart sounds  No murmur heard  Pulmonary:      Effort: Pulmonary effort is normal  No respiratory distress  Breath sounds: Normal breath sounds  No wheezing  Abdominal:      General: There is no distension  Palpations: Abdomen is soft  There is no mass  Tenderness: There is no abdominal tenderness  There is no guarding or rebound  Hernia: No hernia is present  Musculoskeletal:         General: Normal range of motion  Skin:     General: Skin is warm and dry  Neurological:      Mental Status: She is alert and oriented to person, place, and time  Mental status is at baseline  Psychiatric:         Mood and Affect: Mood normal          Behavior: Behavior normal          Thought Content:  Thought content normal          Judgment: Judgment normal

## 2022-04-20 NOTE — PROGRESS NOTES
ADULT ANNUAL PHYSICAL  [address]  FAMILY PRACTICE    NAME: Emeterio Herr  AGE: 62 y o  SEX: female  : 1965     DATE: 2022     Assessment and Plan:  1  Labs UTD  2  PAP & mammo through GYN  3  Colonoscopy ordered  4  F/u in 6 months or sooner PRN  Problem List Items Addressed This Visit        Cardiovascular and Mediastinum    Primary hypertension       Other    Moderate episode of recurrent major depressive disorder (HCC)    Low iron      Other Visit Diagnoses     Annual physical exam    -  Primary    Colon cancer screening        Relevant Orders    Ambulatory referral for colonoscopy    Toenail fungus        Relevant Medications    Clocortolone Pivalate (Cloderm) 0 1 % cream    ciclopirox (PENLAC) 8 % solution    BMI 35 0-35 9,adult        Relevant Orders    Ambulatory Referral to Weight Management          Immunizations and preventive care screenings were discussed with patient today  Appropriate education was printed on patient's after visit summary  Counseling:  Alcohol/drug use: discussed moderation in alcohol intake, the recommendations for healthy alcohol use, and avoidance of illicit drug use  Dental Health: discussed importance of regular tooth brushing, flossing, and dental visits  Injury prevention: discussed safety/seat belts, safety helmets, smoke detectors, carbon dioxide detectors, and smoking near bedding or upholstery  Sexual health: discussed sexually transmitted diseases, partner selection, use of condoms, avoidance of unintended pregnancy, and contraceptive alternatives  · Exercise: the importance of regular exercise/physical activity was discussed  Recommend exercise 3-5 times per week for at least 30 minutes  BMI Counseling: Body mass index is 35 15 kg/m²   The BMI is above normal  Nutrition recommendations include decreasing portion sizes, encouraging healthy choices of fruits and vegetables, decreasing fast food intake, consuming healthier snacks, limiting drinks that contain sugar, moderation in carbohydrate intake, increasing intake of lean protein, reducing intake of saturated and trans fat and reducing intake of cholesterol  Exercise recommendations include moderate physical activity 150 minutes/week  No pharmacotherapy was ordered  Rationale for BMI follow-up plan is due to patient being overweight or obese  Return in about 6 months (around 10/20/2022) for Recheck  Chief Complaint:     Chief Complaint   Patient presents with    Np to be established      History of Present Illness:     Adult Annual Physical   Patient here for a comprehensive physical exam  The patient reports no problems  Diet and Physical Activity  · Diet/Nutrition: well balanced diet and consuming 3-5 servings of fruits/vegetables daily  · Exercise: no formal exercise  Depression Screening  PHQ-2/9 Depression Screening    Little interest or pleasure in doing things: 0 - not at all  Feeling down, depressed, or hopeless: 0 - not at all  PHQ-2 Score: 0  PHQ-2 Interpretation: Negative depression screen       General Health  · Sleep: sleeps well and gets 7-8 hours of sleep on average  · Hearing: normal - bilateral   · Vision: no vision problems and wears glasses  · Dental: regular dental visits, brushes teeth twice daily and flosses teeth occasionally  /GYN Health  · Patient is: postmenopausal     Review of Systems:     Review of Systems   Constitutional: Negative  Negative for chills and fatigue  HENT: Negative  Respiratory: Negative  Negative for cough and shortness of breath  Cardiovascular: Negative  Negative for chest pain  Gastrointestinal: Negative  Genitourinary: Negative  Musculoskeletal: Negative  Negative for myalgias  Neurological: Negative         Past Medical History:     Past Medical History:   Diagnosis Date    ADHD (attention deficit hyperactivity disorder)     Anxiety     Arthritis     Depression     Fibromyalgia, primary     GERD (gastroesophageal reflux disease)     History of stomach ulcers     Hypertension     Panic attack     Papanicolaou smear 2020    Psychiatric disorder       Past Surgical History:     Past Surgical History:   Procedure Laterality Date    HYSTEROSCOPY  2015    MAMMO (HISTORICAL)  2020      Social History:     Social History     Socioeconomic History    Marital status: Single     Spouse name: None    Number of children: None    Years of education: None    Highest education level: None   Occupational History    Occupation: Tech  Tobacco Use    Smoking status: Former Smoker    Smokeless tobacco: Never Used    Tobacco comment: teen years   Vaping Use    Vaping Use: Never used   Substance and Sexual Activity    Alcohol use: Yes     Comment: Socially    Drug use: No    Sexual activity: Not Currently     Birth control/protection: Post-menopausal   Other Topics Concern    None   Social History Narrative    Caffeine Intake: 1-2 cups per day    Do you smoke marijuana? Denies    Do you drink alcohol?  Socially    Exercise: Occassionally    Domestic violence: No    History of drug/alcohol abuse denies     Social Determinants of Health     Financial Resource Strain: Not on file   Food Insecurity: Not on file   Transportation Needs: Not on file   Physical Activity: Not on file   Stress: Not on file   Social Connections: Not on file   Intimate Partner Violence: Not on file   Housing Stability: Not on file      Family History:     Family History   Problem Relation Age of Onset    Hypertension Mother     Lung cancer Mother     Skin cancer Father       Current Medications:     Current Outpatient Medications   Medication Sig Dispense Refill    buPROPion (FORFIVO XL) 450 MG 24 hr tablet Take 450 mg by mouth daily      Clocortolone Pivalate (Cloderm) 0 1 % cream Apply topically 2 (two) times a day      hydrochlorothiazide (HYDRODIURIL) 12 5 mg tablet Take 12 5 mg by mouth daily      losartan (COZAAR) 100 MG tablet Take 100 mg by mouth daily      Mirabegron (MYRBETRIQ PO) Take by mouth      omeprazole (PriLOSEC) 40 MG capsule Take 40 mg by mouth daily Take before a meal      QUEtiapine (SEROQUEL) 100 mg tablet Take 100 mg by mouth daily at bedtime      SERTRALINE HCL PO Take 200 mg by mouth daily         SF 5000 Plus 1 1 % CREA       ciclopirox (PENLAC) 8 % solution Apply topically daily at bedtime 6 6 mL 0     No current facility-administered medications for this visit  Allergies: Allergies   Allergen Reactions    Latex Rash      Physical Exam:     /70 (BP Location: Left arm, Patient Position: Sitting, Cuff Size: Large)   Pulse 76   Resp 16   Ht 5' 6" (1 676 m)   Wt 98 8 kg (217 lb 12 8 oz)   Breastfeeding No   BMI 35 15 kg/m²     Physical Exam  Vitals and nursing note reviewed  Constitutional:       General: She is not in acute distress  Appearance: Normal appearance  She is well-developed  She is not ill-appearing  HENT:      Head: Normocephalic and atraumatic  Eyes:      Conjunctiva/sclera: Conjunctivae normal    Neck:      Vascular: No carotid bruit  Cardiovascular:      Rate and Rhythm: Normal rate and regular rhythm  Pulses: Normal pulses  Heart sounds: Normal heart sounds  No murmur heard  Pulmonary:      Effort: Pulmonary effort is normal  No respiratory distress  Breath sounds: Normal breath sounds  No wheezing  Abdominal:      General: There is no distension  Palpations: Abdomen is soft  There is no mass  Tenderness: There is no abdominal tenderness  There is no guarding or rebound  Hernia: No hernia is present  Musculoskeletal:         General: Normal range of motion  Cervical back: Normal range of motion and neck supple  Right lower leg: No edema  Left lower leg: No edema  Skin:     General: Skin is warm and dry        Capillary Refill: Capillary refill takes less than 2 seconds  Neurological:      Mental Status: She is alert and oriented to person, place, and time  Mental status is at baseline  Motor: No weakness  Gait: Gait normal    Psychiatric:         Mood and Affect: Mood normal          Behavior: Behavior normal          Thought Content:  Thought content normal          Judgment: Judgment normal           [place], [address]

## 2022-04-20 NOTE — PATIENT INSTRUCTIONS

## 2022-06-09 DIAGNOSIS — F33.1 MODERATE EPISODE OF RECURRENT MAJOR DEPRESSIVE DISORDER (HCC): Primary | ICD-10-CM

## 2022-06-09 RX ORDER — BUPROPION HYDROCHLORIDE 300 MG/1
300 TABLET ORAL DAILY
Qty: 90 TABLET | Refills: 2 | Status: SHIPPED | OUTPATIENT
Start: 2022-06-09

## 2022-06-09 RX ORDER — BUPROPION HYDROCHLORIDE 150 MG/1
150 TABLET, EXTENDED RELEASE ORAL DAILY
Qty: 90 TABLET | Refills: 2 | Status: SHIPPED | OUTPATIENT
Start: 2022-06-09

## 2022-06-15 ENCOUNTER — OFFICE VISIT (OUTPATIENT)
Dept: BARIATRICS | Facility: CLINIC | Age: 57
End: 2022-06-15

## 2022-06-15 VITALS — BODY MASS INDEX: 35.07 KG/M2 | WEIGHT: 218.2 LBS | HEIGHT: 66 IN

## 2022-06-15 DIAGNOSIS — R63.5 ABNORMAL WEIGHT GAIN: Primary | ICD-10-CM

## 2022-06-15 PROCEDURE — RECHECK: Performed by: DIETITIAN, REGISTERED

## 2022-06-15 PROCEDURE — WMDI30: Performed by: DIETITIAN, REGISTERED

## 2022-06-15 NOTE — PROGRESS NOTES
Weight Management Medical Nutrition Assessment    Patient presents for menu planning session  Reports that she often feel bloated with eating with occasional nausea and increased gas  Reports that this has been going on for 1 year  Reports no change in her bowel habits over the past year- reports her normal is 2-3 BM per day-does take metamucil daily and has for years  Is interested in weight loss as well       Patient seen by Medical Provider in past 6 months:  no  Requested to schedule appointment with Medical Provider: No      Anthropometric Measurements  Start Weight (#): 218 2  Current Weight (#): 218 2  TBW % Change from start weight: n/a  Ideal Body Weight (#):  Goal Weight (#):180    Weight Loss History  Previous weight loss attempts: Commercial Programs ([employer], [employer], etc )- in 2003, lost 35lbs (155-160#)    Food and Nutrition Related History  Wake up: 6:30am   Bed Time: 12am   -no sleep issues, takes her antidepressant at night which in turn helps her sleep    Food Recall  Works 8:30a-5p  7am Breakfast: bowl of cereal (1 cup wheat chex or shredded wheat) it 8oz 1% milk, 20oz coffee (2 10oz cups) with 1/2 and 1/2 (2 tbsps)    10am Snack: pretzels at work (something at the office, she doesn't pack)  12-12:30p Lunch: packs, weight watchers or lean cuisine pizza (frozen), banana, 12oz gingerale  Snack:apple on way home from work OR gets a milkshake 3-4 days a week on her way home ([employer])  -will snack on things like a box of cheese-its (1/2 box)  6-6:30p Dinner:leftovers from dining out on Sunday (grilled chicken with tara hair pasta and tomatoes with oil), may have another lean cuisine or salad with cheese on it  Snack: bowl of cereal    Beverages: water, 1% milk and coffee/tea  Volume of beverage intake: 20 oz coffee, 8-16 oz 1% milk, 64-80oz water daily    Weekends: Improved, don't keep as much food at home, not a morning person so delayed meals or skips a meal  Cravings: salty and milkshakes  Trouble area of day: when gets home from work    Frequency of Eating out: milkshake 3-4 times a week, take out 2-3 nights a week (renuka marcos)  Food restrictions: none  Cooking: self , dislikes cooking  Food Shopping: self  -lives with her brother    Physical Activity Intake  Activity:liked to walk but due to her back she cannot walk any more, does own a stationary bike but can act up her knee pain  Frequency:rarely  Physical limitations/barriers to exercise: back pain/issues, pain in knees    Estimated Needs  Energy  SECA: BMR:n/a     X 1 3 -1000 =  [place] Energy Needs: BMR : 1392  1-2# loss weekly sedentary: 2977-1661             1-2# loss weekly lightly active:0366-8601  Maintenance calories for sedentary activity level: 2102  Protein:71-86g     (1 2-1 5g/kg IBW)  Fluid: 69oz     (35mL/kg IBW)    Nutrition Diagnosis  Yes; Overweight/obesity  related to Excess energy intake as evidenced by  BMI more than normative standard for age and sex (obesity-grade II 35-39  9)       Nutrition Intervention    Nutrition Prescription  Calories:1500  Protein:75-85  Fluid:69oz    Meal Plan (Jayson/Pro/Carb)  Breakfast:200-300/15-20  Snack:100-150/5-10  Lunch:300-400/20  Snack:100-150/5-10  Dinner:300-400/20  Snack:100-150/5-10    Nutrition Education:    Calorie controlled menu  Lean protein food choices  Healthy snack options  Food journaling tips      Nutrition Counseling:  Strategies: meal planning, portion sizes, healthy snack choices, hydration, fiber intake, protein intake, exercise, food journal      Monitoring and Evaluation:  Evaluation criteria:  Energy Intake  Meet protein needs  Maintain adequate hydration  Monitor weekly weight  Meal planning/preparation  Food journal   Decreased portions at mealtimes and snacks  Physical activity     Barriers to learning:emotional  Readiness to change: Preparation:  (Getting ready to change)   Comprehension: good  Expected Compliance: good

## 2022-06-21 ENCOUNTER — HOSPITAL ENCOUNTER (EMERGENCY)
Facility: HOSPITAL | Age: 57
Discharge: HOME | End: 2022-06-21
Attending: EMERGENCY MEDICINE
Payer: COMMERCIAL

## 2022-06-21 ENCOUNTER — APPOINTMENT (EMERGENCY)
Dept: RADIOLOGY | Facility: HOSPITAL | Age: 57
End: 2022-06-21
Payer: COMMERCIAL

## 2022-06-21 VITALS
DIASTOLIC BLOOD PRESSURE: 67 MMHG | HEIGHT: 66 IN | WEIGHT: 218 LBS | SYSTOLIC BLOOD PRESSURE: 147 MMHG | BODY MASS INDEX: 35.03 KG/M2 | TEMPERATURE: 97.3 F | RESPIRATION RATE: 18 BRPM | OXYGEN SATURATION: 96 % | HEART RATE: 94 BPM
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
## 2022-06-21 DIAGNOSIS — S61.452A DOG BITE, HAND, LEFT, INITIAL ENCOUNTER: Primary | ICD-10-CM
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
## 2022-06-21 DIAGNOSIS — S51.851A DOG BITE OF RIGHT FOREARM, INITIAL ENCOUNTER: ICD-10-CM

## 2022-06-21 PROCEDURE — 90715 TDAP VACCINE 7 YRS/> IM: CPT | Performed by: EMERGENCY MEDICINE

## 2022-06-21 PROCEDURE — 99284 EMERGENCY DEPT VISIT MOD MDM: CPT | Performed by: EMERGENCY MEDICINE

## 2022-06-21 PROCEDURE — 73090 X-RAY EXAM OF FOREARM: CPT

## 2022-06-21 PROCEDURE — 73130 X-RAY EXAM OF HAND: CPT

## 2022-06-21 PROCEDURE — 99283 EMERGENCY DEPT VISIT LOW MDM: CPT

## 2022-06-21 PROCEDURE — 90471 IMMUNIZATION ADMIN: CPT

## 2022-06-21 RX ORDER — GINSENG 100 MG
1 CAPSULE ORAL ONCE
Status: COMPLETED | OUTPATIENT
Start: 2022-06-21 | End: 2022-06-21

## 2022-06-21 RX ORDER — IBUPROFEN 600 MG/1
600 TABLET ORAL ONCE
Status: COMPLETED | OUTPATIENT
Start: 2022-06-21 | End: 2022-06-21

## 2022-06-21 RX ORDER — ACETAMINOPHEN 325 MG/1
975 TABLET ORAL ONCE
Status: COMPLETED | OUTPATIENT
Start: 2022-06-21 | End: 2022-06-21

## 2022-06-21 RX ORDER — AMOXICILLIN AND CLAVULANATE POTASSIUM 875; 125 MG/1; MG/1
1 TABLET, FILM COATED ORAL ONCE
Status: COMPLETED | OUTPATIENT
Start: 2022-06-21 | End: 2022-06-21

## 2022-06-21 RX ORDER — LIDOCAINE HYDROCHLORIDE 10 MG/ML
20 INJECTION, SOLUTION EPIDURAL; INFILTRATION; INTRACAUDAL; PERINEURAL ONCE
Status: COMPLETED | OUTPATIENT
Start: 2022-06-21 | End: 2022-06-21

## 2022-06-21 RX ORDER — AMOXICILLIN AND CLAVULANATE POTASSIUM 875; 125 MG/1; MG/1
1 TABLET, FILM COATED ORAL EVERY 12 HOURS
Qty: 14 TABLET | Refills: 0 | Status: SHIPPED | OUTPATIENT
Start: 2022-06-21 | End: 2022-06-28

## 2022-06-21 RX ADMIN — TETANUS TOXOID, REDUCED DIPHTHERIA TOXOID AND ACELLULAR PERTUSSIS VACCINE, ADSORBED 0.5 ML: 5; 2.5; 8; 8; 2.5 SUSPENSION INTRAMUSCULAR at 23:08

## 2022-06-21 RX ADMIN — ACETAMINOPHEN 975 MG: 325 TABLET, FILM COATED ORAL at 23:09

## 2022-06-21 RX ADMIN — IBUPROFEN 600 MG: 600 TABLET, FILM COATED ORAL at 23:09

## 2022-06-21 RX ADMIN — BACITRACIN 1 SMALL APPLICATION: 500 OINTMENT TOPICAL at 23:46

## 2022-06-21 RX ADMIN — AMOXICILLIN AND CLAVULANATE POTASSIUM 1 TABLET: 875; 125 TABLET, FILM COATED ORAL at 23:46

## 2022-06-21 RX ADMIN — LIDOCAINE HYDROCHLORIDE 20 ML: 10 INJECTION, SOLUTION EPIDURAL; INFILTRATION; INTRACAUDAL at 23:08

## 2022-06-22 ENCOUNTER — OFFICE VISIT (OUTPATIENT)
Dept: URGENT CARE | Facility: CLINIC | Age: 57
End: 2022-06-22
Payer: COMMERCIAL

## 2022-06-22 VITALS
TEMPERATURE: 99.1 F | DIASTOLIC BLOOD PRESSURE: 80 MMHG | HEART RATE: 82 BPM | OXYGEN SATURATION: 96 % | SYSTOLIC BLOOD PRESSURE: 131 MMHG
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
## 2022-06-22 PROCEDURE — 99213 OFFICE O/P EST LOW 20 MIN: CPT | Performed by: PHYSICIAN ASSISTANT

## 2022-06-22 RX ORDER — MELATONIN
1000 DAILY
COMMUNITY

## 2022-06-22 RX ORDER — GINSENG 100 MG
1 CAPSULE ORAL ONCE
Status: COMPLETED | OUTPATIENT
Start: 2022-06-22 | End: 2022-06-22

## 2022-06-22 RX ADMIN — Medication 1 SMALL APPLICATION: at 16:09

## 2022-06-22 NOTE — ED PROVIDER NOTES
History  Chief Complaint   Patient presents with    Dog Bite     Pulled apart 2 dogs at home, dogs UTD on rabies, right arm with multiple bites, L index finger with bite     78-year-old female with past medical history of anxiety, ADHD, depression, fibromyalgia, GERD hypertension presents for evaluation of dog bites that were sustained while she was trying to keep her 2 dogs apart at home, they are rescue dogs, rabies up-to-date, 1 of the dogs is a bulldog mix that bit onto her right forearm, released fairly quickly   She has pain in her right forearm worse with touching, also has puncture wound to her left index finger from the other dog, no medications taken prior to arrival   Unknown last tetanus otherwise no allergies          Prior to Admission Medications   Prescriptions Last Dose Informant Patient Reported? Taking?    Clocortolone Pivalate (Cloderm) 0 1 % cream   Yes No   Sig: Apply topically 2 (two) times a day   Mirabegron (MYRBETRIQ PO)   Yes No   Sig: Take by mouth   QUEtiapine (SEROQUEL) 100 mg tablet  Self Yes No   Sig: Take 100 mg by mouth daily at bedtime   SERTRALINE HCL PO  Self Yes No   Sig: Take 200 mg by mouth daily      SF 5000 Plus 1 1 % CREA   Yes No   buPROPion (WELLBUTRIN XL) 300 mg 24 hr tablet   No No   Sig: Take 1 tablet (300 mg total) by mouth daily   buPROPion (Wellbutrin SR) 150 mg 12 hr tablet   No No   Sig: Take 1 tablet (150 mg total) by mouth in the morning   ciclopirox (PENLAC) 8 % solution   No No   Sig: Apply topically daily at bedtime   hydrochlorothiazide (HYDRODIURIL) 12 5 mg tablet  Self Yes No   Sig: Take 12 5 mg by mouth daily   losartan (COZAAR) 100 MG tablet  Self Yes No   Sig: Take 100 mg by mouth daily   omeprazole (PriLOSEC) 40 MG capsule  Self Yes No   Sig: Take 40 mg by mouth daily Take before a meal      Facility-Administered Medications: None       Past Medical History:   Diagnosis Date    ADHD (attention deficit hyperactivity disorder)     Anxiety     Arthritis     Depression     Fibromyalgia, primary     GERD (gastroesophageal reflux disease)     History of stomach ulcers     Hypertension     Panic attack     Papanicolaou smear 2020    Psychiatric disorder        Past Surgical History:   Procedure Laterality Date    HYSTEROSCOPY  2015    MAMMO (HISTORICAL)  2020       Family History   Problem Relation Age of Onset    Hypertension Mother     Lung cancer Mother     Skin cancer Father      I have reviewed and agree with the history as documented  E-Cigarette/Vaping    E-Cigarette Use Never User      E-Cigarette/Vaping Substances    Nicotine No     THC No     CBD No     Flavoring No     Other No     Unknown No      Social History     Tobacco Use    Smoking status: Former Smoker    Smokeless tobacco: Never Used    Tobacco comment: teen years   Vaping Use    Vaping Use: Never used   Substance Use Topics    Alcohol use: Yes     Comment: Socially    Drug use: No       Review of Systems   Constitutional: Negative for appetite change and fever  HENT: Negative for rhinorrhea and sore throat  Eyes: Negative for photophobia and visual disturbance  Respiratory: Negative for cough, chest tightness and wheezing  Cardiovascular: Negative for chest pain, palpitations and leg swelling  Gastrointestinal: Negative for abdominal distention, abdominal pain, blood in stool, constipation and diarrhea  Genitourinary: Negative for dysuria, flank pain, frequency, hematuria and urgency  Musculoskeletal: Negative for back pain  Skin: Positive for wound  Negative for rash  Neurological: Negative for dizziness, weakness and headaches  All other systems reviewed and are negative  Physical Exam  Physical Exam  Vitals and nursing note reviewed  Constitutional:       Appearance: She is well-developed  HENT:      Head: Normocephalic and atraumatic  Eyes:      Pupils: Pupils are equal, round, and reactive to light     Cardiovascular: Rate and Rhythm: Normal rate and regular rhythm  Heart sounds: No murmur heard  No friction rub  No gallop  Pulmonary:      Effort: Pulmonary effort is normal       Breath sounds: No wheezing or rales  Chest:      Chest wall: No tenderness  Abdominal:      General: There is no distension  Palpations: Abdomen is soft  There is no mass  Tenderness: There is no guarding or rebound  Musculoskeletal:        Arms:       Cervical back: Normal range of motion and neck supple  Comments: Tenderness to palpation right forearm there are multiple subcentimeter puncture wounds from the dog's teeth right forearm as well as to the distal phalanx of the left index finger    Otherwise compartments of the forearm soft    No pain with motion of the left index finger   Skin:     General: Skin is warm and dry  Neurological:      Mental Status: She is alert and oriented to person, place, and time           Vital Signs  ED Triage Vitals [06/21/22 2232]   Temperature Pulse Respirations Blood Pressure SpO2   (!) 97 3 °F (36 3 °C) 94 18 147/67 96 %      Temp Source Heart Rate Source Patient Position - Orthostatic VS BP Location FiO2 (%)   Temporal Monitor Sitting Left arm --      Pain Score       9           Vitals:    06/21/22 2232   BP: 147/67   Pulse: 94   Patient Position - Orthostatic VS: Sitting         Visual Acuity      ED Medications  Medications   tetanus-diphtheria-acellular pertussis (BOOSTRIX) IM injection 0 5 mL (0 5 mL Intramuscular Given 6/21/22 2308)   acetaminophen (TYLENOL) tablet 975 mg (975 mg Oral Given 6/21/22 2309)   ibuprofen (MOTRIN) tablet 600 mg (600 mg Oral Given 6/21/22 2309)   lidocaine (PF) (XYLOCAINE-MPF) 1 % injection 20 mL (20 mL Infiltration Given 6/21/22 2308)   bacitracin topical ointment 1 small application (1 small application Topical Given 6/21/22 2346)   amoxicillin-clavulanate (AUGMENTIN) 875-125 mg per tablet 1 tablet (1 tablet Oral Given 6/21/22 2346) Diagnostic Studies  Results Reviewed     None                 XR forearm 2 views RIGHT   ED Interpretation by Merlene Copeland DO (06/21 2309)   No fracture, no foreign bodies      XR hand 3+ views LEFT   ED Interpretation by Merlene Copeland DO (06/21 2309)   No fracture no radioopaque foreign bodies                 Procedures  Procedures         ED Course  ED Course as of 06/21/22 2347   Tue Jun 21, 2022   2332 Area right forearm as well as left index finger puncture wounds cleaned with saline, injected with 4 cc of lidocaine and further cleaned extensively with saline using a 16 gauge catheter using copious amount of sterile saline,                                                [facility]  Number of Diagnoses or Management Options  Diagnosis management comments: 35-year-old female status post dog bite at home, does have multiple puncture lacerations which will be cleaned extensively and tetanus updated, will start on antibiotics and she will follow-up with PCP      Disposition  Final diagnoses:   Dog bite, hand, left, initial encounter   Dog bite of right forearm, initial encounter     Time reflects when diagnosis was documented in both MDM as applicable and the Disposition within this note     Time User Action Codes Description Comment    6/21/2022 11:33 PM Tamara Ivy Add [place]  0XXA] Dog bite, hand, left, initial encounter     6/21/2022 11:33 PM Tamara Ivy Add [G45 289G,  W54  0XXA] Dog bite of right forearm, initial encounter       ED Disposition     ED Disposition   Discharge    Condition   Stable    Date/Time   Tue Jun 21, 2022 11:34 PM    Comment   Anand Mosher discharge to home/self care                 Follow-up Information     Follow up With Specialties Details Why Contact Info Additional [phone] Schedule an appointment as soon as possible for a visit   [address] [address] [facility] Emergency Department Emergency Medicine  If symptoms worsen [address] [place],[address] [address]  [address] [facility] Emergency Department, [address] [place], [facility], [facility] [address]          Patient's Medications   Discharge Prescriptions    AMOXICILLIN-CLAVULANATE (AUGMENTIN) 875-125 MG PER TABLET    Take 1 tablet by mouth every 12 (twelve) hours for 7 days       Start Date: 6/21/2022 End Date: 6/28/2022       Order Dose: 1 tablet       Quantity: 14 tablet    Refills: 0       No discharge procedures on file      PDMP Review       Value Time User    PDMP Reviewed  Yes 5/5/2021  3:56 PM Hamida Burt DO          ED Provider  Electronically Signed by           Merlene Copeland DO  06/21/22 8701

## 2022-06-22 NOTE — PROGRESS NOTES
[facility] [facility]        NAME: Arin Mitchell is a 62 y o  female  : 1965    MRN: [id number]  DATE: 2022  TIME: 4:53 PM    Assessment and Plan   Dog bite, subsequent encounter [W54  0XXD]  1  Dog bite, subsequent encounter  bacitracin topical ointment 1 small application         Patient Instructions     Take antibiotic as directed with food  Recommend probiotics for side effects  Continue with over-the-counter symptom relief  Monitor for worsening symptoms   Keep wounds clean  Follow up with PCP in 3-5 days  Proceed to  ER if symptoms worsen  Chief Complaint     Chief Complaint   Patient presents with   Mignon Pichardo     Was bit by her dogs yesterday during a dog fight, went to ER last night and has wounds to left index finger and to right forearm, wounds are bothering her and would like them evaluated         History of Present Illness       Patient presents with complaint of right forearm pain since yesterday  She states that she was bitten by her dog is accidentally as she was trying to break them up from fighting  She states that she was seen in ER yesterday and had her tetanus booster updated  She states that her dogs are up-to-date on vaccinations  She reports that she started Augmentin yesterday and has taken a total of 2 doses  She reports some stomach upset with the abx  She reports elevating the arm but denies trying other palliative measures for the pain  Review of Systems   Review of Systems   Constitutional: Negative for chills and fever  HENT: Negative for ear pain and sore throat  Eyes: Negative for pain and visual disturbance  Respiratory: Negative for cough and shortness of breath  Cardiovascular: Negative for chest pain and palpitations  Gastrointestinal: Negative for abdominal pain and vomiting  Genitourinary: Negative for dysuria and hematuria  Musculoskeletal: Positive for myalgias  Negative for arthralgias and back pain     Skin: Positive for color change and wound  Negative for rash  Neurological: Negative for seizures and syncope  All other systems reviewed and are negative  Current Medications       Current Outpatient Medications:     amoxicillin-clavulanate (AUGMENTIN) 875-125 mg per tablet, Take 1 tablet by mouth every 12 (twelve) hours for 7 days, Disp: 14 tablet, Rfl: 0    buPROPion (Wellbutrin SR) 150 mg 12 hr tablet, Take 1 tablet (150 mg total) by mouth in the morning, Disp: 90 tablet, Rfl: 2    buPROPion (WELLBUTRIN XL) 300 mg 24 hr tablet, Take 1 tablet (300 mg total) by mouth daily, Disp: 90 tablet, Rfl: 2    cholecalciferol (VITAMIN D3) 1,000 units tablet, Take 1,000 Units by mouth daily, Disp: , Rfl:     ciclopirox (PENLAC) 8 % solution, Apply topically daily at bedtime, Disp: 6 6 mL, Rfl: 0    Clocortolone Pivalate 0 1 % cream, Apply topically 2 (two) times a day, Disp: , Rfl:     hydrochlorothiazide (HYDRODIURIL) 12 5 mg tablet, Take 12 5 mg by mouth daily, Disp: , Rfl:     losartan (COZAAR) 100 MG tablet, Take 100 mg by mouth daily, Disp: , Rfl:     omeprazole (PriLOSEC) 40 MG capsule, Take 40 mg by mouth daily Take before a meal, Disp: , Rfl:     QUEtiapine (SEROquel) 100 mg tablet, Take 100 mg by mouth daily at bedtime, Disp: , Rfl:     SERTRALINE HCL PO, Take 200 mg by mouth daily   , Disp: , Rfl:     SF 5000 Plus 1 1 % CREA, , Disp: , Rfl:     Mirabegron (MYRBETRIQ PO), Take by mouth, Disp: , Rfl:   No current facility-administered medications for this visit      Current Allergies     Allergies as of 06/22/2022 - Reviewed 06/22/2022   Allergen Reaction Noted    Latex Rash 06/14/2021            The following portions of the patient's history were reviewed and updated as appropriate: allergies, current medications, past family history, past medical history, past social history, past surgical history and problem list      Past Medical History:   Diagnosis Date    ADHD (attention deficit hyperactivity disorder)  Anxiety     Arthritis     Depression     Fibromyalgia, primary     GERD (gastroesophageal reflux disease)     History of stomach ulcers     Hypertension     Panic attack     Papanicolaou smear 2020    Psychiatric disorder        Past Surgical History:   Procedure Laterality Date    HYSTEROSCOPY  2015    MAMMO (HISTORICAL)  2020       Family History   Problem Relation Age of Onset    Hypertension Mother     Lung cancer Mother     Skin cancer Father          Medications have been verified  Objective   /80   Pulse 82   Temp 99 1 °F (37 3 °C)   SpO2 96%   No LMP recorded  Patient is postmenopausal        Physical Exam     Physical Exam  Vitals and nursing note reviewed  Constitutional:       General: She is not in acute distress  Appearance: Normal appearance  She is well-developed  She is not ill-appearing or diaphoretic  HENT:      Head: Normocephalic and atraumatic  Eyes:      Conjunctiva/sclera: Conjunctivae normal       Pupils: Pupils are equal, round, and reactive to light  Cardiovascular:      Rate and Rhythm: Normal rate and regular rhythm  Heart sounds: Normal heart sounds  Pulmonary:      Effort: Pulmonary effort is normal  No respiratory distress  Breath sounds: Normal breath sounds  No stridor  Musculoskeletal:         General: Swelling and tenderness present  Cervical back: Normal range of motion and neck supple  Lymphadenopathy:      Cervical: No cervical adenopathy  Skin:     General: Skin is warm and dry  Capillary Refill: Capillary refill takes less than 2 seconds  Findings: Bruising present  No erythema or rash        Comments: 6 puncture wounds of right forearm w/ surrounding swelling and ecchymosis extending into distal bicep  1 puncture wound of left index finger w/ scant serosanguinous fluid oozing; AROM with pain; distal sensation intact   Neurological:      Mental Status: She is alert and oriented to person, place, and time       Cranial Nerves: No cranial nerve deficit  Sensory: No sensory deficit  Psychiatric:         Behavior: Behavior normal          Thought Content:  Thought content normal

## 2022-06-26 ENCOUNTER — APPOINTMENT (EMERGENCY)
Dept: CT IMAGING | Facility: HOSPITAL | Age: 57
End: 2022-06-26
Payer: COMMERCIAL

## 2022-06-26 ENCOUNTER — HOSPITAL ENCOUNTER (EMERGENCY)
Facility: HOSPITAL | Age: 57
Discharge: HOME | End: 2022-06-26
Attending: EMERGENCY MEDICINE
Payer: COMMERCIAL

## 2022-06-26 VITALS
DIASTOLIC BLOOD PRESSURE: 69 MMHG | TEMPERATURE: 96.3 F | SYSTOLIC BLOOD PRESSURE: 140 MMHG | HEART RATE: 88 BPM | OXYGEN SATURATION: 98 % | RESPIRATION RATE: 18 BRPM

## 2022-06-26 DIAGNOSIS — K52.9 ENTEROCOLITIS: Primary | ICD-10-CM

## 2022-06-26 LAB
ALBUMIN SERPL BCP-MCNC: 3.9 G/DL (ref 3.5–5)
ALP SERPL-CCNC: 130 U/L (ref 46–116)
ALT SERPL W P-5'-P-CCNC: 51 U/L (ref 12–78)
ANION GAP SERPL CALCULATED.3IONS-SCNC: 6 MMOL/L (ref 4–13)
AST SERPL W P-5'-P-CCNC: 26 U/L (ref 5–45)
BASOPHILS # BLD AUTO: 0.03 THOUSANDS/ΜL (ref 0–0.1)
BASOPHILS NFR BLD AUTO: 0 % (ref 0–1)
BILIRUB SERPL-MCNC: 0.4 MG/DL (ref 0.2–1)
BUN SERPL-MCNC: 18 MG/DL (ref 5–25)
CALCIUM SERPL-MCNC: 9 MG/DL (ref 8.3–10.1)
CHLORIDE SERPL-SCNC: 105 MMOL/L (ref 100–108)
CO2 SERPL-SCNC: 28 MMOL/L (ref 21–32)
CREAT SERPL-MCNC: 0.89 MG/DL (ref 0.6–1.3)
EOSINOPHIL # BLD AUTO: 0.18 THOUSAND/ΜL (ref 0–0.61)
EOSINOPHIL NFR BLD AUTO: 1 % (ref 0–6)
ERYTHROCYTE [DISTWIDTH] IN BLOOD BY AUTOMATED COUNT: 12.8 % (ref 11.6–15.1)
GFR SERPL CREATININE-BSD FRML MDRD: 72 ML/MIN/1.73SQ M
GLUCOSE SERPL-MCNC: 117 MG/DL (ref 65–140)
HCT VFR BLD AUTO: 44.9 % (ref 34.8–46.1)
HGB BLD-MCNC: 14.8 G/DL (ref 11.5–15.4)
IMM GRANULOCYTES # BLD AUTO: 0.09 THOUSAND/UL (ref 0–0.2)
IMM GRANULOCYTES NFR BLD AUTO: 1 % (ref 0–2)
LYMPHOCYTES # BLD AUTO: 0.29 THOUSANDS/ΜL (ref 0.6–4.47)
LYMPHOCYTES NFR BLD AUTO: 2 % (ref 14–44)
MCH RBC QN AUTO: 29.2 PG (ref 26.8–34.3)
MCHC RBC AUTO-ENTMCNC: 33 G/DL (ref 31.4–37.4)
MCV RBC AUTO: 89 FL (ref 82–98)
MONOCYTES # BLD AUTO: 0.41 THOUSAND/ΜL (ref 0.17–1.22)
MONOCYTES NFR BLD AUTO: 3 % (ref 4–12)
NEUTROPHILS # BLD AUTO: 12.89 THOUSANDS/ΜL (ref 1.85–7.62)
NEUTS SEG NFR BLD AUTO: 93 % (ref 43–75)
NRBC BLD AUTO-RTO: 0 /100 WBCS
PLATELET # BLD AUTO: 256 THOUSANDS/UL (ref 149–390)
PMV BLD AUTO: 10.3 FL (ref 8.9–12.7)
POTASSIUM SERPL-SCNC: 4.1 MMOL/L (ref 3.5–5.3)
PROT SERPL-MCNC: 7.7 G/DL (ref 6.4–8.2)
RBC # BLD AUTO: 5.06 MILLION/UL (ref 3.81–5.12)
SODIUM SERPL-SCNC: 139 MMOL/L (ref 136–145)
WBC # BLD AUTO: 13.89 THOUSAND/UL (ref 4.31–10.16)

## 2022-06-26 PROCEDURE — 80053 COMPREHEN METABOLIC PANEL: CPT

## 2022-06-26 PROCEDURE — 85025 COMPLETE CBC W/AUTO DIFF WBC: CPT

## 2022-06-26 PROCEDURE — 96375 TX/PRO/DX INJ NEW DRUG ADDON: CPT

## 2022-06-26 PROCEDURE — G1004 CDSM NDSC: HCPCS

## 2022-06-26 PROCEDURE — 99284 EMERGENCY DEPT VISIT MOD MDM: CPT

## 2022-06-26 PROCEDURE — 83690 ASSAY OF LIPASE: CPT

## 2022-06-26 PROCEDURE — 36415 COLL VENOUS BLD VENIPUNCTURE: CPT

## 2022-06-26 PROCEDURE — 96361 HYDRATE IV INFUSION ADD-ON: CPT

## 2022-06-26 PROCEDURE — 74176 CT ABD & PELVIS W/O CONTRAST: CPT

## 2022-06-26 PROCEDURE — 96374 THER/PROPH/DIAG INJ IV PUSH: CPT

## 2022-06-26 RX ORDER — ONDANSETRON 2 MG/ML
4 INJECTION INTRAMUSCULAR; INTRAVENOUS ONCE
Status: COMPLETED | OUTPATIENT
Start: 2022-06-26 | End: 2022-06-26

## 2022-06-26 RX ORDER — METOCLOPRAMIDE HYDROCHLORIDE 5 MG/ML
10 INJECTION INTRAMUSCULAR; INTRAVENOUS ONCE
Status: COMPLETED | OUTPATIENT
Start: 2022-06-26 | End: 2022-06-26

## 2022-06-26 RX ORDER — ONDANSETRON 4 MG/1
4 TABLET, ORALLY DISINTEGRATING ORAL EVERY 6 HOURS PRN
Qty: 12 TABLET | Refills: 0 | Status: SHIPPED | OUTPATIENT
Start: 2022-06-26

## 2022-06-26 RX ADMIN — ONDANSETRON 4 MG: 2 INJECTION INTRAMUSCULAR; INTRAVENOUS at 18:40

## 2022-06-26 RX ADMIN — METOCLOPRAMIDE 10 MG: 5 INJECTION, SOLUTION INTRAMUSCULAR; INTRAVENOUS at 19:47

## 2022-06-26 RX ADMIN — SODIUM CHLORIDE 1000 ML: 0.9 INJECTION, SOLUTION INTRAVENOUS at 18:40

## 2022-06-27 LAB — LIPASE SERPL-CCNC: 158 U/L (ref 73–393)

## 2022-06-27 NOTE — DISCHARGE INSTRUCTIONS
You can take zofran under the tongue every 6 hours for nausea symptoms    Eat bland foods - bananas, apple sauce, toast  Make sure you are staying hydrated with water or electrolyte drinks such as gatorade  Return to the ER if you develop intense abdominal pain, chest pain, shortness of breath, blood in vomit or stool, abdomen becomes rigid or distended, fevers > 106 F, you feel lightheaded, confused, dizzy or weak

## 2022-06-27 NOTE — ED PROVIDER NOTES
History  Chief Complaint   Patient presents with    Vomiting    Diarrhea     63 y/o female with PMH anxiety, GERD, fibromyalgia, HTN presents to ED today via ambulance for vomiting and diarrhea that started at 1300 today  She states that she ate cereal and coffee for breakfast this morning and a yogurt smoothie for lunch  She states that she had too many episodes of vomiting and diarrhea to count  Has not eaten since and states she feels weak  Denies any abdominal pain  Feels nauseous  Denies any changes in urination, blood in urination or stool, headaches, dizziness, URI symptoms  She has not tried any medications  She is currently on augmentin for a dog bite started on Thursday  Denies any other recent illness, travel, medication changes      History provided by:  Patient   used: No    Vomiting  Severity:  Moderate  Duration:  1 day  Timing:  Intermittent  Number of daily episodes:  5  Quality:  Stomach contents  Progression:  Worsening  Recent urination:  Normal  Ineffective treatments:  None tried  Associated symptoms: diarrhea    Associated symptoms: no abdominal pain, no chills, no cough, no fever, no headaches, no myalgias, no sore throat and no URI    Diarrhea  Associated symptoms: vomiting    Associated symptoms: no abdominal pain, no chills, no fever, no headaches, no myalgias and no URI        Prior to Admission Medications   Prescriptions Last Dose Informant Patient Reported? Taking?    Clocortolone Pivalate 0 1 % cream   Yes No   Sig: Apply topically 2 (two) times a day   Mirabegron (MYRBETRIQ PO)   Yes No   Sig: Take by mouth   QUEtiapine (SEROquel) 100 mg tablet  Self Yes No   Sig: Take 100 mg by mouth daily at bedtime   SERTRALINE HCL PO  Self Yes No   Sig: Take 200 mg by mouth daily      SF 5000 Plus 1 1 % CREA   Yes No   amoxicillin-clavulanate (AUGMENTIN) 875-125 mg per tablet   No No   Sig: Take 1 tablet by mouth every 12 (twelve) hours for 7 days   buPROPion (WELLBUTRIN XL) 300 mg 24 hr tablet   No No   Sig: Take 1 tablet (300 mg total) by mouth daily   buPROPion (Wellbutrin SR) 150 mg 12 hr tablet   No No   Sig: Take 1 tablet (150 mg total) by mouth in the morning   cholecalciferol (VITAMIN D3) 1,000 units tablet   Yes No   Sig: Take 1,000 Units by mouth daily   ciclopirox (PENLAC) 8 % solution   No No   Sig: Apply topically daily at bedtime   hydrochlorothiazide (HYDRODIURIL) 12 5 mg tablet  Self Yes No   Sig: Take 12 5 mg by mouth daily   losartan (COZAAR) 100 MG tablet  Self Yes No   Sig: Take 100 mg by mouth daily   omeprazole (PriLOSEC) 40 MG capsule  Self Yes No   Sig: Take 40 mg by mouth daily Take before a meal      Facility-Administered Medications: None       Past Medical History:   Diagnosis Date    ADHD (attention deficit hyperactivity disorder)     Anxiety     Arthritis     Depression     Fibromyalgia, primary     GERD (gastroesophageal reflux disease)     History of stomach ulcers     Hypertension     Panic attack     Papanicolaou smear 2020    Psychiatric disorder        Past Surgical History:   Procedure Laterality Date    HYSTEROSCOPY  2015    MAMMO (HISTORICAL)  2020       Family History   Problem Relation Age of Onset    Hypertension Mother     Lung cancer Mother     Skin cancer Father      I have reviewed and agree with the history as documented  E-Cigarette/Vaping    E-Cigarette Use Never User      E-Cigarette/Vaping Substances    Nicotine No     THC No     CBD No     Flavoring No     Other No     Unknown No      Social History     Tobacco Use    Smoking status: Former Smoker    Smokeless tobacco: Never Used    Tobacco comment: teen years   Vaping Use    Vaping Use: Never used   Substance Use Topics    Alcohol use: Yes     Comment: Socially    Drug use: No       Review of Systems   Constitutional: Negative for chills and fever  HENT: Negative for congestion and sore throat      Respiratory: Negative for cough, chest tightness and shortness of breath  Cardiovascular: Negative for chest pain  Gastrointestinal: Positive for diarrhea and vomiting  Negative for abdominal pain, blood in stool, nausea and rectal pain  Genitourinary: Negative for decreased urine volume, difficulty urinating, dysuria and hematuria  Musculoskeletal: Negative for myalgias  Skin: Negative for color change  Neurological: Negative for light-headedness and headaches  Psychiatric/Behavioral: Negative for behavioral problems and sleep disturbance  All other systems reviewed and are negative  Physical Exam  Physical Exam  Vitals and nursing note reviewed  Constitutional:       General: She is awake  Appearance: Normal appearance  She is well-developed  HENT:      Head: Normocephalic and atraumatic  Right Ear: External ear normal       Left Ear: External ear normal       Nose: Nose normal    Eyes:      General: No scleral icterus  Extraocular Movements: Extraocular movements intact  Cardiovascular:      Rate and Rhythm: Normal rate and regular rhythm  Heart sounds: Normal heart sounds, S1 normal and S2 normal  No murmur heard  No gallop  Pulmonary:      Effort: Pulmonary effort is normal       Breath sounds: Normal breath sounds  No wheezing, rhonchi or rales  Abdominal:      General: Abdomen is flat  Bowel sounds are normal       Palpations: Abdomen is soft  Tenderness: There is no abdominal tenderness  Comments: BS Normal  Abdomen is soft and flat  There is no tenderness, rebound, guarding, rigidity, masses noted  Musculoskeletal:         General: Normal range of motion  Cervical back: Normal range of motion  Skin:     General: Skin is warm and dry  Neurological:      General: No focal deficit present  Mental Status: She is alert     Psychiatric:         Attention and Perception: Attention and perception normal          Mood and Affect: Mood normal          Behavior: Behavior normal  Behavior is cooperative           Vital Signs  ED Triage Vitals   Temperature Pulse Respirations Blood Pressure SpO2   06/26/22 1814 06/26/22 1813 06/26/22 1813 06/26/22 1813 06/26/22 1813   (!) 96 3 °F (35 7 °C) 98 20 131/68 96 %      Temp Source Heart Rate Source Patient Position - Orthostatic VS BP Location FiO2 (%)   06/26/22 1814 06/26/22 1845 06/26/22 1813 06/26/22 1813 --   Tympanic Monitor Lying Right arm       Pain Score       06/26/22 1813       No Pain           Vitals:    06/26/22 1813 06/26/22 1845 06/26/22 2000   BP: 131/68 134/76 140/69   Pulse: 98 95 88   Patient Position - Orthostatic VS: Lying  Lying         Visual Acuity      ED Medications  Medications   ondansetron (ZOFRAN) injection 4 mg (4 mg Intravenous Given 6/26/22 1840)   sodium chloride 0 9 % bolus 1,000 mL (0 mL Intravenous Stopped 6/26/22 1940)   metoclopramide (REGLAN) injection 10 mg (10 mg Intravenous Given 6/26/22 1947)       Diagnostic Studies  Results Reviewed     Procedure Component Value Units Date/Time    [facility] [[id number]]  (Abnormal) Collected: 06/26/22 1840    Lab Status: Final result Specimen: Blood from Arm, Left Updated: 06/26/22 1932     Sodium 139 mmol/L      Potassium 4 1 mmol/L      Chloride 105 mmol/L      CO2 28 mmol/L      ANION GAP 6 mmol/L      BUN 18 mg/dL      Creatinine 0 89 mg/dL      Glucose 117 mg/dL      Calcium 9 0 mg/dL      AST 26 U/L      ALT 51 U/L      Alkaline Phosphatase 130 U/L      Total Protein 7 7 g/dL      Albumin 3 9 g/dL      Total Bilirubin 0 40 mg/dL      eGFR 72 ml/min/1 73sq m     Narrative:      [place] guidelines for Chronic Kidney Disease (CKD):     Stage 1 with normal or high GFR (GFR > 90 mL/min/1 73 square meters)    Stage 2 Mild CKD (GFR = 60-89 mL/min/1 73 square meters)    Stage 3A Moderate CKD (GFR = 45-59 mL/min/1 73 square meters)    Stage 3B Moderate CKD (GFR = 30-44 mL/min/1 73 square meters)    Stage 4 Severe CKD (GFR = 15-29 mL/min/1 73 square meters)   Stage 5 End Stage CKD (GFR <15 mL/min/1 73 square meters)  Note: GFR calculation is accurate only with a steady state creatinine    CBC and differential [[id number]]  (Abnormal) Collected: 06/26/22 1840    Lab Status: Final result Specimen: Blood from Arm, Left Updated: 06/26/22 1930     WBC 13 89 Thousand/uL      RBC 5 06 Million/uL      Hemoglobin 14 8 g/dL      Hematocrit 44 9 %      MCV 89 fL      MCH 29 2 pg      MCHC 33 0 g/dL      RDW 12 8 %      MPV 10 3 fL      Platelets 424 Thousands/uL      nRBC 0 /100 WBCs      Neutrophils Relative 93 %      Immat GRANS % 1 %      Lymphocytes Relative 2 %      Monocytes Relative 3 %      Eosinophils Relative 1 %      Basophils Relative 0 %      Neutrophils Absolute 12 89 Thousands/µL      Immature Grans Absolute 0 09 Thousand/uL      Lymphocytes Absolute 0 29 Thousands/µL      Monocytes Absolute 0 41 Thousand/µL      Eosinophils Absolute 0 18 Thousand/µL      Basophils Absolute 0 03 Thousands/µL     Narrative: This is an appended report  These results have been appended to a previously verified report  Lipase [[id number]] Collected: 06/26/22 1840    Lab Status: In process Specimen: Blood from Arm, Left Updated: 06/26/22 1855    UA w Reflex to Microscopic w Reflex to Culture [[id number]]     Lab Status: No result Specimen: Urine                  CT abdomen pelvis wo contrast   Final Result by Karthik Reyes MD (06/26 2029)   Exam is limited without IV and oral contrast particularly for soft tissue and bowel evaluation  1   Mild-to-moderate distention of small and large bowel loops  Multiple fluid-filled bowel loops extending to the transverse colon  Findings raise suspicion for underlying enterocolitis  The study was marked in [facility] for immediate notification        Workstation performed: [id number]                    Procedures  Procedures         ED Course  ED Course as of 06/26/22 2232   Vitor Kay Jun 26, 2022 2009 Patient feels nausea relief after receiving the reglan             SBIRT 22yo+    Flowsheet Row Most Recent Value   SBIRT (23 yo +)    In order to provide better care to our patients, we are screening all of our patients for alcohol and drug use  Would it be okay to ask you these screening questions? Yes Filed at: 06/26/2022 2005   Initial Alcohol Screen: US AUDIT-C     1  How often do you have a drink containing alcohol? 0 Filed at: 06/26/2022 2005   2  How many drinks containing alcohol do you have on a typical day you are drinking? 0 Filed at: 06/26/2022 2005   3b  FEMALE Any Age, or MALE 65+: How often do you have 4 or more drinks on one occassion? 0 Filed at: 06/26/2022 2005   Audit-C Score 0 Filed at: 06/26/2022 2005   RUTH: How many times in the past year have you    Used an illegal drug or used a prescription medication for non-medical reasons? Never Filed at: 06/26/2022 2005                    MDM  Number of Diagnoses or Management Options  Enterocolitis: new and requires workup  Diagnosis management comments: 63 y/o female presents to the ER today via ambulance today for nausea, vomiting and diarrhea  Pt denies any abdominal pain  Vitals stable in ED  Differential gastroenteritis, enterocolitis, appendicitis, dehydration  Ordered CBC, CMP, Lipase, UA, CT of abdomen  Gave pt zofran and fluids in ED  Labs showed slightly elevated WBC at 13 89 but otherwise unremarkable  CT showed enterocolitis  Pt stated zofran did not help so she was given reglan in ED  Helped symptoms significantly  Pt was informed that she could be experiencing side effects due to her antibiotics, or it could be a virus or bacteria  Explained that there is an infection called clostridium difficile that can occur on antibiotics and it can be tested by stool culture but patient was unable to give a sample at this time  She was given care instructions for at home and was told to follow up with her PCP in the next week to ensure her symptoms are improving  Pt agreed with this plan  She was prescribed zofran for at home  Pt was given strict return to ED Precautions both verbally and in discharge papers    This patient was evaluated during a time of global shortage of iodinated contrast media  Based on guidance from the [employer] of Radiology, best practices and local institutional approaches, an alternative path for evaluating and managing the patient may have been employed in order to provide optimal care during this shortage  The current situation has been discussed with the patient  Amount and/or Complexity of Data Reviewed  Clinical lab tests: ordered and reviewed  Tests in the radiology section of CPT®: ordered and reviewed    Risk of Complications, Morbidity, and/or Mortality  Presenting problems: low  Diagnostic procedures: low  Management options: low    Patient Progress  Patient progress: improved      Disposition  Final diagnoses:   Enterocolitis     Time reflects when diagnosis was documented in both MDM as applicable and the Disposition within this note     Time User Action Codes Description Comment    6/26/2022  9:05 PM Arias Ivan [K52 9] Enterocolitis       ED Disposition     ED Disposition   Discharge    Condition   Stable    Date/Time   Sun Jun 26, 2022  9:05 PM    [facility] discharge to home/self care                 Follow-up Information     Follow up With Specialties Details Why Contact Info    Viktoria Rockwell, [address] In 1 week to ensure symptoms are improving [address] [facility] 25      Viktoria Rockwell, [address] In 3 days  [facility] [facility] [address] [place] [address]  [phone]            Discharge Medication List as of 6/26/2022  9:13 PM      START taking these medications    Details   ondansetron (Zofran ODT) 4 mg disintegrating tablet Take 1 tablet (4 mg total) by mouth every 6 (six) hours as needed for nausea or vomiting, Starting Sun 6/26/2022, Normal         CONTINUE these medications which have NOT CHANGED    Details   amoxicillin-clavulanate (AUGMENTIN) 875-125 mg per tablet Take 1 tablet by mouth every 12 (twelve) hours for 7 days, Starting Tue 6/21/2022, Until Tue 6/28/2022, Normal      buPROPion (Wellbutrin SR) 150 mg 12 hr tablet Take 1 tablet (150 mg total) by mouth in the morning, Starting Thu 6/9/2022, Normal      buPROPion (WELLBUTRIN XL) 300 mg 24 hr tablet Take 1 tablet (300 mg total) by mouth daily, Starting Thu 6/9/2022, Normal      cholecalciferol (VITAMIN D3) 1,000 units tablet Take 1,000 Units by mouth daily, Historical Med      ciclopirox (PENLAC) 8 % solution Apply topically daily at bedtime, Starting Wed 4/20/2022, Normal      Clocortolone Pivalate 0 1 % cream Apply topically 2 (two) times a day, Historical Med      hydrochlorothiazide (HYDRODIURIL) 12 5 mg tablet Take 12 5 mg by mouth daily, Starting Wed 4/21/2021, Historical Med      losartan (COZAAR) 100 MG tablet Take 100 mg by mouth daily, Historical Med      Mirabegron (MYRBETRIQ PO) Take by mouth, Historical Med      omeprazole (PriLOSEC) 40 MG capsule Take 40 mg by mouth daily Take before a meal, Starting Fri 4/2/2021, Historical Med      QUEtiapine (SEROquel) 100 mg tablet Take 100 mg by mouth daily at bedtime, Historical Med      SERTRALINE HCL PO Take 200 mg by mouth daily   , Starting Mon 5/10/2021, Historical Med      SF 5000 Plus 1 1 % CREA Starting Tue 2/1/2022, Historical Med             No discharge procedures on file      PDMP Review       Value Time User    PDMP Reviewed  Yes 5/5/2021  3:56 PM Ashlee Sinclair DO          ED Provider  Electronically Signed by           Lai Bowens PA-C  06/26/22 [address] [place], [place] [address]MEL  06/26/22 5320

## 2022-06-28 ENCOUNTER — OFFICE VISIT (OUTPATIENT)
Dept: FAMILY MEDICINE CLINIC | Facility: CLINIC | Age: 57
End: 2022-06-28
Payer: COMMERCIAL

## 2022-06-28 VITALS
SYSTOLIC BLOOD PRESSURE: 130 MMHG | HEART RATE: 80 BPM | HEIGHT: 66 IN | WEIGHT: 219.2 LBS | DIASTOLIC BLOOD PRESSURE: 80 MMHG | BODY MASS INDEX: 35.23 KG/M2 | OXYGEN SATURATION: 98 % | RESPIRATION RATE: 15 BRPM
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
## 2022-06-28 PROCEDURE — 99213 OFFICE O/P EST LOW 20 MIN: CPT | Performed by: FAMILY MEDICINE

## 2022-06-28 NOTE — PATIENT INSTRUCTIONS
Call with any signs of infection, red, heat, pain, discharge    Low fiber diet for a week, then gradually increase the fiber    Add acidophylus twice daily or align, or greek yogurt daily      Call as needed

## 2022-06-28 NOTE — PROGRESS NOTES
ASSESSMENT/PLAN:    Dog bite   We will hold off on any antibiotics and patient will let us know with the 1st sign of any infection which was described   She will wash the area daily during shower with soap and water and rinse well then apply a either antibiotic ointment or but Vaseline on the pad of Band-Aids and cover until they are totally healed    Tetanus is up-to-date   Recheck as needed              Health Maintenance   Topic Date Due    Colorectal Cancer Screening  Never done    COVID-19 Vaccine (3 - Booster for [place] series) 10/07/2021    PT PLAN OF CARE  03/11/2022    Pneumococcal Vaccine: Pediatrics (0 to 5 Years) and At-Risk Patients (6 to 59 Years) (1 - PCV) 04/20/2023 (Originally 1/11/1971)    HIV Screening  04/20/2024 (Originally 1/11/1980)    Hepatitis C Screening  05/20/2024 (Originally 1965)    Breast Cancer Screening: Mammogram  11/02/2022    Annual Physical  04/20/2023    BMI: Followup Plan  06/15/2023    BMI: Adult  06/28/2023    Depression Remission PHQ  06/28/2023    Cervical Cancer Screening  01/21/2025    DTaP,Tdap,and Td Vaccines (2 - Td or Tdap) 06/21/2032    Influenza Vaccine  Completed    HIB Vaccine  Aged Out    Hepatitis B Vaccine  Aged Out    IPV Vaccine  Aged Out    Hepatitis A Vaccine  Aged Out    Meningococcal ACWY Vaccine  Aged Out    HPV Vaccine  Aged Out         Problem List as of 6/28/2022 Reviewed: 4/20/2022 12:48 PM by ANTONY Maier    Chronic bilateral low back pain without sciatica    Low iron    Lumbar disc herniation    Lumbar radiculopathy    Moderate episode of recurrent major depressive disorder ([facility] [address] )    Primary hypertension    Spinal stenosis of lumbar region with neurogenic claudication            Subjective:   Chief Complaint   Patient presents with    ER Follow up      1 week ago patient try to pull 2 dogs were fighting apart    They were her dogs and there vaccinated   As results she was bit by each dog on her right arm that mistake  She went to the emergency room was treated with Augmentin and sent home   After about 5 days a taking Augmentin she suddenly had severe diarrhea and vomiting nonstop for 3 hours   At this point she went back to the emergency room for recheck, and was found to have nonspecific colitis most likely as results of the Augmentin     She was sent here for further follow-up  No other antibiotics given      patient ID: Clayton Turpin is a 62 y o  female  Patient's past medical history, surgical history, family history, social history, and Tobacco history reviewed with patient  MED LIST WAS REVIEWED AND UPDATED    ROS  As per HPI  Rest of 12 point review of systems negative     Objective:      VITALS:  Wt Readings from Last 3 Encounters:   06/28/22 99 4 kg (219 lb 3 2 oz)   06/21/22 98 9 kg (218 lb)   06/15/22 99 kg (218 lb 3 2 oz)     BP Readings from Last 3 Encounters:   06/28/22 130/80   06/26/22 140/69   06/22/22 131/80     Pulse Readings from Last 3 Encounters:   06/28/22 80   06/26/22 88   06/22/22 82     Body mass index is 35 38 kg/m²  Laboratory Results: All pertinent labs and studies were reviewed with patient during this office visit with highlights of the results contained in this note in the ASSESSMENT AND PLAN section       Physical Exam  General  Patient in no acute distress, well appearing, well nourished and appears stated age    Mental status  Mood is very nervous and appropriate  Good judgment and insight, oriented to time person and place, recent and remote memory is intact, cooperative, and patient is reasonable      Skin   Volar surface of right forearm has 5 distinct puncture wounds that have healing redness around the edge but no evidence of infection or foreign body

## 2022-07-18 DIAGNOSIS — F33.1 MODERATE EPISODE OF RECURRENT MAJOR DEPRESSIVE DISORDER (HCC): Primary | ICD-10-CM

## 2022-07-18 RX ORDER — SERTRALINE HYDROCHLORIDE 100 MG/1
200 TABLET, FILM COATED ORAL DAILY
Qty: 60 TABLET | Refills: 2 | Status: SHIPPED | OUTPATIENT
Start: 2022-07-18 | End: 2022-10-21 | Stop reason: SDUPTHER

## 2022-07-18 NOTE — TELEPHONE ENCOUNTER
Pt states her last doctor had her taking 100 MG 2 5 tablets daily but pt stated the extra half dose was not doing anything   Pt stated she takes 2 100 MG tablets in am

## 2022-08-04 DIAGNOSIS — I10 PRIMARY HYPERTENSION: Primary | ICD-10-CM

## 2022-08-04 RX ORDER — LOSARTAN POTASSIUM 100 MG/1
100 TABLET ORAL DAILY
Qty: 90 TABLET | Refills: 2 | Status: SHIPPED | OUTPATIENT
Start: 2022-08-04

## 2022-08-04 NOTE — PROGRESS NOTES
Daily Note     Today's date: 2022  Patient name: Alyssa Boyce  : 1965  MRN: [id number]  Referring provider: Pardeep Nye, PT  Dx:   Encounter Diagnosis     ICD-10-CM    1  Chronic midline low back pain with bilateral sciatica  M54 41     M54 42     G89 29                 Subjective: Patient reports no adverse reaction to her LV  She reports today she is very tired - did not sleep well  Objective: See treatment diary below      Assessment:   Stage: chronic  Stability of Symptoms:  At Evaluation: stable - unchanged  Global Rating of Change:   Symptom Irritability Level: moderate  Primary Movement Diagnosis: poor motor control   Patient Specific Functional Scale:   22: return to walking around the block 0/10, return to standing for housework - cooking, baking, and cleaning 1/10, exercise for weight loss 0/10; Total   FOTO Prediction: 55 by visit 12  FOTO Progress: 40 at evaluation   Greatest Concern:  "being in a wheelchair before I'm 60"  Current Activity Recommendations: added to HEP, sit with towel roll (); added to HEP (1/10)  Current Educational Needs: progressions    Patient had decreased pain following lumbar spine mobilization  She continues to respond well to extension based treatment  She demonstrated good form with exercises performed  Skilled PT continues to be required to guide progression of lumbar mobility and control to allow her to return to regular walking and exercise program        Plan: Continue with POC - monitor tolerance to treatment and updated HEP        Daily Treatment Diary     DX: LBP  EPOC: 2/3/22  Precautions: NA  CO-MORBIDITIES: Fibromyalgia, HTN  HEP ACCESS CODE: [id number]      Treatment Diary  1/10 1/12       Manual Therapy         Lumbar Mobs PA Gr 2/3  8 min PA Gr 2/3  8 min                                                             Therapeutic Exercise  HEP         Recumbent Bike  6 min 6 min                 Standing Lumbar Ext  15x 15x Prone on Elbows 1/6 2 min 2 min                 Abdominal Brace 1/10 5"x10 5"x10       LFS: Alt LE 1/10 15x ea 15x ea       H/L Hip ADD Isometric 1/10 5"x15 5"x15                 SLR 1/6 15x ea 15x ea       S/L Hip ABD 1/6 15x ea 15x ea       Bridge 1/6 5"x15 5"x15                 LAQ                    Neuromuscular Reeducation                    FWD Mini Lunge          LAT Mini Lunge          Mini Squat                    TB Side Stepping                              FWD Step Up          LAT Step Up                    Therapeutic Activity                              Gait Training                                        Modalities Universal Safety Interventions

## 2022-08-08 ENCOUNTER — HOSPITAL ENCOUNTER (EMERGENCY)
Facility: HOSPITAL | Age: 57
Discharge: HOME | End: 2022-08-09
Attending: EMERGENCY MEDICINE
Payer: COMMERCIAL

## 2022-08-08 VITALS
BODY MASS INDEX: 35.2 KG/M2 | SYSTOLIC BLOOD PRESSURE: 143 MMHG | DIASTOLIC BLOOD PRESSURE: 81 MMHG | TEMPERATURE: 97.7 F | HEIGHT: 66 IN | WEIGHT: 219 LBS | RESPIRATION RATE: 18 BRPM | HEART RATE: 107 BPM | OXYGEN SATURATION: 96 %
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
## 2022-08-08 DIAGNOSIS — S51.859A DOG BITE OF FOREARM: ICD-10-CM

## 2022-08-08 DIAGNOSIS — S61.451A DOG BITE OF RIGHT HAND, INITIAL ENCOUNTER: Primary | ICD-10-CM
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
## 2022-08-08 PROCEDURE — 99283 EMERGENCY DEPT VISIT LOW MDM: CPT

## 2022-08-08 PROCEDURE — 12001 RPR S/N/AX/GEN/TRNK 2.5CM/<: CPT

## 2022-08-08 PROCEDURE — 99284 EMERGENCY DEPT VISIT MOD MDM: CPT

## 2022-08-08 RX ORDER — METRONIDAZOLE 500 MG/1
500 TABLET ORAL EVERY 8 HOURS SCHEDULED
Qty: 30 TABLET | Refills: 0 | Status: SHIPPED | OUTPATIENT
Start: 2022-08-08 | End: 2022-08-13

## 2022-08-08 RX ORDER — GINSENG 100 MG
1 CAPSULE ORAL ONCE
Status: COMPLETED | OUTPATIENT
Start: 2022-08-08 | End: 2022-08-08

## 2022-08-08 RX ORDER — LIDOCAINE HYDROCHLORIDE AND EPINEPHRINE 10; 10 MG/ML; UG/ML
5 INJECTION, SOLUTION INFILTRATION; PERINEURAL ONCE
Status: COMPLETED | OUTPATIENT
Start: 2022-08-08 | End: 2022-08-08

## 2022-08-08 RX ORDER — CEFUROXIME AXETIL 500 MG/1
500 TABLET ORAL EVERY 12 HOURS SCHEDULED
Qty: 20 TABLET | Refills: 0 | Status: SHIPPED | OUTPATIENT
Start: 2022-08-08 | End: 2022-08-13

## 2022-08-08 RX ADMIN — LIDOCAINE HYDROCHLORIDE,EPINEPHRINE BITARTRATE 5 ML: 10; .01 INJECTION, SOLUTION INFILTRATION; PERINEURAL at 23:58

## 2022-08-08 RX ADMIN — BACITRACIN 1 LARGE APPLICATION: 500 OINTMENT TOPICAL at 23:59

## 2022-08-09 ENCOUNTER — APPOINTMENT (OUTPATIENT)
Dept: PHYSICAL THERAPY | Facility: CLINIC | Age: 57
End: 2022-08-09
Payer: COMMERCIAL

## 2022-08-09 NOTE — ED PROVIDER NOTES
History  Chief Complaint   Patient presents with    Dog Bite     63 y/o female with PMH anxiety, depression, HTN presents to the ER for dog bites and scratches to the right hand, forearm and right shoulder  Patient states her 3 dogs were fighting when the oldest one got mad an attacked her  This has occurred in the past  Patient says her pain is a 6-7/10  It was bleeding pretty significantly as well but was controlled with pressure PTA  Patient denies any numbness, tingling, loss of ROM or feeling in hand, fevers, chills, chest pain, shortness of breath  Tetanus UTD  Dogs UTD on all their rabies vax  Patient states she had a reaction to the augmentin last time and it caused her colitis      History provided by:  Patient  Dog Bite  Contact animal:  Dog  Location:  Shoulder/arm  Shoulder/arm injury location:  R arm  Time since incident:  1 hour  Pain details:     Quality:  Sore    Severity:  Moderate    Timing:  Constant    Progression:  Waxing and waning  Animal's rabies vaccination status:  Up to date  Animal in possession: yes    Tetanus status:  Up to date  Ineffective treatments:  None tried  Associated symptoms: no fever, no numbness and no swelling        Prior to Admission Medications   Prescriptions Last Dose Informant Patient Reported? Taking?    Clocortolone Pivalate 0 1 % cream   Yes No   Sig: Apply topically 2 (two) times a day   QUEtiapine (SEROquel) 100 mg tablet  Self Yes No   Sig: Take 100 mg by mouth daily at bedtime   SF 5000 Plus 1 1 % CREA   Yes No   buPROPion (WELLBUTRIN XL) 300 mg 24 hr tablet   No No   Sig: Take 1 tablet (300 mg total) by mouth daily   buPROPion (Wellbutrin SR) 150 mg 12 hr tablet   No No   Sig: Take 1 tablet (150 mg total) by mouth in the morning   cholecalciferol (VITAMIN D3) 1,000 units tablet   Yes No   Sig: Take 1,000 Units by mouth daily   ciclopirox (PENLAC) 8 % solution   No No   Sig: Apply topically daily at bedtime   hydrochlorothiazide (HYDRODIURIL) 12 5 mg tablet Self Yes No   Sig: Take 12 5 mg by mouth daily   losartan (COZAAR) 100 MG tablet   No No   Sig: Take 1 tablet (100 mg total) by mouth daily   omeprazole (PriLOSEC) 40 MG capsule  Self Yes No   Sig: Take 40 mg by mouth daily Take before a meal   ondansetron (Zofran ODT) 4 mg disintegrating tablet   No No   Sig: Take 1 tablet (4 mg total) by mouth every 6 (six) hours as needed for nausea or vomiting   sertraline (ZOLOFT) 100 mg tablet   No No   Sig: Take 2 tablets (200 mg total) by mouth daily      Facility-Administered Medications: None       Past Medical History:   Diagnosis Date    ADHD (attention deficit hyperactivity disorder)     Anxiety     Arthritis     Depression     Fibromyalgia, primary     GERD (gastroesophageal reflux disease)     History of stomach ulcers     Hypertension     Panic attack     Papanicolaou smear 2020    Psychiatric disorder        Past Surgical History:   Procedure Laterality Date    HYSTEROSCOPY  2015    MAMMO (HISTORICAL)  2020       Family History   Problem Relation Age of Onset    Hypertension Mother     Lung cancer Mother     Skin cancer Father      I have reviewed and agree with the history as documented  E-Cigarette/Vaping    E-Cigarette Use Never User      E-Cigarette/Vaping Substances    Nicotine No     THC No     CBD No     Flavoring No     Other No     Unknown No      Social History     Tobacco Use    Smoking status: Former Smoker    Smokeless tobacco: Never Used    Tobacco comment: teen years   Vaping Use    Vaping Use: Never used   Substance Use Topics    Alcohol use: Yes     Comment: Socially    Drug use: No       Review of Systems   Constitutional: Negative for chills and fever  Respiratory: Negative for chest tightness and shortness of breath  Cardiovascular: Negative for chest pain  Gastrointestinal: Negative for nausea and vomiting  Skin: Positive for color change and wound  Neurological: Negative for numbness and headaches  Psychiatric/Behavioral: Negative for behavioral problems and sleep disturbance  All other systems reviewed and are negative  Physical Exam  Physical Exam  Vitals and nursing note reviewed  Constitutional:       General: She is awake  Appearance: Normal appearance  She is well-developed  HENT:      Head: Normocephalic and atraumatic  Right Ear: External ear normal       Left Ear: External ear normal       Nose: Nose normal    Eyes:      General: No scleral icterus  Extraocular Movements: Extraocular movements intact  Cardiovascular:      Rate and Rhythm: Normal rate and regular rhythm  Pulses:           Radial pulses are 2+ on the right side  Heart sounds: Normal heart sounds, S1 normal and S2 normal  No murmur heard  No gallop  Pulmonary:      Effort: Pulmonary effort is normal       Breath sounds: Normal breath sounds  No wheezing, rhonchi or rales  Musculoskeletal:         General: Normal range of motion  Cervical back: Normal range of motion  Comments: Full ROM and strength of right hand   Skin:     General: Skin is warm and dry  Findings: Abrasion, ecchymosis and laceration present  Comments: There are multiple small abrasions and small lacerations on the patients right hand and forearm  There is a larger 2 cm gaping laceration noted on the right forearm with adipose tissue exposed  There is no muscle, tendon or nerve involvement  Bleeding controlled  No crepitus, redness, warmth noted  There is localized ecchymosis around the areas of abrasions and lacerations  Neurological:      General: No focal deficit present  Mental Status: She is alert  Psychiatric:         Attention and Perception: Attention and perception normal          Mood and Affect: Mood normal          Behavior: Behavior normal  Behavior is cooperative           Vital Signs  ED Triage Vitals [08/08/22 2214]   Temperature Pulse Respirations Blood Pressure SpO2   97 7 °F (36 5 °C) (!) 107 18 143/81 96 %      Temp src Heart Rate Source Patient Position - Orthostatic VS BP Location FiO2 (%)   -- -- -- -- --      Pain Score       7           Vitals:    08/08/22 2214   BP: 143/81   Pulse: (!) 107         Visual Acuity      ED Medications  Medications   lidocaine-epinephrine (XYLOCAINE/EPINEPHRINE) 1 %-1:100,000 injection 5 mL (5 mL Infiltration Given by Other 8/8/22 9394)   bacitracin topical ointment 1 large application (1 large application Topical Given by Other 8/8/22 6492)       Diagnostic Studies  Results Reviewed     None                 No orders to display              Procedures  Laceration repair    Date/Time: 8/8/2022 11:58 PM  Performed by: Marilin Taylor PA-C  Authorized by: Marilin Taylor PA-C   Consent: Verbal consent obtained  Body area: upper extremity  Location details: right lower arm  Laceration length: 2 cm  Foreign bodies: no foreign bodies  Tendon involvement: none  Nerve involvement: none  Anesthesia: local infiltration    Anesthesia:  Local Anesthetic: lidocaine 1% with epinephrine  Anesthetic total: 2 mL      Procedure Details:  Preparation: Patient was prepped and draped in the usual sterile fashion  Irrigation method: jet lavage  Amount of cleaning: extensive  Skin closure: 5-0 nylon  Number of sutures: 2  Technique: simple  Approximation: loose  Approximation difficulty: simple  Dressing: antibiotic ointment  Patient tolerance: patient tolerated the procedure well with no immediate complications               ED Course                                             MDM  Number of Diagnoses or Management Options  Dog bite of forearm: new and does not require workup  Dog bite of right hand, initial encounter: new and does not require workup  Diagnosis management comments: 61 y/o female here for dog bites/scratches to right hand and right forearm  Pt says her dogs were fighting and one attacked her  This has happened to her before   Dogs rabies vax UTD and patients tetanus UTD  Patient has multiple abrasions and small lacerations to right hand and forearm  There is one larger 2 cm laceration on the right forearm  The wounds were thoroughly irrigated with 2 L of NS and cleaned with betadine  The larger laceration was anesthestized and closed with two 5-0 nylon sutures  Patient was given prescription to ceftin and metronidazole since she is allergic to augmentin  She was given care instructions for dog bite and told to folllow up with her PCP in 3 days for a wound check and 7 days for suture removal  She was given bacitracin to place on wounds and instructed to use ice, ibuprofen and tylenol as needed  She was given strict return to ER precautions both verbally and in discharge papers  Pt agreed to this plan  Risk of Complications, Morbidity, and/or Mortality  Presenting problems: low  Diagnostic procedures: minimal  Management options: low    Patient Progress  Patient progress: improved      Disposition  Final diagnoses:   Dog bite of right hand, initial encounter   Dog bite of forearm     Time reflects when diagnosis was documented in both MDM as applicable and the Disposition within this note     Time User Action Codes Description Comment    8/8/2022 11:49 PM Deletorsten Figueroa Add Gail Russian  0XXA] Dog bite of right hand, initial encounter     8/8/2022 11:49 PM Deletorsten Figueroa Add [[id number],  W54  0XXA] Dog bite of forearm       ED Disposition     ED Disposition   Discharge    Condition   Stable    Date/Time   Mon Aug 8, 2022 11:49 PM    Comment   Kendallchance Mic discharge to home/self care                 Follow-up Information     Follow up With Specialties Details Why Contact Info Additional [id number], [address] In 3 days and in one week for suture removal Real Munoz [address] [place] [address] [address] Emergency Department Emergency Medicine Go to  if the areas start rebleeding and wont stop with pressure > 10 minutes, area around the wounds turns red, hot, swollen, warm, has red streaking, purulent discharge, you get fevers, chest pain, shortness of breath [address] [facility] Emergency Department, [address] [place], [facility], Lu[address]          Discharge Medication List as of 8/8/2022 11:55 PM      START taking these medications    Details   cefuroxime (CEFTIN) 500 mg tablet Take 1 tablet (500 mg total) by mouth every 12 (twelve) hours for 10 days, Starting Mon 8/8/2022, Until Thu 8/18/2022, Normal      metroNIDAZOLE (FLAGYL) 500 mg tablet Take 1 tablet (500 mg total) by mouth every 8 (eight) hours for 10 days, Starting Mon 8/8/2022, Until Thu 8/18/2022, Normal         CONTINUE these medications which have NOT CHANGED    Details   buPROPion (Wellbutrin SR) 150 mg 12 hr tablet Take 1 tablet (150 mg total) by mouth in the morning, Starting Thu 6/9/2022, Normal      buPROPion (WELLBUTRIN XL) 300 mg 24 hr tablet Take 1 tablet (300 mg total) by mouth daily, Starting Thu 6/9/2022, Normal      cholecalciferol (VITAMIN D3) 1,000 units tablet Take 1,000 Units by mouth daily, Historical Med      ciclopirox (PENLAC) 8 % solution Apply topically daily at bedtime, Starting Wed 4/20/2022, Normal      Clocortolone Pivalate 0 1 % cream Apply topically 2 (two) times a day, Historical Med      hydrochlorothiazide (HYDRODIURIL) 12 5 mg tablet Take 12 5 mg by mouth daily, Starting Wed 4/21/2021, Historical Med      losartan (COZAAR) 100 MG tablet Take 1 tablet (100 mg total) by mouth daily, Starting Thu 8/4/2022, Normal      omeprazole (PriLOSEC) 40 MG capsule Take 40 mg by mouth daily Take before a meal, Starting Fri 4/2/2021, Historical Med      ondansetron (Zofran ODT) 4 mg disintegrating tablet Take 1 tablet (4 mg total) by mouth every 6 (six) hours as needed for nausea or vomiting, Starting Sun 6/26/2022, Normal      QUEtiapine (SEROquel) 100 mg tablet Take 100 mg by mouth daily at bedtime, Historical Med      sertraline (ZOLOFT) 100 mg tablet Take 2 tablets (200 mg total) by mouth daily, Starting Mon 7/18/2022, Normal      SF 5000 Plus 1 1 % CREA Starting Tue 2/1/2022, Historical Med             No discharge procedures on file      PDMP Review       Value Time User    PDMP Reviewed  Yes 5/5/2021  3:56 PM Anika Kaufman DO          ED Provider  Electronically Signed by           Marilin Taylor PA-C  08/09/22 0009

## 2022-08-09 NOTE — DISCHARGE INSTRUCTIONS
Take ceftin 500 mg every 12 hours for the next 10 days  Take metronidazole 500 mg every 8 hours for the next 10 days  Schedule an appointment with your PCP for 3 days from now and also for one week from today for suture removal   Return to the ER if the areas start rebleeding and wont stop with pressure > 10 minutes, area around the wounds turns red, hot, swollen, warm, has red streaking, purulent discharge, you get fevers, chest pain, shortness of breath

## 2022-08-09 NOTE — ED TRIAGE NOTES
Pt  Arrives to ED with complaints of dog bite to R forearm and R hand  Pt  States her dogs were fighting and she tried to break them up    Pt   Reports her dogs are UTD on vaccinations and she had last tetanus shot June 2022

## 2022-08-11 ENCOUNTER — HOSPITAL ENCOUNTER (INPATIENT)
Facility: HOSPITAL | Age: 57
LOS: 2 days | Discharge: HOME | DRG: 603 | End: 2022-08-13
Attending: EMERGENCY MEDICINE | Admitting: GENERAL PRACTICE
Payer: COMMERCIAL

## 2022-08-11 ENCOUNTER — APPOINTMENT (OUTPATIENT)
Dept: RADIOLOGY | Facility: CLINIC | Age: 57
End: 2022-08-11
Payer: COMMERCIAL

## 2022-08-11 ENCOUNTER — OFFICE VISIT (OUTPATIENT)
Dept: OBGYN CLINIC | Facility: CLINIC | Age: 57
End: 2022-08-11
Payer: COMMERCIAL

## 2022-08-11 VITALS
BODY MASS INDEX: 35.35 KG/M2 | HEIGHT: 66 IN | SYSTOLIC BLOOD PRESSURE: 122 MMHG | DIASTOLIC BLOOD PRESSURE: 76 MMHG | TEMPERATURE: 98.4 F

## 2022-08-11 DIAGNOSIS — L08.9 HAND ABRASION, INFECTED, RIGHT, INITIAL ENCOUNTER: ICD-10-CM

## 2022-08-11 DIAGNOSIS — M79.601 RIGHT ARM PAIN: ICD-10-CM
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
## 2022-08-11 DIAGNOSIS — R60.0 EDEMA OF HAND: ICD-10-CM

## 2022-08-11 DIAGNOSIS — S60.511A HAND ABRASION, INFECTED, RIGHT, INITIAL ENCOUNTER: ICD-10-CM

## 2022-08-11 DIAGNOSIS — M79.641 RIGHT HAND PAIN: ICD-10-CM
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
## 2022-08-11 DIAGNOSIS — M79.641 RIGHT HAND PAIN: Primary | ICD-10-CM

## 2022-08-11 PROBLEM — L03.119 CELLULITIS OF EXTREMITY: Status: ACTIVE | Noted: 2022-08-11

## 2022-08-11 PROBLEM — S69.90XA HAND INJURY: Status: ACTIVE | Noted: 2022-08-11

## 2022-08-11 LAB
ANION GAP SERPL CALCULATED.3IONS-SCNC: 5 MMOL/L (ref 4–13)
BASOPHILS # BLD AUTO: 0.04 THOUSANDS/ΜL (ref 0–0.1)
BASOPHILS NFR BLD AUTO: 1 % (ref 0–1)
BUN SERPL-MCNC: 16 MG/DL (ref 5–25)
CALCIUM SERPL-MCNC: 9.5 MG/DL (ref 8.3–10.1)
CHLORIDE SERPL-SCNC: 106 MMOL/L (ref 96–108)
CO2 SERPL-SCNC: 27 MMOL/L (ref 21–32)
CREAT SERPL-MCNC: 0.76 MG/DL (ref 0.6–1.3)
EOSINOPHIL # BLD AUTO: 0.21 THOUSAND/ΜL (ref 0–0.61)
EOSINOPHIL NFR BLD AUTO: 3 % (ref 0–6)
ERYTHROCYTE [DISTWIDTH] IN BLOOD BY AUTOMATED COUNT: 13.1 % (ref 11.6–15.1)
GFR SERPL CREATININE-BSD FRML MDRD: 87 ML/MIN/1.73SQ M
GLUCOSE SERPL-MCNC: 92 MG/DL (ref 65–140)
HCT VFR BLD AUTO: 39.3 % (ref 34.8–46.1)
HGB BLD-MCNC: 12.7 G/DL (ref 11.5–15.4)
IMM GRANULOCYTES # BLD AUTO: 0.03 THOUSAND/UL (ref 0–0.2)
IMM GRANULOCYTES NFR BLD AUTO: 0 % (ref 0–2)
LYMPHOCYTES # BLD AUTO: 0.99 THOUSANDS/ΜL (ref 0.6–4.47)
LYMPHOCYTES NFR BLD AUTO: 12 % (ref 14–44)
MCH RBC QN AUTO: 28.5 PG (ref 26.8–34.3)
MCHC RBC AUTO-ENTMCNC: 32.3 G/DL (ref 31.4–37.4)
MCV RBC AUTO: 88 FL (ref 82–98)
MONOCYTES # BLD AUTO: 0.88 THOUSAND/ΜL (ref 0.17–1.22)
MONOCYTES NFR BLD AUTO: 10 % (ref 4–12)
NEUTROPHILS # BLD AUTO: 6.29 THOUSANDS/ΜL (ref 1.85–7.62)
NEUTS SEG NFR BLD AUTO: 74 % (ref 43–75)
NRBC BLD AUTO-RTO: 0 /100 WBCS
PLATELET # BLD AUTO: 272 THOUSANDS/UL (ref 149–390)
PMV BLD AUTO: 10.2 FL (ref 8.9–12.7)
POTASSIUM SERPL-SCNC: 4.2 MMOL/L (ref 3.5–5.3)
RBC # BLD AUTO: 4.45 MILLION/UL (ref 3.81–5.12)
SODIUM SERPL-SCNC: 138 MMOL/L (ref 135–147)
WBC # BLD AUTO: 8.44 THOUSAND/UL (ref 4.31–10.16)

## 2022-08-11 PROCEDURE — 99285 EMERGENCY DEPT VISIT HI MDM: CPT | Performed by: EMERGENCY MEDICINE

## 2022-08-11 PROCEDURE — 73130 X-RAY EXAM OF HAND: CPT

## 2022-08-11 PROCEDURE — 73090 X-RAY EXAM OF FOREARM: CPT

## 2022-08-11 PROCEDURE — 85025 COMPLETE CBC W/AUTO DIFF WBC: CPT

## 2022-08-11 PROCEDURE — 99223 1ST HOSP IP/OBS HIGH 75: CPT | Performed by: INTERNAL MEDICINE

## 2022-08-11 PROCEDURE — NC001 PR NO CHARGE: Performed by: SURGERY

## 2022-08-11 PROCEDURE — 99284 EMERGENCY DEPT VISIT MOD MDM: CPT

## 2022-08-11 PROCEDURE — 80048 BASIC METABOLIC PNL TOTAL CA: CPT

## 2022-08-11 PROCEDURE — 99213 OFFICE O/P EST LOW 20 MIN: CPT | Performed by: PHYSICIAN ASSISTANT

## 2022-08-11 PROCEDURE — 36415 COLL VENOUS BLD VENIPUNCTURE: CPT

## 2022-08-11 RX ORDER — ACETAMINOPHEN 325 MG/1
975 TABLET ORAL EVERY 6 HOURS PRN
Status: DISCONTINUED | OUTPATIENT
Start: 2022-08-11 | End: 2022-08-13 | Stop reason: HOSPADM

## 2022-08-11 RX ORDER — BUPROPION HYDROCHLORIDE 150 MG/1
450 TABLET ORAL DAILY
Status: DISCONTINUED | OUTPATIENT
Start: 2022-08-12 | End: 2022-08-13 | Stop reason: HOSPADM

## 2022-08-11 RX ORDER — LOSARTAN POTASSIUM 50 MG/1
100 TABLET ORAL
Status: DISCONTINUED | OUTPATIENT
Start: 2022-08-11 | End: 2022-08-13 | Stop reason: HOSPADM

## 2022-08-11 RX ORDER — MELATONIN
1000 DAILY
Status: DISCONTINUED | OUTPATIENT
Start: 2022-08-12 | End: 2022-08-13 | Stop reason: HOSPADM

## 2022-08-11 RX ORDER — HEPARIN SODIUM 5000 [USP'U]/ML
5000 INJECTION, SOLUTION INTRAVENOUS; SUBCUTANEOUS EVERY 8 HOURS SCHEDULED
Status: DISCONTINUED | OUTPATIENT
Start: 2022-08-11 | End: 2022-08-13 | Stop reason: HOSPADM

## 2022-08-11 RX ORDER — LIDOCAINE HYDROCHLORIDE 10 MG/ML
20 INJECTION, SOLUTION EPIDURAL; INFILTRATION; INTRACAUDAL; PERINEURAL ONCE
Status: DISCONTINUED | OUTPATIENT
Start: 2022-08-11 | End: 2022-08-13 | Stop reason: HOSPADM

## 2022-08-11 RX ORDER — ONDANSETRON 2 MG/ML
4 INJECTION INTRAMUSCULAR; INTRAVENOUS EVERY 8 HOURS PRN
Status: DISCONTINUED | OUTPATIENT
Start: 2022-08-11 | End: 2022-08-13 | Stop reason: HOSPADM

## 2022-08-11 RX ORDER — QUETIAPINE FUMARATE 100 MG/1
100 TABLET, FILM COATED ORAL
Status: DISCONTINUED | OUTPATIENT
Start: 2022-08-11 | End: 2022-08-13 | Stop reason: HOSPADM

## 2022-08-11 RX ORDER — PANTOPRAZOLE SODIUM 40 MG/1
40 TABLET, DELAYED RELEASE ORAL
Status: DISCONTINUED | OUTPATIENT
Start: 2022-08-12 | End: 2022-08-13 | Stop reason: HOSPADM

## 2022-08-11 RX ORDER — SERTRALINE HYDROCHLORIDE 100 MG/1
200 TABLET, FILM COATED ORAL DAILY
Status: DISCONTINUED | OUTPATIENT
Start: 2022-08-12 | End: 2022-08-13 | Stop reason: HOSPADM

## 2022-08-11 RX ADMIN — SODIUM CHLORIDE 3 G: 9 INJECTION, SOLUTION INTRAVENOUS at 16:01

## 2022-08-11 RX ADMIN — SODIUM CHLORIDE 3 G: 9 INJECTION, SOLUTION INTRAVENOUS at 21:40

## 2022-08-11 RX ADMIN — HEPARIN SODIUM 5000 UNITS: 5000 INJECTION INTRAVENOUS; SUBCUTANEOUS at 21:40

## 2022-08-11 RX ADMIN — LOSARTAN POTASSIUM 100 MG: 50 TABLET, FILM COATED ORAL at 21:40

## 2022-08-11 RX ADMIN — QUETIAPINE FUMARATE 100 MG: 100 TABLET ORAL at 21:40

## 2022-08-11 NOTE — ED ATTENDING ATTESTATION
8/11/2022  I, Elsy Staples MD, saw and evaluated the patient  I have discussed the patient with the resident/non-physician practitioner and agree with the resident's/non-physician practitioner's findings, Plan of Care, and MDM as documented in the resident's/non-physician practitioner's note, except where noted  All available labs and Radiology studies were reviewed  I was present for key portions of any procedure(s) performed by the resident/non-physician practitioner and I was immediately available to provide assistance  At this point I agree with the current assessment done in the Emergency Department  I have conducted an independent evaluation of this patient a history and physical is as follows:  Pt was bit by her dog Pt got sutures and antibiotics from [place] Pt has noted increased swelling and redness Pt had xrays earlier no fevers PE: alert nad heart reg lungs clear hand noted swelling noted redness at thumb from [facility] MDM: will admit for iv antibiotics    ED Course         Critical Care Time  Procedures

## 2022-08-11 NOTE — ASSESSMENT & PLAN NOTE
Patient with cellulitis of the right hand secondary to dog bite  -no obvious abscess, induration  -images pending  -continue IV Unasyn  -monitor fever curve, patient currently afebrile  -orthopedics consult  -neurovascular checks

## 2022-08-11 NOTE — ASSESSMENT & PLAN NOTE
Patient takes losartan 100 mg and hydrochlorothiazide 12 5 mg daily  Will continue on losartan for now in hold hydrochlorothiazide

## 2022-08-11 NOTE — ED PROVIDER NOTES
[address] [place] [address]    Phone:  [phone]    Fax:  [phone]       Thank You for choosing us for your health care visit. We are glad to serve you and happy to provide you with this summary of your visit. Please help us to ensure we have accurate records. If you find anything that needs to be changed, please let our staff know as soon as possible.          Your Demographic Information     Patient Name Sex Marc Barrow Male 1952       Ethnic Group Patient Race    Not of  or  Origin White      Your Visit Details     Date & Time Provider Department    3/9/2017 11:00 AM Rodrigue Rivera MD [facility] [place] [facility] [place]      Your Upcoming Appointment*(Max 10)     2017  8:15 AM CDT   Follow-up Visit with Chalino Andrew MD   [facility] ([facility])    [address] [place] [address] [place] [address]   [phone]            2018 11:00 AM CST   Follow-up Visit with Rodrigue Rivera MD   [facility] [place] [facility] [place])    [address] [place] [address] [place] [address]   [phone]              Your To Do List     Future Orders Please Complete On or Around Expires    BASIC METABOLIC PANEL  Mar 09, 2017 2017    CBC & AUTO DIFFERENTIAL  Mar 09, 2017 2017    Follow-Up    Return in about 1 year (around 3/9/2018).      Conditions Discussed Today or Order-Related Diagnoses        Comments    Anemia, unspecified type    -  Primary       Your Vitals Were     BP Pulse Height Weight BMI Smoking Status    136/82 88 6' (1.829 m) 196 lb (88.9 kg) 26.58 kg/m2 Never Smoker      Medications Prescribed or Re-Ordered Today     None      Your Current Medications Are        Disp Refills Start End    omeprazole (PRILOSEC) 40 MG  History  Chief Complaint   Patient presents with    Dog Bite     Pt was breaking up her two dogs Saturday night 8/6/22 where she got bit multiple times on fingers, bruising and lacerations on R forearm; both dogs are up to date on vaccines; pt was seen at [facility] on 8/8  Pt currently on antibiotics  Pt works for [employer] and her office to her to leave work and head to the ER     51-year-old female presenting to the ED for inflammation and pain around dog bite wound  Patient was seen in [facility] on Monday after a dog bite from her dog that she was trying to break up  They sutured a laceration on her posterior right forearm, and cleaned out the scrapes on her right hand and fingers  Patient was at work today, works in Orthopedics for [employer], and notice some redness and swelling about 1 scrapes on her thenar eminence  There was purulence discharge, a PA in her office drained the collection, however the erythema continued on pain continued as well as some superficial spreading of redness  She had an x-ray done of the right hand, and was told to come into the emergency room for evaluation  She has had no fever, lightheadedness, nausea, vomiting, shortness of breath, chest pain or other constitutional symptoms               Prior to Admission Medications   Prescriptions Last Dose Informant Patient Reported? Taking?    Clocortolone Pivalate 0 1 % cream   Yes No   Sig: Apply topically 2 (two) times a day   QUEtiapine (SEROquel) 100 mg tablet  Self Yes No   Sig: Take 100 mg by mouth daily at bedtime   SF 5000 Plus 1 1 % CREA   Yes No   buPROPion (WELLBUTRIN XL) 300 mg 24 hr tablet   No No   Sig: Take 1 tablet (300 mg total) by mouth daily   buPROPion (Wellbutrin SR) 150 mg 12 hr tablet   No No   Sig: Take 1 tablet (150 mg total) by mouth in the morning   cefuroxime (CEFTIN) 500 mg tablet   No No   Sig: Take 1 tablet (500 mg total) by mouth every 12 (twelve) hours for 10 days capsule 30 capsule 11 2/10/2017     Sig - Route: Take 1 capsule by mouth daily. - Oral    Class: Eprescribe    Cosign for Ordering: Accepted by Rodrigue Rivera MD on 2/10/2017  8:43 AM    tadalafil (CIALIS) 10 MG tablet 10 tablet 0 9/12/2016     Sig - Route: Take 1 tablet by mouth as needed for Erectile Dysfunction. - Oral    Class: Eprescribe    ferrous sulfate 325 (65 FE) MG tablet 30 tablet 5 3/11/2016     Sig - Route: Take 1 tablet by mouth daily (with breakfast). - Oral    Class: Eprescribe    tamsulosin (FLOMAX) 0.4 MG Cap 30 capsule 11 3/4/2016     Sig - Route: Take 1 capsule by mouth daily after a meal. - Oral    Class: Eprescribe    terazosin (HYTRIN) 5 MG capsule 30 capsule 5 2/10/2017     Sig - Route: Take 1 capsule by mouth nightly. - Oral    Class: Eprescribe    Cosign for Ordering: Accepted by Rodrigue Rivera MD on 2/10/2017  8:43 AM      Allergies     Cat Dander Other (See Comments)    Runny nose, watery eyes    Dog Dander Other (See Comments)    Runny nose, watery eyes      Immunizations History as of 3/9/2017     Name Date    Influenza 10/12/2015    Influenza A novel H1N1 11/23/2016    Tdap 12/3/2013      Problem List as of 3/9/2017     Anemia            Patient Instructions     None       cholecalciferol (VITAMIN D3) 1,000 units tablet   Yes No   Sig: Take 1,000 Units by mouth daily   ciclopirox (PENLAC) 8 % solution   No No   Sig: Apply topically daily at bedtime   hydrochlorothiazide (HYDRODIURIL) 12 5 mg tablet  Self Yes No   Sig: Take 12 5 mg by mouth daily   losartan (COZAAR) 100 MG tablet   No No   Sig: Take 1 tablet (100 mg total) by mouth daily   metroNIDAZOLE (FLAGYL) 500 mg tablet   No No   Sig: Take 1 tablet (500 mg total) by mouth every 8 (eight) hours for 10 days   omeprazole (PriLOSEC) 40 MG capsule  Self Yes No   Sig: Take 40 mg by mouth daily Take before a meal   ondansetron (Zofran ODT) 4 mg disintegrating tablet   No No   Sig: Take 1 tablet (4 mg total) by mouth every 6 (six) hours as needed for nausea or vomiting   sertraline (ZOLOFT) 100 mg tablet   No No   Sig: Take 2 tablets (200 mg total) by mouth daily      Facility-Administered Medications: None       Past Medical History:   Diagnosis Date    ADHD (attention deficit hyperactivity disorder)     Anxiety     Arthritis     Depression     Fibromyalgia, primary     GERD (gastroesophageal reflux disease)     History of stomach ulcers     Hypertension     Panic attack     Papanicolaou smear 2020    Psychiatric disorder        Past Surgical History:   Procedure Laterality Date    HYSTEROSCOPY  2015    MAMMO (HISTORICAL)  2020       Family History   Problem Relation Age of Onset    Hypertension Mother     Lung cancer Mother     Skin cancer Father      I have reviewed and agree with the history as documented      E-Cigarette/Vaping    E-Cigarette Use Never User      E-Cigarette/Vaping Substances    Nicotine No     THC No     CBD No     Flavoring No     Other No     Unknown No      Social History     Tobacco Use    Smoking status: Former Smoker    Smokeless tobacco: Never Used    Tobacco comment: teen years   Vaping Use    Vaping Use: Never used   Substance Use Topics    Alcohol use: Yes     Comment: Socially  Drug use: No        Review of Systems   Constitutional: Negative for chills, diaphoresis and fever  HENT: Negative for sore throat  Eyes: Negative for visual disturbance  Respiratory: Negative for chest tightness, shortness of breath and wheezing  Cardiovascular: Negative for palpitations  Gastrointestinal: Negative for abdominal pain  Musculoskeletal: Positive for joint swelling  Right hand stiffness, with pain in the right thumb, with notable swelling on the right hand/wrist   Skin: Positive for color change and wound  Redness and erythema   Neurological: Negative for syncope, weakness and light-headedness  Psychiatric/Behavioral: Negative for confusion  Physical Exam  ED Triage Vitals [08/11/22 1435]   Temperature Pulse Respirations Blood Pressure SpO2   98 2 °F (36 8 °C) 90 18 143/67 97 %      Temp Source Heart Rate Source Patient Position - Orthostatic VS BP Location FiO2 (%)   Oral Monitor Sitting Left arm --      Pain Score       --             Orthostatic Vital Signs  Vitals:    08/11/22 1435 08/11/22 1604 08/11/22 1800   BP: 143/67  121/63   Pulse: 90 78 74   Patient Position - Orthostatic VS: Sitting         Physical Exam  Constitutional:       General: She is not in acute distress  Appearance: Normal appearance  HENT:      Head: Normocephalic and atraumatic  Nose: Nose normal    Eyes:      Conjunctiva/sclera: Conjunctivae normal    Cardiovascular:      Rate and Rhythm: Normal rate  Pulses: Normal pulses  Pulmonary:      Effort: Pulmonary effort is normal  No respiratory distress  Musculoskeletal:      Cervical back: Neck supple  Comments: She able to move thumb and digits in flexion-extension abduction adduction opposition   Skin:     General: Skin is warm and dry        Comments: Lacerations on the posterior forearm palmar surface is light of the wound and specifically the thenar eminence and base of the 3rd digit, some lacerations on the dorsal side of the 3rd digit with 2nd digit and 4th digit  Erythema demarcated around the thenar eminence wound 510  Glucose is only located  erythema on the dorsal surface of the thumb   Neurological:      Mental Status: She is alert  Media Information                  Document Information    Clinical Image - Mobile Device      08/11/2022 15:57   Attached To:    Hospital Encounter on 8/11/22     Source Information    [facility], DO  Be Ed         ED Medications  Medications   ampicillin-sulbactam (UNASYN) 3 g in sodium chloride 0 9 % 100 mL IVPB (0 g Intravenous Stopped 8/11/22 1631)   buPROPion (WELLBUTRIN XL) 24 hr tablet 450 mg (has no administration in time range)   cholecalciferol (VITAMIN D3) tablet 1,000 Units (has no administration in time range)   losartan (COZAAR) tablet 100 mg (has no administration in time range)   pantoprazole (PROTONIX) EC tablet 40 mg (has no administration in time range)   QUEtiapine (SEROquel) tablet 100 mg (has no administration in time range)   sertraline (ZOLOFT) tablet 200 mg (has no administration in time range)   ondansetron (ZOFRAN) injection 4 mg (has no administration in time range)   acetaminophen (TYLENOL) tablet 975 mg (has no administration in time range)   lidocaine (PF) (XYLOCAINE-MPF) 1 % injection 20 mL (20 mL Infiltration Not Given 8/11/22 1755)   heparin (porcine) subcutaneous injection 5,000 Units (has no administration in time range)       Diagnostic Studies  Results Reviewed     Procedure Component Value Units Date/Time    Basic metabolic panel [[id number]] Collected: 08/11/22 1535    Lab Status: Final result Specimen: Blood from Arm, Left Updated: 08/11/22 1616     Sodium 138 mmol/L      Potassium 4 2 mmol/L      Chloride 106 mmol/L      CO2 27 mmol/L      ANION GAP 5 mmol/L      BUN 16 mg/dL      Creatinine 0 76 mg/dL      Glucose 92 mg/dL      Calcium 9 5 mg/dL      eGFR 87 ml/min/1 73sq m     Narrative:      National Kidney Disease Foundation guidelines for Chronic Kidney Disease (CKD):     Stage 1 with normal or high GFR (GFR > 90 mL/min/1 73 square meters)    Stage 2 Mild CKD (GFR = 60-89 mL/min/1 73 square meters)    Stage 3A Moderate CKD (GFR = 45-59 mL/min/1 73 square meters)    Stage 3B Moderate CKD (GFR = 30-44 mL/min/1 73 square meters)    Stage 4 Severe CKD (GFR = 15-29 mL/min/1 73 square meters)    Stage 5 End Stage CKD (GFR <15 mL/min/1 73 square meters)  Note: GFR calculation is accurate only with a steady state creatinine    CBC and differential [[id number]]  (Abnormal) Collected: 08/11/22 1535    Lab Status: Final result Specimen: Blood from Arm, Left Updated: 08/11/22 1549     WBC 8 44 Thousand/uL      RBC 4 45 Million/uL      Hemoglobin 12 7 g/dL      Hematocrit 39 3 %      MCV 88 fL      MCH 28 5 pg      MCHC 32 3 g/dL      RDW 13 1 %      MPV 10 2 fL      Platelets 988 Thousands/uL      nRBC 0 /100 WBCs      Neutrophils Relative 74 %      Immat GRANS % 0 %      Lymphocytes Relative 12 %      Monocytes Relative 10 %      Eosinophils Relative 3 %      Basophils Relative 1 %      Neutrophils Absolute 6 29 Thousands/µL      Immature Grans Absolute 0 03 Thousand/uL      Lymphocytes Absolute 0 99 Thousands/µL      Monocytes Absolute 0 88 Thousand/µL      Eosinophils Absolute 0 21 Thousand/µL      Basophils Absolute 0 04 Thousands/µL                  No orders to display         Procedures  Procedures      ED Course                                       MDM  Number of Diagnoses or Management Options  Dog bite, subsequent encounter  Diagnosis management comments: 59-year-old female presenting to the ED for inflammation and pain around dog bite wound  Patient had x-rays done today outpatient, finally response and had I&D done by coworkers, with still some fluctuance  Still neva erythema concern for failed outpatient antibiotics  CBC BMP drawn  Patient started on Unasyn IV to treat infection    Admitted to [facility] for [facility] antibiotics  Amount and/or Complexity of Data Reviewed  Clinical lab tests: ordered and reviewed        Disposition  Final diagnoses:   Dog bite, subsequent encounter     Time reflects when diagnosis was documented in both MDM as applicable and the Disposition within this note     Time User Action Codes Description Comment    8/11/2022  3:12 PM Yayonakita Arringtonn  0XXD] Dog bite, subsequent encounter       ED Disposition     ED Disposition   Admit    Condition   Stable    Date/Time   Thu Aug 11, 2022  3:13 PM    Comment   Case was discussed with Dr Deidra Higgins and the patient's admission status was agreed to be Admission Status: inpatient status to the service of Dr Dr Deidra Higgins   Follow-up Information    None         Patient's Medications   Discharge Prescriptions    No medications on file     No discharge procedures on file  PDMP Review       Value Time User    PDMP Reviewed  Yes 5/5/2021  3:56 PM Esteban Vela DO           ED Provider  Attending physically available and evaluated Kirill Haile  I managed the patient along with the ED Attending      Electronically Signed by         Rubén Corbin DO  08/11/22 2030

## 2022-08-11 NOTE — H&P
[facility]  H&P- Lubna Pereyra 1965, 62 y o  female MRN: [id number]  Unit/Bed#: ED 28 Encounter: [id number]  Primary Care Provider: ANTONY Damian   Date and time admitted to hospital: 8/11/2022  2:26 PM    Dog bite  Assessment & Plan  Management as above    Hand injury  Assessment & Plan  Patient sustaining dog bite to her forearm, will continue IV antibiotics, appreciate orthopedics consult    Moderate episode of recurrent major depressive disorder [facility])  Assessment & Plan  Patient with a history of depression, will continue home medications including bupropion 400 mg daily, Seroquel 100 mg at bedtime and sertraline 200 mg daily    Primary hypertension  Assessment & Plan  Patient takes losartan 100 mg and hydrochlorothiazide 12 5 mg daily  Will continue on losartan for now in hold hydrochlorothiazide    * Cellulitis of extremity  Assessment & Plan  Patient with cellulitis of the right hand secondary to dog bite  -no obvious abscess, induration  -images pending  -continue IV Unasyn  -monitor fever curve, patient currently afebrile  -orthopedics consult  -neurovascular checks        VTE Pharmacologic Prophylaxis:   Moderate Risk (Score 3-4) - Pharmacological DVT Prophylaxis Ordered: heparin  Code Status: Level 1 - Full Code   Discussion with family: Patient declined call to   Anticipated Length of Stay: Patient will be admitted on an inpatient basis with an anticipated length of stay of greater than 2 midnights secondary to Cellulitis  Total Time for Visit, including Counseling / Coordination of Care: 30 minutes Greater than 50% of this total time spent on direct patient counseling and coordination of care      Chief Complaint:  Cellulitis    History of Present Illness:  Lubna Pereyra is a 62 y o  female  with a past medical history of anxiety, depression, fibromyalgia, GERD, hypertension presents for evaluation of worsening right hand and thumb swelling pain and erythema  Patient has 3 dogs at home, who engaged in a fight and the patient tried to intervene sustaining multiple bites from her dogs  Her dogs have no history of rabies, and have not been behaving apparently per patient  Patient was evaluated at [facility] up a box 80 where she was started on oral antibiotics with Augmentin and sutured a small laceration on her forearm  Patient reports worsening of her swelling pain and erythema over the past several days  She states her range of motion is mildly limited secondary swelling but she denies any tingling numbness or loss of sensation in her hands  She denies any fever chills nausea vomiting  Review of Systems:  Review of Systems   Constitutional: Negative for chills, diaphoresis, fatigue and fever  HENT: Negative for ear pain and sore throat  Eyes: Negative for pain and visual disturbance  Respiratory: Negative for cough, shortness of breath, wheezing and stridor  Cardiovascular: Negative for chest pain, palpitations and leg swelling  Gastrointestinal: Negative for abdominal distention, abdominal pain, diarrhea, nausea, rectal pain and vomiting  Endocrine: Negative for polydipsia, polyphagia and polyuria  Genitourinary: Negative for dysuria and hematuria  Musculoskeletal: Negative for arthralgias and back pain  Skin: Negative for color change and rash  Neurological: Negative for dizziness, seizures, syncope, facial asymmetry, light-headedness, numbness and headaches  All other systems reviewed and are negative        Past Medical and Surgical History:   Past Medical History:   Diagnosis Date    ADHD (attention deficit hyperactivity disorder)     Anxiety     Arthritis     Depression     Fibromyalgia, primary     GERD (gastroesophageal reflux disease)     History of stomach ulcers     Hypertension     Panic attack     Papanicolaou smear 2020    Psychiatric disorder        Past Surgical History: Procedure Laterality Date    HYSTEROSCOPY  2015    MAMMO (HISTORICAL)  2020       Meds/Allergies:  Prior to Admission medications    Medication Sig Start Date End Date Taking? Authorizing Provider   buPROPion (Wellbutrin SR) 150 mg 12 hr tablet Take 1 tablet (150 mg total) by mouth in the morning 6/9/22   ANTONY Dukes   buPROPion (WELLBUTRIN XL) 300 mg 24 hr tablet Take 1 tablet (300 mg total) by mouth daily 6/9/22   ANTONY Dukes   cefuroxime (CEFTIN) 500 mg tablet Take 1 tablet (500 mg total) by mouth every 12 (twelve) hours for 10 days 8/8/22 8/18/22  Xenia Ochoa PA-C   cholecalciferol (VITAMIN D3) 1,000 units tablet Take 1,000 Units by mouth daily    Historical Provider, MD   ciclopirox (PENLAC) 8 % solution Apply topically daily at bedtime 4/20/22   ANTONY Dukes   Clocortolone Pivalate 0 1 % cream Apply topically 2 (two) times a day    Historical Provider, MD   hydrochlorothiazide (HYDRODIURIL) 12 5 mg tablet Take 12 5 mg by mouth daily 4/21/21   Historical Provider, MD   losartan (COZAAR) 100 MG tablet Take 1 tablet (100 mg total) by mouth daily 8/4/22   ANTONY Dukes   metroNIDAZOLE (FLAGYL) 500 mg tablet Take 1 tablet (500 mg total) by mouth every 8 (eight) hours for 10 days 8/8/22 8/18/22  Xenia Ochoa PA-C   omeprazole (PriLOSEC) 40 MG capsule Take 40 mg by mouth daily Take before a meal 4/2/21   Historical Provider, MD   ondansetron (Zofran ODT) 4 mg disintegrating tablet Take 1 tablet (4 mg total) by mouth every 6 (six) hours as needed for nausea or vomiting 6/26/22   Xenia Ochoa PA-C   QUEtiapine (SEROquel) 100 mg tablet Take 100 mg by mouth daily at bedtime    Historical Provider, MD   sertraline (ZOLOFT) 100 mg tablet Take 2 tablets (200 mg total) by mouth daily 7/18/22   ANTONY Dukes   SF 5000 Plus 1 1 % CREA  2/1/22   Historical Provider, MD     I have reviewed home medications with patient personally  Allergies:    Allergies Allergen Reactions    Latex Rash       Social History:  Marital Status: Single   Occupation:   Patient Pre-hospital Living Situation: Home  Patient Pre-hospital Level of Mobility: walks  Patient Pre-hospital Diet Restrictions:   Substance Use History:   Social History     Substance and Sexual Activity   Alcohol Use Yes    Comment: Socially     Social History     Tobacco Use   Smoking Status Former Smoker   Smokeless Tobacco Never Used   Tobacco Comment    teen years     Social History     Substance and Sexual Activity   Drug Use No       Family History:  Family History   Problem Relation Age of Onset    Hypertension Mother     Lung cancer Mother     Skin cancer Father        Physical Exam:     Vitals:   Blood Pressure: 121/63 (08/11/22 1800)  Pulse: 74 (08/11/22 1800)  Temperature: 98 2 °F (36 8 °C) (08/11/22 1435)  Temp Source: Oral (08/11/22 1435)  Respirations: 18 (08/11/22 1800)  SpO2: 94 % (08/11/22 1800)    Physical Exam  Vitals and nursing note reviewed  Constitutional:       General: She is not in acute distress  Appearance: She is well-developed  She is not ill-appearing, toxic-appearing or diaphoretic  HENT:      Head: Normocephalic and atraumatic  Eyes:      General: No scleral icterus  Conjunctiva/sclera: Conjunctivae normal    Cardiovascular:      Rate and Rhythm: Normal rate and regular rhythm  Heart sounds: No murmur heard  No friction rub  No gallop  Pulmonary:      Effort: Pulmonary effort is normal  No respiratory distress  Breath sounds: Normal breath sounds  No stridor  No wheezing, rhonchi or rales  Chest:      Chest wall: No tenderness  Abdominal:      General: There is no distension  Palpations: Abdomen is soft  There is no mass  Tenderness: There is no abdominal tenderness  There is no guarding or rebound  Hernia: No hernia is present  Musculoskeletal:         General: No swelling, tenderness, deformity or signs of injury  Cervical back: Neck supple  Right lower leg: No edema  Left lower leg: No edema  Comments: Erythema and warmth over right thumb  Sensation intact throughout both upper extremities  Mild limitation in range of motion secondary to swelling of the right hand  Small incision of right forearm noted   Skin:     General: Skin is warm and dry  Coloration: Skin is not jaundiced or pale  Findings: No bruising or erythema  Neurological:      Mental Status: She is alert and oriented to person, place, and time  Additional Data:     Lab Results:  Results from last 7 days   Lab Units 08/11/22  1535   WBC Thousand/uL 8 44   HEMOGLOBIN g/dL 12 7   HEMATOCRIT % 39 3   PLATELETS Thousands/uL 272   NEUTROS PCT % 74   LYMPHS PCT % 12*   MONOS PCT % 10   EOS PCT % 3     Results from last 7 days   Lab Units 08/11/22  1535   SODIUM mmol/L 138   POTASSIUM mmol/L 4 2   CHLORIDE mmol/L 106   CO2 mmol/L 27   BUN mg/dL 16   CREATININE mg/dL 0 76   ANION GAP mmol/L 5   CALCIUM mg/dL 9 5   GLUCOSE RANDOM mg/dL 92                       Imaging: Reviewed radiology reports from this admission including: xray  No orders to display       EKG and Other Studies Reviewed on Admission:   · EKG: No EKG obtained  ** Please Note: This note has been constructed using a voice recognition system   **

## 2022-08-11 NOTE — PROGRESS NOTES
Orthopaedic Surgery - Office Note  Cecil Jones (14 y o  female)   : 1965   MRN: [id number]  Encounter Date: 2022    No chief complaint on file  Right hand pain      Assessment/Plan  Diagnoses and all orders for this visit:    Right hand pain  -     XR hand 3+ vw right; Future  -     Ambulatory Referral to Occupational Therapy; Future    Right arm pain  -     XR forearm 2 vw right; Future    Dog bite, initial encounter  -     Ambulatory Referral to Occupational Therapy; Future    Hand abrasion, infected, right, initial encounter  -     Ambulatory Referral to Occupational Therapy; Future    Edema of hand  -     Ambulatory Referral to Occupational Therapy; Future    Is reviewed with the patient to continue her current oral antibiotics, additional antibiotics at this time pose more risk than benefit  Is recommended at this time to irrigate and debride the small superficial infection at the base of her thumb  The cellulitic border was traced and patient was educated on how to monitor if the erythema goes beyond the tracing it is signs of worsening infection and she should seek immediate care  She will continue to watch for worsening signs of infection and seek immediate care if they develop  I would recommend starting occupational therapy to assist in edema control and home exercises  It is too early to remove the sutures  Appropriate wound care was reviewed    Addendum:  Patient's hand does not appear to be improving despite lancing of purulence location this a m  The cellulitic nature appears to be advancing beyond this a m  tracing  Patient does not feel better and asked whether not she should go to an urgent care, I would recommend against Urgent Care as most likely next step would be to consider IV antibiotics  After reviewing with MICHAEL MEYERS PA-C, [facility] would be best option as residents and hand surgery coverage are available if needed         Return on friday for wound check         History of Present Illness  Benito Riggins is a new patient with right hand and forearm injury  On 08/08/2022 her 3 dogs were fighting and 1 of them attacked her  She was seen at the emergency department for this  She had a laceration repaired in her forearm and had the other wounds and abrasions cleaned  She was started on 2 oral antibiotics(Ceftin and metronidazole) and has an allergy to Augmentin  She has noticed some increased hand swelling and area of purulence at the base of the right thumb  No paresthesias are reported  She is up-to-date on her tetanus and her dogs are up-to-date on their vaccines  Review of Systems  Pertinent items are noted in HPI  All other systems were reviewed and are negative  Physical Exam  /76   Temp 98 4 °F (36 9 °C)   Ht 5' 6" (1 676 m)   BMI 35 35 kg/m²   Cons: Appears well  No apparent distress  Psych: Alert  Oriented x3  Mood and affect normal   Eyes: PERRLA, EOMI  Resp: Normal effort  No audible wheezing or stridor  CV: Palpable pulse  No discernable arrhythmia  Lymph:  No palpable cervical, axillary, or inguinal lymphadenopathy  Skin: Warm  No palpable masses  No visible lesions  Neuro: Normal muscle tone  Normal and symmetric DTR's  Patient's right hand and forearm are with multiple small skin abrasions without signs of infection  She has moderate right hand soft tissue edema with diffuse tenderness  At the base of her right thumb she has a small abrasion with a purulence fluid pocket and mild cellulitic changes  Right forearm laceration repair was with retained 2 sutures without signs of infection and too early to remove  Risks and benefits of lancing the small area purulence were reviewed at length  Verbal consent was obtained  Small abrasion at the base of the thumb was prepped with alcohol and Betadine  A small incision with 18 gauge needle was performed and purulence material was expunged    Wound was then cleaned and triple antibiotic ointment applied  Cellulitic borders were traced with pen in the office today and to be monitored by patient  Studies Reviewed  X-rays performed in the office today two views of the right forearm show no acute fractures or dislocations  No foreign bodies or radiopaque object seen  This was read from orthopedic standpoint will await official radiologist interpretation  X-rays performed in the office today three views of the right hand show no acute fractures or dislocations  No foreign bodies or radiopaque objects are seen  This was read from an orthopedic standpoint will await official radiologist interpretation  Emergency department notes from 08/08/2022 were reviewed by myself in the office today  Medical, Surgical, Family, and Social History  The patient's medical history, family history, and social history, were reviewed and updated as appropriate  Past Medical History:   Diagnosis Date    ADHD (attention deficit hyperactivity disorder)     Anxiety     Arthritis     Depression     Fibromyalgia, primary     GERD (gastroesophageal reflux disease)     History of stomach ulcers     Hypertension     Panic attack     Papanicolaou smear 2020    Psychiatric disorder        Past Surgical History:   Procedure Laterality Date    HYSTEROSCOPY  2015    MAMMO (HISTORICAL)  2020       Family History   Problem Relation Age of Onset    Hypertension Mother     Lung cancer Mother     Skin cancer Father        Social History     Occupational History    Occupation: Tech      Tobacco Use    Smoking status: Former Smoker    Smokeless tobacco: Never Used    Tobacco comment: teen years   Vaping Use    Vaping Use: Never used   Substance and Sexual Activity    Alcohol use: Yes     Comment: Socially    Drug use: No    Sexual activity: Not Currently     Birth control/protection: Post-menopausal       Allergies   Allergen Reactions    Latex Rash         Current Outpatient Medications:     buPROPion (Wellbutrin SR) 150 mg 12 hr tablet, Take 1 tablet (150 mg total) by mouth in the morning, Disp: 90 tablet, Rfl: 2    buPROPion (WELLBUTRIN XL) 300 mg 24 hr tablet, Take 1 tablet (300 mg total) by mouth daily, Disp: 90 tablet, Rfl: 2    cefuroxime (CEFTIN) 500 mg tablet, Take 1 tablet (500 mg total) by mouth every 12 (twelve) hours for 10 days, Disp: 20 tablet, Rfl: 0    cholecalciferol (VITAMIN D3) 1,000 units tablet, Take 1,000 Units by mouth daily, Disp: , Rfl:     ciclopirox (PENLAC) 8 % solution, Apply topically daily at bedtime, Disp: 6 6 mL, Rfl: 0    Clocortolone Pivalate 0 1 % cream, Apply topically 2 (two) times a day, Disp: , Rfl:     hydrochlorothiazide (HYDRODIURIL) 12 5 mg tablet, Take 12 5 mg by mouth daily, Disp: , Rfl:     losartan (COZAAR) 100 MG tablet, Take 1 tablet (100 mg total) by mouth daily, Disp: 90 tablet, Rfl: 2    metroNIDAZOLE (FLAGYL) 500 mg tablet, Take 1 tablet (500 mg total) by mouth every 8 (eight) hours for 10 days, Disp: 30 tablet, Rfl: 0    omeprazole (PriLOSEC) 40 MG capsule, Take 40 mg by mouth daily Take before a meal, Disp: , Rfl:     ondansetron (Zofran ODT) 4 mg disintegrating tablet, Take 1 tablet (4 mg total) by mouth every 6 (six) hours as needed for nausea or vomiting, Disp: 12 tablet, Rfl: 0    QUEtiapine (SEROquel) 100 mg tablet, Take 100 mg by mouth daily at bedtime, Disp: , Rfl:     sertraline (ZOLOFT) 100 mg tablet, Take 2 tablets (200 mg total) by mouth daily, Disp: 60 tablet, Rfl: 2    SF 5000 Plus 1 1 % CREA, , Disp: , Rfl:       eGraldo Winn PA-C

## 2022-08-11 NOTE — ASSESSMENT & PLAN NOTE
Patient sustaining dog bite to her forearm, will continue IV antibiotics, appreciate orthopedics consult

## 2022-08-11 NOTE — CONSULTS
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, Eriberto Leahy, took an 25 gauge needle and lanced a small superficial fluid collection which drained purulence  She reports mild pain at this time  The pain is worse at the base of the thumb and is worse with movement  She denies numbness, tingling, fever, or malaise  She denies forearm pain and elbow pain       Review Of Systems:   · Skin: Normal  · Neuro: See HPI  · Musculoskeletal: See HPI  · 14 point review of systems negative except as stated above     Past Medical History:   Past Medical History:   Diagnosis Date    ADHD (attention deficit hyperactivity disorder)     Anxiety     Arthritis     Depression     Fibromyalgia, primary     GERD (gastroesophageal reflux disease)     History of stomach ulcers     Hypertension     Panic attack     Papanicolaou smear 2020    Psychiatric disorder        Past Surgical History:   Past Surgical History:   Procedure Laterality Date    HYSTEROSCOPY  2015    MAMMO (HISTORICAL)  2020       Family History:  Family history reviewed and non-contributory  Family History   Problem Relation Age of Onset    Hypertension Mother     Lung cancer Mother     Skin cancer Father        Social History:  Social History     Socioeconomic History    Marital status: Single     Spouse name: None    Number of children: None    Years of education: None    Highest education level: None   Occupational History    Occupation: Tech  Tobacco Use    Smoking status: Former Smoker    Smokeless tobacco: Never Used    Tobacco comment: teen years   Vaping Use    Vaping Use: Never used   Substance and Sexual Activity    Alcohol use: Yes     Comment: Socially    Drug use: No    Sexual activity: Not Currently     Birth control/protection: Post-menopausal   Other Topics Concern    None   Social History Narrative    Caffeine Intake: 1-2 cups per day    Do you smoke marijuana? Denies    Do you drink alcohol? Socially    Exercise: Occassionally    Domestic violence: No    History of drug/alcohol abuse denies     Social Determinants of Health     Financial Resource Strain: Not on file   Food Insecurity: Not on file   Transportation Needs: Not on file   Physical Activity: Not on file   Stress: Not on file   Social Connections: Not on file   Intimate Partner Violence: Not on file   Housing Stability: Not on file       Allergies:    Allergies   Allergen Reactions    Latex Rash           Labs:  0   Lab Value Date/Time    HCT 39 3 08/11/2022 1535    HCT 44 9 06/26/2022 1840    HCT 39 4 03/26/2022 1052    HGB 12 7 08/11/2022 1535    HGB 14 8 06/26/2022 1840    HGB 13 2 03/26/2022 1052    WBC 8 44 08/11/2022 1535    WBC 13 89 (H) 06/26/2022 1840    WBC 5 65 03/26/2022 1052       Meds:    Current Facility-Administered Medications:     acetaminophen (TYLENOL) tablet 975 mg, 975 mg, Oral, Q6H PRN, Prince Baez, DO    ampicillin-sulbactam (UNASYN) 3 g in sodium chloride 0 9 % 100 mL IVPB, 3 g, Intravenous, Q6H, Dirk Ortega DO, Stopped at 08/11/22 1631    lidocaine (PF) (XYLOCAINE-MPF) 1 % injection 20 mL, 20 mL, Infiltration, Once, Adore Duron MD    ondansetron [facility]) injection 4 mg, 4 mg, Intravenous, Q8H PRN, Prince Baez DO    Current Outpatient Medications:     buPROPion (Wellbutrin SR) 150 mg 12 hr tablet, Take 1 tablet (150 mg total) by mouth in the morning, Disp: 90 tablet, Rfl: 2    buPROPion (WELLBUTRIN XL) 300 mg 24 hr tablet, Take 1 tablet (300 mg total) by mouth daily, Disp: 90 tablet, Rfl: 2    cefuroxime (CEFTIN) 500 mg tablet, Take 1 tablet (500 mg total) by mouth every 12 (twelve) hours for 10 days, Disp: 20 tablet, Rfl: 0    cholecalciferol (VITAMIN D3) 1,000 units tablet, Take 1,000 Units by mouth daily, Disp: , Rfl:     ciclopirox (PENLAC) 8 % solution, Apply topically daily at bedtime, Disp: 6 6 mL, Rfl: 0    Clocortolone Pivalate 0 1 % cream, Apply topically 2 (two) times a day, Disp: , Rfl:     hydrochlorothiazide (HYDRODIURIL) 12 5 mg tablet, Take 12 5 mg by mouth daily, Disp: , Rfl:     losartan (COZAAR) 100 MG tablet, Take 1 tablet (100 mg total) by mouth daily, Disp: 90 tablet, Rfl: 2    metroNIDAZOLE (FLAGYL) 500 mg tablet, Take 1 tablet (500 mg total) by mouth every 8 (eight) hours for 10 days, Disp: 30 tablet, Rfl: 0    omeprazole (PriLOSEC) 40 MG capsule, Take 40 mg by mouth daily Take before a meal, Disp: , Rfl:     ondansetron (Zofran ODT) 4 mg disintegrating tablet, Take 1 tablet (4 mg total) by mouth every 6 (six) hours as needed for nausea or vomiting, Disp: 12 tablet, Rfl: 0    QUEtiapine (SEROquel) 100 mg tablet, Take 100 mg by mouth daily at bedtime, Disp: , Rfl:     sertraline (ZOLOFT) 100 mg tablet, Take 2 tablets (200 mg total) by mouth daily, Disp: 60 tablet, Rfl: 2    SF 5000 Plus 1 1 % CREA, , Disp: , Rfl:     Blood Culture:   No results found for: BLOODCX    Wound Culture:   No results found for: WOUNDCULT    Ins and Outs:  No intake/output data recorded  Physical Exam:   /67 (BP Location: Left arm)   Pulse 78   Temp 98 2 °F (36 8 °C) (Oral)   Resp 18   SpO2 97%   Gen: Alert and oriented to person, place, time  HEENT: EOMI, eyes clear, moist mucus membranes, hearing intact  Respiratory: Bilateral chest rise   No audible wheezing found  Cardiovascular: Regular Rate and Rhythm  Abdomen: soft nontender/nondistended  · Musculoskeletal: right Upper Extremity  · Skin: Erythema over thumb at the metacarpal and proximal phalanx  Warmth to touch  No fluctuant masses noted  There is a small incision site on the proximal phalanx of the thumb on the radial aspect without expressible drainage  · Unable to perform composite fist due to swelling   · Able to flex and extend at MCPJ, PIPJ, and DIPJ of index, long, ring, and small fingers  Able to flex and extend at [facility], MCPJ and IPJ of thumb with limited range of motion due to swelling   · No pain at any digit with full passive extension   · No painful range of motion of the wrist  Able to actively flex and extend at the wrist without difficulty  · No lymphogenic spread noted at forearm   · No pain at elbow with full flexion and extension    · Sensation intact Axillary, Musculocutaneous, Radial, Ulna and Median  · Unable to perform thumb and small finger opposition due to pain   · +2 radial pulse     Radiology:   I personally reviewed the films  X-rays of right hand show no acute fracture or dislocation  There are no foreign bodies or lesions seen      _*_*_*_*_*_*_*_*_*_*_*_*_*_*_*_*_*_*_*_*_*_*_*_*_*_*_*_*_*_*_*_*_*_*_*_*_*_*_*_*_*    Assessment:  62 y  o female with  right hand cellulitis status post dog bites  She has no fluctuant masses at this time and no drainage from her prior  Incision site  She has no signs of flexor tendon pathology and tolerates range of motion of the thumb at all joints  She has no signs of septic arthritis of the thumb or wrist  She can be managed non-operatively at this time with close serial examinations while on IV Unasyn and in a maceration dressing  Plan:   · Cont  abx per primary team  · Nonweight bearing to right in a maceration dressing   · Heat to affected area  · Analgesics for pain  · NPO at midnight   · There is no height or weight on file to calculate BMI  moderately obese   Recommend behavior modifications, nutrition and physical activity    · Dispo: Ortho will follow       Keri Cortez MD

## 2022-08-11 NOTE — ASSESSMENT & PLAN NOTE
Patient with a history of depression, will continue home medications including bupropion 400 mg daily, Seroquel 100 mg at bedtime and sertraline 200 mg daily

## 2022-08-12 PROBLEM — L03.011 CELLULITIS OF RIGHT THUMB: Status: ACTIVE | Noted: 2022-08-11

## 2022-08-12 LAB
ANION GAP SERPL CALCULATED.3IONS-SCNC: 1 MMOL/L (ref 4–13)
BASOPHILS # BLD AUTO: 0.04 THOUSANDS/ΜL (ref 0–0.1)
BASOPHILS NFR BLD AUTO: 1 % (ref 0–1)
BUN SERPL-MCNC: 16 MG/DL (ref 5–25)
CALCIUM SERPL-MCNC: 8.8 MG/DL (ref 8.3–10.1)
CHLORIDE SERPL-SCNC: 108 MMOL/L (ref 96–108)
CO2 SERPL-SCNC: 29 MMOL/L (ref 21–32)
CREAT SERPL-MCNC: 0.74 MG/DL (ref 0.6–1.3)
EOSINOPHIL # BLD AUTO: 0.22 THOUSAND/ΜL (ref 0–0.61)
EOSINOPHIL NFR BLD AUTO: 4 % (ref 0–6)
ERYTHROCYTE [DISTWIDTH] IN BLOOD BY AUTOMATED COUNT: 13.3 % (ref 11.6–15.1)
GFR SERPL CREATININE-BSD FRML MDRD: 90 ML/MIN/1.73SQ M
GLUCOSE SERPL-MCNC: 94 MG/DL (ref 65–140)
HCT VFR BLD AUTO: 38.3 % (ref 34.8–46.1)
HGB BLD-MCNC: 12.2 G/DL (ref 11.5–15.4)
IMM GRANULOCYTES # BLD AUTO: 0.01 THOUSAND/UL (ref 0–0.2)
IMM GRANULOCYTES NFR BLD AUTO: 0 % (ref 0–2)
INR PPP: 1.04 (ref 0.84–1.19)
LYMPHOCYTES # BLD AUTO: 1.34 THOUSANDS/ΜL (ref 0.6–4.47)
LYMPHOCYTES NFR BLD AUTO: 23 % (ref 14–44)
MCH RBC QN AUTO: 29 PG (ref 26.8–34.3)
MCHC RBC AUTO-ENTMCNC: 31.9 G/DL (ref 31.4–37.4)
MCV RBC AUTO: 91 FL (ref 82–98)
MONOCYTES # BLD AUTO: 0.68 THOUSAND/ΜL (ref 0.17–1.22)
MONOCYTES NFR BLD AUTO: 12 % (ref 4–12)
NEUTROPHILS # BLD AUTO: 3.5 THOUSANDS/ΜL (ref 1.85–7.62)
NEUTS SEG NFR BLD AUTO: 60 % (ref 43–75)
NRBC BLD AUTO-RTO: 0 /100 WBCS
PLATELET # BLD AUTO: 235 THOUSANDS/UL (ref 149–390)
PMV BLD AUTO: 10.5 FL (ref 8.9–12.7)
POTASSIUM SERPL-SCNC: 3.9 MMOL/L (ref 3.5–5.3)
PROTHROMBIN TIME: 13.8 SECONDS (ref 11.6–14.5)
RBC # BLD AUTO: 4.2 MILLION/UL (ref 3.81–5.12)
SODIUM SERPL-SCNC: 138 MMOL/L (ref 135–147)
WBC # BLD AUTO: 5.79 THOUSAND/UL (ref 4.31–10.16)

## 2022-08-12 PROCEDURE — 80048 BASIC METABOLIC PNL TOTAL CA: CPT | Performed by: INTERNAL MEDICINE

## 2022-08-12 PROCEDURE — 99232 SBSQ HOSP IP/OBS MODERATE 35: CPT | Performed by: PHYSICIAN ASSISTANT

## 2022-08-12 PROCEDURE — 85610 PROTHROMBIN TIME: CPT | Performed by: INTERNAL MEDICINE

## 2022-08-12 PROCEDURE — 97166 OT EVAL MOD COMPLEX 45 MIN: CPT

## 2022-08-12 PROCEDURE — 85025 COMPLETE CBC W/AUTO DIFF WBC: CPT | Performed by: INTERNAL MEDICINE

## 2022-08-12 PROCEDURE — NC001 PR NO CHARGE: Performed by: SURGERY

## 2022-08-12 RX ADMIN — PANTOPRAZOLE SODIUM 40 MG: 40 TABLET, DELAYED RELEASE ORAL at 05:30

## 2022-08-12 RX ADMIN — HEPARIN SODIUM 5000 UNITS: 5000 INJECTION INTRAVENOUS; SUBCUTANEOUS at 14:16

## 2022-08-12 RX ADMIN — HEPARIN SODIUM 5000 UNITS: 5000 INJECTION INTRAVENOUS; SUBCUTANEOUS at 22:48

## 2022-08-12 RX ADMIN — BUPROPION HYDROCHLORIDE 450 MG: 150 TABLET, FILM COATED, EXTENDED RELEASE ORAL at 08:49

## 2022-08-12 RX ADMIN — SODIUM CHLORIDE 3 G: 9 INJECTION, SOLUTION INTRAVENOUS at 17:03

## 2022-08-12 RX ADMIN — HEPARIN SODIUM 5000 UNITS: 5000 INJECTION INTRAVENOUS; SUBCUTANEOUS at 05:30

## 2022-08-12 RX ADMIN — Medication 1000 UNITS: at 08:49

## 2022-08-12 RX ADMIN — LOSARTAN POTASSIUM 100 MG: 50 TABLET, FILM COATED ORAL at 22:48

## 2022-08-12 RX ADMIN — SODIUM CHLORIDE 3 G: 9 INJECTION, SOLUTION INTRAVENOUS at 05:59

## 2022-08-12 RX ADMIN — SERTRALINE 200 MG: 100 TABLET, FILM COATED ORAL at 08:49

## 2022-08-12 RX ADMIN — SODIUM CHLORIDE 3 G: 9 INJECTION, SOLUTION INTRAVENOUS at 12:09

## 2022-08-12 RX ADMIN — QUETIAPINE FUMARATE 100 MG: 100 TABLET ORAL at 22:51

## 2022-08-12 NOTE — OCCUPATIONAL THERAPY NOTE
Occupational Therapy Evaluation     Patient Name: Bhavesh Bass  Today's Date: 8/12/2022  Problem List  Principal Problem:    Cellulitis of right thumb  Active Problems:    Primary hypertension    Moderate episode of recurrent major depressive disorder ([address] )    Past Medical History  Past Medical History:   Diagnosis Date    ADHD (attention deficit hyperactivity disorder)     Anxiety     Arthritis     Depression     Fibromyalgia, primary     GERD (gastroesophageal reflux disease)     History of stomach ulcers     Hypertension     Panic attack     Papanicolaou smear 2020    Psychiatric disorder      Past Surgical History  Past Surgical History:   Procedure Laterality Date    HYSTEROSCOPY  2015    MAMMO (HISTORICAL)  2020 08/12/22 0913   OT Last Visit   OT Visit Date 08/12/22   Note Type   Note type Evaluation   Restrictions/Precautions   Weight Bearing Precautions Per Order Yes   RUE Weight Bearing Per Order NWB   Other Precautions WBS;Pain   Pain Assessment   Pain Assessment Tool 0-10   Pain Score No Pain   Home Living   Type of [address] [place] [address] Two level   Bathroom Shower/Tub Walk-in shower   Bathroom Toilet Standard   Prior Function   Level of [place] Independent with ADLs and functional mobility   Lives With Family  (brother)   Receives Help From Family   ADL Assistance Independent   IADLs Independent   Falls in the last 6 months 0   Vocational Full time employment   Lifestyle   Autonomy pta pt reports I in ADlsIADLs/functional mobility   Reciprocal Relationships lives w/ brother   Service to Others works for [employer] enjoys watching tv   Psychosocial   Psychosocial (WDL) WDL   Subjective   Subjective "I'm doing okay"   ADL   Where Assessed Chair   Eating Assistance 7  Independent   Grooming Assistance 7  Independent   UB Bathing Assistance 7  Independent   LB Bathing Assistance 5  [address]  7  Independent   UB Dressing Deficit   (educated on one handed dressing)   LB Dressing Assistance 5  Supervision/Setup   Bed Mobility   Supine to Sit 6  Modified independent   Sit to Supine 6  Modified independent   Transfers   Sit to Stand 6  Modified independent   Stand to Sit 6  Modified independent   Functional Mobility   Functional Mobility 6  Modified independent   Balance   Static Sitting Normal   Dynamic Sitting Good   Static Standing Good   Dynamic Standing Good   Ambulatory Good   Activity Tolerance   Activity Tolerance Patient tolerated treatment well   Nurse Made Aware okay to see per RN   RUE Assessment   RUE Assessment   (NWB)   LUE Assessment   LUE Assessment WFL   Hand Function   Gross Motor Coordination Functional   Fine Motor Coordination Impaired   Cognition   Overall Cognitive Status WFL   Arousal/Participation Cooperative   Attention Within functional limits   Orientation Level Oriented X4   Memory Within functional limits   Following Commands Follows all commands and directions without difficulty   Comments pt cooperative   Assessment   Prognosis Good   Assessment Pt is a 62 y o  YO  female admitted to [facility] on 8/11/2022 w/ R thumb cellulitis  Pt  has a past medical history of ADHD (attention deficit hyperactivity disorder), Anxiety, Arthritis, Depression, Fibromyalgia, primary, GERD (gastroesophageal reflux disease), History of stomach ulcers, Hypertension, Panic attack, Papanicolaou smear, and Psychiatric disorder  Pt with active OT orders and NWB RUE orders    Pt resides in a house with brother  Pt was I w/  ADLS and IADLS, (+) drove, & required no use of DME PTA  Currently pt is supervision for ADLs/functional mobility  Pt is limited at this time 2*: endurance, activity tolerance, functional mobility and decreased I w/ ADLS/IADLS  The following Occupational Performance Areas to address include: household maintenance, care of pets and job performance/volunteering   Based on the aforementioned OT evaluation, functional performance deficits, and assessments, pt has been identified as a moderate complexity evaluation  From OT standpoint, anticipate d/c home with family support  The patient's raw score on the AM-PAC Daily Activity inpatient short form is 24, standardized score is 57 54, greater than 39 4  Patients at this level are likely to benefit from discharge to home  Please refer to the recommendation of the Occupational Therapist for safe discharge planning  Recommend continued participation in ADLs and functional mobility w/ staff  No further acute OT needs, d/c OT  Please re-consult if necessary     Goals   Patient Goals to shower   Recommendation   OT Discharge Recommendation No rehabilitation needs   AM-PAC Daily Activity Inpatient   Lower Body Dressing 4   Bathing 4   Toileting 4   Upper Body Dressing 4   Grooming 4   Eating 4   Daily Activity Raw Score 24   Daily Activity Standardized Score (Calc for Raw Score >=11) 57 54   AM-PAC Applied Cognition Inpatient   Following a Speech/Presentation 4   Understanding Ordinary Conversation 4   Taking Medications 4   Remembering Where Things Are Placed or Put Away 4   Remembering List of 4-5 Errands 4   Taking Care of Complicated Tasks 4   Applied Cognition Raw Score 24   Applied Cognition Standardized Score 62 21   Modified Ana Scale   Modified Ana Scale 2         Marilee Arango MS, OTR/L

## 2022-08-12 NOTE — PROGRESS NOTES
[facility]  Progress Note - Bronson Cords 1965, 62 y o  female MRN: [id number]  Unit/Bed#: -01 Encounter: [id number]  Primary Care Provider: ANTONY Cr   Date and time admitted to hospital: 8/11/2022  2:26 PM      DOS: 8/12/2022  * Cellulitis of right thumb  Assessment & Plan  · Pt presented with cellulitis of the right thumb secondary to sustaining a dog bite  · Xray hand and forearm right negative for osseous abnormalities  · Ortho following,  · Continue IV Unasyn, likely transition to PO Augmentin in the next 24-48 hours pending continued improvement   · Continue maceration dressings   · NWB RUE  · Pain control   · PT/OT    Moderate episode of recurrent major depressive disorder (HCC)  Assessment & Plan  · Hx of depression   · Continue Wellbutrin 450 mg daily, Zoloft 200 mg daily and Seroquel 100 mg daily   · Mood currently appears stable, monitor     Primary hypertension  Assessment & Plan  · Bp appears stable on review   · Continue Losartan 100 mg daily   · Continue to hold HCTZ for now as pressures are stable without it   · Monitor     VTE Pharmacologic Prophylaxis:   Moderate Risk (Score 3-4) - Pharmacological DVT Prophylaxis Ordered: heparin  Patient Centered Rounds: I performed bedside rounds with nursing staff today  Discussions with Specialists or Other Care Team Provider: Discussed with RN, AGUS and reviewed previous notes     Education and Discussions with Family / Patient: Attempted to update  (brother) via phone  Left voicemail  Time Spent for Care: 20 minutes  More than 50% of total time spent on counseling and coordination of care as described above      Current Length of Stay: 1 day(s)  Current Patient Status: Inpatient   Certification Statement: The patient will continue to require additional inpatient hospital stay due to IV abx, ongoing orthopedic evaluation   Discharge Plan: Anticipate discharge in 24-48 hrs to home     Code Status: Level 1 - Full Code    Subjective:   Pt reports that she is feeling better today  States that the swelling and pain has gone down significantly of her right hand  She denies any nausea, vomiting, abdominal pain  Appetite is good  Asking about taking a shower  Reports that she has constipation at baseline and is requesting metamucil here which she typically takes at home daily at night  Objective:     Vitals:   Temp (24hrs), Av 2 °F (36 8 °C), Min:97 9 °F (36 6 °C), Max:98 4 °F (36 9 °C)    Temp:  [97 9 °F (36 6 °C)-98 4 °F (36 9 °C)] 97 9 °F (36 6 °C)  HR:  [73-90] 73  Resp:  [16-18] 16  BP: (107-143)/(61-87) 107/61  SpO2:  [93 %-97 %] 93 %  There is no height or weight on file to calculate BMI  Input and Output Summary (last 24 hours):   No intake or output data in the 24 hours ending 22 1122    Physical Exam:   Physical Exam  Vitals reviewed  Constitutional:       General: She is not in acute distress  Appearance: She is not toxic-appearing  Comments: Pt is in no acute distress lying in her hospital bed resting comfortably  Right hand with multiple small wounds, mild erythema of the thumb with edema   HENT:      Head: Normocephalic and atraumatic  Cardiovascular:      Rate and Rhythm: Normal rate and regular rhythm  Pulses: Normal pulses  Pulmonary:      Effort: Pulmonary effort is normal  No respiratory distress  Breath sounds: No wheezing  Abdominal:      General: Bowel sounds are normal  There is no distension  Palpations: Abdomen is soft  Tenderness: There is no abdominal tenderness  Musculoskeletal:      Right lower leg: No edema  Left lower leg: No edema  Skin:     General: Skin is warm and dry  Findings: No erythema  Neurological:      Mental Status: She is alert     Psychiatric:         Mood and Affect: Mood normal           Additional Data:     Labs:  Results from last 7 days   Lab Units 22  0608   WBC Thousand/uL 5 79   HEMOGLOBIN g/dL 12 2   HEMATOCRIT % 38 3   PLATELETS Thousands/uL 235   NEUTROS PCT % 60   LYMPHS PCT % 23   MONOS PCT % 12   EOS PCT % 4     Results from last 7 days   Lab Units 08/12/22  0608   SODIUM mmol/L 138   POTASSIUM mmol/L 3 9   CHLORIDE mmol/L 108   CO2 mmol/L 29   BUN mg/dL 16   CREATININE mg/dL 0 74   ANION GAP mmol/L 1*   CALCIUM mg/dL 8 8   GLUCOSE RANDOM mg/dL 94     Results from last 7 days   Lab Units 08/12/22  0608   INR  1 04                   Lines/Drains:  Invasive Devices  Report    Peripheral Intravenous Line  Duration           Peripheral IV 08/11/22 Left Arm <1 day                      Imaging: Reviewed radiology reports from this admission including: xray(s)    Recent Cultures (last 7 days):         Last 24 Hours Medication List:   Current Facility-Administered Medications   Medication Dose Route Frequency Provider Last Rate    acetaminophen  975 mg Oral Q6H PRN Prince Banai, DO      ampicillin-sulbactam  3 g Intravenous Q6H Jerome Velasquez DO 3 g (08/12/22 0559)    buPROPion  450 mg Oral Daily Prince Banai, DO      cholecalciferol  1,000 Units Oral Daily Prince Banai, DO      heparin (porcine)  5,000 Units Subcutaneous Q8H [facility] Prince Banai, DO      lidocaine (PF)  20 mL Infiltration Once Jacky Jade MD      losartan  100 mg Oral HS Prince Banai, DO      ondansetron  4 mg Intravenous Q8H PRN Prince Banai, DO      pantoprazole  40 mg Oral Early Morning Prince Banai, DO      QUEtiapine  100 mg Oral HS Prince Banai, DO      sertraline  200 mg Oral Daily Prince Banai, DO          Today, Patient Was Seen By: Chase Graham PA-C    **Please Note: This note may have been constructed using a voice recognition system  **

## 2022-08-12 NOTE — ASSESSMENT & PLAN NOTE
· Hx of depression   · Continue Wellbutrin 450 mg daily, Zoloft 200 mg daily and Seroquel 100 mg daily   · Mood currently appears stable, monitor

## 2022-08-12 NOTE — PLAN OF CARE
Problem: DISCHARGE PLANNING  Goal: Discharge to home or other facility with appropriate resources  Description: INTERVENTIONS:  - Identify barriers to discharge w/patient and caregiver  - Arrange for needed discharge resources and transportation as appropriate  - Identify discharge learning needs (meds, wound care, etc )  - Arrange for interpretive services to assist at discharge as needed  - Refer to Case Management Department for coordinating discharge planning if the patient needs post-hospital services based on physician/advanced practitioner order or complex needs related to functional status, cognitive ability, or social support system  Outcome: Progressing     Problem: INFECTION - ADULT  Goal: Absence or prevention of progression during hospitalization  Description: INTERVENTIONS:  - Assess and monitor for signs and symptoms of infection  - Monitor lab/diagnostic results  - Monitor all insertion sites, i e  indwelling lines, tubes, and drains  - Monitor endotracheal if appropriate and nasal secretions for changes in amount and color  - [place] appropriate cooling/warming therapies per order  - Administer medications as ordered  - Instruct and encourage patient and family to use good hand hygiene technique  - Identify and instruct in appropriate isolation precautions for identified infection/condition  Outcome: Progressing  Goal: Absence of fever/infection during neutropenic period  Description: INTERVENTIONS:  - Monitor WBC    Outcome: Progressing

## 2022-08-12 NOTE — ASSESSMENT & PLAN NOTE
· Pt presented with cellulitis of the right thumb secondary to sustaining a dog bite  · Xray hand and forearm right negative for osseous abnormalities  · Ortho following,  · Continue IV Unasyn, likely transition to PO Augmentin in the next 24-48 hours pending continued improvement   · Continue maceration dressings   · NWB RUE  · Pain control   · PT/OT

## 2022-08-12 NOTE — PROGRESS NOTES
Progress Note - Orthopedics   Marika Ley 62 y o  female MRN: [id number]  Unit/Bed#: VANIA ALTAM[facility] 763-01      Subjective:    62 y  o female  No acute events, no complaints  Pt doing well  Pain controlled   Denies fevers chills, CP, SOB    Labs:  0   Lab Value Date/Time    HCT 38 3 08/12/2022 0608    HCT 39 3 08/11/2022 1535    HCT 44 9 06/26/2022 1840    HGB 12 2 08/12/2022 0608    HGB 12 7 08/11/2022 1535    HGB 14 8 06/26/2022 1840    INR 1 04 08/12/2022 0608    WBC 5 79 08/12/2022 0608    WBC 8 44 08/11/2022 1535    WBC 13 89 (H) 06/26/2022 1840       Meds:    Current Facility-Administered Medications:     acetaminophen (TYLENOL) tablet 975 mg, 975 mg, Oral, Q6H PRN, Prince Baez, DO    ampicillin-sulbactam (UNASYN) 3 g in sodium chloride 0 9 % 100 mL IVPB, 3 g, Intravenous, Q6H, Margareth Mcintyre DO, Last Rate: 200 mL/hr at 08/12/22 0559, 3 g at 08/12/22 0559    buPROPion (WELLBUTRIN XL) 24 hr tablet 450 mg, 450 mg, Oral, Daily, Prince Colindresai, DO    cholecalciferol (VITAMIN D3) tablet 1,000 Units, 1,000 Units, Oral, Daily, Prince Baez, DO    heparin (porcine) subcutaneous injection 5,000 Units, 5,000 Units, Subcutaneous, Q8H [address], Prince Baez, DO, 5,000 Units at 08/12/22 0530    lidocaine (PF) (XYLOCAINE-MPF) 1 % injection 20 mL, 20 mL, Infiltration, Once, Jose Rebolledo MD    losartan (COZAAR) tablet 100 mg, 100 mg, Oral, HS, Prince Baez, DO, 100 mg at 08/11/22 2140    ondansetron (ZOFRAN) injection 4 mg, 4 mg, Intravenous, Q8H PRN, Prince Baez, DO    pantoprazole (PROTONIX) EC tablet 40 mg, 40 mg, Oral, Early Morning, Prince Banai, DO, 40 mg at 08/12/22 0530    QUEtiapine (SEROquel) tablet 100 mg, 100 mg, Oral, HS, Prince Banai, DO, 100 mg at 08/11/22 2140    sertraline (ZOLOFT) tablet 200 mg, 200 mg, Oral, Daily, Prince Bernadineai, DO    Blood Culture:   No results found for: BLOODCX    Wound Culture:   No results found for: WOUNDCULT    Ins and Outs:  No intake/output data recorded  Physical:  Vitals:    08/11/22 2344   BP: 107/61   Pulse: 73   Resp: 16   Temp: 97 9 °F (36 6 °C)   SpO2: 93%     Musculoskeletal: right Upper Extremity  · Skin appearing with less erythema and swelling about the thumb after maceration dressing removed  · Dressing removed and clean dry dressing applied   · TTP thumb with improvement from yesterday  · SILT m/r/u  Motor intact ain/pin/m/r/u, 2+ radial pulse    Assessment:    62 y  o female with right thumb cellulitis with improvement on exam after overnight maceration dressing  No fluctuant mass was noted on exam this morning  No operative intervention required today   She may benefit from another day of maceration dressing     Plan:  · NWB RUE  · Continue IV Abx per primary  · Maceration dressing today   · PT/OT  · Pain control  · Dispo: Ortho will follow     Ernie Ashley MD

## 2022-08-13 VITALS
OXYGEN SATURATION: 96 % | HEART RATE: 64 BPM | SYSTOLIC BLOOD PRESSURE: 121 MMHG | TEMPERATURE: 97.3 F | RESPIRATION RATE: 17 BRPM | DIASTOLIC BLOOD PRESSURE: 68 MMHG

## 2022-08-13 PROCEDURE — NC001 PR NO CHARGE: Performed by: SURGERY

## 2022-08-13 PROCEDURE — 99239 HOSP IP/OBS DSCHRG MGMT >30: CPT | Performed by: PHYSICIAN ASSISTANT

## 2022-08-13 RX ORDER — AMOXICILLIN AND CLAVULANATE POTASSIUM 875; 125 MG/1; MG/1
1 TABLET, FILM COATED ORAL EVERY 12 HOURS SCHEDULED
Qty: 14 TABLET | Refills: 0 | Status: SHIPPED | OUTPATIENT
Start: 2022-08-13 | End: 2022-08-22

## 2022-08-13 RX ADMIN — PANTOPRAZOLE SODIUM 40 MG: 40 TABLET, DELAYED RELEASE ORAL at 06:35

## 2022-08-13 RX ADMIN — BUPROPION HYDROCHLORIDE 450 MG: 150 TABLET, FILM COATED, EXTENDED RELEASE ORAL at 09:50

## 2022-08-13 RX ADMIN — SODIUM CHLORIDE 3 G: 9 INJECTION, SOLUTION INTRAVENOUS at 00:53

## 2022-08-13 RX ADMIN — SERTRALINE 200 MG: 100 TABLET, FILM COATED ORAL at 09:50

## 2022-08-13 RX ADMIN — PSYLLIUM HUSK 1 PACKET: 3.4 POWDER ORAL at 09:51

## 2022-08-13 RX ADMIN — HEPARIN SODIUM 5000 UNITS: 5000 INJECTION INTRAVENOUS; SUBCUTANEOUS at 06:35

## 2022-08-13 RX ADMIN — SODIUM CHLORIDE 3 G: 9 INJECTION, SOLUTION INTRAVENOUS at 06:35

## 2022-08-13 RX ADMIN — Medication 1000 UNITS: at 09:50

## 2022-08-13 NOTE — PROGRESS NOTES
Progress Note - Orthopedics   Lo Mc 62 y o  female MRN: [id number]  Unit/Bed#: VANIA [facility] 763-01      Subjective:    62 y  o female  No acute events, no complaints  Pt doing well  Pain controlled   Denies fevers chills, CP, SOB    Labs:  0   Lab Value Date/Time    HCT 38 3 08/12/2022 0608    HCT 39 3 08/11/2022 1535    HCT 44 9 06/26/2022 1840    HGB 12 2 08/12/2022 0608    HGB 12 7 08/11/2022 1535    HGB 14 8 06/26/2022 1840    INR 1 04 08/12/2022 0608    WBC 5 79 08/12/2022 0608    WBC 8 44 08/11/2022 1535    WBC 13 89 (H) 06/26/2022 1840       Meds:    Current Facility-Administered Medications:     acetaminophen (TYLENOL) tablet 975 mg, 975 mg, Oral, Q6H PRN, Prince Baez, DO    ampicillin-sulbactam (UNASYN) 3 g in sodium chloride 0 9 % 100 mL IVPB, 3 g, Intravenous, Q6H, Parul Ching DO, Last Rate: 200 mL/hr at 08/13/22 0635, 3 g at 08/13/22 0635    buPROPion (WELLBUTRIN XL) 24 hr tablet 450 mg, 450 mg, Oral, Daily, Prince Colindresai, DO, 450 mg at 08/12/22 0849    cholecalciferol (VITAMIN D3) tablet 1,000 Units, 1,000 Units, Oral, Daily, Prince Banai, DO, 1,000 Units at 08/12/22 0849    heparin (porcine) subcutaneous injection 5,000 Units, 5,000 Units, Subcutaneous, Q8H [facility], DO, 5,000 Units at 08/13/22 0635    lidocaine (PF) (XYLOCAINE-MPF) 1 % injection 20 mL, 20 mL, Infiltration, Once, Darrel Waller MD    losartan (COZAAR) tablet 100 mg, 100 mg, Oral, HS, Prince Baez, DO, 100 mg at 08/12/22 2248    ondansetron (ZOFRAN) injection 4 mg, 4 mg, Intravenous, Q8H PRN, Prince Baez DO    pantoprazole (PROTONIX) EC tablet 40 mg, 40 mg, Oral, Early Morning, Prince Baez, , 40 mg at 08/13/22 2607    psyllium (METAMUCIL) 1 packet, 1 packet, Oral, Daily, Liz Terrazas PA-C    QUEtiapine (SEROquel) tablet 100 mg, 100 mg, Oral, HS, Prince Baez DO, 100 mg at 08/12/22 2251    sertraline (ZOLOFT) tablet 200 mg, 200 mg, Oral, Daily, Prince Baez DO, 200 mg at 08/12/22 0849    Blood Culture:   No results found for: BLOODCX    Wound Culture:   No results found for: WOUNDCULT    Ins and Outs:  No intake/output data recorded  Physical:  Vitals:    08/12/22 2205   BP: 134/72   Pulse: 70   Resp:    Temp: 97 9 °F (36 6 °C)   SpO2: 95%     Musculoskeletal: right upper extremity  · Skin appearing less erythematous with less swelling about the thumb after maceration nursing was removed  · Dressing removed and cleaned dry dressing applied  · Tender to palpation at the thumb with improvement from yesterday  · Sensation intact to light touch median/radial/ulnar nerves distribution  · Motor intact AIN/PIN/M/R/U  · 2+ radial pulse    Assessment:    62 y  o female with right thumb cellulitis with improvement on exam after overnight maceration dressing  No fluctuant mass was noted on exam this morning  No operative intervention required today  She may benefit from another day of maceration dressing vs  Transitioning to PO abx and starting beta dine soaks      Plan:  · NWB RUE  · Continue Abx per primary  · Maceration dressing today   · PT/OT  · Pain control  · Dispo: Ortho will follow    Kitty Sicard, MD

## 2022-08-13 NOTE — PHYSICAL THERAPY NOTE
08/13/22 1030   Note Type   Note type Screen   Pt screened for PT services  Spoke w/ pt at bedside, and she notes she continues to mobilize indep, only limited by hand pain  OT has seen for hand  Will sign off    Pt may d/c home when stable w/ recs per OT luis Martin PT, DPT CSRS

## 2022-08-13 NOTE — PLAN OF CARE
Problem: PAIN - ADULT  Goal: Verbalizes/displays adequate comfort level or baseline comfort level  Description: Interventions:  - Encourage patient to monitor pain and request assistance  - Assess pain using appropriate pain scale  - Administer analgesics based on type and severity of pain and evaluate response  - Implement non-pharmacological measures as appropriate and evaluate response  - Consider cultural and social influences on pain and pain management  - Notify physician/advanced practitioner if interventions unsuccessful or patient reports new pain  Outcome: Progressing     Problem: SAFETY ADULT  Goal: Patient will remain free of falls  Description: INTERVENTIONS:  - Educate patient/family on patient safety including physical limitations  - Instruct patient to call for assistance with activity   - Consult OT/PT to assist with strengthening/mobility   - Keep Call bell within reach  - Keep bed low and locked with side rails adjusted as appropriate  - Keep care items and personal belongings within reach  - Initiate and maintain comfort rounds  - Make Fall Risk Sign visible to staff  - Offer Toileting every  Hours, in advance of need  - Initiate/Maintain alarm  - Obtain necessary fall risk management equipment:   - Apply yellow socks and bracelet for high fall risk patients  - Consider moving patient to room near nurses station  Outcome: Progressing     Problem: DISCHARGE PLANNING  Goal: Discharge to home or other facility with appropriate resources  Description: INTERVENTIONS:  - Identify barriers to discharge w/patient and caregiver  - Arrange for needed discharge resources and transportation as appropriate  - Identify discharge learning needs (meds, wound care, etc )  - Arrange for interpretive services to assist at discharge as needed  - Refer to Case Management Department for coordinating discharge planning if the patient needs post-hospital services based on physician/advanced practitioner order or complex needs related to functional status, cognitive ability, or social support system  Outcome: Progressing     Problem: Knowledge Deficit  Goal: Patient/family/caregiver demonstrates understanding of disease process, treatment plan, medications, and discharge instructions  Description: Complete learning assessment and assess knowledge base    Interventions:  - Provide teaching at level of understanding  - Provide teaching via preferred learning methods  Outcome: Progressing

## 2022-08-13 NOTE — DISCHARGE SUMMARY
[facility]  Discharge- [place] Offer 1965, 62 y o  female MRN: [id number]  Unit/Bed#: -01 Encounter: [id number]  Primary Care Provider: ANTONY Damian   Date and time admitted to hospital: 8/11/2022  2:26 PM     DOS: 8/13/2022  * Cellulitis of right thumb  Assessment & Plan  · Pt presented with cellulitis of the right thumb secondary to sustaining a dog bite  · Xray hand and forearm right negative for osseous abnormalities  · Per review of records, tetanus UTD  · Ortho following,  · S/p IV Unasyn (day 3), transition to PO Augmentin today to complete total of 10 days of treatment   · Encourage probiotic with abx management as pt with history of GI upset with Augmentin   · NWB RUE  · Pain control   · OT recommending no rehab needs  · Cleared for d/c from ortho perspective today with follow up in one week     Moderate episode of recurrent major depressive disorder (HCC)  Assessment & Plan  · Hx of depression   · Continue Wellbutrin 450 mg daily, Zoloft 200 mg daily and Seroquel 100 mg daily   · Mood currently appears stable, monitor     Primary hypertension  Assessment & Plan  · Bp appears stable on review   · Continue Losartan 100 mg daily   · Resume HCTZ on discharge  · Follow up with PCP       Medical Problems             Resolved Problems  Date Reviewed: 8/13/2022   None               Discharging Physician / Practitioner: Nimo Jamil PA-C  PCP: Matilda Damian  Admission Date:   Admission Orders (From admission, onward)     Ordered        08/11/22 1514  INPATIENT ADMISSION  Once                      Discharge Date: 08/13/22    Consultations During Hospital Stay:  · Orthopedics     Procedures Performed:   · Xray forearm right 8/11 - No acute osseous abnormality   · Xray hand right 8/11 - No acute osseous abnormality     Significant Findings / Test Results:   · None    Incidental Findings:   · None     Test Results Pending at Discharge (will require follow up): · None     Outpatient Tests Requested:  · Per PCP    Complications:  None    Reason for Admission: Cellulitis of right hand after dog bite    Hospital Course:   Nina Taylor is a 62 y o  female patient with significant past medical history of depression, HTN who originally presented to the hospital on 8/11/2022 due to worsening redness, swelling and pain of the right hand and thumb after sustaining a dog bite  Pt had been seen in the ER a few days prior and small laceration was sutured and she was placed on ceftin/flagyl without any improvement  Noted to have purulent drainage of the thumb and came back to the ER  She was seen by orthopedics and placed on IV Unasyn with maceration dressings with overall improvement  She was cleared for discharge from orthopedic standpoint with follow up in one week  She was transitioned to PO Augmentin to complete total of 10 day course of treatment  Pt was discharged in stable condition  For additional information please refer to medical records  Medication changes include: D/c ceftin and flagyl, start PO Augmentin 875/125 mg Q12H x 7 days to complete 10 day total course of treatment      Please see above list of diagnoses and related plan for additional information  Condition at Discharge: stable    Discharge Day Visit / Exam:   Subjective:  Pt reports that she continues to feel better today  Still has some mild swelling of the hand but improved from prior  Denies any significant pain  Denies any diarrhea, tolerating abx well  She is agreeable to be discharged on the PO Augmentin with close ortho follow up  Appetite is good with no additional complaints  Vitals: Blood Pressure: 121/68 (08/13/22 0724)  Pulse: 64 (08/13/22 0724)  Temperature: (!) 97 3 °F (36 3 °C) (08/13/22 0724)  Temp Source: Oral (08/11/22 1435)  Respirations: 17 (08/13/22 0724)  SpO2: 96 % (08/13/22 0724)  Exam:   Physical Exam  Vitals reviewed     Constitutional:       General: She is not in acute distress  Appearance: She is not toxic-appearing  Comments: Pt is in no acute distress lying in her hospital bed resting comfortably   Right hand with dressing clean, dry and intact  Improvement of erythema, mild swelling noted improved from yesterday    HENT:      Head: Normocephalic and atraumatic  Cardiovascular:      Rate and Rhythm: Normal rate and regular rhythm  Pulses: Normal pulses  Pulmonary:      Effort: Pulmonary effort is normal  No respiratory distress  Breath sounds: No wheezing  Abdominal:      General: Bowel sounds are normal  There is no distension  Palpations: Abdomen is soft  Tenderness: There is no abdominal tenderness  Musculoskeletal:      Right lower leg: No edema  Left lower leg: No edema  Skin:     General: Skin is warm and dry  Neurological:      Mental Status: She is alert  Psychiatric:         Mood and Affect: Mood normal           Discussion with Family: Patient declined call to   Discharge instructions/Information to patient and family:   See after visit summary for information provided to patient and family  Provisions for Follow-Up Care:  See after visit summary for information related to follow-up care and any pertinent home health orders  Disposition:   Home    Planned Readmission: None     Discharge Statement:  I spent 35 minutes discharging the patient  This time was spent on the day of discharge  I had direct contact with the patient on the day of discharge  Greater than 50% of the total time was spent examining patient, answering all patient questions, arranging and discussing plan of care with patient as well as directly providing post-discharge instructions  Additional time then spent on discharge activities  Discharge Medications:  See after visit summary for reconciled discharge medications provided to patient and/or family        **Please Note: This note may have been constructed using a voice recognition system**

## 2022-08-13 NOTE — DISCHARGE INSTRUCTIONS
Discharge Instructions - Orthopedics  Nina Taylor 62 y o  female MRN: [id number]  Unit/Bed#:  -01    Weight Bearing Status:                                           Non-weight bearing right upper extremity  Pain:  Continue analgesics as directed    Dressing Instructions:   Please keep clean, dry and intact until follow up     Appt Instructions: If you do not have your appointment, please call the clinic at [phone] t  Otherwise followup as scheduled     Contact the office sooner if you experience any increased numbness/tingling in the extremities        Can do soaks in warm soapy water three times a day with dry dressings   If you begin to have any worsening redness, swelling, pain or decreased range of motion of the hand please come back to the hospital for further evaluation   You can take probiotics with antibiotics for improvement of GI related symptoms

## 2022-08-13 NOTE — ASSESSMENT & PLAN NOTE
· Pt presented with cellulitis of the right thumb secondary to sustaining a dog bite  · Xray hand and forearm right negative for osseous abnormalities  · Per review of records, tetanus UTD  · Ortho following,  · S/p IV Unasyn (day 3), transition to PO Augmentin today to complete total of 10 days of treatment   · Encourage probiotic with abx management as pt with history of GI upset with Augmentin   · NWB RUE  · Pain control   · OT recommending no rehab needs  · Cleared for d/c from ortho perspective today with follow up in one week

## 2022-08-13 NOTE — ASSESSMENT & PLAN NOTE
· Bp appears stable on review   · Continue Losartan 100 mg daily   · Resume HCTZ on discharge  · Follow up with PCP

## 2022-08-15 ENCOUNTER — OFFICE VISIT (OUTPATIENT)
Dept: OBGYN CLINIC | Facility: CLINIC | Age: 57
End: 2022-08-15
Payer: COMMERCIAL

## 2022-08-15 DIAGNOSIS — L03.113 CELLULITIS OF RIGHT HAND: ICD-10-CM

## 2022-08-15 DIAGNOSIS — M79.641 RIGHT HAND PAIN: Primary | ICD-10-CM
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
## 2022-08-15 DIAGNOSIS — M79.601 RIGHT ARM PAIN: ICD-10-CM

## 2022-08-15 DIAGNOSIS — R60.0 EDEMA OF HAND: ICD-10-CM

## 2022-08-15 PROCEDURE — 99213 OFFICE O/P EST LOW 20 MIN: CPT | Performed by: PHYSICIAN ASSISTANT

## 2022-08-15 NOTE — PROGRESS NOTES
Orthopaedic Surgery - Office Note  Narayan Garcia (38 y o  female)   : 1965   MRN: [id number]  Encounter Date: 8/15/2022    No chief complaint on file  Assessment/Plan  Diagnoses and all orders for this visit:    Right hand pain  -     Ambulatory Referral to Occupational Therapy; Future    Right arm pain    Dog bite, subsequent encounter    Cellulitis of right hand  -     Ambulatory Referral to Occupational Therapy; Future    Edema of hand  -     Ambulatory Referral to Occupational Therapy; Future    Patient will continue oral Augmentin as prescribed  Patient will continue wound care as recommended from the hospital-included daily warm soap hand washing  Patient will watch for worsening signs of infection and seek care immediately if they develop  Patient will be started occupational therapy to work on range of motion, strengthening, and edema control  Patient may begin to use the mouse at her computer without limitation     Return in one week with Dr Zachary Chen  History of Present Illness  Patient is here for a wound check  Patient sustained a dog bite on 2022 and was initially seen at the emergency department  She was on 2 oral antibiotics at that time but had developed an area of purulence and cellulitis at the base of the thumb that did not respond to conservative treatment and was subsequently admitted for IV antibiotics over the weekend  She has been discharged and transition to oral Augmentin  Patient reports decrease in pain symptoms  She denies any fever or chills  She has noted decrease in the swelling in the hand and there is no longer redness  She reports there is still stiffness in her fingers  Review of Systems  Pertinent items are noted in HPI  All other systems were reviewed and are negative  Physical Exam  There were no vitals taken for this visit  Cons: Appears well  No apparent distress  Psych: Alert  Oriented x3    Mood and affect normal   Eyes: PERRLA, EOMI  Resp: Normal effort  No audible wheezing or stridor  CV: Palpable pulse  No discernable arrhythmia  Lymph:  No palpable cervical, axillary, or inguinal lymphadenopathy  Skin: Warm  No palpable masses  No visible lesions  Neuro: Normal muscle tone  Normal and symmetric DTR's  Patient's right forearm abrasion is healed well sutures are no longer function and were removed today  There is no signs of active infection and wound margins are aligned  Patient has multiple abrasions throughout the right forearm and hand all in varying stages of healing without any signs of active infection  Ecchymosis is resolving  There is diffuse soft tissue edema in the right hand  She is able to actively flex and extend at all digit MCP and IP joints, although range of motion is limited  Patient is able to actively abduct the thumb  The area of purulence on previous exam is resolved  There are no fluctuant masses appreciated on clinical examination today  Distal radial ulnar pulses are +2  Capillary refills within normal limits  Studies Reviewed  Recent admission records and notes were reviewed by myself in the office today    Procedures  No procedures today  Medical, Surgical, Family, and Social History  The patient's medical history, family history, and social history, were reviewed and updated as appropriate      Past Medical History:   Diagnosis Date    ADHD (attention deficit hyperactivity disorder)     Anxiety     Arthritis     Depression     Fibromyalgia, primary     GERD (gastroesophageal reflux disease)     History of stomach ulcers     Hypertension     Panic attack     Papanicolaou smear 2020    Psychiatric disorder        Past Surgical History:   Procedure Laterality Date    HYSTEROSCOPY  2015    MAMMO (HISTORICAL)  2020       Family History   Problem Relation Age of Onset    Hypertension Mother     Lung cancer Mother     Skin cancer Father Social History     Occupational History    Occupation: Tech      Tobacco Use    Smoking status: Former Smoker    Smokeless tobacco: Never Used    Tobacco comment: teen years   Vaping Use    Vaping Use: Never used   Substance and Sexual Activity    Alcohol use: Yes     Comment: Socially    Drug use: No    Sexual activity: Not Currently     Birth control/protection: Post-menopausal       Allergies   Allergen Reactions    Latex Rash         Current Outpatient Medications:     amoxicillin-clavulanate (AUGMENTIN) 875-125 mg per tablet, Take 1 tablet by mouth every 12 (twelve) hours for 7 days, Disp: 14 tablet, Rfl: 0    buPROPion (Wellbutrin SR) 150 mg 12 hr tablet, Take 1 tablet (150 mg total) by mouth in the morning, Disp: 90 tablet, Rfl: 2    buPROPion (WELLBUTRIN XL) 300 mg 24 hr tablet, Take 1 tablet (300 mg total) by mouth daily, Disp: 90 tablet, Rfl: 2    cholecalciferol (VITAMIN D3) 1,000 units tablet, Take 1,000 Units by mouth daily, Disp: , Rfl:     ciclopirox (PENLAC) 8 % solution, Apply topically daily at bedtime, Disp: 6 6 mL, Rfl: 0    Clocortolone Pivalate 0 1 % cream, Apply topically 2 (two) times a day, Disp: , Rfl:     hydrochlorothiazide (HYDRODIURIL) 12 5 mg tablet, Take 12 5 mg by mouth daily, Disp: , Rfl:     losartan (COZAAR) 100 MG tablet, Take 1 tablet (100 mg total) by mouth daily, Disp: 90 tablet, Rfl: 2    omeprazole (PriLOSEC) 40 MG capsule, Take 40 mg by mouth daily Take before a meal, Disp: , Rfl:     ondansetron (Zofran ODT) 4 mg disintegrating tablet, Take 1 tablet (4 mg total) by mouth every 6 (six) hours as needed for nausea or vomiting, Disp: 12 tablet, Rfl: 0    QUEtiapine (SEROquel) 100 mg tablet, Take 100 mg by mouth daily at bedtime, Disp: , Rfl:     sertraline (ZOLOFT) 100 mg tablet, Take 2 tablets (200 mg total) by mouth daily, Disp: 60 tablet, Rfl: 2    SF 5000 Plus 1 1 % CREA, , Disp: , Rfl:       Luke Jung PA-C

## 2022-08-16 ENCOUNTER — TRANSITIONAL CARE MANAGEMENT (OUTPATIENT)
Dept: FAMILY MEDICINE CLINIC | Facility: CLINIC | Age: 57
End: 2022-08-16

## 2022-08-16 ENCOUNTER — TELEPHONE (OUTPATIENT)
Dept: FAMILY MEDICINE CLINIC | Facility: CLINIC | Age: 57
End: 2022-08-16

## 2022-08-16 ENCOUNTER — APPOINTMENT (OUTPATIENT)
Dept: PHYSICAL THERAPY | Facility: CLINIC | Age: 57
End: 2022-08-16
Payer: COMMERCIAL

## 2022-08-16 NOTE — TELEPHONE ENCOUNTER
Patient called because she was in the hospital and where IV was it is now swollen and sore      Was admitted in hospital for 2 days

## 2022-08-17 ENCOUNTER — HOSPITAL ENCOUNTER (OUTPATIENT)
Dept: NON INVASIVE DIAGNOSTICS | Facility: HOSPITAL | Age: 57
Discharge: HOME | End: 2022-08-17
Payer: COMMERCIAL

## 2022-08-17 ENCOUNTER — OFFICE VISIT (OUTPATIENT)
Dept: FAMILY MEDICINE CLINIC | Facility: CLINIC | Age: 57
End: 2022-08-17
Payer: COMMERCIAL

## 2022-08-17 VITALS
WEIGHT: 214 LBS | HEIGHT: 66 IN | HEART RATE: 83 BPM | DIASTOLIC BLOOD PRESSURE: 74 MMHG | BODY MASS INDEX: 34.39 KG/M2 | RESPIRATION RATE: 16 BRPM | TEMPERATURE: 97.7 F | SYSTOLIC BLOOD PRESSURE: 118 MMHG

## 2022-08-17 DIAGNOSIS — I10 PRIMARY HYPERTENSION: ICD-10-CM

## 2022-08-17 DIAGNOSIS — Z12.11 SCREENING FOR MALIGNANT NEOPLASM OF COLON: ICD-10-CM

## 2022-08-17 DIAGNOSIS — F33.1 MODERATE EPISODE OF RECURRENT MAJOR DEPRESSIVE DISORDER (HCC): ICD-10-CM

## 2022-08-17 DIAGNOSIS — I80.8 SUPERFICIAL THROMBOPHLEBITIS OF LEFT UPPER EXTREMITY: ICD-10-CM

## 2022-08-17 DIAGNOSIS — M79.602 PAIN IN LEFT ARM: ICD-10-CM

## 2022-08-17 DIAGNOSIS — L03.113 CELLULITIS OF RIGHT HAND: Primary | ICD-10-CM

## 2022-08-17 DIAGNOSIS — Z12.31 ENCOUNTER FOR SCREENING MAMMOGRAM FOR MALIGNANT NEOPLASM OF BREAST: ICD-10-CM
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
## 2022-08-17 PROCEDURE — 93971 EXTREMITY STUDY: CPT

## 2022-08-17 PROCEDURE — 93971 EXTREMITY STUDY: CPT | Performed by: INTERNAL MEDICINE

## 2022-08-17 PROCEDURE — 99495 TRANSJ CARE MGMT MOD F2F 14D: CPT | Performed by: FAMILY MEDICINE

## 2022-08-17 RX ORDER — NAPROXEN 500 MG/1
500 TABLET ORAL 2 TIMES DAILY WITH MEALS
Qty: 20 TABLET | Refills: 0 | Status: SHIPPED | OUTPATIENT
Start: 2022-08-17 | End: 2022-09-27

## 2022-08-17 NOTE — PROGRESS NOTES
TCM Call     Date and time call was made  8/16/2022  1:02 PM    Hospital care reviewed  Records reviewed    Patient was hospitialized at  [facility]    Date of Admission  08/11/22    Date of discharge  08/13/22    Diagnosis  Cellulitis of right thumb    Disposition  Home    Were the patients medications reviewed and updated  Yes    Current Symptoms  None      TCM Call     Post hospital issues  None    Should patient be enrolled in anticoag monitoring? No    I have advised the patient to call PCP with any new or worsening symptoms  latia newberry ma        Assessment/Plan:    1  Cellulitis of right hand  - secondary to dog bite, advised to continue Augmentin until finished and follow up with Ortho as scheduled on 8/22/22    2  Superficial thrombophlebitis of left upper extremity  - will obtain venous dopplers to r/o DVT, advised to apply warm compresses and take Naproxen twice a day, follow up if symptoms persist or worsen  - VAS upper limb venous duplex scan, unilateral/limited; Future  - naproxen (Naprosyn) 500 mg tablet; Take 1 tablet (500 mg total) by mouth 2 (two) times a day with meals  Dispense: 20 tablet; Refill: 0    3  Hypertension  - well controlled, continue losartan and HCTZ    4  Recurrent major depressive disorder  - doing well on Zoloft and Wellbutrin       Return as scheduled or sooner as needed  The patient understands and agrees with the treatment plan  Subjective:   Chief Complaint   Patient presents with    Transition of Care Management    Edema     With soreness at IV site      Patient ID: Terence Lucio is a 62 y o  female who presents today for follow up hospitalization on 8/11/22 - 8/13/22 for right hand cellulitis secondary to dog bite  Patient was seen by Ortho, after receiving IV antibiotics her right thumb/ hand redness and swelling improved, she was transitioned to po Augmentin for 7 more days and discharged home on 8/13/22 in stable condition   Patient states that her right forearm/ hand wounds are getting better, no fever/chills, no oozing, no increased redness or swelling  Patient noted increased pain and redness along left arm vein at recent IV site  The following portions of the patient's history were reviewed and updated as appropriate: allergies, current medications, past family history, past medical history, past social history, past surgical history and problem list     Past Medical History:   Diagnosis Date    ADHD (attention deficit hyperactivity disorder)     Anxiety     Arthritis     Depression     Fibromyalgia, primary     GERD (gastroesophageal reflux disease)     History of stomach ulcers     Hypertension     Panic attack     Papanicolaou smear 2020    Psychiatric disorder      Past Surgical History:   Procedure Laterality Date    HYSTEROSCOPY  2015    MAMMO (HISTORICAL)  2020     Family History   Problem Relation Age of Onset    Hypertension Mother     Lung cancer Mother     Skin cancer Father      Social History     Socioeconomic History    Marital status: Single     Spouse name: Not on file    Number of children: Not on file    Years of education: Not on file    Highest education level: Not on file   Occupational History    Occupation: Tech  Tobacco Use    Smoking status: Former Smoker    Smokeless tobacco: Never Used    Tobacco comment: teen years   Vaping Use    Vaping Use: Never used   Substance and Sexual Activity    Alcohol use: Yes     Comment: Socially    Drug use: No    Sexual activity: Not Currently     Birth control/protection: Post-menopausal   Other Topics Concern    Not on file   Social History Narrative    Caffeine Intake: 1-2 cups per day    Do you smoke marijuana? Denies    Do you drink alcohol?  Socially    Exercise: Occassionally    Domestic violence: No    History of drug/alcohol abuse denies     Social Determinants of Health     Financial Resource Strain: Not on file   Food Insecurity: Not on file Transportation Needs: Not on file   Physical Activity: Not on file   Stress: Not on file   Social Connections: Not on file   Intimate Partner Violence: Not on file   Housing Stability: Not on file       Current Outpatient Medications:     amoxicillin-clavulanate (AUGMENTIN) 875-125 mg per tablet, Take 1 tablet by mouth every 12 (twelve) hours for 7 days, Disp: 14 tablet, Rfl: 0    buPROPion (Wellbutrin SR) 150 mg 12 hr tablet, Take 1 tablet (150 mg total) by mouth in the morning, Disp: 90 tablet, Rfl: 2    buPROPion (WELLBUTRIN XL) 300 mg 24 hr tablet, Take 1 tablet (300 mg total) by mouth daily, Disp: 90 tablet, Rfl: 2    cholecalciferol (VITAMIN D3) 1,000 units tablet, Take 1,000 Units by mouth daily, Disp: , Rfl:     Clocortolone Pivalate 0 1 % cream, Apply topically if needed, Disp: , Rfl:     hydrochlorothiazide (HYDRODIURIL) 12 5 mg tablet, Take 12 5 mg by mouth daily, Disp: , Rfl:     losartan (COZAAR) 100 MG tablet, Take 1 tablet (100 mg total) by mouth daily, Disp: 90 tablet, Rfl: 2    omeprazole (PriLOSEC) 40 MG capsule, Take 40 mg by mouth daily Take before a meal, Disp: , Rfl:     ondansetron (Zofran ODT) 4 mg disintegrating tablet, Take 1 tablet (4 mg total) by mouth every 6 (six) hours as needed for nausea or vomiting, Disp: 12 tablet, Rfl: 0    QUEtiapine (SEROquel) 100 mg tablet, Take 100 mg by mouth daily at bedtime, Disp: , Rfl:     sertraline (ZOLOFT) 100 mg tablet, Take 2 tablets (200 mg total) by mouth daily, Disp: 60 tablet, Rfl: 2    SF 5000 Plus 1 1 % CREA, , Disp: , Rfl:     Review of Systems   Constitutional: Negative for appetite change, chills, fatigue, fever and unexpected weight change  Respiratory: Negative for cough, shortness of breath and wheezing  Cardiovascular: Negative for chest pain, palpitations and leg swelling  Gastrointestinal: Negative for abdominal pain, blood in stool, constipation, diarrhea, nausea and vomiting     Genitourinary: Negative for difficulty urinating, dysuria, flank pain, frequency, hematuria, pelvic pain and urgency  Skin: Positive for color change and wound  Neurological: Negative for dizziness, syncope, weakness, numbness and headaches  Hematological: Negative for adenopathy  Psychiatric/Behavioral: Negative for dysphoric mood and sleep disturbance  The patient is not nervous/anxious  Objective:    Vitals:    08/17/22 1032   BP: 118/74   Pulse: 83   Resp: 16   Weight: 97 1 kg (214 lb)   Height: 5' 6" (1 676 m)        Physical Exam  Constitutional:       General: She is not in acute distress  Appearance: She is well-developed  Neck:      Thyroid: No thyromegaly  Cardiovascular:      Rate and Rhythm: Normal rate and regular rhythm  Heart sounds: Normal heart sounds  No murmur heard  Pulmonary:      Effort: Pulmonary effort is normal  No respiratory distress  Breath sounds: No wheezing, rhonchi or rales  Abdominal:      General: There is no distension  Palpations: Abdomen is soft  There is no mass  Tenderness: There is no abdominal tenderness  Musculoskeletal:      Cervical back: Neck supple  Right lower leg: No edema  Left lower leg: No edema  Lymphadenopathy:      Cervical: No cervical adenopathy  Skin:     Comments: Right forearm/ hand with good ROM, several healing lacerations and puncture wounds without significant erythema or discharge  Left upper extremity with tenderness and erythema along superficial vein in antecubital fossa at recent IV site   Neurological:      Mental Status: She is alert and oriented to person, place, and time  Psychiatric:         Mood and Affect: Mood normal          Behavior: Behavior normal          Thought Content:  Thought content normal

## 2022-08-22 ENCOUNTER — OFFICE VISIT (OUTPATIENT)
Dept: OBGYN CLINIC | Facility: CLINIC | Age: 57
End: 2022-08-22
Payer: COMMERCIAL

## 2022-08-22 VITALS
HEIGHT: 66 IN | WEIGHT: 214 LBS | DIASTOLIC BLOOD PRESSURE: 64 MMHG | SYSTOLIC BLOOD PRESSURE: 110 MMHG | BODY MASS INDEX: 34.39 KG/M2
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
## 2022-08-22 DIAGNOSIS — L03.011 CELLULITIS OF RIGHT THUMB: ICD-10-CM

## 2022-08-22 PROCEDURE — 99213 OFFICE O/P EST LOW 20 MIN: CPT | Performed by: ORTHOPAEDIC SURGERY

## 2022-08-22 NOTE — PROGRESS NOTES
Assessment:    Right thumb cellulitis s/p dog bite 2 weeks ago  Treated with IV antibiotics and maceration in the hospital  Finished oral Augmention  Doing very well overall      Plan:    Plan to continue OT/Hand therapy for ROM, strengthening, and edema control  No further oral antibiotics necessary at this point  For the forearm laceration,  cleanse with soap/water and pat dry  Apply thin layer of bacitracin, this will likely heal   Can follow-up on an as-needed basis   Let us know if any issues  Problem List Items Addressed This Visit        Other    Cellulitis of right thumb    Dog bite - Primary                   Subjective:     HPI    Patient ID:  Rachel Baker is a 62 y o  female presenting for hospital follow-up  She sustained a dog bite about two weeks ago with subsequent right thumb cellulitis for which she was admitted to the hospital treated with IV antibiotics and maturation dressing  Today, she states overall she is doing well and notes significant improvement in the swelling in her right hand specifically her right thumb  She states it does still feel stiff and she has yet to start occupational therapy  She does note a forearm laceration from the dog bite opened up slightly over the weekend she bumped it on something  Noticed any pus drainage  No fever or chills  The following portions of the patient's history were reviewed and updated as appropriate: allergies, current medications, past family history, past medical history, past social history, past surgical history and problem list     Review of Systems     Objective:    Imaging:  No new imaging performed for today's visit       Physical Exam   Vitals:    08/22/22 1008   BP: 110/64       Orthopedic Examination:  Right hand     Inspection:  There is mild generalized swelling of the right hand compared to the contralateral side without open wound or erythema  Previous puncture sites are healed    Small subungual hematoma is resolving (thumb)  The small laceration mid forearm region demonstrates no active drainage with mild surrounding erythema  Palpation:  No abnormal warmth of the skin  Range-of-motion:  Able to flex extend all fingers    Strength:  Gross strength is 5/5 finger/flexion    Sensation:  Intact median radial ulnar nerve distribution    Special Tests:  Palpable radial pulse   Good cap refill at all fingertips      Scribe Attestation    I,:  James Escobar PA-C am acting as a scribe while in the presence of the attending physician :       I,:  Michelle Arriaza MD personally performed the services described in this documentation    as scribed in my presence :

## 2022-08-23 ENCOUNTER — EVALUATION (OUTPATIENT)
Dept: PHYSICAL THERAPY | Facility: CLINIC | Age: 57
End: 2022-08-23
Payer: COMMERCIAL

## 2022-08-23 DIAGNOSIS — R32 URINARY INCONTINENCE IN FEMALE: Primary | ICD-10-CM

## 2022-08-23 PROCEDURE — 97112 NEUROMUSCULAR REEDUCATION: CPT | Performed by: PHYSICAL THERAPIST

## 2022-08-23 PROCEDURE — 97162 PT EVAL MOD COMPLEX 30 MIN: CPT | Performed by: PHYSICAL THERAPIST
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
## 2022-08-29 ENCOUNTER — EVALUATION (OUTPATIENT)
Dept: OCCUPATIONAL THERAPY | Facility: CLINIC | Age: 57
End: 2022-08-29
Payer: COMMERCIAL
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
## 2022-08-29 DIAGNOSIS — L03.113 CELLULITIS OF RIGHT HAND: ICD-10-CM

## 2022-08-29 DIAGNOSIS — M79.641 RIGHT HAND PAIN: ICD-10-CM

## 2022-08-29 DIAGNOSIS — R60.0 EDEMA OF HAND: ICD-10-CM

## 2022-08-29 PROCEDURE — 97140 MANUAL THERAPY 1/> REGIONS: CPT | Performed by: OCCUPATIONAL THERAPIST

## 2022-08-29 PROCEDURE — 97165 OT EVAL LOW COMPLEX 30 MIN: CPT | Performed by: OCCUPATIONAL THERAPIST

## 2022-08-29 PROCEDURE — 97110 THERAPEUTIC EXERCISES: CPT | Performed by: OCCUPATIONAL THERAPIST

## 2022-08-29 NOTE — PROGRESS NOTES
Assessment/Plan:  Calcium 1200-1500mg + 600-1000 IU Vit D daily  Exercise  including weight bearing exercises  Pap with high risk HPV Q 5 years  Annual mammogram and monthly breast self exam recommended  Colonoscopy- UP to date  Continue Pelvic PT        Diagnoses and all orders for this visit:    Encounter for gynecological examination without abnormal finding    Encounter for screening mammogram for malignant neoplasm of breast  -     Mammo screening bilateral w 3d & cad; Future    Mixed stress and urge urinary incontinence  Comments:  failed meds Undergoing pelvic PT nsaw urogyn    Cutaneous candidiasis  -     clotrimazole-betamethasone (LOTRISONE) 1-0 05 % cream; Apply topically 2 (two) times a day          Subjective:      Patient ID: Cecil Jones is a 62 y o  female  Here for annual gyn [place] 1/2020  PAP neg/neg HPV  NSA x 10 years  G0 Pain right lateral breast Was in a dog fight last week  She has 3 and one is a newer rescue mary was bitten in her right arm and on right side beneath her breast also had bite on hand which required antibiotics  She is very upset and traumatized by this interaction, She also has poison IVY on arms and face  H/o mixed UI  Jabari Goody Get up to pee in middle of night and is incont Failed meds just starting pelvic PT  Had elevated ALT followed w/ PCP all ok since then  iron deficiency anemia On Iron supplement  through PCP   Denies  vaginal bleeding  Bowels normal  Colonoscopy UTD 3 years in Sept normal 5 year repeat  H/o spinal stenosis and herniated disk  When back hurts she cannot control her urine  ?  Nerve involvement  PT  2 epidurals   Works at [employer]      The following portions of the patient's history were reviewed and updated as appropriate: allergies, current medications, past family history, past medical history, past social history, past surgical history and problem list     Review of Systems   Constitutional: Negative for chills, fatigue, fever and unexpected weight change  Gastrointestinal: Negative for abdominal distention, abdominal pain, anal bleeding, constipation, diarrhea, nausea and vomiting  Genitourinary: Positive for frequency  Negative for difficulty urinating, dyspareunia, dysuria, genital sores, menstrual problem, pelvic pain, urgency, vaginal bleeding, vaginal discharge and vaginal pain  Mixed urinary incont   Musculoskeletal: Positive for back pain  Neurological: Negative for headaches  Psychiatric/Behavioral: Negative  Negative for dysphoric mood  The patient is not nervous/anxious  Objective:      Ht 5' 5" (1 651 m)   Wt 100 kg (221 lb)   BMI 36 78 kg/m²          Physical Exam  Vitals and nursing note reviewed  Constitutional:       General: She is not in acute distress  Appearance: Normal appearance  HENT:      Head: Normocephalic and atraumatic  Pulmonary:      Effort: Pulmonary effort is normal    Chest:   Breasts: Breasts are symmetrical       Right: Normal  No mass, nipple discharge, skin change, tenderness or axillary adenopathy  Left: Normal  No mass, nipple discharge, skin change, tenderness or axillary adenopathy  Abdominal:      General: There is no distension  Palpations: Abdomen is soft  Tenderness: There is no abdominal tenderness  There is no guarding or rebound  Genitourinary:     General: Normal vulva  Exam position: Lithotomy position  Labia:         Right: No rash, tenderness, lesion or injury  Left: No rash, tenderness, lesion or injury  Urethra: No prolapse, urethral pain, urethral swelling or urethral lesion  Vagina: Normal  No erythema or lesions  Cervix: No cervical motion tenderness, discharge, lesion or cervical bleeding  Uterus: Normal        Adnexa: Right adnexa normal and left adnexa normal         Right: No mass or tenderness  Left: No mass or tenderness          Rectum: No mass or external hemorrhoid  Musculoskeletal:         General: Normal range of motion  Lymphadenopathy:      Upper Body:      Right upper body: No axillary adenopathy  Left upper body: No axillary adenopathy  Lower Body: No right inguinal adenopathy  No left inguinal adenopathy  Skin:     General: Skin is warm and dry  Neurological:      Mental Status: She is alert and oriented to person, place, and time  Psychiatric:         Mood and Affect: Mood normal          Behavior: Behavior normal          Thought Content:  Thought content normal          Judgment: Judgment normal

## 2022-08-29 NOTE — PROGRESS NOTES
OT Evaluation     Today's date: 2022  Patient name: John Acevedo  : 1965  MRN: [id number]  Referring provider: MIRYAM Marsh*  Dx:   Encounter Diagnosis     ICD-10-CM    1  Dog bite, subsequent encounter  W54  0XXD    2  Right hand pain  M79 641 Ambulatory Referral to Occupational Therapy   3  Cellulitis of right hand  L03 113 Ambulatory Referral to Occupational Therapy   4  Edema of hand  R60 0 Ambulatory Referral to Occupational Therapy                  Assessment  Assessment details: Rhea Shepherd presents s/p dog bite that left several puncture wounds on her R hand   She has significant pain complaints  That are sensory in nature  She has impaired 2 point in the thumb , index , and middle fingers   She has full ROM   She has mild edema   She has difficulty with gripping , lifting , bottles , jars   She works at [employer] , she types most of the day   She lives with her brother     Impairments: activity intolerance, impaired physical strength, lacks appropriate home exercise program and pain with function  Functional limitations: limited gripping, bottles , jars   Symptom irritability: moderateUnderstanding of Dx/Px/POC: good   Prognosis: good    Goals  STG - 1 wk  1- I with HEP  2- improve worst pain to 6/10    LTG- 4 wks  1- I with ADL- bottles , jars , forceful gripping   2- improve worst  Pain 3/10  3- improve  = to  L      Plan  Patient would benefit from: OT eval and skilled occupational therapy  Planned modality interventions: thermotherapy: hydrocollator packs and cryotherapy  Planned therapy interventions: manual therapy, joint mobilization, stretching, strengthening, home exercise program, fine motor coordination training, flexibility, therapeutic activities, therapeutic exercise, coordination, ADL retraining and graded exercise  Frequency: 2x week  Duration in weeks: 4  Plan of Care beginning date: 2022  Treatment plan discussed with: patient        Subjective Evaluation    History of Present Illness  Date of onset: 2022  Mechanism of injury: Reji Stephenson suffered a dog bite  dogs that were fighting   She was treated with IV anti biotics as an inpatient   Quality of life: fair    Pain  Current pain ratin  At best pain rating: 3  At worst pain rating: 10  Location: R hand   Quality: needle-like, radiating and dull ache  Relieving factors: rest  Progression: improved    Social Support  Steps to enter house: yes  Stairs in house: yes   Lives in: multiple-level home  Lives with: brother  Employment status: working  Hand dominance: right    Patient Goals  Patient goals for therapy: decreased edema, decreased pain, increased strength and independence with ADLs/IADLs          Objective     Observations     Additional Observation Details  Puncture scars at R  [facility] x 3 , R thumb x 3 and ulnar forearm     Palpation     Additional Palpation Details  Tender palpation scar , flexors at the wrist     Neurological Testing     Additional Neurological Details  Decreased 2 point T/I/L     Active Range of Motion     Right Wrist   Normal active range of motion    Right Thumb   Opposition: ROM WNL     Additional Active Range of Motion Details  Digit ROM WNL     Strength/Myotome Testing     Left Wrist/Hand      (2nd hand position)     Trial 1: 42    Thumb Strength  Key/Lateral Pinch     Trial 1: 12  Palmar/Three-Point Pinch     Trial 1: 9    Right Wrist/Hand      (2nd hand position)     Trial 1: 20    Thumb Strength   Key/Lateral Pinch     Trial 1: 11  Palmar/Three-Point Pinch     Trial 1: 6    Tests     Right Wrist/Hand   Negative extrinsic extensor tightness, extrinsic flexor tightness, intrinsic muscle tightness and Tinel's sign (medial nerve)       Swelling     Left Wrist/Hand   Circumference MCP: 18 8 cm  Circumference wrist: 16 3 cm    Right Wrist/Hand   Circumference MCP: 19 2 cm  Circumference wrist: 16 cm             Precautions: latex      Manuals  monika  4m            Scar STM 4m            Retrograde  4m                         HEP - ROM , massage , desens 5xday             desens towel  4m                                                                                          Ther Ex             TGE  10x            Wrist  Stretches  2m            AROM thumb  2m                                                                             Ther Activity             Pegs   40x                         Gait Training                                       Modalities               8m            CP  5m

## 2022-08-30 ENCOUNTER — ANNUAL EXAM (OUTPATIENT)
Dept: OBGYN CLINIC | Facility: CLINIC | Age: 57
End: 2022-08-30
Payer: COMMERCIAL

## 2022-08-30 ENCOUNTER — OFFICE VISIT (OUTPATIENT)
Dept: PHYSICAL THERAPY | Facility: CLINIC | Age: 57
End: 2022-08-30
Payer: COMMERCIAL

## 2022-08-30 VITALS — BODY MASS INDEX: 36.82 KG/M2 | HEIGHT: 65 IN | WEIGHT: 221 LBS

## 2022-08-30 DIAGNOSIS — R32 URINARY INCONTINENCE IN FEMALE: Primary | ICD-10-CM

## 2022-08-30 DIAGNOSIS — B37.2 CUTANEOUS CANDIDIASIS: ICD-10-CM

## 2022-08-30 DIAGNOSIS — Z12.31 ENCOUNTER FOR SCREENING MAMMOGRAM FOR MALIGNANT NEOPLASM OF BREAST: ICD-10-CM

## 2022-08-30 DIAGNOSIS — N39.46 MIXED STRESS AND URGE URINARY INCONTINENCE: ICD-10-CM

## 2022-08-30 DIAGNOSIS — Z01.419 ENCOUNTER FOR GYNECOLOGICAL EXAMINATION WITHOUT ABNORMAL FINDING: Primary | ICD-10-CM

## 2022-08-30 PROCEDURE — S0612 ANNUAL GYNECOLOGICAL EXAMINA: HCPCS | Performed by: NURSE PRACTITIONER

## 2022-08-30 PROCEDURE — 97110 THERAPEUTIC EXERCISES: CPT | Performed by: PHYSICAL THERAPIST

## 2022-08-30 PROCEDURE — 97140 MANUAL THERAPY 1/> REGIONS: CPT | Performed by: PHYSICAL THERAPIST

## 2022-08-30 PROCEDURE — 97530 THERAPEUTIC ACTIVITIES: CPT | Performed by: PHYSICAL THERAPIST

## 2022-08-30 RX ORDER — CLOTRIMAZOLE AND BETAMETHASONE DIPROPIONATE 10; .64 MG/G; MG/G
CREAM TOPICAL 2 TIMES DAILY
Qty: 30 G | Refills: 2 | Status: SHIPPED | OUTPATIENT
Start: 2022-08-30

## 2022-08-30 NOTE — PROGRESS NOTES
Daily Note     Today's date: 2022  Patient name: Mahad Found  : 1965  MRN: [id number]  Referring provider: Ailin Larsen MD  Dx:   Encounter Diagnosis     ICD-10-CM    1  Urinary incontinence in female  R32                   Subjective: No new changes since IE  Objective: See treatment diary below      Assessment: Tolerated treatment well  Patient demonstrated fatigue post treatment and would benefit from continued PT for further PFM strengthening in sitting position against gravity  Pt was educated on importance of abdominal breathing and PF contraction on exhale  HEP was given and reviewed  Also, pt was also educated on importance of timed voiding with ADL's at home/work  Plan: Continue per plan of care  Progress treatment as tolerated         Precautions: latex      Manuals             graston  4m            Scar STM 4m            Retrograde  4m                         HEP - ROM , massage , desens 5xday             desens towel  4m                                                                                          Ther Ex             TGE  10x            Wrist  Stretches  2m            AROM thumb  2m                                                                             Ther Activity             Pegs   40x                         Gait Training                                       Modalities             MH  8m            CP  5m                   Manuals                    PF MMT 3/5                                                                                             Neuro Re-Ed                                                PT edu/anatomy/benefits 5'                     Bladder diary edu/benefits/purpose 5'  5'                   Timed voiding - edu/purpose/benefits    5' (every 45-60 min during the day)                    Rec bike/posture/PF    L1 10'                                                                   Ther Ex                        sitting PF/TA    5x5" VC's                    sitting PF/TA/AD's t-ball    5x5" VC's                    sitting PF/TA/AB's TB use    5x5" VC's                    sitting PF/TA/ alt LE TKE    5x5" VC's ea LE                                                                                                                   Ther Activity                        HEP edu/review    5'                    Abdominal breathing in sitting/edu/purpose    5'                   Gait Training                                                                       Modalities

## 2022-08-31 ENCOUNTER — OFFICE VISIT (OUTPATIENT)
Dept: URGENT CARE | Facility: CLINIC | Age: 57
End: 2022-08-31
Payer: COMMERCIAL

## 2022-08-31 VITALS
HEART RATE: 83 BPM | RESPIRATION RATE: 20 BRPM | WEIGHT: 222 LBS | HEIGHT: 66 IN | DIASTOLIC BLOOD PRESSURE: 60 MMHG | BODY MASS INDEX: 35.68 KG/M2 | OXYGEN SATURATION: 96 % | TEMPERATURE: 98 F | SYSTOLIC BLOOD PRESSURE: 126 MMHG

## 2022-08-31 DIAGNOSIS — L23.7 ALLERGIC CONTACT DERMATITIS DUE TO PLANTS, EXCEPT FOOD: Primary | ICD-10-CM

## 2022-08-31 PROCEDURE — 99213 OFFICE O/P EST LOW 20 MIN: CPT | Performed by: PHYSICIAN ASSISTANT

## 2022-08-31 RX ORDER — PREDNISONE 10 MG/1
TABLET ORAL
Qty: 30 TABLET | Refills: 0 | Status: SHIPPED | OUTPATIENT
Start: 2022-08-31 | End: 2022-09-27

## 2022-08-31 NOTE — PROGRESS NOTES
[facility]        NAME: Cara Wills is a 62 y o  female  : 1965    MRN: [id number]  DATE: 2022  TIME: 9:27 AM    /60   Pulse 83   Temp 98 °F (36 7 °C)   Resp 20   Ht 5' 6" (1 676 m)   Wt 101 kg (222 lb)   SpO2 96%   BMI 35 83 kg/m²     Assessment and Plan   Allergic contact dermatitis due to plants, except food [L23 7]  1  Allergic contact dermatitis due to plants, except food  predniSONE 10 mg tablet         Patient Instructions       Follow up with PCP in 3-5 days  Proceed to  ER if symptoms worsen  Chief Complaint     Chief Complaint   Patient presents with    Rash     Rash to face, BL arms, and under left breast, itchy in nature x 2 days, was weeding over the weekend  History of Present Illness       Pt with facial , neck, arm rash for several days       Review of Systems   Review of Systems   Constitutional: Negative  HENT: Negative  Eyes: Negative  Respiratory: Negative  Cardiovascular: Negative  Gastrointestinal: Negative  Endocrine: Negative  Genitourinary: Negative  Musculoskeletal: Negative  Skin: Positive for rash  Allergic/Immunologic: Negative  Neurological: Negative  Hematological: Negative  Psychiatric/Behavioral: Negative  All other systems reviewed and are negative          Current Medications       Current Outpatient Medications:     buPROPion (Wellbutrin SR) 150 mg 12 hr tablet, Take 1 tablet (150 mg total) by mouth in the morning, Disp: 90 tablet, Rfl: 2    buPROPion (WELLBUTRIN XL) 300 mg 24 hr tablet, Take 1 tablet (300 mg total) by mouth daily, Disp: 90 tablet, Rfl: 2    cholecalciferol (VITAMIN D3) 1,000 units tablet, Take 1,000 Units by mouth daily, Disp: , Rfl:     Clocortolone Pivalate 0 1 % cream, Apply topically if needed, Disp: , Rfl:     clotrimazole-betamethasone (LOTRISONE) 1-0 05 % cream, Apply topically 2 (two) times a day, Disp: 30 g, Rfl: 2    hydrochlorothiazide (HYDRODIURIL) 12 5 mg tablet, Take 12 5 mg by mouth daily, Disp: , Rfl:     losartan (COZAAR) 100 MG tablet, Take 1 tablet (100 mg total) by mouth daily, Disp: 90 tablet, Rfl: 2    omeprazole (PriLOSEC) 40 MG capsule, Take 40 mg by mouth daily Take before a meal, Disp: , Rfl:     ondansetron (Zofran ODT) 4 mg disintegrating tablet, Take 1 tablet (4 mg total) by mouth every 6 (six) hours as needed for nausea or vomiting, Disp: 12 tablet, Rfl: 0    predniSONE 10 mg tablet, 5 tabs po qd x 2 days then 4 tabs po qd x 2 days then 3 tabs po qd x 2 days then 2 tabs po qd x 2 days then 1 tab po qd x 2 days, Disp: 30 tablet, Rfl: 0    QUEtiapine (SEROquel) 100 mg tablet, Take 100 mg by mouth daily at bedtime, Disp: , Rfl:     sertraline (ZOLOFT) 100 mg tablet, Take 2 tablets (200 mg total) by mouth daily, Disp: 60 tablet, Rfl: 2    SF 5000 Plus 1 1 % CREA, , Disp: , Rfl:     naproxen (Naprosyn) 500 mg tablet, Take 1 tablet (500 mg total) by mouth 2 (two) times a day with meals (Patient not taking: Reported on 8/31/2022), Disp: 20 tablet, Rfl: 0    Current Allergies     Allergies as of 08/31/2022 - Reviewed 08/31/2022   Allergen Reaction Noted    Latex Rash 06/14/2021            The following portions of the patient's history were reviewed and updated as appropriate: allergies, current medications, past family history, past medical history, past social history, past surgical history and problem list      Past Medical History:   Diagnosis Date    ADHD (attention deficit hyperactivity disorder)     Anxiety     Arthritis     Depression     Fibromyalgia, primary     GERD (gastroesophageal reflux disease)     History of stomach ulcers     Hypertension     Panic attack     Papanicolaou smear 2020    Psychiatric disorder        Past Surgical History:   Procedure Laterality Date    HYSTEROSCOPY  2015    MAMMO (HISTORICAL)  2020       Family History   Problem Relation Age of Onset    Hypertension Mother     Lung cancer Mother  Skin cancer Father          Medications have been verified  Objective   /60   Pulse 83   Temp 98 °F (36 7 °C)   Resp 20   Ht 5' 6" (1 676 m)   Wt 101 kg (222 lb)   SpO2 96%   BMI 35 83 kg/m²        Physical Exam     Physical Exam  Vitals and nursing note reviewed  Constitutional:       Appearance: Normal appearance  She is normal weight  HENT:      Head: Normocephalic and atraumatic  Right Ear: Tympanic membrane, ear canal and external ear normal       Left Ear: Tympanic membrane, ear canal and external ear normal       Nose: Nose normal       Mouth/Throat:      Mouth: Mucous membranes are moist       Pharynx: Oropharynx is clear  Eyes:      Extraocular Movements: Extraocular movements intact  Conjunctiva/sclera: Conjunctivae normal       Pupils: Pupils are equal, round, and reactive to light  Cardiovascular:      Rate and Rhythm: Normal rate and regular rhythm  Pulses: Normal pulses  Heart sounds: Normal heart sounds  Pulmonary:      Effort: Pulmonary effort is normal       Breath sounds: Normal breath sounds  Abdominal:      General: Abdomen is flat  Bowel sounds are normal       Palpations: Abdomen is soft  Musculoskeletal:         General: Normal range of motion  Cervical back: Normal range of motion and neck supple  Skin:     General: Skin is warm  Capillary Refill: Capillary refill takes less than 2 seconds  Comments: Poison ivy to forehead and cheeks bilat ,neck, arms    Neurological:      General: No focal deficit present  Mental Status: She is alert and oriented to person, place, and time     Psychiatric:         Mood and Affect: Mood normal          Behavior: Behavior normal

## 2022-09-06 NOTE — PATIENT INSTRUCTIONS
Calcium 1200-1500mg + 600-1000 IU Vit D daily  Exercise  including weight bearing exercises  Pap with high risk HPV Q 5 years  Annual mammogram and monthly breast self exam recommended  Colonoscopy- UP to date   Continue Pelvic PT

## 2022-09-08 ENCOUNTER — OFFICE VISIT (OUTPATIENT)
Dept: OCCUPATIONAL THERAPY | Facility: CLINIC | Age: 57
End: 2022-09-08
Payer: COMMERCIAL

## 2022-09-08 DIAGNOSIS — M79.641 RIGHT HAND PAIN: ICD-10-CM

## 2022-09-08 DIAGNOSIS — L03.113 CELLULITIS OF RIGHT HAND: Primary | ICD-10-CM

## 2022-09-08 PROCEDURE — 97140 MANUAL THERAPY 1/> REGIONS: CPT | Performed by: OCCUPATIONAL THERAPIST

## 2022-09-08 PROCEDURE — 97110 THERAPEUTIC EXERCISES: CPT | Performed by: OCCUPATIONAL THERAPIST

## 2022-09-08 NOTE — PROGRESS NOTES
Daily Note     Today's date: 2022  Patient name: Candelaria Gomez  : 1965  MRN: [id number]  Referring provider: MIRYAM Boss*  Dx:   Encounter Diagnosis     ICD-10-CM    1  Cellulitis of right hand  L03 113    2  Right hand pain  M79 641                   Subjective: I am doing better , the  Scar on my index       Objective: See treatment diary below      Assessment: Tolerated treatment well  Patient has improved tiffanie to activity       Plan: Continue per plan of care        Precautions: latex      Manuals            graston  4m 4m           Scar STM 4m 4m           Retrograde  4m 4m                        HEP - ROM , massage , desens 5xday             desens towel  4m                                                                                          Ther Ex             TGE  10x 10x           Wrist  Stretches  2m 2m           AROM thumb  2m 2m           flexgrip ext   3x10  Yellow             Wrist e/f   2#  3x10           powerweb    Green  3x10                                     Ther Activity             Pegs   40x            Grooved pegs   40x           Gait Training                                       Modalities             MH  8m 8m           CP  5m 5m

## 2022-09-09 ENCOUNTER — OFFICE VISIT (OUTPATIENT)
Dept: PHYSICAL THERAPY | Facility: CLINIC | Age: 57
End: 2022-09-09
Payer: COMMERCIAL

## 2022-09-09 DIAGNOSIS — R32 URINARY INCONTINENCE IN FEMALE: Primary | ICD-10-CM

## 2022-09-09 PROCEDURE — 97530 THERAPEUTIC ACTIVITIES: CPT | Performed by: PHYSICAL THERAPIST

## 2022-09-09 PROCEDURE — 97112 NEUROMUSCULAR REEDUCATION: CPT | Performed by: PHYSICAL THERAPIST

## 2022-09-09 PROCEDURE — 97110 THERAPEUTIC EXERCISES: CPT | Performed by: PHYSICAL THERAPIST

## 2022-09-09 NOTE — PROGRESS NOTES
Daily Note     Today's date: 2022  Patient name: Lo Mc  : 1965  MRN: [id number]  Referring provider: Patrick Bond, PT  Dx:   Encounter Diagnosis     ICD-10-CM    1  Urinary incontinence in female  R32                   Subjective: Pt reports slight improvement in UI management and increase time between voiding noted, less urgency verbalized  As per pt, she was not able to do her HEP every day due to vacation, traveling out of town  Objective: See treatment diary below      Assessment: Tolerated treatment well  Patient demonstrated fatigue post treatment and would benefit from continued PT for further PFM strengthening and review of timed voiding strategies  Pt's HEP was updated with PF self stretching techniques  Pt was educated on importance of daily HEP  Plan: Continue per plan of care  Progress treatment as tolerated         Precautions: latex      Manuals            graston  4m 4m           Scar STM 4m 4m           Retrograde  4m 4m                        HEP - ROM , massage , desens 5xday             desens towel  4m                                                                                          Ther Ex             TGE  10x 10x           Wrist  Stretches  2m 2m           AROM thumb  2m 2m           flexgrip ext   3x10  Yellow             Wrist e/f   2#  3x10           powerweb    Green  3x10                                     Ther Activity             Pegs   40x            Grooved pegs   40x           Gait Training                                       Modalities             MH  8m 8m           CP  5m 5m             Manuals                  PF MMT 3/5                                                                                             Neuro Re-Ed                                                PT edu/anatomy/benefits 5'                     Bladder diary edu/benefits/purpose 5'  5'                   Timed voiding - edu/purpose/benefits    5' (every 45-60 min during the day)                    Rec bike/posture/PF    L1 10'  L2 10'                                                                 Ther Ex                        sitting PF/TA    5x5" VC's  5x5" VC's                  sitting PF/TA/AD's t-ball    5x5" VC's  5x5" VC's                  sitting PF/TA/AB's TB use    5x5" VC's  5x5" VC's                  sitting PF/TA/ alt LE TKE    5x5" VC's ea LE  5x5" VC's ea LE                  butterfly stretch      3x30"                  child-pose stretch      3x30"                                                                 Ther Activity                        HEP edu/review    5'  5'                  Abdominal breathing in sitting/edu/purpose    5'  3' review/VC's                 Gait Training                                                                       Modalities

## 2022-09-13 ENCOUNTER — APPOINTMENT (OUTPATIENT)
Dept: PHYSICAL THERAPY | Facility: CLINIC | Age: 57
End: 2022-09-13
Payer: COMMERCIAL

## 2022-09-15 ENCOUNTER — OFFICE VISIT (OUTPATIENT)
Dept: OCCUPATIONAL THERAPY | Facility: CLINIC | Age: 57
End: 2022-09-15
Payer: COMMERCIAL

## 2022-09-15 DIAGNOSIS — L03.113 CELLULITIS OF RIGHT HAND: Primary | ICD-10-CM

## 2022-09-15 PROCEDURE — 97140 MANUAL THERAPY 1/> REGIONS: CPT | Performed by: OCCUPATIONAL THERAPIST

## 2022-09-15 PROCEDURE — 97110 THERAPEUTIC EXERCISES: CPT | Performed by: OCCUPATIONAL THERAPIST

## 2022-09-15 NOTE — PROGRESS NOTES
Daily Note     Today's date: 9/15/2022  Patient name: Ketan Caal  : 1965  MRN: [id number]  Referring provider: MIRYAM Winston*  Dx:   Encounter Diagnosis     ICD-10-CM    1  Cellulitis of right hand  L03  113                   Subjective: feels stiff      Objective: See treatment diary below      Assessment: Tolerated treatment well  Patient has full ROM  Scars tender  Plan: Continue per plan of care        Precautions: latex      Manuals 8/29 9/8 9/15          graston  4m 4m 4m          Scar STM 4m 4m 4m          Retrograde  4m 4m 4m          KT tape scars   apply          HEP - ROM , massage , desens 5xday             desens towel  4m                                                                                          Ther Ex             TGE  10x 10x 10x          Wrist  Stretches  2m 2m 2m          AROM thumb  2m 2m           flexgrip ext   3x10  Yellow   Yellow  3x10          Wrist e/f   2#  3x10 2#  3x10          powerweb    Green  3x10 Blue  3x10                                    Ther Activity             Pegs   40x            Grooved pegs   40x           Gait Training             Pinch pins    Green  3x10          flexbar p/S   Red  3x10          Modalities             MH  8m 8m 8m          CP  5m 5m             Manuals                  PF MMT 3                                                                                             Neuro Re-Ed                                               WH PT edu/anatomy/benefits 5'                     Bladder diary edu/benefits/purpose 5'  5'                   Timed voiding - edu/purpose/benefits    5' (every 45-60 min during the day)                    Rec bike/posture/PF    L1 10'  L2 10'                                                                 Ther Ex                        sitting PF/TA    5x5" VC's  5x5" VC's                  sitting PF/TA/AD's t-ball    5x5" VC's  5x5" VC's                  sitting PF/TA/AB's TB use    5x5" VC's  5x5" VC's                  sitting PF/TA/ alt LE TKE    5x5" VC's ea LE  5x5" VC's ea LE                  butterfly stretch      3x30"                  child-pose stretch      3x30"                                                                 Ther Activity                        HEP edu/review    5'  5'                  Abdominal breathing in sitting/edu/purpose    5'  3' review/VC's                 Gait Training                                                                       Modalities

## 2022-09-20 ENCOUNTER — APPOINTMENT (OUTPATIENT)
Dept: PHYSICAL THERAPY | Facility: CLINIC | Age: 57
End: 2022-09-20
Payer: COMMERCIAL

## 2022-09-22 ENCOUNTER — OFFICE VISIT (OUTPATIENT)
Dept: OCCUPATIONAL THERAPY | Facility: CLINIC | Age: 57
End: 2022-09-22
Payer: COMMERCIAL

## 2022-09-22 DIAGNOSIS — L03.113 CELLULITIS OF RIGHT HAND: ICD-10-CM

## 2022-09-22 DIAGNOSIS — M79.641 RIGHT HAND PAIN: Primary | ICD-10-CM

## 2022-09-22 PROCEDURE — 97110 THERAPEUTIC EXERCISES: CPT | Performed by: OCCUPATIONAL THERAPIST

## 2022-09-22 PROCEDURE — 97140 MANUAL THERAPY 1/> REGIONS: CPT | Performed by: OCCUPATIONAL THERAPIST

## 2022-09-22 NOTE — PROGRESS NOTES
Daily Note     Today's date: 2022  Patient name: Ketan Caal  : 1965  MRN: [id number]  Referring provider: MIRYAM Winston*  Dx:   Encounter Diagnosis     ICD-10-CM    1  Right hand pain  M79 641    2  Cellulitis of right hand  L03  113                   Subjective: it's doing better, it hurts sometimes       Objective: See treatment diary below      Assessment: Tolerated treatment well  Patient has improved symptoms   2 R/L=32/35    Plan: Continue per plan of care        Precautions: latex      Manuals 8/29 9/8 9/15 9/22         graston  4m 4m 4m 4m         Scar STM 4m 4m 4m 4m         Retrograde  4m 4m 4m 4m         KT tape scars   apply          HEP - ROM , massage , desens 5xday             desens towel  4m                                                                                          Ther Ex             TGE  10x 10x 10x          Wrist  Stretches  2m 2m 2m 2m         AROM thumb  2m 2m           flexgrip ext   3x10  Yellow   Yellow  3x10 Yellow  3x10         Wrist e/f   2#  3x10 2#  3x10 3#  3x10         powerweb    Green  3x10 Blue  3x10 Blue  3x10                                   Ther Activity             Pegs   40x            Grooved pegs   40x           Gait Training             Pinch pins    Green  3x10 Blue  3x10         flexbar p/S   Red  3x10 Red  3x10         Modalities             MH  8m 8m 8m 8m         CP  5m 5m

## 2022-09-27 ENCOUNTER — OFFICE VISIT (OUTPATIENT)
Dept: FAMILY MEDICINE CLINIC | Facility: CLINIC | Age: 57
End: 2022-09-27
Payer: COMMERCIAL

## 2022-09-27 ENCOUNTER — OFFICE VISIT (OUTPATIENT)
Dept: PHYSICAL THERAPY | Facility: CLINIC | Age: 57
End: 2022-09-27
Payer: COMMERCIAL

## 2022-09-27 VITALS
BODY MASS INDEX: 35.63 KG/M2 | SYSTOLIC BLOOD PRESSURE: 124 MMHG | HEART RATE: 74 BPM | TEMPERATURE: 97.9 F | WEIGHT: 221.7 LBS | HEIGHT: 66 IN | DIASTOLIC BLOOD PRESSURE: 80 MMHG | RESPIRATION RATE: 15 BRPM | OXYGEN SATURATION: 98 %

## 2022-09-27 DIAGNOSIS — R32 URINARY INCONTINENCE IN FEMALE: Primary | ICD-10-CM

## 2022-09-27 DIAGNOSIS — I10 PRIMARY HYPERTENSION: ICD-10-CM

## 2022-09-27 DIAGNOSIS — F41.9 ANXIETY: Primary | ICD-10-CM

## 2022-09-27 DIAGNOSIS — R05.9 COUGH: ICD-10-CM

## 2022-09-27 DIAGNOSIS — Z23 NEED FOR INFLUENZA VACCINATION: ICD-10-CM

## 2022-09-27 DIAGNOSIS — L29.9 ITCHING: ICD-10-CM

## 2022-09-27 PROCEDURE — 97140 MANUAL THERAPY 1/> REGIONS: CPT | Performed by: PHYSICAL THERAPIST

## 2022-09-27 PROCEDURE — 97110 THERAPEUTIC EXERCISES: CPT | Performed by: PHYSICAL THERAPIST

## 2022-09-27 PROCEDURE — 97112 NEUROMUSCULAR REEDUCATION: CPT | Performed by: PHYSICAL THERAPIST

## 2022-09-27 PROCEDURE — 99214 OFFICE O/P EST MOD 30 MIN: CPT | Performed by: NURSE PRACTITIONER

## 2022-09-27 PROCEDURE — 90471 IMMUNIZATION ADMIN: CPT

## 2022-09-27 PROCEDURE — 90682 RIV4 VACC RECOMBINANT DNA IM: CPT

## 2022-09-27 RX ORDER — HYDROXYZINE HYDROCHLORIDE 25 MG/1
25 TABLET, FILM COATED ORAL EVERY 6 HOURS PRN
Qty: 30 TABLET | Refills: 0 | Status: SHIPPED | OUTPATIENT
Start: 2022-09-27

## 2022-09-27 RX ORDER — HYDROCHLOROTHIAZIDE 12.5 MG/1
12.5 TABLET ORAL DAILY
Qty: 90 TABLET | Refills: 2 | Status: SHIPPED | OUTPATIENT
Start: 2022-09-27 | End: 2022-09-27

## 2022-09-27 RX ORDER — ALBUTEROL SULFATE 90 UG/1
2 AEROSOL, METERED RESPIRATORY (INHALATION) EVERY 6 HOURS PRN
Qty: 8 G | Refills: 2 | Status: SHIPPED | OUTPATIENT
Start: 2022-09-27

## 2022-09-27 RX ORDER — HYDROCHLOROTHIAZIDE 12.5 MG/1
12.5 TABLET ORAL DAILY
Qty: 90 TABLET | Refills: 2 | Status: SHIPPED | OUTPATIENT
Start: 2022-09-27

## 2022-09-27 NOTE — PROGRESS NOTES
Assessment/Plan:     Diagnoses and all orders for this visit:    Anxiety  -     hydrOXYzine HCL (ATARAX) 25 mg tablet; Take 1 tablet (25 mg total) by mouth every 6 (six) hours as needed for itching or anxiety    Trial PRN Atarax for anxiety attacks  This will also help itching  Medication and s/e reviewed  Pt will provide update over the next several weeks with how medication is working  Social work referral placed to help aid patient in finding psychiatrist  Patient is encouraged to call our office for any questions/concerns, persistent or worsening symptoms  Patient states they understand and agree with treatment plan  Itching    Same as above  Need for influenza vaccination  -     influenza vaccine, quadrivalent, recombinant, PF, 0 5 mL, for patients 18 yr+ (FLUBLOK)    Primary hypertension  -     hydrochlorothiazide (HYDRODIURIL) 12 5 mg tablet; Take 1 tablet (12 5 mg total) by mouth daily    Refills prescribed  Cough  -     albuterol (PROVENTIL HFA,VENTOLIN HFA) 90 mcg/act inhaler; Inhale 2 puffs every 6 (six) hours as needed for wheezing or shortness of breath      Refills prescribed  Pt to provide update over the next several weeks  F/u PRN  Subjective:      Patient ID: Elbert Mcwilliams is a 62 y o  female  Pt presents today for concerns over itching  She notes over the summer she had a mix of poison ivy and heat rash  She also was bit by a dog and notes increasing anxiety  She believes it may be the nerves that are making her itchy  She denies any new medications, foods, soaps or detergents  She denies any rash today  Patient does have hx of major depression and was previously following with Psych  She notes her Psychiatrist went to work for the [place] and the practice was not able to schedule her with another provider  She notes she has not been able to find another psychiatrist since        The following portions of the patient's history were reviewed and updated as appropriate: allergies, current medications, past family history, past medical history, past social history, past surgical history and problem list     Review of Systems    As noted per HPI  Objective:      /80   Pulse 74   Temp 97 9 °F (36 6 °C) (Oral)   Resp 15   Ht 5' 6" (1 676 m)   Wt 101 kg (221 lb 11 2 oz)   SpO2 98%   BMI 35 78 kg/m²          Physical Exam  Constitutional:       Appearance: Normal appearance  Pulmonary:      Effort: Pulmonary effort is normal    Neurological:      Mental Status: She is alert and oriented to person, place, and time  Mental status is at baseline  Psychiatric:         Mood and Affect: Mood is anxious  Behavior: Behavior normal          Thought Content:  Thought content normal          Judgment: Judgment normal       Comments: Patient fidgeting throughout entire visit

## 2022-09-27 NOTE — PROGRESS NOTES
Daily Note     Today's date: 2022  Patient name: Wong Jacobs  : 1965  MRN: [id number]  Referring provider: Rodney Kellogg MD  Dx:   Encounter Diagnosis     ICD-10-CM    1  Urinary incontinence in female  R32                   Subjective: "I was not able to do my exercises at home  My back was bothering me and at the same time I was noticing worsening of leakage "      Objective: See treatment diary below      Assessment: Tolerated treatment well  Patient demonstrated fatigue post treatment and VC's/TC's needed for correct exercise techniques to avoid bearing down with PFM contractions  Pt was also educated on importance of daily HEP to improve PFM activation with ADL's at home/work and prevent UI with dynamic ADL's  Plan: Continue per plan of care  Progress treatment as tolerated         Precautions: latex      Manuals 8/29 9/8 9/15 9/22         graston  4m 4m 4m 4m         Scar STM 4m 4m 4m 4m         Retrograde  4m 4m 4m 4m         KT tape scars   apply          HEP - ROM , massage , desens 5xday             desens towel  4m                                                                                          Ther Ex             TGE  10x 10x 10x          Wrist  Stretches  2m 2m 2m 2m         AROM thumb  2m 2m           flexgrip ext   3x10  Yellow   Yellow  3x10 Yellow  3x10         Wrist e/f   2#  3x10 2#  3x10 3#  3x10         powerweb    Green  3x10 Blue  3x10 Blue  3x10                                   Ther Activity             Pegs   40x            Grooved pegs   40x           Gait Training             Pinch pins    Green  3x10 Blue  3x10         flexbar p/S   Red  3x10 Red  3x10         Modalities             MH  8m 8m 8m 8m         CP  5m 5m             Manuals                PF MMT 3/5      3/5 (bearing down noted)                                                                                       Neuro Re-Ed                                               WH PT edu/anatomy/benefits 5'                     Bladder diary edu/benefits/purpose 5'  5'                   Timed voiding - edu/purpose/benefits    5' (every 45-60 min during the day)                    Rec bike/posture/PF    L1 10'  L2 10'  L3 10'                                                               Ther Ex                        sitting PF/TA    5x5" VC's  5x5" VC's  3x5" review w VC's                sitting PF/TA/AD's t-ball    5x5" VC's  5x5" VC's  home                sitting PF/TA/AB's TB use    5x5" VC's  5x5" VC's  home                sitting PF/TA/ alt LE TKE    5x5" VC's ea LE  5x5" VC's ea LE  3x5" w VC's                 butterfly stretch      3x30"  3x30"                child-pose stretch      3x30"  3x30"               Sit to stand    10x3" w VC's         Standing PF/TA    10x3" w VC's         Standing mini-squats/PF    10x3" w VC's                                                         Ther Activity                        HEP edu/review    5'  5'  5'                Abdominal breathing in sitting/edu/purpose    5'  3' review/VC's  5' review               Gait Training                                                                       Modalities

## 2022-09-28 ENCOUNTER — OFFICE VISIT (OUTPATIENT)
Dept: OCCUPATIONAL THERAPY | Facility: CLINIC | Age: 57
End: 2022-09-28
Payer: COMMERCIAL

## 2022-09-28 ENCOUNTER — PATIENT OUTREACH (OUTPATIENT)
Dept: FAMILY MEDICINE CLINIC | Facility: CLINIC | Age: 57
End: 2022-09-28

## 2022-09-28 DIAGNOSIS — L03.113 CELLULITIS OF RIGHT HAND: ICD-10-CM

## 2022-09-28 DIAGNOSIS — M79.641 RIGHT HAND PAIN: Primary | ICD-10-CM

## 2022-09-28 PROCEDURE — 97110 THERAPEUTIC EXERCISES: CPT | Performed by: OCCUPATIONAL THERAPIST

## 2022-09-28 PROCEDURE — 97140 MANUAL THERAPY 1/> REGIONS: CPT | Performed by: OCCUPATIONAL THERAPIST

## 2022-09-28 NOTE — PROGRESS NOTES
Daily Note     Today's date: 2022  Patient name: Alyssa Boyce  : 1965  MRN: [id number]  Referring provider: MIRYAM Sanchez*  Dx:   Encounter Diagnosis     ICD-10-CM    1  Right hand pain  M79 641    2  Cellulitis of right hand  L03  113                   Subjective: It really hurts from working it a lot  Objective: See treatment diary below      Assessment: Tolerated treatment well  Patient having mild edema in hand today with overuse  Pain in IP joints   2 R/L=32/35    Plan: Continue per plan of care        Precautions: latex      Manuals 8/29 9/8 9/15 9/22 9/28        graston  4m 4m 4m 4m 4m        Scar STM 4m 4m 4m 4m 4m        Retrograde  4m 4m 4m 4m 4m        KT tape scars   apply          HEP - ROM , massage , desens 5xday             desens towel  4m                                                                                          Ther Ex             TGE  10x 10x 10x          Wrist  Stretches  2m 2m 2m 2m 2m        AROM thumb  2m 2m           flexgrip ext   3x10  Yellow   Yellow  3x10 Yellow  3x10 yellow 3x10        Wrist e/f   2#  3x10 2#  3x10 3#  3x10 #3 3x10        powerweb    Green  3x10 Blue  3x10 Blue  3x10 Blue 3x10                                  Ther Activity             Pegs   40x            Grooved pegs   40x           Gait Training             Pinch pins    Green  3x10 Blue  3x10 Blue 3x10        flexbar p/S   Red  3x10 Red  3x10 Red 3x10        Modalities             MH  8m 8m 8m 8m 8m        CP  5m 5m

## 2022-09-28 NOTE — PROGRESS NOTES
AGUS SW received referral from VITA HARDY    Per chart review, patient needs assistance with finding a psychiatrist     Shilpi Mckeon will follow up with patient regarding referral

## 2022-09-29 ENCOUNTER — PATIENT OUTREACH (OUTPATIENT)
Dept: FAMILY MEDICINE CLINIC | Facility: CLINIC | Age: 57
End: 2022-09-29

## 2022-09-29 NOTE — PROGRESS NOTES
Referral received from PCP with request for Outpatient Social Work Care Manager to outreach patient and assist in finding psychiatrist related to anxiety  Per chart review, patient is a [employer] Employee and has [employer] as form of health insurance  Call placed [facility] in [address] [facility]  Confirmed they are able to accept patient's insurance plan  Call placed to patient to further assess and discuss psychiatry options  No answer, voicemail left, and awaiting return call

## 2022-10-04 ENCOUNTER — OFFICE VISIT (OUTPATIENT)
Dept: PHYSICAL THERAPY | Facility: CLINIC | Age: 57
End: 2022-10-04
Payer: COMMERCIAL

## 2022-10-04 DIAGNOSIS — R32 URINARY INCONTINENCE IN FEMALE: Primary | ICD-10-CM

## 2022-10-04 PROCEDURE — 97112 NEUROMUSCULAR REEDUCATION: CPT | Performed by: PHYSICAL THERAPIST

## 2022-10-04 PROCEDURE — 97530 THERAPEUTIC ACTIVITIES: CPT | Performed by: PHYSICAL THERAPIST

## 2022-10-04 PROCEDURE — 97110 THERAPEUTIC EXERCISES: CPT | Performed by: PHYSICAL THERAPIST

## 2022-10-04 NOTE — PROGRESS NOTES
Daily Note     Today's date: 10/4/2022  Patient name: Lubna Pereyra  : 1965  MRN: [id number]  Referring provider: Nuvia Dillard MD  Dx:   Encounter Diagnosis     ICD-10-CM    1  Urinary incontinence in female  R32                   Subjective: Pt reports being more aware of pelvic floor  Objective: See treatment diary below      Assessment: Tolerated treatment well  Patient demonstrated fatigue post treatment and would benefit from continued PT sitting/standing PF therex as tiffanie  HEP was updated and reviewed  Plan: Continue per plan of care  Progress treatment as tolerated         Precautions: latex      Manuals 8/29 9/8 9/15 9/22 9/28        graston  4m 4m 4m 4m 4m        Scar STM 4m 4m 4m 4m 4m        Retrograde  4m 4m 4m 4m 4m        KT tape scars   apply          HEP - ROM , massage , desens 5xday             desens towel  4m                                                                                          Ther Ex             TGE  10x 10x 10x          Wrist  Stretches  2m 2m 2m 2m 2m        AROM thumb  2m 2m           flexgrip ext   3x10  Yellow   Yellow  3x10 Yellow  3x10 yellow 3x10        Wrist e/f   2#  3x10 2#  3x10 3#  3x10 #3 3x10        powerweb    Green  3x10 Blue  3x10 Blue  3x10 Blue 3x10                                  Ther Activity             Pegs   40x            Grooved pegs   40x           Gait Training             Pinch pins    Green  3x10 Blue  3x10 Blue 3x10        flexbar p/S   Red  3x10 Red  3x10 Red 3x10        Modalities             MH  8m 8m 8m 8m 8m        CP  5m 5m                   10/4        PF MMT 3/5      3/5 (bearing down noted)                                                                                       Neuro Re-Ed                                               WH PT edu/anatomy/benefits 5'                     Bladder diary edu/benefits/purpose 5'  5'                   Timed voiding - edu/purpose/benefits    5' (every 45-60 min during the day)                    Rec bike/posture/PF    L1 10'  L2 10'  L3 10'  L4 10'                                                             Ther Ex                        sitting PF/TA    5x5" VC's  5x5" VC's  3x5" review w VC's                sitting PF/TA/AD's t-ball    5x5" VC's  5x5" VC's  home                sitting PF/TA/AB's TB use    5x5" VC's  5x5" VC's  home                sitting PF/TA/ alt LE TKE    5x5" VC's ea LE  5x5" VC's ea LE  3x5" w VC's                 butterfly stretch      3x30"  3x30"                child-pose stretch      3x30"  3x30"               Sit to stand       10x3" w VC's               Standing PF/TA       10x3" w VC's               Standing mini-squats/PF       10x3" w VC's                Leg press/PF/TA         L2 10x5"             Apollo B TKE/PF/TA     L2 10x5"        Apollo B HS curls/PF/TA     L2 10x5"        TB B UE ext/PF/TA     Blue 10x5"        Mini squats/PF/TA     10x5"        FF lunges/PF/TA     5x5" ea LE                                                          Ther Activity                        HEP edu/review    5'  5'  5'  5'              Abdominal breathing in sitting/edu/purpose    5'  3' review/VC's  5' review 3' standing             Gait Training                                                                       Modalities

## 2022-10-05 ENCOUNTER — PATIENT OUTREACH (OUTPATIENT)
Dept: FAMILY MEDICINE CLINIC | Facility: CLINIC | Age: 57
End: 2022-10-05

## 2022-10-05 NOTE — PROGRESS NOTES
JARED MCLEAN made 2nd outreach attempt to further assess and discuss psychiatry options  No answer, left voicemail  Will send unable to reach letter and follow up in 2 weeks if call was not returned  Patient returning phone call  JARED MCLEAN introduced role and informed patient about referral for psychiatry resources  Patient stated that she was established with a psychiatrist for 7 years but her psychiatrist has now left the practice and she does not know where to go  JARED MCLEAN discussed about [facility] in [place] with patient and informed her that they accept her insurance  Patient was interested and JARED MCLEAN provided her with contact information  Patient requesting information on psychiatry resources that are closer to her home in [facility]  JARED MCLEAN will explore other options that are closer to patient and follow up in a few days  Patient is aware and thankful  No other needs at this time

## 2022-10-06 ENCOUNTER — APPOINTMENT (OUTPATIENT)
Dept: OCCUPATIONAL THERAPY | Facility: CLINIC | Age: 57
End: 2022-10-06

## 2022-10-07 ENCOUNTER — PATIENT OUTREACH (OUTPATIENT)
Dept: FAMILY MEDICINE CLINIC | Facility: CLINIC | Age: 57
End: 2022-10-07

## 2022-10-07 NOTE — PROGRESS NOTES
OP AGUS MCLEAN contacted [address] [facility] [address] in [facility] to confirm if they are accepting new patients  They are currently not taking new patients and working off a waitlist  Call placed to [facility] in [facility] and they are also working off a waitlist     [address] contacted [address] [facility] [address] in [facility] ([facility]) and was informed that they are accepting new patients  Patient was contacted and informed of resources available  Patient declined information for [address] [place] [address] office  OP AGUS MCLEAN also offered patient contact information for the [employer] [facility] Team who may further assist her in finding in-network psychiatry services  Patient was interested in contacting [facility] [facility] and phone # ([phone]) was provided  She also has contact information for [facility] in [facility] [place]  Patient reported no other needs at this time  OP AGUS MCLEAN will close case but can be contacted if other needs arise

## 2022-10-10 ENCOUNTER — OFFICE VISIT (OUTPATIENT)
Dept: OCCUPATIONAL THERAPY | Facility: CLINIC | Age: 57
End: 2022-10-10
Payer: COMMERCIAL

## 2022-10-10 ENCOUNTER — OFFICE VISIT (OUTPATIENT)
Dept: PHYSICAL THERAPY | Facility: CLINIC | Age: 57
End: 2022-10-10
Payer: COMMERCIAL

## 2022-10-10 DIAGNOSIS — M79.641 RIGHT HAND PAIN: Primary | ICD-10-CM

## 2022-10-10 DIAGNOSIS — L03.113 CELLULITIS OF RIGHT HAND: ICD-10-CM

## 2022-10-10 DIAGNOSIS — R32 URINARY INCONTINENCE IN FEMALE: Primary | ICD-10-CM

## 2022-10-10 PROCEDURE — 97112 NEUROMUSCULAR REEDUCATION: CPT | Performed by: PHYSICAL THERAPIST

## 2022-10-10 PROCEDURE — 97140 MANUAL THERAPY 1/> REGIONS: CPT | Performed by: OCCUPATIONAL THERAPIST

## 2022-10-10 PROCEDURE — 97530 THERAPEUTIC ACTIVITIES: CPT | Performed by: PHYSICAL THERAPIST

## 2022-10-10 PROCEDURE — 97110 THERAPEUTIC EXERCISES: CPT | Performed by: PHYSICAL THERAPIST

## 2022-10-10 PROCEDURE — 97110 THERAPEUTIC EXERCISES: CPT | Performed by: OCCUPATIONAL THERAPIST

## 2022-10-10 NOTE — PROGRESS NOTES
Daily Note     Today's date: 10/10/2022  Patient name: Cecil Jones  : 1965  MRN: [id number]  Referring provider: Anita Vincent MD  Dx:   Encounter Diagnosis     ICD-10-CM    1  Urinary incontinence in female  R32                   Subjective: "I am not doing so good  My back is hurting and I think it is affecting my pelvic floor  Pain: 4/10 today, yesterday 9/10 - standing is killing me "      Objective: See treatment diary below      Assessment: Tolerated treatment well  Patient exhibited good technique with therapeutic exercises and incorporated some LB stretching techniques as well  Pt voiced improvement in discomfort post tx  Pt is safe with all therex/HEP review  Plan: Potential discharge next visit       Precautions: latex      Manuals 8/29 9/8 9/15 9/22 9/28        graston  4m 4m 4m 4m 4m        Scar STM 4m 4m 4m 4m 4m        Retrograde  4m 4m 4m 4m 4m        KT tape scars   apply          HEP - ROM , massage , desens 5xday             desens towel  4m                                                                                          Ther Ex             TGE  10x 10x 10x          Wrist  Stretches  2m 2m 2m 2m 2m        AROM thumb  2m 2m           flexgrip ext   3x10  Yellow   Yellow  3x10 Yellow  3x10 yellow 3x10        Wrist e/f   2#  3x10 2#  3x10 3#  3x10 #3 3x10        powerweb    Green  3x10 Blue  3x10 Blue  3x10 Blue 3x10                                  Ther Activity             Pegs   40x            Grooved pegs   40x           Gait Training             Pinch pins    Green  3x10 Blue  3x10 Blue 3x10        flexbar p/S   Red  3x10 Red  3x10 Red 3x10        Modalities             MH  8m 8m 8m 8m 8m        CP  5m 5m                   10/4 10/10       PF MMT 3/5      3/5 (bearing down noted)                                                                                       Neuro Re-Ed                                                PT edu/anatomy/benefits 5'                     Bladder diary edu/benefits/purpose 5'  5'                   Timed voiding - edu/purpose/benefits    5' (every 45-60 min during the day)                    Rec bike/posture/PF    L1 10'  L2 10'  L3 10'  L4 10'  L4 10'                                                           Ther Ex                        sitting PF/TA    5x5" VC's  5x5" VC's  3x5" review w VC's                sitting PF/TA/AD's t-ball    5x5" VC's  5x5" VC's  home    supine w semi bridge/T-ball 10x5"            sitting PF/TA/AB's TB use    5x5" VC's  5x5" VC's  home                sitting PF/TA/ alt LE TKE    5x5" VC's ea LE  5x5" VC's ea LE  3x5" w VC's                 butterfly stretch      3x30"  3x30"    3x30"            child-pose stretch      3x30"  3x30"    3x30"           Sit to stand       10x3" w VC's               Standing PF/TA       10x3" w VC's               Standing mini-squats/PF       10x3" w VC's                Leg press/PF/TA         L2 10x5"             Apollo B TKE/PF/TA     L2 10x5"        Apollo B HS curls/PF/TA     L2 10x5"        TB B UE ext/PF/TA     Blue 10x5"        Mini squats/PF/TA     10x5"        FF lunges/PF/TA     5x5" ea LE        U/l knee to chest stretch      3x30" ea       LTR stretch      3x30"       HS stretch      3x30" ea LE                                                                      Ther Activity                        HEP edu/review    5'  5'  5'  5' 5'update           Abdominal breathing in sitting/edu/purpose    5'  3' review/VC's  5' review 3' standing  3' review VC's          Gait Training                                                                       Modalities

## 2022-10-10 NOTE — PROGRESS NOTES
Daily Note     Today's date: 10/10/2022  Patient name: Esther Ramirez  : 1965  MRN: [id number]  Referring provider: MIRYAM Mina*  Dx:   Encounter Diagnosis     ICD-10-CM    1  Right hand pain  M79 641    2  Cellulitis of right hand  L03  113                   Subjective: I am still still      Objective: See treatment diary below      Assessment: Tolerated treatment fair  Patient has full ROM   She has continued complaints of stiffness  She has mild  weakness  recommended compression gloves      2 R/L= 30/45    Plan: Continue per plan of care    as needed     Precautions: latex      Manuals 8/29 9/8 9/15 9/22 9/28 10/10       graston  4m 4m 4m 4m 4m 4m       Scar STM 4m 4m 4m 4m 4m 4m       Retrograde  4m 4m 4m 4m 4m 4m       KT tape scars   apply          HEP - ROM , massage , desens 5xday             desens towel  4m                                                                                          Ther Ex             TGE  10x 10x 10x          Wrist  Stretches  2m 2m 2m 2m 2m 2m       AROM thumb  2m 2m           flexgrip ext   3x10  Yellow   Yellow  3x10 Yellow  3x10 yellow 3x10 Yellow  3x10       Wrist e/f   2#  3x10 2#  3x10 3#  3x10 #3 3x10 3#  3x10       powerweb    Green  3x10 Blue  3x10 Blue  3x10 Blue 3x10 Black  3x10                                 Ther Activity             Pegs   40x            Grooved pegs   40x           Gait Training             Pinch pins    Green  3x10 Blue  3x10 Blue 3x10 Blue  3x10       flexbar p/S   Red  3x10 Red  3x10 Red 3x10 Red  3x10       Modalities             MH  8m 8m 8m 8m 8m 8m       CP  5m 5m

## 2022-10-18 ENCOUNTER — OFFICE VISIT (OUTPATIENT)
Dept: PHYSICAL THERAPY | Facility: CLINIC | Age: 57
End: 2022-10-18
Payer: COMMERCIAL

## 2022-10-18 DIAGNOSIS — R32 URINARY INCONTINENCE IN FEMALE: Primary | ICD-10-CM

## 2022-10-18 PROCEDURE — 97110 THERAPEUTIC EXERCISES: CPT | Performed by: PHYSICAL THERAPIST

## 2022-10-18 PROCEDURE — 97140 MANUAL THERAPY 1/> REGIONS: CPT | Performed by: PHYSICAL THERAPIST

## 2022-10-18 PROCEDURE — 97112 NEUROMUSCULAR REEDUCATION: CPT | Performed by: PHYSICAL THERAPIST

## 2022-10-18 NOTE — PROGRESS NOTES
Daily Note     Today's date: 10/18/2022  Patient name: Shaila Seymour  : 1965  MRN: [id number]  Referring provider: John Torres MD  Dx:   Encounter Diagnosis     ICD-10-CM    1  Urinary incontinence in female  R32                   Subjective: "My pelvic floor is affected by on/off back pain " pt reports improvement in PFM control with ADL's  Objective: See treatment diary below  Goals - linette all)  1  Independent with PF HEP  2  Independent and safe with self-postural correction using a squatty potty with BM/voiding  3  Independent and with daily bladder/bowel diary to max fluid intake  4  PFM MMT increased by 1/2 -1 grade t/o  Assessment: Tolerated treatment well  Patient exhibited good technique with therapeutic exercises and HEP review  Pt shows improvement in PF MMT  Pt is motivated and safe with all HEP tech      Plan: D/C with HEP review                      10/4 10/10  10/18         PF MMT 3/5      3/5 (bearing down noted)      3+/5 to 4/5 w VC's                                                                                 Neuro Re-Ed                                               WH PT edu/anatomy/benefits 5'                     Bladder diary edu/benefits/purpose 5'  5'                   Timed voiding - edu/purpose/benefits    5' (every 45-60 min during the day)                    Rec bike/posture/PF    L1 10'  L2 10'  L3 10'  L4 10'  L4 10'  L4 10'                                                         Ther Ex                        sitting PF/TA    5x5" VC's  5x5" VC's  3x5" review w VC's                sitting PF/TA/AD's t-ball    5x5" VC's  5x5" VC's  home    supine w semi bridge/T-ball 10x5"            sitting PF/TA/AB's TB use    5x5" VC's  5x5" VC's  home                sitting PF/TA/ alt LE TKE    5x5" VC's ea LE  5x5" VC's ea LE  3x5" w VC's                 butterfly stretch      3x30"  3x30"    3x30"            child-pose stretch      3x30"  3x30"    3x30"           Sit to stand       10x3" w VC's               Standing PF/TA       10x3" w VC's      5x5"         Standing mini-squats/PF       10x3" w VC's                Leg press/PF/TA         L2 10x5"    L2 10x5"         Apollo B TKE/PF/TA         L2 10x5"    L2 10x5"         Apollo B HS curls/PF/TA         L2 10x5"    L2 10x5"         TB B UE ext/PF/TA         Blue 10x5"    Black TB 10x5"         Mini squats/PF/TA         10x5"             FF lunges/PF/TA         5x5" ea LE             U/l knee to chest stretch           3x30" ea 3x30" ea         LTR stretch           3x30"  3x30"         HS stretch           3x30" ea LE  3x30" ea LE                                                                                                         Ther Activity                        HEP edu/review    10'  5'  5'  5' 5'update 8' review          Abdominal breathing in sitting/edu/purpose    5'  3' review/VC's  5' review 3' standing  3' review VC's           Gait Training                                                                       Modalities

## 2022-10-21 DIAGNOSIS — F33.1 MODERATE EPISODE OF RECURRENT MAJOR DEPRESSIVE DISORDER (HCC): ICD-10-CM

## 2022-10-21 RX ORDER — SERTRALINE HYDROCHLORIDE 100 MG/1
200 TABLET, FILM COATED ORAL DAILY
Qty: 180 TABLET | Refills: 2 | Status: SHIPPED | OUTPATIENT
Start: 2022-10-21

## 2022-10-21 RX ORDER — SERTRALINE HYDROCHLORIDE 100 MG/1
200 TABLET, FILM COATED ORAL DAILY
Qty: 60 TABLET | Refills: 2 | Status: SHIPPED | OUTPATIENT
Start: 2022-10-21 | End: 2022-10-21 | Stop reason: SDUPTHER

## 2022-11-04 ENCOUNTER — TELEPHONE (OUTPATIENT)
Dept: PSYCHIATRY | Facility: CLINIC | Age: 57
End: 2022-11-04

## 2022-11-04 NOTE — TELEPHONE ENCOUNTER
Pt called to request med mgmt services  Writer informed pt that there are no openings at this moment  Pt agreed to be placed on proper wait list     Preferences: In-person, female  Open to vv and male

## 2022-12-14 NOTE — UTILIZATION REVIEW
Notification of Discharge   This is a Notification of Discharge from our facility [address]  Please be advised that this patient has been discharge from our facility  Below you will find the admission and discharge date and time including the patients disposition  UTILIZATION REVIEW CONTACT:  Lydia Velasquez  Utilization   Network Utilization Review Department  Phone: [phone] x carefully listen to the prompts  All voicemails are confidential   Email: [email or web address]     PHYSICIAN ADVISORY SERVICES:  FOR [facility]SNW-ME-[facility] REVIEW - MEDICAL NECESSITY DENIAL  Phone: [phone]  Fax: [phone]  Email: [email or web address]     PRESENTATION DATE: 8/11/2022  2:26 PM  OBERVATION ADMISSION DATE:   INPATIENT ADMISSION DATE: 8/11/22  3:14 PM   DISCHARGE DATE: 8/13/2022 12:17 PM  DISPOSITION: Home/Self Care Home/Self Care      IMPORTANT INFORMATION:  Send all requests for admission clinical reviews, approved or denied determinations and any other requests to dedicated fax number below belonging to the campus where the patient is receiving treatment   List of dedicated fax numbers:  [address] DENIALS (Administrative/Medical Necessity) [phone]   [address] (Maternity/NICU/Pediatrics) [phone]   Cami Alvarez [phone]   130 [facility] [phone]   [address] [place] [address] [phone]   [address] [phone]   [facility]  [phone]   [address] [place] [address]  2401 [facility] 1000 [facility] [phone] Patient calling, very upset as she states has been trying to refill her Oxycodone since yesterday. Dr. Gannon did last prescription on 11/25  Patient is now out of medication    Christiane HOOK, RN

## 2022-12-29 ENCOUNTER — HOSPITAL ENCOUNTER (OUTPATIENT)
Dept: MAMMOGRAPHY | Facility: CLINIC | Age: 57
Discharge: HOME | End: 2022-12-29

## 2022-12-29 VITALS — BODY MASS INDEX: 35.52 KG/M2 | WEIGHT: 221 LBS | HEIGHT: 66 IN

## 2022-12-29 DIAGNOSIS — Z12.31 ENCOUNTER FOR SCREENING MAMMOGRAM FOR MALIGNANT NEOPLASM OF BREAST: ICD-10-CM

## 2023-01-09 ENCOUNTER — OFFICE VISIT (OUTPATIENT)
Dept: OBGYN CLINIC | Facility: CLINIC | Age: 58
End: 2023-01-09

## 2023-01-09 VITALS
HEIGHT: 66 IN | BODY MASS INDEX: 35.52 KG/M2 | DIASTOLIC BLOOD PRESSURE: 83 MMHG | WEIGHT: 221 LBS | SYSTOLIC BLOOD PRESSURE: 138 MMHG

## 2023-01-09 DIAGNOSIS — G56.03 CARPAL TUNNEL SYNDROME, BILATERAL: Primary | ICD-10-CM

## 2023-01-09 DIAGNOSIS — G56.23 CUBITAL TUNNEL SYNDROME OF BOTH UPPER EXTREMITIES: ICD-10-CM

## 2023-01-09 NOTE — PROGRESS NOTES
ASSESSMENT/PLAN:    Assessment:   Carpal Tunnel Syndrome  Bilateral  Cubital tunnel syndrome bilaterally    Plan: We will order ultrasounds of bilateral carpal tunnel and cubital tunnel for evaluation and to discuss treatment options  She was advised that she can wear her braces at nighttime  She will follow-up after testing to go over test results and treatment options    Follow Up: After Testing    To Do Next Visit:  Reevaluation    General Discussions:  Carpal Tunnel Syndrome: The anatomy and physiology of carpal tunnel syndrome was discussed with the patient today  Increase pressure localized under the transverse carpal ligament can cause pain, numbness, tingling, or dysesthesias within the median nerve distribution as well as feelings of fatigue, clumsiness, or awkwardness  These symptoms typically occur at night and worse in the morning upon waking  Eventually, untreated carpal tunnel syndrome can result in weakness and permanent loss of muscle within the thenar compartment of the hand  Treatment options were discussed with the patient  Conservative treatment includes nocturnal resting splints to keep the nerve in a neutral position, ergonomic changes within the work or home environment, activity modification, and tendon gliding exercises  Steroid injections within the carpal canal can help a majority of patients, however this is often self-limited in a majority of patients  Surgical intervention to divide the transverse carpal ligament typically results in a long-lasting relief of the patient's complaints, with the recurrence rate of less than 1%  Cubital Tunnel Syndrome: The anatomy and physiology of cubital tunnel syndrome were discussed with the patient today in the office  Typically, increased elbow flexion activities decrease blood flow within the intraneural spaces, resulting in a feeling of numbness, tingling, weakness, or clumsiness within the hand and fingers    Occasionally, anatomic structures such as medial elbow osteophytes, the medial head of the triceps, were subluxing ulnar nerve may result in increased pressure or aggravation at the cubital tunnel  Typical signs and symptoms usually include numbness and tingling within the ring and small finger, weakness with , and weakness with pinch  Conservative treatment and includes nocturnal bracing to keep the elbow in a semi-extended position, activity modification, therapy, and avoiding excessive elbow flexion activities  A majority of patients typically respond to conservative treatment over a period of approximately 3-6 months  EMG/NCV testing of the ulnar nerve at the elbow is not as reliable as carpal tunnel syndrome  Surgical intervention in the form of in situ release of the ulnar nerve at the elbow or ulnar nerve transposition may be required in up to 20% of patients          _____________________________________________________  CHIEF COMPLAINT:  Chief Complaint   Patient presents with   • Left Hand - Pain, Numbness   • Right Hand - Pain, Numbness         SUBJECTIVE:  Wong Jacobs is a 62 y o  female who presents bilateral hand pain, numbness and tingling  She states its been going on for years  She states that she wakes up from sleep do to it  She states she has been dropping things  She has difficulty with buttons, picking up small objects and opening caps       PAST MEDICAL HISTORY:  Past Medical History:   Diagnosis Date   • ADHD (attention deficit hyperactivity disorder)    • Anxiety    • Arthritis    • Depression    • Fibromyalgia, primary    • GERD (gastroesophageal reflux disease)    • History of stomach ulcers    • Hypertension    • Panic attack    • Papanicolaou smear 2020   • Psychiatric disorder        PAST SURGICAL HISTORY:  Past Surgical History:   Procedure Laterality Date   • HYSTEROSCOPY  2015   • MAMMO (HISTORICAL)  2020       FAMILY HISTORY:  Family History   Problem Relation Age of Onset   • Hypertension Mother    • Lung cancer Mother    • Skin cancer Father        SOCIAL HISTORY:  Social History     Tobacco Use   • Smoking status: Former   • Smokeless tobacco: Never   • Tobacco comments:     teen years   Vaping Use   • Vaping Use: Never used   Substance Use Topics   • Alcohol use: Yes     Comment: Socially   • Drug use: No       MEDICATIONS:    Current Outpatient Medications:   •  sertraline (ZOLOFT) 100 mg tablet, Take 2 tablets (200 mg total) by mouth daily, Disp: 180 tablet, Rfl: 2  •  albuterol (PROVENTIL HFA,VENTOLIN HFA) 90 mcg/act inhaler, Inhale 2 puffs every 6 (six) hours as needed for wheezing or shortness of breath, Disp: 8 g, Rfl: 2  •  buPROPion (Wellbutrin SR) 150 mg 12 hr tablet, Take 1 tablet (150 mg total) by mouth in the morning, Disp: 90 tablet, Rfl: 2  •  buPROPion (WELLBUTRIN XL) 300 mg 24 hr tablet, Take 1 tablet (300 mg total) by mouth daily, Disp: 90 tablet, Rfl: 2  •  cholecalciferol (VITAMIN D3) 1,000 units tablet, Take 1,000 Units by mouth daily, Disp: , Rfl:   •  Clocortolone Pivalate 0 1 % cream, Apply topically if needed, Disp: , Rfl:   •  clotrimazole-betamethasone (LOTRISONE) 1-0 05 % cream, Apply topically 2 (two) times a day, Disp: 30 g, Rfl: 2  •  hydrochlorothiazide (HYDRODIURIL) 12 5 mg tablet, Take 1 tablet (12 5 mg total) by mouth daily, Disp: 90 tablet, Rfl: 2  •  hydrOXYzine HCL (ATARAX) 25 mg tablet, Take 1 tablet (25 mg total) by mouth every 6 (six) hours as needed for itching or anxiety, Disp: 30 tablet, Rfl: 0  •  losartan (COZAAR) 100 MG tablet, Take 1 tablet (100 mg total) by mouth daily, Disp: 90 tablet, Rfl: 2  •  omeprazole (PriLOSEC) 40 MG capsule, Take 40 mg by mouth daily Take before a meal, Disp: , Rfl:   •  ondansetron (Zofran ODT) 4 mg disintegrating tablet, Take 1 tablet (4 mg total) by mouth every 6 (six) hours as needed for nausea or vomiting, Disp: 12 tablet, Rfl: 0  •  QUEtiapine (SEROquel) 100 mg tablet, Take 100 mg by mouth daily at bedtime, Disp: , Rfl:   •  SF 5000 Plus 1 1 % CREA, , Disp: , Rfl:     ALLERGIES:  Allergies   Allergen Reactions   • Latex Rash       REVIEW OF SYSTEMS:  Pertinent items are noted in HPI  A comprehensive review of systems was negative  LABS:  HgA1c:   Lab Results   Component Value Date    HGBA1C 5 6 03/26/2022     BMP:   Lab Results   Component Value Date    GLUCOSE 89 10/01/2016    CALCIUM 8 8 08/12/2022    K 3 9 08/12/2022    CO2 29 08/12/2022     08/12/2022    BUN 16 08/12/2022    CREATININE 0 74 08/12/2022       _____________________________________________________  PHYSICAL EXAMINATION:  Vital signs: /83   Ht 5' 6" (1 676 m)   Wt 100 kg (221 lb)   BMI 35 67 kg/m²   General: well developed and well nourished, alert, oriented times 3 and appears comfortable  Psychiatric: Normal  HEENT: Trachea Midline, No torticollis  Cardiovascular: No discernable arrhythmia  Pulmonary: No wheezing or stridor  Abdomen: No rebound or guarding  Extremities: No peripheral edema  Skin: No masses, erythema, lacerations, fluctation, ulcerations  Neurovascular: Sensation Intact to the Median, Ulnar, Radial Nerve, Motor Intact to the Median, Ulnar, Radial Nerve and Pulses Intact    MUSCULOSKELETAL EXAMINATION:  Bilateral Carpal Tunnel Exam:    Negative thenar atrophy  Negative phalen's test  Positive carpal tunnel compression   Positive tinels over median nerve at the wrist    intrinsicis on the right 4/5 and 4-/5 on the left   AIN 5/5 bilaterally  APB 4/5 on left 5/5 on right   5/5 wrist flexion and extension    _____________________________________________________  STUDIES REVIEWED:  No Studies to review      PROCEDURES PERFORMED:  Procedures  No Procedures performed today    Scribe Attestation    I,:  Oni Pazi PA-C am acting as a scribe while in the presence of the attending physician :       I,:  Luis Martinez MD personally performed the services described in this documentation    as scribed in my presence : Scribe Attestation    I,:  Tara Hoffman PA-C am acting as a scribe while in the presence of the attending physician :       I,:  Polo Hammonds MD personally performed the services described in this documentation    as scribed in my presence :

## 2023-01-16 DIAGNOSIS — K21.9 GASTROESOPHAGEAL REFLUX DISEASE, UNSPECIFIED WHETHER ESOPHAGITIS PRESENT: ICD-10-CM

## 2023-01-16 DIAGNOSIS — F33.1 MODERATE EPISODE OF RECURRENT MAJOR DEPRESSIVE DISORDER (HCC): Primary | ICD-10-CM

## 2023-01-16 RX ORDER — QUETIAPINE FUMARATE 100 MG/1
100 TABLET, FILM COATED ORAL
Qty: 90 TABLET | Refills: 2 | Status: SHIPPED | OUTPATIENT
Start: 2023-01-16

## 2023-01-16 RX ORDER — OMEPRAZOLE 40 MG/1
40 CAPSULE, DELAYED RELEASE ORAL DAILY
Qty: 90 CAPSULE | Refills: 2 | Status: SHIPPED | OUTPATIENT
Start: 2023-01-16

## 2023-02-09 ENCOUNTER — OFFICE VISIT (OUTPATIENT)
Dept: FAMILY MEDICINE CLINIC | Facility: CLINIC | Age: 58
End: 2023-02-09

## 2023-02-09 ENCOUNTER — TELEPHONE (OUTPATIENT)
Dept: PSYCHOLOGY | Facility: CLINIC | Age: 58
End: 2023-02-09

## 2023-02-09 VITALS
HEART RATE: 92 BPM | SYSTOLIC BLOOD PRESSURE: 112 MMHG | WEIGHT: 228 LBS | HEIGHT: 66 IN | RESPIRATION RATE: 18 BRPM | DIASTOLIC BLOOD PRESSURE: 70 MMHG | TEMPERATURE: 98.1 F | BODY MASS INDEX: 36.64 KG/M2 | OXYGEN SATURATION: 98 %

## 2023-02-09 DIAGNOSIS — R53.83 OTHER FATIGUE: Primary | ICD-10-CM

## 2023-02-09 DIAGNOSIS — Z13.89 SCREENING FOR BLOOD OR PROTEIN IN URINE: ICD-10-CM

## 2023-02-09 DIAGNOSIS — Z13.220 SCREENING FOR LIPID DISORDERS: ICD-10-CM

## 2023-02-09 DIAGNOSIS — Z13.1 SCREENING FOR DIABETES MELLITUS: ICD-10-CM

## 2023-02-09 DIAGNOSIS — B35.1 TOENAIL FUNGUS: ICD-10-CM

## 2023-02-09 DIAGNOSIS — F33.1 MODERATE EPISODE OF RECURRENT MAJOR DEPRESSIVE DISORDER (HCC): ICD-10-CM

## 2023-02-09 DIAGNOSIS — Z13.228 SCREENING FOR METABOLIC DISORDER: ICD-10-CM

## 2023-02-09 RX ORDER — MULTIVITAMIN
1 TABLET ORAL DAILY
COMMUNITY

## 2023-02-09 NOTE — PROGRESS NOTES
Assessment/Plan:     Diagnoses and all orders for this visit:    Other fatigue  -     CBC and differential; Future  -     TSH, 3rd generation with Free T4 reflex; Future  -     Vitamin D 25 hydroxy; Future  -     Lyme Total Antibody Profile with reflex to WB; Future  -     Ambulatory Referral to Sleep Medicine; Future    Labs ordered for further evaluation  We will contact pt w/ results once available  May also be the result of patient's worsening depression  Will refer to Sleep Medicine as well to r/o any apnea episodes that may contribute to her fatigue  We will contact patient with results once available  Patient is encouraged to call our office for any questions/concerns, persistent or worsening symptoms  Patient states they understand and agree with treatment plan  Moderate episode of recurrent major depressive disorder ([facility] [address] )  -     Ambulatory Referral to [facility] or Partial Psych Program; Future    Pt currently on Sertraline, Wellbutrin and Seroquel  No active SI plan although sometimes pt notes she would not mind dying in her sleep peacefully  She notes she is trying to get an appointment w/ therapist she was seeing prior  She is currently on wait list for Psych after having psychiatrist for years prior who left the practice  [facility] to [facility] treatment  If patient does not hear anything in next 2 days from Partial program, she is to contact our office  Patient is encouraged to call our office for any questions/concerns, persistent or worsening symptoms  Patient states they understand and agree with treatment plan  Screening for metabolic disorder  -     Comprehensive metabolic panel; Future    Screening for lipid disorders  -     Lipid panel; Future    Screening for diabetes mellitus  -     Hemoglobin A1C; Future    Screening for blood or protein in urine  -     UA (URINE) with reflex to Scope    Toenail fungus  -     ciclopirox (PENLAC) 8 % solution;  Apply topically daily at bedtime    Refills prescribed  Pt to f/u PRN  F/u pending results  Subjective:      Patient ID: eJssa Jurado is a 62 y o  female  Pt presents today for ongoing fatigue for the last several months  She does believe she is getting restful sleep during the day  Pt also has hx of depression that she feels is worsening  She previously was seeing a Psychiatrist but her previous provider went to work for the [place] and patient has not been able to find one since  [facility]   She previously was seeing a therapist as well and is going to try and see her therapist again  Pt has been on her Wellbutrin, Seroquel & Sertraline for many years  Pt denies any thoughts of self harm  She notes sometimes she feels as though she would be better off not being here and dying peacefully in her sleep but notes she has no plans or means to take her own life  The following portions of the patient's history were reviewed and updated as appropriate: allergies, current medications, past family history, past medical history, past social history, past surgical history and problem list     Review of Systems    As noted per HPI  Objective:      /70 (BP Location: Left arm, Patient Position: Sitting, Cuff Size: Large)   Pulse 92   Temp 98 1 °F (36 7 °C) (Oral)   Resp 18   Ht 5' 6" (1 676 m)   Wt 103 kg (228 lb)   SpO2 98%   BMI 36 80 kg/m²          Physical Exam  Constitutional:       Appearance: She is well-developed  Pulmonary:      Effort: Pulmonary effort is normal    Neurological:      Mental Status: She is alert  Psychiatric:         Speech: Speech normal          Behavior: Behavior is cooperative  Thought Content: Thought content does not include homicidal or suicidal ideation  Thought content does not include homicidal or suicidal plan

## 2023-02-14 ENCOUNTER — HOSPITAL ENCOUNTER (OUTPATIENT)
Dept: RADIOLOGY | Facility: HOSPITAL | Age: 58
Discharge: HOME | End: 2023-02-14

## 2023-02-14 DIAGNOSIS — G56.03 CARPAL TUNNEL SYNDROME, BILATERAL: ICD-10-CM

## 2023-02-14 DIAGNOSIS — G56.23 CUBITAL TUNNEL SYNDROME OF BOTH UPPER EXTREMITIES: ICD-10-CM

## 2023-02-18 ENCOUNTER — APPOINTMENT (OUTPATIENT)
Dept: LAB | Facility: HOSPITAL | Age: 58
End: 2023-02-18

## 2023-02-18 DIAGNOSIS — R53.83 OTHER FATIGUE: ICD-10-CM

## 2023-02-18 DIAGNOSIS — Z13.228 SCREENING FOR METABOLIC DISORDER: ICD-10-CM

## 2023-02-18 DIAGNOSIS — Z13.220 SCREENING FOR LIPID DISORDERS: ICD-10-CM

## 2023-02-18 DIAGNOSIS — Z13.1 SCREENING FOR DIABETES MELLITUS: ICD-10-CM

## 2023-02-18 LAB
25(OH)D3 SERPL-MCNC: 43.6 NG/ML (ref 30–100)
BASOPHILS # BLD AUTO: 0.04 THOUSANDS/ÂΜL (ref 0–0.1)
BASOPHILS NFR BLD AUTO: 1 % (ref 0–1)
BILIRUB UR QL STRIP: NEGATIVE
CLARITY UR: CLEAR
COLOR UR: YELLOW
EOSINOPHIL # BLD AUTO: 0.23 THOUSAND/ÂΜL (ref 0–0.61)
EOSINOPHIL NFR BLD AUTO: 4 % (ref 0–6)
ERYTHROCYTE [DISTWIDTH] IN BLOOD BY AUTOMATED COUNT: 15 % (ref 11.6–15.1)
GLUCOSE UR STRIP-MCNC: NEGATIVE MG/DL
HCT VFR BLD AUTO: 36.3 % (ref 34.8–46.1)
HGB BLD-MCNC: 11.3 G/DL (ref 11.5–15.4)
HGB UR QL STRIP.AUTO: NEGATIVE
IMM GRANULOCYTES # BLD AUTO: 0.01 THOUSAND/UL (ref 0–0.2)
IMM GRANULOCYTES NFR BLD AUTO: 0 % (ref 0–2)
KETONES UR STRIP-MCNC: NEGATIVE MG/DL
LEUKOCYTE ESTERASE UR QL STRIP: NEGATIVE
LYMPHOCYTES # BLD AUTO: 1.09 THOUSANDS/ÂΜL (ref 0.6–4.47)
LYMPHOCYTES NFR BLD AUTO: 20 % (ref 14–44)
MCH RBC QN AUTO: 25.9 PG (ref 26.8–34.3)
MCHC RBC AUTO-ENTMCNC: 31.1 G/DL (ref 31.4–37.4)
MCV RBC AUTO: 83 FL (ref 82–98)
MONOCYTES # BLD AUTO: 0.66 THOUSAND/ÂΜL (ref 0.17–1.22)
MONOCYTES NFR BLD AUTO: 12 % (ref 4–12)
NEUTROPHILS # BLD AUTO: 3.53 THOUSANDS/ÂΜL (ref 1.85–7.62)
NEUTS SEG NFR BLD AUTO: 63 % (ref 43–75)
NITRITE UR QL STRIP: NEGATIVE
NRBC BLD AUTO-RTO: 0 /100 WBCS
PH UR STRIP.AUTO: 8 [PH]
PLATELET # BLD AUTO: 295 THOUSANDS/UL (ref 149–390)
PMV BLD AUTO: 10.9 FL (ref 8.9–12.7)
PROT UR STRIP-MCNC: NEGATIVE MG/DL
RBC # BLD AUTO: 4.36 MILLION/UL (ref 3.81–5.12)
SP GR UR STRIP.AUTO: 1.02 (ref 1–1.03)
TSH SERPL DL<=0.05 MIU/L-ACNC: 2.63 UIU/ML (ref 0.45–4.5)
UROBILINOGEN UR STRIP-ACNC: <2 MG/DL
WBC # BLD AUTO: 5.56 THOUSAND/UL (ref 4.31–10.16)

## 2023-02-19 LAB
ALBUMIN SERPL BCP-MCNC: 3.6 G/DL (ref 3.5–5)
ALP SERPL-CCNC: 93 U/L (ref 46–116)
ALT SERPL W P-5'-P-CCNC: 38 U/L (ref 12–78)
ANION GAP SERPL CALCULATED.3IONS-SCNC: 8 MMOL/L (ref 4–13)
AST SERPL W P-5'-P-CCNC: 29 U/L (ref 5–45)
BILIRUB SERPL-MCNC: 0.28 MG/DL (ref 0.2–1)
BUN SERPL-MCNC: 13 MG/DL (ref 5–25)
CALCIUM SERPL-MCNC: 9.1 MG/DL (ref 8.3–10.1)
CHLORIDE SERPL-SCNC: 104 MMOL/L (ref 96–108)
CHOLEST SERPL-MCNC: 201 MG/DL
CO2 SERPL-SCNC: 29 MMOL/L (ref 21–32)
CREAT SERPL-MCNC: 0.69 MG/DL (ref 0.6–1.3)
EST. AVERAGE GLUCOSE BLD GHB EST-MCNC: 120 MG/DL
GFR SERPL CREATININE-BSD FRML MDRD: 96 ML/MIN/1.73SQ M
GLUCOSE P FAST SERPL-MCNC: 96 MG/DL (ref 65–99)
HBA1C MFR BLD: 5.8 %
HDLC SERPL-MCNC: 104 MG/DL
LDLC SERPL CALC-MCNC: 83 MG/DL (ref 0–100)
NONHDLC SERPL-MCNC: 97 MG/DL
POTASSIUM SERPL-SCNC: 4.2 MMOL/L (ref 3.5–5.3)
PROT SERPL-MCNC: 7.3 G/DL (ref 6.4–8.4)
SODIUM SERPL-SCNC: 141 MMOL/L (ref 135–147)
TRIGL SERPL-MCNC: 68 MG/DL

## 2023-02-20 DIAGNOSIS — R73.09 ELEVATED HEMOGLOBIN A1C: Primary | ICD-10-CM

## 2023-02-20 DIAGNOSIS — D64.9 LOW HEMOGLOBIN: ICD-10-CM

## 2023-02-20 LAB — B BURGDOR IGG+IGM SER-ACNC: 0.3 AI

## 2023-02-27 ENCOUNTER — OFFICE VISIT (OUTPATIENT)
Dept: OBGYN CLINIC | Facility: CLINIC | Age: 58
End: 2023-02-27

## 2023-02-27 VITALS
DIASTOLIC BLOOD PRESSURE: 80 MMHG | WEIGHT: 224.4 LBS | HEIGHT: 66 IN | BODY MASS INDEX: 36.07 KG/M2 | SYSTOLIC BLOOD PRESSURE: 131 MMHG | HEART RATE: 75 BPM

## 2023-02-27 DIAGNOSIS — G56.21 CUBITAL TUNNEL SYNDROME ON RIGHT: ICD-10-CM

## 2023-02-27 DIAGNOSIS — G56.03 CARPAL TUNNEL SYNDROME, BILATERAL: Primary | ICD-10-CM

## 2023-02-27 NOTE — PROGRESS NOTES
ASSESSMENT/PLAN:    Assessment:   Carpal Tunnel Syndrome  bilateral   Cubital tunnel syndrome   Right     Plan:   Ultrasound results were reviewed   Treatment options were discussed   She was fit with bilateral cock up wrist braces, to be worn at night   We discussed endoscopic carpal tunnel releases   I would not recommend a right cubital tunnel release at this time as her strength is full and she denies significant numbness into her right small finger     Follow Up:  6  week(s)    To Do Next Visit:  Re-evaluation, discuss carpal tunnel releases if symptoms worsen or fail to improve     General Discussions:  Carpal Tunnel Syndrome: The anatomy and physiology of carpal tunnel syndrome was discussed with the patient today  Increase pressure localized under the transverse carpal ligament can cause pain, numbness, tingling, or dysesthesias within the median nerve distribution as well as feelings of fatigue, clumsiness, or awkwardness  These symptoms typically occur at night and worse in the morning upon waking  Eventually, untreated carpal tunnel syndrome can result in weakness and permanent loss of muscle within the thenar compartment of the hand  Treatment options were discussed with the patient  Conservative treatment includes nocturnal resting splints to keep the nerve in a neutral position, ergonomic changes within the work or home environment, activity modification, and tendon gliding exercises  Steroid injections within the carpal canal can help a majority of patients, however this is often self-limited in a majority of patients  Surgical intervention to divide the transverse carpal ligament typically results in a long-lasting relief of the patient's complaints, with the recurrence rate of less than 1%  Cubital Tunnel Syndrome: The anatomy and physiology of cubital tunnel syndrome were discussed with the patient today in the office    Typically, increased elbow flexion activities decrease blood flow within the intraneural spaces, resulting in a feeling of numbness, tingling, weakness, or clumsiness within the hand and fingers  Occasionally, anatomic structures such as medial elbow osteophytes, the medial head of the triceps, were subluxing ulnar nerve may result in increased pressure or aggravation at the cubital tunnel  Typical signs and symptoms usually include numbness and tingling within the ring and small finger, weakness with , and weakness with pinch  Conservative treatment and includes nocturnal bracing to keep the elbow in a semi-extended position, activity modification, therapy, and avoiding excessive elbow flexion activities  A majority of patients typically respond to conservative treatment over a period of approximately 3-6 months  EMG/NCV testing of the ulnar nerve at the elbow is not as reliable as carpal tunnel syndrome  Surgical intervention in the form of in situ release of the ulnar nerve at the elbow or ulnar nerve transposition may be required in up to 20% of patients      _____________________________________________________  CHIEF COMPLAINT:  Chief Complaint   Patient presents with   • Left Wrist - Follow-up, Carpal Tunnel     US- 2/14/23   • Right Wrist - Follow-up, Carpal Tunnel     US- 2/14/23         SUBJECTIVE:  Maggie Pal is a 62 y o  female who presents for follow up regarding bilateral carpal and bilateral cubital tunnel syndrome  Since last visit, Maggie Pal underwent bilateral wrist and bilateral elbow ultrasounds  She purchased wrist braces OTC but notes she has not been wearing them at night as they are too small  She notes mostly constant numbness and tingling to bilateral hands, left being worse then right  She notes her small fingers are generally not involved with regards to the numbness and tingling  The numbness and tingling did wake her from sleep a few months ago     Work status: [employer]     PAST MEDICAL HISTORY:  Past Medical History:   Diagnosis Date   • ADHD (attention deficit hyperactivity disorder)    • Anxiety    • Arthritis    • Depression    • Fibromyalgia, primary    • GERD (gastroesophageal reflux disease)    • History of stomach ulcers    • Hypertension    • Panic attack    • Papanicolaou smear 2020   • Psychiatric disorder        PAST SURGICAL HISTORY:  Past Surgical History:   Procedure Laterality Date   • HYSTEROSCOPY  2015   • MAMMO (HISTORICAL)  2020       FAMILY HISTORY:  Family History   Problem Relation Age of Onset   • Hypertension Mother    • Lung cancer Mother    • Skin cancer Father        SOCIAL HISTORY:  Social History     Tobacco Use   • Smoking status: Former   • Smokeless tobacco: Never   • Tobacco comments:     teen years   Vaping Use   • Vaping Use: Never used   Substance Use Topics   • Alcohol use: Yes     Comment: Socially   • Drug use: No       MEDICATIONS:    Current Outpatient Medications:   •  albuterol (PROVENTIL HFA,VENTOLIN HFA) 90 mcg/act inhaler, Inhale 2 puffs every 6 (six) hours as needed for wheezing or shortness of breath, Disp: 8 g, Rfl: 2  •  buPROPion (Wellbutrin SR) 150 mg 12 hr tablet, Take 1 tablet (150 mg total) by mouth in the morning, Disp: 90 tablet, Rfl: 2  •  buPROPion (WELLBUTRIN XL) 300 mg 24 hr tablet, Take 1 tablet (300 mg total) by mouth daily, Disp: 90 tablet, Rfl: 2  •  cholecalciferol (VITAMIN D3) 1,000 units tablet, Take 1,000 Units by mouth daily, Disp: , Rfl:   •  ciclopirox (PENLAC) 8 % solution, Apply topically daily at bedtime, Disp: 6 6 mL, Rfl: 3  •  Clocortolone Pivalate 0 1 % cream, Apply topically if needed, Disp: , Rfl:   •  clotrimazole-betamethasone (LOTRISONE) 1-0 05 % cream, Apply topically 2 (two) times a day, Disp: 30 g, Rfl: 2  •  hydrochlorothiazide (HYDRODIURIL) 12 5 mg tablet, Take 1 tablet (12 5 mg total) by mouth daily, Disp: 90 tablet, Rfl: 2  •  losartan (COZAAR) 100 MG tablet, Take 1 tablet (100 mg total) by mouth daily, Disp: 90 tablet, Rfl: 2  •  Multiple Vitamin (multivitamin) tablet, Take 1 tablet by mouth daily, Disp: , Rfl:   •  omeprazole (PriLOSEC) 40 MG capsule, Take 1 capsule (40 mg total) by mouth daily Take before a meal, Disp: 90 capsule, Rfl: 2  •  QUEtiapine (SEROquel) 100 mg tablet, Take 1 tablet (100 mg total) by mouth daily at bedtime (Patient taking differently: Take 100 mg by mouth daily at bedtime Usually takes half), Disp: 90 tablet, Rfl: 2  •  sertraline (ZOLOFT) 100 mg tablet, Take 2 tablets (200 mg total) by mouth daily, Disp: 180 tablet, Rfl: 2  •  SF 5000 Plus 1 1 % CREA, , Disp: , Rfl:   •  hydrOXYzine HCL (ATARAX) 25 mg tablet, Take 1 tablet (25 mg total) by mouth every 6 (six) hours as needed for itching or anxiety (Patient not taking: Reported on 2/9/2023), Disp: 30 tablet, Rfl: 0  •  ondansetron (Zofran ODT) 4 mg disintegrating tablet, Take 1 tablet (4 mg total) by mouth every 6 (six) hours as needed for nausea or vomiting (Patient not taking: Reported on 2/9/2023), Disp: 12 tablet, Rfl: 0    ALLERGIES:  Allergies   Allergen Reactions   • Latex Rash       REVIEW OF SYSTEMS:  Pertinent items are noted in HPI  A comprehensive review of systems was negative      LABS:  HgA1c:   Lab Results   Component Value Date    HGBA1C 5 8 (H) 02/18/2023     BMP:   Lab Results   Component Value Date    GLUCOSE 89 10/01/2016    CALCIUM 9 1 02/18/2023    K 4 2 02/18/2023    CO2 29 02/18/2023     02/18/2023    BUN 13 02/18/2023    CREATININE 0 69 02/18/2023           _____________________________________________________  PHYSICAL EXAMINATION:  Vital signs: /80   Pulse 75   Ht 5' 6" (1 676 m)   Wt 102 kg (224 lb 6 4 oz)   BMI 36 22 kg/m²   General: well developed and well nourished, alert, oriented times 3 and appears comfortable  Psychiatric: Normal  HEENT: Trachea Midline, No torticollis  Cardiovascular: No discernable arrhythmia  Pulmonary: No wheezing or stridor  Abdomen: No rebound or guarding  Extremities: No peripheral edema  Skin: No masses, erythema, lacerations, fluctation, ulcerations  Neurovascular: Sensation Intact to the Median, Ulnar, Radial Nerve, Motor Intact to the Median, Ulnar, Radial Nerve and Pulses Intact    MUSCULOSKELETAL EXAMINATION:    BILATERAL HAND: intrinsic strength 5/5 on the right, intrinsic strength 4/5 on the left, 5/5 AIN strenth bilaterally,  APB strength 4+/5 on the right, APB strength 4/5 on the left     _____________________________________________________  STUDIES REVIEWED:  Images were reviewed in PACS by Dr Summer Gacria and demonstrate:  Right cubital tunnel syndrome ruled in, maximum ulnar nerve cross sectional area within or near the cubital tunnel: 9 3 sq mm  Left cubital tunnel tunnel syndrome ruled out, maximum ulnar nerve cross sectional area within or near the cubital tunnel: 7 1 sq mm  Right carpal tunnel syndrome ruled in, maximum median nerve cross sectional area within carpal tunnel (CSAc): 28 5 sq mm  Left carpal tunnel syndrome ruled in, maximum median nerve cross sectional area within carpal tunnel (CSAc): 29 7 sq mm          PROCEDURES PERFORMED:  Procedures  No Procedures performed today    Scribe Attestation    I,:  Kellen Varghese am acting as a scribe while in the presence of the attending physician :       I,:  Diann Yee MD personally performed the services described in this documentation    as scribed in my presence :

## 2023-03-07 ENCOUNTER — EVALUATION (OUTPATIENT)
Dept: OCCUPATIONAL THERAPY | Facility: CLINIC | Age: 58
End: 2023-03-07

## 2023-03-07 DIAGNOSIS — G56.03 CARPAL TUNNEL SYNDROME, BILATERAL: Primary | ICD-10-CM

## 2023-03-07 NOTE — PROGRESS NOTES
OT Evaluation     Today's date: 3/7/2023  Patient name: Sharon Castellanos  : 1965  MRN: [id number]  Referring provider: Prabhu Borja MD  Dx:   Encounter Diagnosis     ICD-10-CM    1  Carpal tunnel syndrome, bilateral  G56 03           Start Time: 0700  Stop Time: 0800  Total time in clinic (min): 60 minutes    Assessment  Assessment details: Patient present today on 3/7/23 for initial evaluation and treatment for B/L carpal tunnel  Patient states she has parasthesia thorughout the day and especially at night  Patient has wrist splint already at home and was advised to wear it at night  Patient had diminished two-point discrimination and >6 65mm on simweis filaments  Patient had negative Tinel's B/L but had moderate discomfort when performing provacative test  [facility], elbow, wrist, and hand  Patient is able to make composite fist and touch distal palmar crease but has discomfort when doing so  Patient has increased pain which limits her ADLs/IADLs such as opening jars, bottles, lifting, caring for pets  Patient would benefit from skilled OT  Impairments: activity intolerance and lacks appropriate home exercise program  Functional limitations: opening jars, bottles, doors, lifting, caring for petsUnderstanding of Dx/Px/POC: good   Prognosis: good    Goals  ST  Decreased functional pain by 2 in RUE/LUE by 2 weeks or re-evaluation  2  Compliant with HEP    LT  Decrease functional pain by 6 in RUE/LUE by 4 weeks or discharge  2  Increase  strength by 15 in R/L hand by 4 weeks or discharge  3  Increase sensation to 4 56mm filaments by 4 weeks or discharge  4  Increase FOTO score to be at projected outcome or greater by discharge      Plan  Patient would benefit from: OT eval and skilled occupational therapy  Planned modality interventions: thermotherapy: hydrocollator packs and cryotherapy  Planned therapy interventions: behavior modification, massage, manual therapy, activity modification, strengthening, stretching, therapeutic exercise, therapeutic activities, functional ROM exercises, graded exercise and home exercise program  Frequency: 2x week  Duration in weeks: 4  Treatment plan discussed with: patient        Subjective Evaluation    History of Present Illness  Date of onset: 2023  Mechanism of injury: Mahin Zhou is a 62 y o  female who presents bilateral carpal and bilateral cubital tunnel syndrome  Since last visit, Mahin Zhou underwent bilateral wrist and bilateral elbow ultrasounds  She purchased wrist braces OTC but notes she has not been wearing them at night as they are too small  She notes mostly constant numbness and tingling to bilateral hands, left being worse then right  She notes her small fingers are generally not involved with regards to the numbness and tingling  The numbness and tingling did wake her from sleep a few months ago  Feels worse in left hand          Not a recurrent problem   Quality of life: good    Pain  Current pain ratin  At best pain ratin  At worst pain rating: 10  Quality: needle-like, burning and knife-like    Social Support  Steps to enter house: yes  Stairs in house: yes   Lives in: multiple-level home  Lives with: Lives with brother  Employment status: working  Hand dominance: right    Patient Goals  Patient goal: decrease pain to support ADLs/IADLs such as work and leisure           Objective     Neurological Testing     Sensation     Wrist/Hand   Left   Paresthesia: light touch  Absent: dynamic two point discrimination     Right   Paresthesia: light touch  Absent: dynamic two point discrimination    Additional Neurological Details  Could not feel 6 65 filaments B/L    Active Range of Motion     Left Wrist   Wrist flexion: WFL  Wrist extension: WFL  Radial deviation: WFL  Ulnar deviation: WFL      Right Wrist   Wrist flexion: WFL  Wrist extension: WFl  Radial deviation: WFL  Ulnar deviation: WFL    Left Thumb   Opposition: Full composite fist discomfort  Able to touch distal palmar crease - discomfort    Right Thumb   Opposition: Full composite fist discomfort  Able to touch distal palmar crease - discomfort    Additional Active Range of Motion Details  Discomfort in wrist flexion    Strength/Myotome Testing     Left Wrist/Hand   Normal wrist strength     (2nd hand position)     Trial 1: 20    Thumb Strength  Key/Lateral Pinch     Trial 1: 8  Palmar/Three-Point Pinch     Trial 1: 9    Right Wrist/Hand   Normal wrist strength     (2nd hand position)     Trial 1: 20    Thumb Strength   Key/Lateral Pinch     Trial 1: 11  Palmar/Three-Point Pinch     Trial 1: 8    Tests     Left Wrist/Hand   Negative Tinel's sign (medial nerve)  Right Wrist/Hand   Negative Tinel's sign (medial nerve)       Additional Tests Details  Performed Tinel's did not get positive, but had moderate discomfort     General Comments:      Shoulder Comments   WNL    Elbow Comments   WNL             Precautions: universal      Manuals 3/7            graston 4m            Intrinsic stretches 4m            CT stretches 4m                         HEP 3x10                                                                                                       Ther Ex             Wrist ext 2#  3x10            flexgrip Y  3x10            flexbar roll 2m            Pinch pins Y  3x10                                                                Ther Activity                                                                              Modalities             MH 8m

## 2023-03-13 ENCOUNTER — APPOINTMENT (OUTPATIENT)
Dept: OCCUPATIONAL THERAPY | Facility: CLINIC | Age: 58
End: 2023-03-13

## 2023-03-15 ENCOUNTER — OFFICE VISIT (OUTPATIENT)
Dept: OCCUPATIONAL THERAPY | Facility: CLINIC | Age: 58
End: 2023-03-15

## 2023-03-15 DIAGNOSIS — G56.03 CARPAL TUNNEL SYNDROME, BILATERAL: Primary | ICD-10-CM

## 2023-03-15 NOTE — PROGRESS NOTES
Daily Note     Today's date: 3/15/2023  Patient name: Alethea Hughes  : 1965  MRN: [id number]  Referring provider: Sindy Pettit MD  Dx:   Encounter Diagnosis     ICD-10-CM    1  Carpal tunnel syndrome, bilateral  G56 03           Start Time:   Stop Time: 815  Total time in clinic (min): 40 minutes    Subjective: My hands still get numbness and tingling      Objective: See treatment diary below      Assessment: Tolerated treatment fair  Patient had moderate fatigue after treatment session  Patient benefits from carpal tunnel stretches during manual therapy  Patient would benefit from continued OT      Plan: Continue per plan of care        Precautions: universal      Manuals 3/7 3/15           graston 4m 4m           Intrinsic stretches 4m 4m           CT stretches 4m 4m                        HEP 3x10                                                                                                       Ther Ex             Wrist PROM  2m           Wrist ext 2#  3x10 2#  3x10           flexgrip Y  3x10 Y  3x10           flexbar roll 2m 2m           Pinch pins Y  3x10 R  3x10           Flexbar twist  R  3x10                                                  Ther Activity                                                                              Modalities             MH 8m 8m             5m

## 2023-03-16 ENCOUNTER — TELEPHONE (OUTPATIENT)
Dept: FAMILY MEDICINE CLINIC | Facility: CLINIC | Age: 58
End: 2023-03-16

## 2023-03-16 DIAGNOSIS — F33.1 MODERATE EPISODE OF RECURRENT MAJOR DEPRESSIVE DISORDER (HCC): ICD-10-CM

## 2023-03-16 RX ORDER — BUPROPION HYDROCHLORIDE 150 MG/1
150 TABLET, EXTENDED RELEASE ORAL DAILY
Qty: 90 TABLET | Refills: 2 | Status: SHIPPED | OUTPATIENT
Start: 2023-03-16

## 2023-03-16 RX ORDER — BUPROPION HYDROCHLORIDE 300 MG/1
300 TABLET ORAL DAILY
Qty: 90 TABLET | Refills: 2 | Status: SHIPPED | OUTPATIENT
Start: 2023-03-16

## 2023-03-20 ENCOUNTER — OFFICE VISIT (OUTPATIENT)
Dept: OCCUPATIONAL THERAPY | Facility: CLINIC | Age: 58
End: 2023-03-20

## 2023-03-20 DIAGNOSIS — G56.03 CARPAL TUNNEL SYNDROME, BILATERAL: Primary | ICD-10-CM

## 2023-03-20 NOTE — PROGRESS NOTES
Daily Note     Today's date: 3/20/2023  Patient name: Ketan Caal  : 1965  MRN: [id number]  Referring provider: Dinesh Norwood MD  Dx:   Encounter Diagnosis     ICD-10-CM    1  Carpal tunnel syndrome, bilateral  G56 03           Start Time: 738  Stop Time: 815  Total time in clinic (min): 37 minutes    Subjective: My hands feel better but my left hand feels worse      Objective: See treatment diary below      Assessment: Tolerated treatment fair  Patient had minimal fatigue after treatment session  Patient can make composite fist with no discomfort  Patient does carpal tunnel stretches to help relieve her symptoms  Patient would benefit from continued OT      Plan: Continue per plan of care        Precautions: universal      Manuals 3/7 3/15 3/20          graston 4m 4m 4m          Intrinsic stretches 4m 4m 4m          CT stretches 4m 4m 4m                       HEP 3x10                                                                                                       Ther Ex             Wrist PROM  2m 2m          Wrist ext 2#  3x10 2#  3x10 3#  3x10          flexgrip Y  3x10 Y  3x10 Y  3x10          flexbar roll 2m 2m 2m          Pinch pins twist Y  3x10 R  3x10 G  3x10          Flexbar twist  R  3x10 R  3x10                                                 Ther Activity                                                                              Modalities             MH 8m 8m 8m            5m 5m

## 2023-03-22 ENCOUNTER — OFFICE VISIT (OUTPATIENT)
Dept: OCCUPATIONAL THERAPY | Facility: CLINIC | Age: 58
End: 2023-03-22

## 2023-03-22 DIAGNOSIS — G56.03 CARPAL TUNNEL SYNDROME, BILATERAL: Primary | ICD-10-CM

## 2023-03-22 NOTE — PROGRESS NOTES
Daily Note     Today's date: 3/22/2023  Patient name: Narayan Garcia  : 1965  MRN: [id number]  Referring provider: Placido Deleon MD  Dx:   Encounter Diagnosis     ICD-10-CM    1  Carpal tunnel syndrome, bilateral  G56 03           Start Time: 736  Stop Time: 815  Total time in clinic (min): 39 minutes    Subjective: The stretches really help my hands      Objective: See treatment diary below      Assessment: Tolerated treatment fair  Patient had minimal fatigue after treatment session  Patient can make composite fist with no discomfort  Patient states she has less paraesthesia during the day  Patient would benefit from continued OT      Plan: Continue per plan of care        Precautions: universal      Manuals 3/7 3/15 3/20 3/22         graston 4m 4m 4m 4m         Intrinsic stretches 4m 4m 4m 4m         CT stretches 4m 4m 4m 4m                      HEP 3x10                                                                                                       Ther Ex             Wrist PROM  2m 2m 2m         Wrist ext 2#  3x10 2#  3x10 3#  3x10 3#  3x10         flexgrip Y  3x10 Y  3x10 Y  3x10 Y  3x10         flexbar roll 2m 2m 2m 2m         Pinch pins twist Y  3x10 R  3x10 G  3x10 G  3x10         Flexbar twist  R  3x10 R  3x10 R  3x10                                                Ther Activity                                                                              Modalities             MH 8m 8m 8m 8m           5m 5m 5m

## 2023-03-27 ENCOUNTER — OFFICE VISIT (OUTPATIENT)
Dept: OCCUPATIONAL THERAPY | Facility: CLINIC | Age: 58
End: 2023-03-27

## 2023-03-27 DIAGNOSIS — G56.03 CARPAL TUNNEL SYNDROME, BILATERAL: Primary | ICD-10-CM

## 2023-03-27 NOTE — PROGRESS NOTES
Daily Note     Today's date: 3/27/2023  Patient name: Mo Teran  : 1965  MRN: [id number]  Referring provider: George Killian MD  Dx:   Encounter Diagnosis     ICD-10-CM    1  Carpal tunnel syndrome, bilateral  G56 03                      Subjective: I am not doing the exercises regularly      Objective: See treatment diary below      Assessment: Tolerated treatment fair  Patient has continued CTS symptoms      Plan: Continue per plan of care        Precautions: universal      Manuals 3/7 3/15 3/20 3/22 3/27        graston 4m 4m 4m 4m 4m        Intrinsic stretches 4m 4m 4m 4m 4m        CT stretches 4m 4m 4m 4m 4m                     HEP 3x10                                                                                                       Ther Ex             Wrist PROM  2m 2m 2m 2m        Wrist ext 2#  3x10 2#  3x10 3#  3x10 3#  3x10 3# 3x10        flexgrip Y  3x10 Y  3x10 Y  3x10 Y  3x10 y  3x10        flexbar roll 2m 2m 2m 2m 2m        Pinch pins twist Y  3x10 R  3x10 G  3x10 G  3x10         Flexbar twist  R  3x10 R  3x10 R  3x10 R 3x10        Wrist ext      2#  3x10                                  Ther Activity                                                                              Modalities             MH 8m 8m 8m 8m 8m          5m 5m 5m 5m

## 2023-03-29 ENCOUNTER — TELEPHONE (OUTPATIENT)
Dept: PSYCHIATRY | Facility: CLINIC | Age: 58
End: 2023-03-29

## 2023-03-29 ENCOUNTER — APPOINTMENT (OUTPATIENT)
Dept: OCCUPATIONAL THERAPY | Facility: CLINIC | Age: 58
End: 2023-03-29

## 2023-03-29 DIAGNOSIS — Z12.11 SCREENING FOR MALIGNANT NEOPLASM OF COLON: Primary | ICD-10-CM

## 2023-03-30 ENCOUNTER — OFFICE VISIT (OUTPATIENT)
Dept: FAMILY MEDICINE CLINIC | Facility: CLINIC | Age: 58
End: 2023-03-30

## 2023-03-30 VITALS
WEIGHT: 218 LBS | TEMPERATURE: 98.5 F | HEIGHT: 66 IN | HEART RATE: 89 BPM | DIASTOLIC BLOOD PRESSURE: 80 MMHG | SYSTOLIC BLOOD PRESSURE: 122 MMHG | OXYGEN SATURATION: 98 % | RESPIRATION RATE: 16 BRPM | BODY MASS INDEX: 35.03 KG/M2

## 2023-03-30 DIAGNOSIS — R05.1 ACUTE COUGH: Primary | ICD-10-CM

## 2023-03-30 NOTE — PROGRESS NOTES
"Assessment/Plan:     Diagnoses and all orders for this visit:    Acute cough  -     Covid/Flu- Office Collect      Check covid/flu swab  Pt may continue her Claritin  I would recommend she take Robitussin DM syrup OTC to help with her cough and congestion  She is encouraged to rest and keep well hydrated  We will contact her w/ results once available  Patient is encouraged to call our office for any questions/concerns, persistent or worsening symptoms  Patient states they understand and agree with treatment plan  Pt to f/u PRN  F/u pending results  Subjective:      Patient ID: Merly Pickett is a 62 y o  female  Pt presents today for flu-like symptoms including body aches and feeling feverish that started on Monday  Pt also notes a productive cough & clear rhinorrhea  Pt notes a scratchy throat from PND  Denies ear, V/D  She notes slight nausea occasionally  She took 1 Mucinex and allergy pill PRN since symptoms started  The following portions of the patient's history were reviewed and updated as appropriate: allergies, current medications, past family history, past medical history, past social history, past surgical history and problem list     Review of Systems    As noted per HPI  Objective:      /80 (BP Location: Left arm, Patient Position: Sitting, Cuff Size: Large)   Pulse 89   Temp 98 5 °F (36 9 °C) (Oral)   Resp 16   Ht 5' 6\" (1 676 m)   Wt 98 9 kg (218 lb)   SpO2 98%   BMI 35 19 kg/m²          Physical Exam  Vitals reviewed  Constitutional:       General: She is not in acute distress  Appearance: Normal appearance  She is not ill-appearing  HENT:      Head: Normocephalic  Right Ear: Tympanic membrane, ear canal and external ear normal       Left Ear: Tympanic membrane, ear canal and external ear normal       Nose: Congestion and rhinorrhea present  Mouth/Throat:      Pharynx: No oropharyngeal exudate or posterior oropharyngeal erythema   " Cardiovascular:      Rate and Rhythm: Normal rate and regular rhythm  Pulses: Normal pulses  Heart sounds: Normal heart sounds  Pulmonary:      Effort: Pulmonary effort is normal       Breath sounds: Normal breath sounds  Lymphadenopathy:      Cervical: No cervical adenopathy  Neurological:      Mental Status: She is alert and oriented to person, place, and time  Mental status is at baseline  Psychiatric:         Mood and Affect: Mood normal          Behavior: Behavior normal          Thought Content:  Thought content normal          Judgment: Judgment normal

## 2023-03-31 LAB
FLUAV RNA RESP QL NAA+PROBE: NEGATIVE
FLUBV RNA RESP QL NAA+PROBE: NEGATIVE
SARS-COV-2 RNA RESP QL NAA+PROBE: NEGATIVE

## 2023-04-03 ENCOUNTER — APPOINTMENT (OUTPATIENT)
Dept: OCCUPATIONAL THERAPY | Facility: CLINIC | Age: 58
End: 2023-04-03

## 2023-04-05 ENCOUNTER — OFFICE VISIT (OUTPATIENT)
Dept: OCCUPATIONAL THERAPY | Facility: CLINIC | Age: 58
End: 2023-04-05

## 2023-04-05 DIAGNOSIS — G56.03 CARPAL TUNNEL SYNDROME, BILATERAL: Primary | ICD-10-CM

## 2023-04-05 NOTE — PROGRESS NOTES
Daily Note     Today's date: 2023  Patient name: Nabil Fuentes  : 1965  MRN: [id number]  Referring provider: Ebony Palacio MD  Dx:   Encounter Diagnosis     ICD-10-CM    1  Carpal tunnel syndrome, bilateral  G56 03                      Subjective: I am doing  A little better       Objective: See treatment diary below      Assessment: Tolerated treatment well  Patient has improved symptoms   She struggles with opening small containers    She is worse with fatigue       Plan: Continue per plan of care        Precautions: universal      Manuals 3/7 3/15 3/20 3/22 3/27 4/5       graston 4m 4m 4m 4m 4m 4m       Intrinsic stretches 4m 4m 4m 4m 4m 4m       CT stretches 4m 4m 4m 4m 4m 4m                    HEP 3x10                                                                                                       Ther Ex             Wrist PROM  2m 2m 2m 2m 2m       Wrist ext 2#  3x10 2#  3x10 3#  3x10 3#  3x10 3# 3x10 3#  3x10       flexgrip Y  3x10 Y  3x10 Y  3x10 Y  3x10 y  3x10 yellow  3x10       flexbar roll 2m 2m 2m 2m 2m 2m       Pinch pins twist Y  3x10 R  3x10 G  3x10 G  3x10         Flexbar twist  R  3x10 R  3x10 R  3x10 R 3x10 R  3x10       Wrist ext      2#  3x10 3#  3x10       Hammer sup      Small  3x10                    Ther Activity             Grooved pegs      40x                                                           Modalities             MH 8m 8m 8m 8m 8m 8m         5m 5m 5m 5m 5m

## 2023-04-10 ENCOUNTER — APPOINTMENT (OUTPATIENT)
Dept: OCCUPATIONAL THERAPY | Facility: CLINIC | Age: 58
End: 2023-04-10

## 2023-04-26 DIAGNOSIS — K21.9 GASTROESOPHAGEAL REFLUX DISEASE, UNSPECIFIED WHETHER ESOPHAGITIS PRESENT: ICD-10-CM

## 2023-04-26 RX ORDER — OMEPRAZOLE 40 MG/1
40 CAPSULE, DELAYED RELEASE ORAL DAILY
Qty: 90 CAPSULE | Refills: 2 | Status: SHIPPED | OUTPATIENT
Start: 2023-04-26

## 2023-04-26 NOTE — TELEPHONE ENCOUNTER
----- Message from Tara Nunez sent at 4/26/2023  4:04 PM EDT -----  Regarding: Refills  Contact: [phone]  Can you call in Omeprazole refills to [facility] mail order? Thank you!

## 2023-04-29 DIAGNOSIS — I10 PRIMARY HYPERTENSION: ICD-10-CM

## 2023-05-01 RX ORDER — LOSARTAN POTASSIUM 100 MG/1
100 TABLET ORAL DAILY
Qty: 90 TABLET | Refills: 0 | Status: SHIPPED | OUTPATIENT
Start: 2023-05-01

## 2023-05-04 DIAGNOSIS — F33.1 MODERATE EPISODE OF RECURRENT MAJOR DEPRESSIVE DISORDER (HCC): ICD-10-CM

## 2023-05-04 RX ORDER — SERTRALINE HYDROCHLORIDE 100 MG/1
200 TABLET, FILM COATED ORAL DAILY
Qty: 180 TABLET | Refills: 0 | Status: SHIPPED | OUTPATIENT
Start: 2023-05-04

## 2023-05-17 DIAGNOSIS — Z79.899 LONG-TERM USE OF HIGH-RISK MEDICATION: Primary | ICD-10-CM

## 2023-05-22 ENCOUNTER — OFFICE VISIT (OUTPATIENT)
Dept: FAMILY MEDICINE CLINIC | Facility: CLINIC | Age: 58
End: 2023-05-22

## 2023-05-22 VITALS
WEIGHT: 212 LBS | RESPIRATION RATE: 16 BRPM | SYSTOLIC BLOOD PRESSURE: 124 MMHG | HEIGHT: 66 IN | HEART RATE: 86 BPM | OXYGEN SATURATION: 97 % | BODY MASS INDEX: 34.07 KG/M2 | DIASTOLIC BLOOD PRESSURE: 84 MMHG

## 2023-05-22 DIAGNOSIS — L82.1 SEBORRHEIC KERATOSES: Primary | ICD-10-CM

## 2023-05-22 DIAGNOSIS — Z12.11 COLON CANCER SCREENING: ICD-10-CM

## 2023-05-22 DIAGNOSIS — L98.9 SKIN LESION OF BACK: ICD-10-CM

## 2023-05-22 DIAGNOSIS — R05.9 COUGH: ICD-10-CM

## 2023-05-22 RX ORDER — ALBUTEROL SULFATE 90 UG/1
2 AEROSOL, METERED RESPIRATORY (INHALATION) EVERY 6 HOURS PRN
Qty: 8 G | Refills: 3 | Status: SHIPPED | OUTPATIENT
Start: 2023-05-22

## 2023-05-22 NOTE — PROGRESS NOTES
Assessment/Plan:     Diagnoses and all orders for this visit:    Seborrheic keratoses    Lesion to patient's left back appears to be seborrheic keratotic lesion  Pt advised that we can remove this for her in office  She is encouraged to schedule procedure visit w/ our   Patient is encouraged to call our office for any questions/concerns, persistent or worsening symptoms  Patient states they understand and agree with treatment plan  Skin lesion of back    Other lesion to patient's right back appears to be a possible mole that was scratched open  Pt advised that we can remove this for her in office  She is encouraged to schedule procedure visit w/ our   Patient is encouraged to call our office for any questions/concerns, persistent or worsening symptoms  Patient states they understand and agree with treatment plan  Cough  -     albuterol (PROVENTIL HFA,VENTOLIN HFA) 90 mcg/act inhaler; Inhale 2 puffs every 6 (six) hours as needed for wheezing or shortness of breath    Refills prescribed  Colon cancer screening  -     Ambulatory Referral to Gastroenterology; Future      Colonoscopy order reordered  Pt to f/u PRN and is encouraged to schedule lesion removal in office  Subjective:      Patient ID: Caleb Quispe is a 62 y o  female  Pt here today for 2 lesions on her back that she wanted looked at  She notes she does not recall them being there last year  She has a dermatology appointment his August, but wanted someone to take a look prior  She denies any pain to the lesions  Pt notes that the lesions to her right mid back she accidentally scratched open and it is now healing  On side note, pt wants her inhaler sent to [facility] of [facility]   She notes her breathing is doing well  In regards to her mental health, she notes she was previously on [facility] waitlist, but is searching out additional options for Psych        The following portions of the patient's history "were reviewed and updated as appropriate: allergies, current medications, past family history, past medical history, past social history, past surgical history and problem list     Review of Systems    As noted per HPI  Objective:      /84 (BP Location: Left arm, Patient Position: Sitting, Cuff Size: Large)   Pulse 86   Resp 16   Ht 5' 6\" (1 676 m)   Wt 96 2 kg (212 lb)   SpO2 97%   BMI 34 22 kg/m²          Physical Exam  Vitals reviewed  Constitutional:       Appearance: Normal appearance  Pulmonary:      Effort: Pulmonary effort is normal    Skin:         Neurological:      Mental Status: She is alert and oriented to person, place, and time  Mental status is at baseline  Psychiatric:         Mood and Affect: Mood normal          Behavior: Behavior normal          Thought Content:  Thought content normal          Judgment: Judgment normal          "

## 2023-05-27 ENCOUNTER — APPOINTMENT (OUTPATIENT)
Dept: LAB | Facility: HOSPITAL | Age: 58
End: 2023-05-27

## 2023-05-27 DIAGNOSIS — Z79.899 LONG-TERM USE OF HIGH-RISK MEDICATION: ICD-10-CM

## 2023-05-27 DIAGNOSIS — R73.09 ELEVATED HEMOGLOBIN A1C: ICD-10-CM

## 2023-05-27 DIAGNOSIS — D64.9 LOW HEMOGLOBIN: ICD-10-CM

## 2023-05-27 LAB
ALBUMIN SERPL BCP-MCNC: 3.9 G/DL (ref 3.5–5)
ALP SERPL-CCNC: 84 U/L (ref 34–104)
ALT SERPL W P-5'-P-CCNC: 14 U/L (ref 7–52)
ANION GAP SERPL CALCULATED.3IONS-SCNC: 6 MMOL/L (ref 4–13)
AST SERPL W P-5'-P-CCNC: 17 U/L (ref 13–39)
BASOPHILS # BLD AUTO: 0.05 THOUSANDS/ÂΜL (ref 0–0.1)
BASOPHILS NFR BLD AUTO: 1 % (ref 0–1)
BILIRUB SERPL-MCNC: 0.3 MG/DL (ref 0.2–1)
BUN SERPL-MCNC: 14 MG/DL (ref 5–25)
CALCIUM SERPL-MCNC: 9.3 MG/DL (ref 8.4–10.2)
CHLORIDE SERPL-SCNC: 105 MMOL/L (ref 96–108)
CO2 SERPL-SCNC: 28 MMOL/L (ref 21–32)
CREAT SERPL-MCNC: 0.81 MG/DL (ref 0.6–1.3)
EOSINOPHIL # BLD AUTO: 0.31 THOUSAND/ÂΜL (ref 0–0.61)
EOSINOPHIL NFR BLD AUTO: 6 % (ref 0–6)
ERYTHROCYTE [DISTWIDTH] IN BLOOD BY AUTOMATED COUNT: 14.6 % (ref 11.6–15.1)
EST. AVERAGE GLUCOSE BLD GHB EST-MCNC: 117 MG/DL
FERRITIN SERPL-MCNC: 3 NG/ML (ref 11–307)
GFR SERPL CREATININE-BSD FRML MDRD: 80 ML/MIN/1.73SQ M
GLUCOSE P FAST SERPL-MCNC: 90 MG/DL (ref 65–99)
HBA1C MFR BLD: 5.7 %
HCT VFR BLD AUTO: 33.5 % (ref 34.8–46.1)
HGB BLD-MCNC: 10.5 G/DL (ref 11.5–15.4)
IMM GRANULOCYTES # BLD AUTO: 0.01 THOUSAND/UL (ref 0–0.2)
IMM GRANULOCYTES NFR BLD AUTO: 0 % (ref 0–2)
IRON SATN MFR SERPL: 10 % (ref 15–50)
IRON SERPL-MCNC: 34 UG/DL (ref 50–170)
LYMPHOCYTES # BLD AUTO: 1.09 THOUSANDS/ÂΜL (ref 0.6–4.47)
LYMPHOCYTES NFR BLD AUTO: 21 % (ref 14–44)
MCH RBC QN AUTO: 25.5 PG (ref 26.8–34.3)
MCHC RBC AUTO-ENTMCNC: 31.3 G/DL (ref 31.4–37.4)
MCV RBC AUTO: 82 FL (ref 82–98)
MONOCYTES # BLD AUTO: 0.67 THOUSAND/ÂΜL (ref 0.17–1.22)
MONOCYTES NFR BLD AUTO: 13 % (ref 4–12)
NEUTROPHILS # BLD AUTO: 2.99 THOUSANDS/ÂΜL (ref 1.85–7.62)
NEUTS SEG NFR BLD AUTO: 59 % (ref 43–75)
NRBC BLD AUTO-RTO: 0 /100 WBCS
PLATELET # BLD AUTO: 369 THOUSANDS/UL (ref 149–390)
PMV BLD AUTO: 11.4 FL (ref 8.9–12.7)
POTASSIUM SERPL-SCNC: 4 MMOL/L (ref 3.5–5.3)
PROT SERPL-MCNC: 7 G/DL (ref 6.4–8.4)
RBC # BLD AUTO: 4.11 MILLION/UL (ref 3.81–5.12)
SODIUM SERPL-SCNC: 139 MMOL/L (ref 135–147)
TIBC SERPL-MCNC: 351 UG/DL (ref 250–450)
WBC # BLD AUTO: 5.12 THOUSAND/UL (ref 4.31–10.16)

## 2023-05-31 ENCOUNTER — TELEPHONE (OUTPATIENT)
Dept: FAMILY MEDICINE CLINIC | Facility: CLINIC | Age: 58
End: 2023-05-31

## 2023-05-31 DIAGNOSIS — Z01.84 IMMUNITY STATUS TESTING: Primary | ICD-10-CM

## 2023-05-31 DIAGNOSIS — D50.9 IRON DEFICIENCY ANEMIA, UNSPECIFIED IRON DEFICIENCY ANEMIA TYPE: Primary | ICD-10-CM

## 2023-05-31 RX ORDER — FERROUS SULFATE TAB EC 324 MG (65 MG FE EQUIVALENT) 324 (65 FE) MG
324 TABLET DELAYED RESPONSE ORAL
Qty: 90 TABLET | Refills: 2 | Status: SHIPPED | OUTPATIENT
Start: 2023-05-31

## 2023-05-31 NOTE — TELEPHONE ENCOUNTER
----- Message from [address] [place] [address] sent at 5/31/2023 10:44 AM EDT -----  Please call patient and let her know that her iron levels are very low and her hemoglobin level dropped  Any history of iron deficiency anemia that she is aware of? Any previous abdominal surgeries? Any blood in urine or stool or dark tarry stools? I am going to send over iron supplements to her pharmacy for her to take first thing in the morning either on an empty stomach or with some orange juice  It may take 6 months for the iron levels to replenish  Most common side effects of iron supplementation can be upset stomach or constipation  Please have her let us know if this occurs  I am also going to refer her to GI, as they will want to investigate why her counts are low and they may like to pursue a colonscopy to evaluate any possible GI tract bleeding  Please give her this info  Additionally, I want to check labs again in 3 months  These are ordered  If she has any fatigue, dizziness, lightheadedness or bleeding, please have her reach out to make us aware  Thanks!

## 2023-05-31 NOTE — TELEPHONE ENCOUNTER
Patient called back, I gave her the results again  She said she has had low iron in the past , but never this low she is a regular blood donor  No abdominal surgeries, no tarry black stools  She has always gotten sick taking iron supplements, in fact she has them at home and does not take them  She will start taking what you had ordered and call if there are any issues  she said her iron was low in the past but never this low  She also thought these labs were for her Cholesterol & A1c   She would really like her cholesterol checked

## 2023-06-05 ENCOUNTER — OFFICE VISIT (OUTPATIENT)
Dept: OBGYN CLINIC | Facility: CLINIC | Age: 58
End: 2023-06-05
Payer: COMMERCIAL

## 2023-06-05 VITALS
HEIGHT: 66 IN | DIASTOLIC BLOOD PRESSURE: 80 MMHG | BODY MASS INDEX: 34.33 KG/M2 | WEIGHT: 213.6 LBS | SYSTOLIC BLOOD PRESSURE: 120 MMHG

## 2023-06-05 DIAGNOSIS — G56.21 CUBITAL TUNNEL SYNDROME ON RIGHT: ICD-10-CM

## 2023-06-05 DIAGNOSIS — G56.03 CARPAL TUNNEL SYNDROME, BILATERAL: Primary | ICD-10-CM

## 2023-06-05 PROCEDURE — 99214 OFFICE O/P EST MOD 30 MIN: CPT | Performed by: ORTHOPAEDIC SURGERY

## 2023-06-05 NOTE — PROGRESS NOTES
ASSESSMENT/PLAN:    Assessment:   Bilateral carpal tunnel syndrome  Right cubital tunnel syndrome    Plan:   Thu Lyn is interested in surgical intervention, after her lab work get figured out   She was shown how to properly wear the cock up wrist braces in the office today     Follow Up:  PRN    To Do Next Visit:  Re-evaluation     _____________________________________________________  CHIEF COMPLAINT:  Chief Complaint   Patient presents with   • Left Wrist - Carpal Tunnel, Follow-up   • Right Wrist - Follow-up, Carpal Tunnel   • Right Elbow - Follow-up, cubital tunnel         SUBJECTIVE:  Jennifer Rebollar is a 62 y o  female who presents for follow up regarding Carpal Tunnel Syndrome  bilateral and Cubital Tunnel Syndrome  right  Since last visit, Jennifer Rebollar has tried bracing but notes she is unsure if she is wearing the braces correctly so she has not been bracing  She has not undergone the ergonomic eval as of yet  Today there is intermittent but frequent numbness and tingling to both hands, not so much her small fingers  She feels her right side may be slightly more worse or bothersome then her left side as she is RHD  She did recently have some lab work performed, which has been abnormal for her  She will be seeing a Gastroenterologist in a few months  She did adjust her chair at work and notes this has made her hands better     Radiation: None  Associated symptoms: Numbness  Handedness: right  Work status: [employer] employee     PAST MEDICAL HISTORY:  Past Medical History:   Diagnosis Date   • ADHD (attention deficit hyperactivity disorder)    • Anxiety    • Arthritis    • Depression    • Fibromyalgia, primary    • GERD (gastroesophageal reflux disease)    • History of stomach ulcers    • Hypertension    • Panic attack    • Papanicolaou smear 2020   • Psychiatric disorder        PAST SURGICAL HISTORY:  Past Surgical History:   Procedure Laterality Date   • HYSTEROSCOPY  2015   • MAMMO (HISTORICAL)  2020 FAMILY HISTORY:  Family History   Problem Relation Age of Onset   • Hypertension Mother    • Lung cancer Mother    • Skin cancer Father        SOCIAL HISTORY:  Social History     Tobacco Use   • Smoking status: Former   • Smokeless tobacco: Never   • Tobacco comments:     teen years   Vaping Use   • Vaping Use: Never used   Substance Use Topics   • Alcohol use: Yes     Comment: Socially   • Drug use: No       MEDICATIONS:    Current Outpatient Medications:   •  albuterol (PROVENTIL HFA,VENTOLIN HFA) 90 mcg/act inhaler, Inhale 2 puffs every 6 (six) hours as needed for wheezing or shortness of breath, Disp: 8 g, Rfl: 3  •  buPROPion (Wellbutrin SR) 150 mg 12 hr tablet, Take 1 tablet (150 mg total) by mouth in the morning, Disp: 90 tablet, Rfl: 2  •  buPROPion (WELLBUTRIN XL) 300 mg 24 hr tablet, Take 1 tablet (300 mg total) by mouth daily, Disp: 90 tablet, Rfl: 2  •  cholecalciferol (VITAMIN D3) 1,000 units tablet, Take 1,000 Units by mouth daily, Disp: , Rfl:   •  ciclopirox (PENLAC) 8 % solution, Apply topically daily at bedtime, Disp: 6 6 mL, Rfl: 3  •  Clocortolone Pivalate 0 1 % cream, Apply topically if needed, Disp: , Rfl:   •  clotrimazole-betamethasone (LOTRISONE) 1-0 05 % cream, Apply topically 2 (two) times a day, Disp: 30 g, Rfl: 2  •  ferrous sulfate 324 (65 Fe) mg, Take 1 tablet (324 mg total) by mouth daily before breakfast, Disp: 90 tablet, Rfl: 2  •  hydrochlorothiazide (HYDRODIURIL) 12 5 mg tablet, Take 1 tablet (12 5 mg total) by mouth daily, Disp: 90 tablet, Rfl: 2  •  hydrOXYzine HCL (ATARAX) 25 mg tablet, Take 1 tablet (25 mg total) by mouth every 6 (six) hours as needed for itching or anxiety, Disp: 30 tablet, Rfl: 0  •  losartan (COZAAR) 100 MG tablet, Take 1 tablet (100 mg total) by mouth daily, Disp: 90 tablet, Rfl: 0  •  Multiple Vitamin (multivitamin) tablet, Take 1 tablet by mouth daily, Disp: , Rfl:   •  omeprazole (PriLOSEC) 40 MG capsule, Take 1 capsule (40 mg total) by mouth "daily Take before a meal, Disp: 90 capsule, Rfl: 2  •  sertraline (ZOLOFT) 100 mg tablet, Take 2 tablets (200 mg total) by mouth daily, Disp: 180 tablet, Rfl: 0  •  SF 5000 Plus 1 1 % CREA, , Disp: , Rfl:   •  terbinafine (LamISIL) 250 mg tablet, Take 1 tablet (250 mg total) by mouth daily (Take one tab daily for 2 weeks  Repeat every 2 months), Disp: 90 tablet, Rfl: 0  •  QUEtiapine (SEROquel) 100 mg tablet, Take 1 tablet (100 mg total) by mouth daily at bedtime (Patient taking differently: Take 100 mg by mouth daily at bedtime Usually takes half), Disp: 90 tablet, Rfl: 2    ALLERGIES:  Allergies   Allergen Reactions   • Latex Rash       REVIEW OF SYSTEMS:  Pertinent items are noted in HPI  A comprehensive review of systems was negative      LABS:  HgA1c:   Lab Results   Component Value Date    HGBA1C 5 7 (H) 05/27/2023     BMP:   Lab Results   Component Value Date    BUN 14 05/27/2023    CALCIUM 9 3 05/27/2023     05/27/2023    CO2 28 05/27/2023    CREATININE 0 81 05/27/2023    GLUCOSE 89 10/01/2016    K 4 0 05/27/2023           _____________________________________________________  PHYSICAL EXAMINATION:  Vital signs: /80   Ht 5' 6\" (1 676 m)   Wt 96 9 kg (213 lb 9 6 oz)   BMI 34 48 kg/m²   General: well developed and well nourished, alert, oriented times 3 and appears comfortable  Psychiatric: Normal  HEENT: Trachea Midline, No torticollis  Cardiovascular: No discernable arrhythmia  Pulmonary: No wheezing or stridor  Abdomen: No rebound or guarding  Extremities: No peripheral edema  Skin: No masses, erythema, lacerations, fluctation, ulcerations  Neurovascular: Sensation Intact to the Median, Ulnar, Radial Nerve, Motor Intact to the Median, Ulnar, Radial Nerve and Pulses Intact    MUSCULOSKELETAL EXAMINATION:    LEFT SIDE:  Carpal tunnel: No erythema ecchymosis or edema, APB strength 5/5, intrinsic strength 5/5, AIN strength 5/5, no atrophy noted     RIGHT SIDE:  Carpal tunnel: No erythema " ecchymosis or edema, APB strength 5/5, intrinsic strength 5/5, AIN strength 5/5, no atrophy noted     _____________________________________________________  STUDIES REVIEWED:  No Studies to review      PROCEDURES PERFORMED:  Procedures  No Procedures performed today    Scribe Attestation    I,:  Lenny Kay am acting as a scribe while in the presence of the attending physician :       I,:  Amy Horton MD personally performed the services described in this documentation    as scribed in my presence :

## 2023-06-20 ENCOUNTER — APPOINTMENT (OUTPATIENT)
Dept: RADIOLOGY | Facility: CLINIC | Age: 58
End: 2023-06-20
Payer: COMMERCIAL

## 2023-06-20 ENCOUNTER — OFFICE VISIT (OUTPATIENT)
Dept: OBGYN CLINIC | Facility: CLINIC | Age: 58
End: 2023-06-20
Payer: COMMERCIAL

## 2023-06-20 VITALS
DIASTOLIC BLOOD PRESSURE: 82 MMHG | BODY MASS INDEX: 33.91 KG/M2 | HEIGHT: 66 IN | WEIGHT: 211 LBS | SYSTOLIC BLOOD PRESSURE: 118 MMHG

## 2023-06-20 DIAGNOSIS — M76.71 PERONEAL TENDONITIS OF RIGHT LOWER LEG: Primary | ICD-10-CM

## 2023-06-20 DIAGNOSIS — M25.571 ACUTE RIGHT ANKLE PAIN: ICD-10-CM

## 2023-06-20 PROCEDURE — 73610 X-RAY EXAM OF ANKLE: CPT

## 2023-06-20 PROCEDURE — 99214 OFFICE O/P EST MOD 30 MIN: CPT | Performed by: ORTHOPAEDIC SURGERY

## 2023-06-20 NOTE — PROGRESS NOTES
Assessment:     1  Peroneal tendonitis of right lower leg        Plan:     Problem List Items Addressed This Visit        Musculoskeletal and Integument    Peroneal tendonitis of right lower leg - Primary     All patient's questions were answered to her satisfaction  This note is created using dictation transcription  It may contain typographical errors, grammatical errors, improperly dictated words, background noise and other errors  Findings consistent with right peroneal tendonitis  Findings and treatment options were discussed with the patient  X-rays were reviewed with the patient that revealed no bony abnormalities  Recommend use of cam walker when ambulating to rest the tendon  She is to come out of it at rest for range of motion of the ankle, icing and elevation  Recommend NSAIDs as needed for pain  She is to avoid any high impact activities or uneven ground  Follow-up in 6 weeks for reevaluation  Discussed that we will most likely send her for formal physical therapy at that time  All questions were answered to patient's satisfaction  Relevant Orders    XR ankle 3+ vw right    Cam Boot      Subjective:     Patient ID: Cate Cantu is a 62 y o  female  Chief Complaint: This is a 72-year-old white female here for evaluation of her right ankle  She states she woke up with pain over the lateral aspect of her ankle about a week ago  She denies any injury or change in activities  Pain is worse when weightbearing  She does have pain at rest, but it is less severe  It is aching and sometimes sharp  No treatment as of yet  She denies any prior injuries to the ankle in the past   Patient intake form reviewed      Allergy:  Allergies   Allergen Reactions   • Latex Rash     Medications:  all current active meds have been reviewed  Past Medical History:  Past Medical History:   Diagnosis Date   • ADHD (attention deficit hyperactivity disorder)    • Anxiety    • Arthritis    • Depression    • "Fibromyalgia, primary    • GERD (gastroesophageal reflux disease)    • History of stomach ulcers    • Hypertension    • Panic attack    • Papanicolaou smear 2020   • Psychiatric disorder      Past Surgical History:  Past Surgical History:   Procedure Laterality Date   • HYSTEROSCOPY  2015   • MAMMO (HISTORICAL)  2020     Family History:  Family History   Problem Relation Age of Onset   • Hypertension Mother    • Lung cancer Mother    • Skin cancer Father      Social History:  Social History     Substance and Sexual Activity   Alcohol Use Yes    Comment: Socially     Social History     Substance and Sexual Activity   Drug Use No     Social History     Tobacco Use   Smoking Status Former   Smokeless Tobacco Never   Tobacco Comments    teen years     Review of Systems   Constitutional: Negative  HENT: Negative  Eyes: Negative  Respiratory: Negative  Cardiovascular: Negative  Gastrointestinal: Negative  Endocrine: Negative  Genitourinary: Negative  Musculoskeletal: Positive for arthralgias (Right ankle), gait problem (antalgic) and joint swelling (Right ankle)  Skin: Negative  Allergic/Immunologic: Negative  Hematological: Negative  Psychiatric/Behavioral: Negative  Objective:  BP Readings from Last 1 Encounters:   06/20/23 118/82      Wt Readings from Last 1 Encounters:   06/20/23 95 7 kg (211 lb)      BMI:   Estimated body mass index is 34 06 kg/m² as calculated from the following:    Height as of this encounter: 5' 6\" (1 676 m)  Weight as of this encounter: 95 7 kg (211 lb)  BSA:   Estimated body surface area is 2 05 meters squared as calculated from the following:    Height as of this encounter: 5' 6\" (1 676 m)  Weight as of this encounter: 95 7 kg (211 lb)  Physical Exam  Vitals and nursing note reviewed  Constitutional:       General: She is not in acute distress  Appearance: Normal appearance  She is well-developed     HENT:      Head: Normocephalic and " atraumatic  Right Ear: External ear normal       Left Ear: External ear normal    Eyes:      Extraocular Movements: Extraocular movements intact  Conjunctiva/sclera: Conjunctivae normal    Pulmonary:      Effort: Pulmonary effort is normal  No respiratory distress  Musculoskeletal:      Cervical back: Neck supple  Skin:     General: Skin is warm and dry  Neurological:      Mental Status: She is alert and oriented to person, place, and time  Deep Tendon Reflexes: Reflexes are normal and symmetric  Psychiatric:         Mood and Affect: Mood normal          Behavior: Behavior normal        Right Ankle Exam     Tenderness   The patient is experiencing tenderness in the lateral malleolus (peroneal tendon)  Swelling: mild (lateral)    Range of Motion   The patient has normal right ankle ROM  Muscle Strength   Dorsiflexion:  5/5  Plantar flexion:  5/5  Posterior tibial:  5/5  Peroneal muscle:  4/5    Other   Erythema: absent  Scars: absent  Sensation: normal  Pulse: present     Comments:  Pain with resisted dorsiflexion/eversion and plantarflexion/eversion            I have personally reviewed pertinent films in PACS and my interpretation is X-rays of the right ankle reveal no bony abnormalities  No soft tissue calcifications       Scribe Attestation    I,:  Estephanie Forrester PA-C am acting as a scribe while in the presence of the attending physician :       I,:  Aristides Castano MD personally performed the services described in this documentation    as scribed in my presence :

## 2023-06-20 NOTE — ASSESSMENT & PLAN NOTE
All patient's questions were answered to her satisfaction  This note is created using dictation transcription  It may contain typographical errors, grammatical errors, improperly dictated words, background noise and other errors  Findings consistent with right peroneal tendonitis  Findings and treatment options were discussed with the patient  X-rays were reviewed with the patient that revealed no bony abnormalities  Recommend use of cam walker when ambulating to rest the tendon  She is to come out of it at rest for range of motion of the ankle, icing and elevation  Recommend NSAIDs as needed for pain  She is to avoid any high impact activities or uneven ground  Follow-up in 6 weeks for reevaluation  Discussed that we will most likely send her for formal physical therapy at that time  All questions were answered to patient's satisfaction

## 2023-07-02 DIAGNOSIS — D50.9 IRON DEFICIENCY ANEMIA, UNSPECIFIED IRON DEFICIENCY ANEMIA TYPE: ICD-10-CM

## 2023-07-02 DIAGNOSIS — I10 PRIMARY HYPERTENSION: ICD-10-CM

## 2023-07-03 RX ORDER — HYDROCHLOROTHIAZIDE 12.5 MG/1
12.5 TABLET ORAL DAILY
Qty: 90 TABLET | Refills: 0 | Status: SHIPPED | OUTPATIENT
Start: 2023-07-03

## 2023-07-03 RX ORDER — FERROUS SULFATE TAB EC 324 MG (65 MG FE EQUIVALENT) 324 (65 FE) MG
324 TABLET DELAYED RESPONSE ORAL
Qty: 90 TABLET | Refills: 0 | Status: SHIPPED | OUTPATIENT
Start: 2023-07-03

## 2023-07-11 ENCOUNTER — PROCEDURE VISIT (OUTPATIENT)
Dept: FAMILY MEDICINE CLINIC | Facility: CLINIC | Age: 58
End: 2023-07-11
Payer: COMMERCIAL

## 2023-07-11 VITALS
TEMPERATURE: 98.2 F | WEIGHT: 213.3 LBS | HEIGHT: 66 IN | HEART RATE: 79 BPM | SYSTOLIC BLOOD PRESSURE: 122 MMHG | DIASTOLIC BLOOD PRESSURE: 80 MMHG | BODY MASS INDEX: 34.28 KG/M2 | RESPIRATION RATE: 16 BRPM | OXYGEN SATURATION: 97 %

## 2023-07-11 DIAGNOSIS — L82.1 SEBORRHEIC KERATOSES: Primary | ICD-10-CM

## 2023-07-11 PROCEDURE — 99212 OFFICE O/P EST SF 10 MIN: CPT | Performed by: NURSE PRACTITIONER

## 2023-07-11 NOTE — PROGRESS NOTES
Assessment/Plan:     Diagnoses and all orders for this visit:    Seborrheic keratoses      While prepping patient's skin with alcohol for lesion removal, patient's seborrheic lesion was able to fully detach itself from skin with friction of alcohol pad. Pt encouraged to continue to monitor area for any swelling or redness. She does have appointment on 08/02/23 to establish with dermatology as well. Patient is encouraged to call our office for any questions/concerns, persistent or worsening symptoms. Patient states they understand and agree with treatment plan. Pt to f/u PRN. Subjective:      Patient ID: Poli Zarco is a 62 y.o. female. Pt here today for seborrheic keratosis removal to her back. Overall she is feeling okay. The following portions of the patient's history were reviewed and updated as appropriate: allergies, current medications, past family history, past medical history, past social history, past surgical history and problem list.    Review of Systems    As noted per HPI. Objective:      /80   Pulse 79   Temp 98.2 °F (36.8 °C) (Oral)   Resp 16   Ht 5' 6" (1.676 m)   Wt 96.8 kg (213 lb 4.8 oz)   SpO2 97%   BMI 34.43 kg/m²          Physical Exam  Constitutional:       Appearance: Normal appearance. Pulmonary:      Effort: Pulmonary effort is normal.   Skin:         Neurological:      Mental Status: She is alert and oriented to person, place, and time. Mental status is at baseline.

## 2023-07-13 ENCOUNTER — OFFICE VISIT (OUTPATIENT)
Dept: GASTROENTEROLOGY | Facility: CLINIC | Age: 58
End: 2023-07-13
Payer: COMMERCIAL

## 2023-07-13 VITALS
DIASTOLIC BLOOD PRESSURE: 90 MMHG | SYSTOLIC BLOOD PRESSURE: 130 MMHG | WEIGHT: 214.2 LBS | BODY MASS INDEX: 34.42 KG/M2 | HEIGHT: 66 IN | TEMPERATURE: 98.7 F

## 2023-07-13 DIAGNOSIS — D50.9 IRON DEFICIENCY ANEMIA, UNSPECIFIED IRON DEFICIENCY ANEMIA TYPE: ICD-10-CM

## 2023-07-13 PROCEDURE — 99244 OFF/OP CNSLTJ NEW/EST MOD 40: CPT | Performed by: INTERNAL MEDICINE

## 2023-07-13 RX ORDER — BISACODYL 5 MG/1
10 TABLET, DELAYED RELEASE ORAL ONCE
Qty: 2 TABLET | Refills: 0 | Status: SHIPPED | OUTPATIENT
Start: 2023-07-13 | End: 2023-07-13

## 2023-07-13 NOTE — PATIENT INSTRUCTIONS
Scheduled date of colonoscopy/egd  (as of today): 8/9/23  Physician performing colonoscopy: Dr. Shruti Vera  Location of colonoscopy: [facility]  Bowel prep reviewed with patient: golytely/dulcolax  Instructions reviewed with patient by: [facility]   Clearances:   n/a

## 2023-07-13 NOTE — PROGRESS NOTES
[facility]  Outpatient Consultation  Bry Olivares 62 y.o. female MRN: [id number]  Encounter: [id number]      ASSESSMENT & PLAN:    1. Iron deficiency anemia  2. CRC screening   Patient referred to our office from PCP due to iron deficiency anemia over past 2 months. Hgb decreased from normal to 11 and then to 10.5 between 2/2023 and 5/2023. Last Colonoscopy was 2017 was told 5 year follow up. Not on file -- no family history of colon cancer but did have polyps removed at that time. No family history of colon cancer. In setting of new iron deficiency anemia, will follow up with endoscopy. Lost 15 lb intentionally. No B symptoms.   - Plan for EGD / colonoscopy in setting of iron deficiency anemia and requiring CRC screening as well    3. Changes in bowel habits  Patient has changes in bowel habits from [facility] stool of 4 and now wavers between a 1 and a 6 pending on the time of the year. The above for assessment and plan. Problem List Items Addressed This Visit    None  Visit Diagnoses     Iron deficiency anemia, unspecified iron deficiency anemia type        Relevant Medications    bisacodyl (DULCOLAX) 5 mg EC tablet    polyethylene glycol (GOLYTELY) 4000 mL solution    Other Relevant Orders    Colonoscopy    EGD        Orders Placed This Encounter   Procedures   • Colonoscopy     Standing Status:   Future     Standing Expiration Date:   7/13/2024     Order Specific Question:   Requested Site     Answer: [facility]     Order Specific Question:   Performing Location     Answer:   Endoscopy Dept     Order Specific Question:   Are you the performing provider? Answer:   Yes     Order Specific Question:   Anesthesia required? Answer:   Yes     Order Specific Question:   Is this procedure associated with another Endo procedure? Answer:   No     Order Specific Question:   Are you an Ambulatory ?  (No Provider Response Needed)     Answer:   No   • EGD Standing Status:   Future     Standing Expiration Date:   7/13/2024     Order Specific Question:   Requested Site     Answer: [facility]     Order Specific Question:   Performing Location     Answer:   Endoscopy Dept     Order Specific Question:   Are you the performing provider? Answer:   Yes     Order Specific Question:   Anesthesia required? Answer:   Yes     Order Specific Question:   Is this procedure associated with another Endo procedure? Answer:   No     Order Specific Question:   Are you an Ambulatory ? (No Provider Response Needed)     Answer:   No       FOLLOW-UP: Colonoscopy   ______________________________________________________________________    HPI:    Patient is a 59F who presents from PCP office due to iron deficiency anemia with Hgb of 10.5 from normal.   Patient did notice change in bowel habits in past 1 year. Initially she had a Ripley stool scale of 4 and now has a Ripley stool of 1 at times and Ripley stool 6-7 at other times. She has been having some fatigue for past year. 2017 she had her colonoscopy and had polyps removed. Was told to follow up in 5 years for repeat colonoscopy. No family history of colon, gastric, pancreas cancers. Does have Fhx of diverticulitis and colitis but not Crohn's or UC. No fevers chills, night sweats or unintentional weight loss. She did lose 15 lbs on purpose but has since hit a plateau.      PREVIOUS ENDOSCOPY: N/A  PREVIOUS COLONOSCOPY: 2017 but not available in chart    REVIEW OF SYSTEMS:    CONSTITUTIONAL: Denies any fever, chills, rigors  HEENT: No earache or tinnitus, denies hearing loss or visual disturbances  CARDIOVASCULAR: No chest pain or palpitations  RESPIRATORY: Denies any cough, hemoptysis, shortness of breath or dyspnea on exertion  GASTROINTESTINAL: As noted in the History of Present Illness  GENITOURINARY: No problems with urination, denies any hematuria or dysuria  NEUROLOGIC: No dizziness or vertigo, denies headaches   MUSCULOSKELETAL: Denies any muscle or joint pain   SKIN: Denies skin rashes or itching  ENDOCRINE: Denies excessive thirst, denies intolerance to heat or cold  PSYCHOSOCIAL: Denies depression or anxiety, denies any recent memory loss     Historical Information   Past Medical History:   Diagnosis Date   • ADHD (attention deficit hyperactivity disorder)    • Anxiety    • Arthritis    • Depression    • Fibromyalgia, primary    • GERD (gastroesophageal reflux disease)    • History of stomach ulcers    • Hypertension    • Panic attack    • Papanicolaou smear 2020   • Psychiatric disorder      Past Surgical History:   Procedure Laterality Date   • HYSTEROSCOPY  2015   • MAMMO (HISTORICAL)  2020     Social History   Social History     Substance and Sexual Activity   Alcohol Use Yes    Comment: Socially     Social History     Substance and Sexual Activity   Drug Use No     Social History     Tobacco Use   Smoking Status Former   Smokeless Tobacco Never   Tobacco Comments    teen years     Family History   Problem Relation Age of Onset   • Hypertension Mother    • Lung cancer Mother    • Skin cancer Father        MEDICATIONS & ALLERGIES:    Current Outpatient Medications   Medication Instructions   • albuterol (PROVENTIL HFA,VENTOLIN HFA) 90 mcg/act inhaler 2 puffs, Inhalation, Every 6 hours PRN   • bisacodyl (DULCOLAX) 10 mg, Oral, Once   • buPROPion (WELLBUTRIN SR) 150 mg, Oral, Daily   • buPROPion (WELLBUTRIN XL) 300 mg, Oral, Daily   • cholecalciferol (VITAMIN D3) 1,000 Units, Oral, Daily   • ciclopirox (PENLAC) 8 % solution Topical, Daily at bedtime   • ferrous sulfate 324 mg, Oral, Daily before breakfast   • hydrochlorothiazide (HYDRODIURIL) 12.5 mg, Oral, Daily   • hydrOXYzine HCL (ATARAX) 25 mg, Oral, Every 6 hours PRN   • losartan (COZAAR) 100 mg, Oral, Daily   • Multiple Vitamin (multivitamin) tablet 1 tablet, Oral, Daily   • omeprazole (PRILOSEC) 40 mg, Oral, Daily, Take before a meal   • polyethylene glycol (GOLYTELY) 4000 mL solution 4,000 mL, Oral, Once   • QUEtiapine (SEROQUEL) 100 mg, Oral, Daily at bedtime   • sertraline (ZOLOFT) 200 mg, Oral, Daily   • SF 5000 Plus 1.1 % CREA No dose, route, or frequency recorded. • terbinafine (LAMISIL) 250 mg, Oral, Daily, (Take one tab daily for 2 weeks. Repeat every 2 months)     Allergies   Allergen Reactions   • Latex Rash       PHYSICAL EXAM:      Objective   Blood pressure 130/90, temperature 98.7 °F (37.1 °C), temperature source Tympanic, height 5' 6" (1.676 m), weight 97.2 kg (214 lb 3.2 oz), not currently breastfeeding. Body mass index is 34.57 kg/m². General Appearance:   Alert, cooperative, no distress   HEENT:   Normocephalic, atraumatic, anicteric     Neck:   Supple, symmetrical, trachea midline   Lungs:   Equal chest rise, respirations unlabored    Heart:   Regular rate and rhythm   Abdomen:   Soft, non-tender, non-distended; normal bowel sounds; no masses, no organomegaly    Rectal:   Deferred    Extremities:   No cyanosis, clubbing or edema; right foot with a boot (she says is due to tendinitis)    Neuro: Moves all 4 extremities    Skin:   No jaundice, rashes, or lesions      LAB RESULTS:     No visits with results within 1 Day(s) from this visit.    Latest known visit with results is:   Appointment on 05/27/2023   Component Date Value   • Hemoglobin A1C 05/27/2023 5.7 (H)    • EAG 05/27/2023 117    • WBC 05/27/2023 5.12    • RBC 05/27/2023 4.11    • Hemoglobin 05/27/2023 10.5 (L)    • Hematocrit 05/27/2023 33.5 (L)    • MCV 05/27/2023 82    • MCH 05/27/2023 25.5 (L)    • MCHC 05/27/2023 31.3 (L)    • RDW 05/27/2023 14.6    • MPV 05/27/2023 11.4    • Platelets 54/37/2545 369    • nRBC 05/27/2023 0    • Neutrophils Relative 05/27/2023 59    • Immat GRANS % 05/27/2023 0    • Lymphocytes Relative 05/27/2023 21    • Monocytes Relative 05/27/2023 13 (H)    • Eosinophils Relative 05/27/2023 6    • Basophils Relative 05/27/2023 1    • Neutrophils Absolute 05/27/2023 2.99    • Immature Grans Absolute 05/27/2023 0.01    • Lymphocytes Absolute 05/27/2023 1.09    • Monocytes Absolute 05/27/2023 0.67    • Eosinophils Absolute 05/27/2023 0.31    • Basophils Absolute 05/27/2023 0.05    • Sodium 05/27/2023 139    • Potassium 05/27/2023 4.0    • Chloride 05/27/2023 105    • CO2 05/27/2023 28    • ANION GAP 05/27/2023 6    • BUN 05/27/2023 14    • Creatinine 05/27/2023 0.81    • Glucose, Fasting 05/27/2023 90    • Calcium 05/27/2023 9.3    • AST 05/27/2023 17    • ALT 05/27/2023 14    • Alkaline Phosphatase 05/27/2023 84    • Total Protein 05/27/2023 7.0    • Albumin 05/27/2023 3.9    • Total Bilirubin 05/27/2023 0.30    • eGFR 05/27/2023 80    • Iron Saturation 05/27/2023 10 (L)    • TIBC 05/27/2023 351    • Iron 05/27/2023 34 (L)    • Ferritin 05/27/2023 3 (L)        RADIOLOGY RESULTS: I have personally reviewed pertinent imaging studies.         Stephany Booth MD  Internal Medicine Residency, PGY-3  [address] [facility]  Available on [facility] Text

## 2023-07-14 ENCOUNTER — TELEPHONE (OUTPATIENT)
Age: 58
End: 2023-07-14

## 2023-07-14 NOTE — TELEPHONE ENCOUNTER
Pt rescheduled her colonoscopy to 8/10/23 with Dr. Diana Stephen in [facility].

## 2023-07-22 ENCOUNTER — APPOINTMENT (OUTPATIENT)
Dept: LAB | Facility: HOSPITAL | Age: 58
End: 2023-07-22

## 2023-07-22 DIAGNOSIS — Z01.84 IMMUNITY STATUS TESTING: ICD-10-CM

## 2023-07-22 PROCEDURE — 86706 HEP B SURFACE ANTIBODY: CPT

## 2023-07-22 PROCEDURE — 36415 COLL VENOUS BLD VENIPUNCTURE: CPT

## 2023-07-23 LAB — HBV SURFACE AB SER-ACNC: 138 MIU/ML

## 2023-08-01 ENCOUNTER — OFFICE VISIT (OUTPATIENT)
Dept: OBGYN CLINIC | Facility: CLINIC | Age: 58
End: 2023-08-01
Payer: COMMERCIAL

## 2023-08-01 VITALS — DIASTOLIC BLOOD PRESSURE: 82 MMHG | BODY MASS INDEX: 34.57 KG/M2 | HEIGHT: 66 IN | SYSTOLIC BLOOD PRESSURE: 124 MMHG

## 2023-08-01 DIAGNOSIS — M76.71 PERONEAL TENDONITIS OF RIGHT LOWER LEG: Primary | ICD-10-CM

## 2023-08-01 PROCEDURE — 99213 OFFICE O/P EST LOW 20 MIN: CPT | Performed by: ORTHOPAEDIC SURGERY

## 2023-08-01 NOTE — PROGRESS NOTES
Assessment:     1. Peroneal tendonitis of right lower leg        Plan:     Problem List Items Addressed This Visit        Musculoskeletal and Integument    Peroneal tendonitis of right lower leg - Primary     Findings consistent with right peroneal tendonitis. Patient has improved but still symptomatic with out boot. At this time patient was given script for physical therapy and HEP. She can gradually wean out of CAM boot over next 2-3 weeks. OTC anti inflammatories if tolerable, voltaren gel or aspercreme. If symptoms worsen or plateua recommend to continue in CAM boot. See patient back in 6 weeks to reevaluate. All patient's questions were answered to her satisfaction. This note is created using dictation transcription. It may contain typographical errors, grammatical errors, improperly dictated words, background noise and other errors. Relevant Orders    Ambulatory Referral to Physical Therapy       Subjective:     Patient ID: Mj Metz is a 62 y.o. female. Chief Complaint:  62 yr old female in for follow up regarding her right ankle. Treating for peroneal tendonitis. She has been complaint with CAM boot. She has been removing to work on gentle range of motion. She states when wearing boot she really has no pain. If she walks in house with out the boot she will still have pain over peroneal tendon. Denies any numbness or tingling.      Allergy:  Allergies   Allergen Reactions   • Latex Rash     Medications:  all current active meds have been reviewed  Past Medical History:  Past Medical History:   Diagnosis Date   • ADHD (attention deficit hyperactivity disorder)    • Anxiety    • Arthritis    • Depression    • Fibromyalgia, primary    • GERD (gastroesophageal reflux disease)    • History of stomach ulcers    • Hypertension    • Panic attack    • Papanicolaou smear 2020   • Psychiatric disorder      Past Surgical History:  Past Surgical History:   Procedure Laterality Date   • HYSTEROSCOPY  2015 • MAMMO (HISTORICAL)  2020     Family History:  Family History   Problem Relation Age of Onset   • Hypertension Mother    • Lung cancer Mother    • Skin cancer Father      Social History:  Social History     Substance and Sexual Activity   Alcohol Use Yes    Comment: Socially     Social History     Substance and Sexual Activity   Drug Use No     Social History     Tobacco Use   Smoking Status Former   Smokeless Tobacco Never   Tobacco Comments    teen years     Review of Systems   Constitutional: Negative for chills and fever. HENT: Negative for ear pain and sore throat. Eyes: Negative for pain and visual disturbance. Respiratory: Negative for cough and shortness of breath. Cardiovascular: Negative for chest pain and palpitations. Gastrointestinal: Negative for abdominal pain and vomiting. Genitourinary: Negative for dysuria and hematuria. Musculoskeletal: Positive for arthralgias (right ankle) and gait problem (Ambulate with a cam walker on the right). Negative for back pain and joint swelling. Skin: Negative for color change and rash. Neurological: Negative for seizures and syncope. Psychiatric/Behavioral: Negative. All other systems reviewed and are negative. Objective:  BP Readings from Last 1 Encounters:   08/01/23 124/82      Wt Readings from Last 1 Encounters:   07/13/23 97.2 kg (214 lb 3.2 oz)      BMI:   Estimated body mass index is 34.57 kg/m² as calculated from the following:    Height as of this encounter: 5' 6" (1.676 m). Weight as of 7/13/23: 97.2 kg (214 lb 3.2 oz). BSA:   Estimated body surface area is 2.06 meters squared as calculated from the following:    Height as of this encounter: 5' 6" (1.676 m). Weight as of 7/13/23: 97.2 kg (214 lb 3.2 oz). Physical Exam  Vitals and nursing note reviewed. Constitutional:       Appearance: Normal appearance. She is well-developed. HENT:      Head: Normocephalic and atraumatic.       Right Ear: External ear normal. Left Ear: External ear normal.   Eyes:      Extraocular Movements: Extraocular movements intact. Conjunctiva/sclera: Conjunctivae normal.   Pulmonary:      Effort: Pulmonary effort is normal.   Musculoskeletal:         General: Tenderness (right ankle arthralgia) present. Cervical back: Neck supple. Skin:     General: Skin is warm and dry. Neurological:      Mental Status: She is alert and oriented to person, place, and time. Deep Tendon Reflexes: Reflexes are normal and symmetric. Psychiatric:         Mood and Affect: Mood normal.         Behavior: Behavior normal.       Right Ankle Exam     Tenderness   The patient is experiencing no tenderness. Swelling: none    Range of Motion   The patient has normal right ankle ROM.     Muscle Strength   Peroneal muscle:  4/5    Tests   Anterior drawer: negative  Varus tilt: negative    Other   Erythema: absent  Scars: absent  Sensation: normal  Pulse: present             no new imaging to review     Scribe Attestation    I,:  Portillo Walton am acting as a scribe while in the presence of the attending physician.:       I,:  Edwin Bravo MD personally performed the services described in this documentation    as scribed in my presence.:

## 2023-08-01 NOTE — PATIENT INSTRUCTIONS
How to Strengthen Your Ankle     Following an ankle injury or surgery, strengthening exercises should be performed once you can bear weight comfortably and your range of motion is near full. There are several types of strengthening exercises. The easiest to begin with are isometric exercises that you do by pushing against a fixed object with your ankle. Once this has been mastered, you can progress to isotonic exercises, which involve using your ankle's range of motion against some form of resistance. The photos below show isotonic exercises performed with a resistance band, which you can get from your local therapist or a sporting goods store. Place your ankle in the "down and in" position against a fixed object such as a couch. Hold this position for a count of 10. Repeat 10 times. Place your ankle in the "up and out" position against the same object. Hold this position for a count of 10. Repeat 10 times. Push your ankle down against a fixed object and hold for a count of 10. Repeat 10 times. Push your ankle up against a fixed object and hold for a count of 10. Repeat 10 times. Using a resistance band around your forefoot, hold the ends of the band with your hand    and gently push your ankle down as far as you can and then back to the starting position. Repeat 10 times. Tie the resistance bands around a fixed object and wrap the ends around your forefoot. Start with your foot pointing down and pull your ankle up as far as you can. Return to the starting position and cycle your ankle 10 times. Tie the bands around an object to the outer side of your ankle. Start with the foot relaxed and then move your ankle down and in. Return to the relaxed position and repeat 10 times.                                Tie the ends of the bands around an object to the inside of your ankle and hold your foot relaxed. Bring your foot up and out and then back to the resting position. Repeat 10 times. Once you have regained the motion and strength in your ankle, you are ready for sporting activities such as gentle jogging and biking. After you feel your ankle strength is approximately 80% of your other side, then you can begin cutting or twisting sports. Proprioceptive Exercises for Balance, Coordination and Agility      Stand with your affected leg on a pillow. Hold this position for a count of 10. Repeat 10 times. Stand on your affected leg with the resistance band applied to your unaffected leg. Bring your unaffected leg forward and then back to the starting position. Repeat 10 times. Start slowly and progress to a faster speed for a more difficult workout. Again, start slowly and increase your speed at your own pace, moving the unaffected leg   forward and then back to the starting position. For a more advanced exercise, swing your unaffected leg behind you and then back.

## 2023-08-01 NOTE — ASSESSMENT & PLAN NOTE
Findings consistent with right peroneal tendonitis. Patient has improved but still symptomatic with out boot. At this time patient was given script for physical therapy and HEP. She can gradually wean out of CAM boot over next 2-3 weeks. OTC anti inflammatories if tolerable, voltaren gel or aspercreme. If symptoms worsen or plateua recommend to continue in CAM boot. See patient back in 6 weeks to reevaluate. All patient's questions were answered to her satisfaction. This note is created using dictation transcription. It may contain typographical errors, grammatical errors, improperly dictated words, background noise and other errors.

## 2023-08-02 ENCOUNTER — OFFICE VISIT (OUTPATIENT)
Dept: DERMATOLOGY | Facility: CLINIC | Age: 58
End: 2023-08-02
Payer: COMMERCIAL

## 2023-08-02 VITALS — BODY MASS INDEX: 34.55 KG/M2 | HEIGHT: 66 IN | WEIGHT: 215 LBS

## 2023-08-02 DIAGNOSIS — L82.1 SEBORRHEIC KERATOSIS: ICD-10-CM

## 2023-08-02 DIAGNOSIS — D22.5 MULTIPLE BENIGN NEVI OF UPPER EXTREMITY, LOWER EXTREMITY, AND TRUNK: Primary | ICD-10-CM

## 2023-08-02 DIAGNOSIS — I10 PRIMARY HYPERTENSION: ICD-10-CM

## 2023-08-02 DIAGNOSIS — D22.60 MULTIPLE BENIGN NEVI OF UPPER EXTREMITY, LOWER EXTREMITY, AND TRUNK: Primary | ICD-10-CM

## 2023-08-02 DIAGNOSIS — D18.01 CHERRY ANGIOMA: ICD-10-CM

## 2023-08-02 DIAGNOSIS — D22.70 MULTIPLE BENIGN NEVI OF UPPER EXTREMITY, LOWER EXTREMITY, AND TRUNK: Primary | ICD-10-CM

## 2023-08-02 DIAGNOSIS — L81.4 LENTIGO: ICD-10-CM

## 2023-08-02 DIAGNOSIS — D23.9 DERMATOFIBROMA: ICD-10-CM

## 2023-08-02 PROCEDURE — 99203 OFFICE O/P NEW LOW 30 MIN: CPT | Performed by: DERMATOLOGY

## 2023-08-02 RX ORDER — LOSARTAN POTASSIUM 100 MG/1
100 TABLET ORAL DAILY
Qty: 90 TABLET | Refills: 0 | Status: SHIPPED | OUTPATIENT
Start: 2023-08-02

## 2023-08-02 NOTE — PATIENT INSTRUCTIONS
Recommend softening cream for dry feet (Amlactin, or cerave SA cream)    1. MELANOCYTIC NEVI ("Moles")    Physical Exam:  Anatomic Location Affected:   Mostly on sun-exposed areas of the trunk and extremities  Morphological Description:  Scattered, 1-4mm round to ovoid, symmetrical-appearing, even bordered, skin colored to dark brown macules/papules, mostly in sun-exposed areas    Assessment and Plan:  Based on a thorough discussion of this condition and the management approach to it (including a comprehensive discussion of the known risks, side effects and potential benefits of treatment), the patient (family) agrees to implement the following specific plan:  When outside we recommend using a wide brim hat, sunglasses, long sleeve and pants, sunscreen with SPF 75+ with reapplication every 2 hours, or SPF specific clothing   Benign, reassured  Annual skin check     Melanocytic Nevi  Melanocytic nevi ("moles") are tan or brown, raised or flat areas of the skin which have an increased number of melanocytes. Melanocytes are the cells in our body which make pigment and account for skin color. Some moles are present at birth (I.e., "congenital nevi"), while others come up later in life (i.e., "acquired nevi"). The sun can stimulate the body to make more moles. Sunburns are not the only thing that triggers more moles. Chronic sun exposure can do it too. Clinically distinguishing a healthy mole from melanoma may be difficult, even for experienced dermatologists. The "ABCDE's" of moles have been suggested as a means of helping to alert a person to a suspicious mole and the possible increased risk of melanoma. The suggestions for raising alert are as follows:    Asymmetry: Healthy moles tend to be symmetric, while melanomas are often asymmetric. Asymmetry means if you draw a line through the mole, the two halves do not match in color, size, shape, or surface texture.  Asymmetry can be a result of rapid enlargement of a mole, the development of a raised area on a previously flat lesion, scaling, ulceration, bleeding or scabbing within the mole. Any mole that starts to demonstrate "asymmetry" should be examined promptly by a board certified dermatologist.     Border: Healthy moles tend to have discrete, even borders. The border of a melanoma often blends into the normal skin and does not sharply delineate the mole from normal skin. Any mole that starts to demonstrate "uneven borders" should be examined promptly by a board certified dermatologist.     Color: Healthy moles tend to be one color throughout. Melanomas tend to be made up of different colors ranging from dark black, blue, white, or red. Any mole that demonstrates a color change should be examined promptly by a board certified dermatologist.     Diameter: Healthy moles tend to be smaller than 0.6 cm in size; an exception are "congenital nevi" that can be larger. Melanomas tend to grow and can often be greater than 0.6 cm (1/4 of an inch, or the size of a pencil eraser). This is only a guideline, and many normal moles may be larger than 0.6 cm without being unhealthy. Any mole that starts to change in size (small to bigger or bigger to smaller) should be examined promptly by a board certified dermatologist.     Evolving: Healthy moles tend to "stay the same."  Melanomas may often show signs of change or evolution such as a change in size, shape, color, or elevation.   Any mole that starts to itch, bleed, crust, burn, hurt, or ulcerate or demonstrate a change or evolution should be examined promptly by a board certified dermatologist.        2. LENTIGO    Physical Exam:  Anatomic Location Affected:  trunk, arms  Morphological Description:  Light brown macules    Assessment and Plan:  Based on a thorough discussion of this condition and the management approach to it (including a comprehensive discussion of the known risks, side effects and potential benefits of treatment), the patient (family) agrees to implement the following specific plan:  When outside we recommend using a wide brim hat, sunglasses, long sleeve and pants, sunscreen with SPF 93+ with reapplication every 2 hours, or SPF specific clothing       What is a lentigo? A lentigo is a pigmented flat or slightly raised lesion with a clearly defined edge. Unlike an ephelis (freckle), it does not fade in the winter months. There are several kinds of lentigo. The name lentigo originally referred to its appearance resembling a small lentil. The plural of lentigo is lentigines, although “lentigos” is also in common use. Who gets lentigines? Lentigines can affect males and females of all ages and races. Solar lentigines are especially prevalent in fair skinned adults. Lentigines associated with syndromes are present at birth or arise during childhood. What causes lentigines? Common forms of lentigo are due to exposure to ultraviolet radiation:  Sun damage including sunburn   Indoor tanning   Phototherapy, especially photochemotherapy (PUVA)    Ionizing radiation, eg radiation therapy, can also cause lentigines. Several familial syndromes associated with widespread lentigines originate from mutations in Moses-MAP kinase, mTOR signaling and PTEN pathways. What is the treatment for lentigines? Most lentigines are left alone. Attempts to lighten them may not be successful. The following approaches are used:  SPF 50+ broad-spectrum sunscreen   Hydroquinone bleaching cream   Alpha hydroxy acids   Vitamin C   Retinoids   Azelaic acid   Chemical peels  Individual lesions can be permanently removed using:  Cryotherapy   Intense pulsed light   Pigment lasers    How can lentigines be prevented? Lentigines associated with exposure ultraviolet radiation can be prevented by very careful sun protection. Clothing is more successful at preventing new lentigines than are sunscreens.     What is the outlook for lentigines? Lentigines usually persist. They may increase in number with age and sun exposure. Some in sun-protected sites may fade and disappear. 3. KENT ANGIOMAS    Physical Exam:  Anatomic Location Affected:  trunk  Morphological Description:  Scattered cherry red, 1-4 mm papules. Assessment and Plan:  Based on a thorough discussion of this condition and the management approach to it (including a comprehensive discussion of the known risks, side effects and potential benefits of treatment), the patient (family) agrees to implement the following specific plan:  Monitor for changes  Benign, reassured    Assessment and Plan:    Cherry angioma, also known as [employer] spots, are benign vascular skin lesions. A "cherry angioma" is a firm red, blue or purple papule, 0.1-1 cm in diameter. When thrombosed, they can appear black in colour until evaluated with a dermatoscope when the red or purple colour is more easily seen. Cherry angioma may develop on any part of the body but most often appear on the scalp, face, lips and trunk. An angioma is due to proliferating endothelial cells; these are the cells that line the inside of a blood vessel. Angiomas can arise in early life or later in life; the most common type of angioma is a cherry angioma. Cherry angiomas are very common in males and females of any age or race. They are more noticeable in white skin than in skin of colour. They markedly increase in number from about the age of 36. There may be a family history of similar lesions. Eruptive cherry angiomas have been rarely reported to be associated with internal malignancy. The cause of angiomas is unknown. Genetic analysis of cherry angiomas has shown that they frequently carry specific somatic missense mutations in the GNAQ and GNA11 (Q209H) genes, which are involved in other vascular and melanocytic proliferations.     4.  DERMATOFIBROMA    Physical Exam:  Anatomic Location Affected:  Right shoulder x 2    Assessment and Plan:  Based on a thorough discussion of this condition and the management approach to it (including a comprehensive discussion of the known risks, side effects and potential benefits of treatment), the patient (family) agrees to implement the following specific plan:  Monitor for change  Reassure benign    Assessment and Plan:  A dermatofibroma is a common benign fibrous nodule that most often arises on the skin of the lower legs. A dermatofibroma is also called a "cutaneous fibrous histiocytoma."  Dermatofibromas occur at all ages and in people of every ethnicity. They are more common in women than in men. It is not clear if dermatofibroma is a reactive process or if it is a neoplasm. The lesions are made up of proliferating fibroblasts. Histiocytes may also be involved. They are sometimes attributed to an insect bite or ingrownhair or local trauma, but not consistently. They may be more numerous in patients with altered immunity. Dermatofibromas most often occur on the legs and arms, but may also arise on the trunk or any site of the body. Typical clinical features include the following:  People may have 1 or up to 15 lesions. Size varies from 0.5-1.5 cm diameter; most lesions are 7-10 mm diameter. They are firm nodules tethered to the skin surface and mobile over subcutaneous tissue. The skin "dimples" on pinching the lesion. Color may be pink to light brown in white skin, and dark brown to black in dark skin; some appear paler in the center. They do not usually cause symptoms, but they are sometimes painful or itchy. Because they are often raised lesions, they may be traumatized, for example by a razor. Occasionally dozens may erupt within a few months, usually in the setting of immunosuppression (for example autoimmune disease, cancer or certain medications). Dermatofibroma does not give rise to cancer.  However, occasionally, it may be mistaken for dermatofibrosarcoma or desmoplastic melanoma. A dermatofibroma is harmless and seldom causes any symptoms. Usually, only reassurance is needed. If it is nuisance or causing concern, the lesion can be removed surgically, resulting in a scar that is, by definition, usually longer in diameter than the widest portion of the dermatofibroma. Cryotherapy, shave biopsy and laser surgery are rarely completely successful. Skin punch biopsy or incisional biopsy may be undertaken if there is an atypical feature such as recent enlargement, ulceration, or asymmetrical structures and colours on dermatoscopy. 5. SEBORRHEIC KERATOSES    - Relevant exam: Scattered over the lower back are waxy brown to black plaques and papules with stuck on appearance  - Exam and clinical history consistent with seborrheic keratoses  - Counseled that these are benign growths that do not require treatment  - Counseled that removal of lesions is considered cosmetic and so would incur a fee should patient elect to move forward.    - Patient to hold on treatments for now but will inform us should they desire additional treatments

## 2023-08-02 NOTE — PROGRESS NOTES
[facility] Note     Patient Name: Mj Metz  Encounter Date: 8/2/2023     Have you been cared for by a [facility] Dermatologist in the last 3 years and, if so, which description applies to you? NO. I am considered a "new" patient and must complete all patient intake questions. I am FEMALE/of child-bearing potential.    REVIEW OF SYSTEMS:  Have you recently had or currently have any of the following? Recent fever or chills? No  Any non-healing wound? No  Are you pregnant or planning to become pregnant? No  Are you currently or planning to be nursing or breast feeding? No   PAST MEDICAL HISTORY:  Have you personally ever had or currently have any of the following? If "YES," then please provide more detail. Skin cancer (such as Melanoma, Basal Cell Carcinoma, Squamous Cell Carcinoma? No  Tuberculosis, HIV/AIDS, Hepatitis B or C: No  Systemic Immunosuppression such as Diabetes, Biologic or Immunotherapy, Chemotherapy, Organ Transplantation, Bone Marrow Transplantation No  Radiation Treatment No   FAMILY HISTORY:  Any "first degree relatives" (parent, brother, sister, or child) with the following? Skin Cancer, Pancreatic or Other Cancer? YES, Father had skin cancer(SCC)   PATIENT EXPERIENCE:    Do you want the Dermatologist to perform a COMPLETE skin exam today including a clinical examination under the "bra and underwear" areas? Yes  If necessary, do we have your permission to call and leave a detailed message on your Preferred Phone number that includes your specific medical information?   Yes      Allergies   Allergen Reactions   • Latex Rash      Current Outpatient Medications:   •  albuterol (PROVENTIL HFA,VENTOLIN HFA) 90 mcg/act inhaler, Inhale 2 puffs every 6 (six) hours as needed for wheezing or shortness of breath, Disp: 8 g, Rfl: 3  •  buPROPion (Wellbutrin SR) 150 mg 12 hr tablet, Take 1 tablet (150 mg total) by mouth in the morning, Disp: 90 tablet, Rfl: 2  •  buPROPion (WELLBUTRIN XL) 300 mg 24 hr tablet, Take 1 tablet (300 mg total) by mouth daily, Disp: 90 tablet, Rfl: 2  •  cholecalciferol (VITAMIN D3) 1,000 units tablet, Take 1,000 Units by mouth daily, Disp: , Rfl:   •  ciclopirox (PENLAC) 8 % solution, Apply topically daily at bedtime, Disp: 6.6 mL, Rfl: 3  •  ferrous sulfate 324 (65 Fe) mg, Take 1 tablet (324 mg total) by mouth daily before breakfast, Disp: 90 tablet, Rfl: 0  •  hydrochlorothiazide (HYDRODIURIL) 12.5 mg tablet, Take 1 tablet (12.5 mg total) by mouth daily, Disp: 90 tablet, Rfl: 0  •  hydrOXYzine HCL (ATARAX) 25 mg tablet, Take 1 tablet (25 mg total) by mouth every 6 (six) hours as needed for itching or anxiety, Disp: 30 tablet, Rfl: 0  •  losartan (COZAAR) 100 MG tablet, Take 1 tablet (100 mg total) by mouth daily, Disp: 90 tablet, Rfl: 0  •  Multiple Vitamin (multivitamin) tablet, Take 1 tablet by mouth daily, Disp: , Rfl:   •  omeprazole (PriLOSEC) 40 MG capsule, Take 1 capsule (40 mg total) by mouth daily Take before a meal, Disp: 90 capsule, Rfl: 2  •  QUEtiapine (SEROquel) 100 mg tablet, Take 1 tablet (100 mg total) by mouth daily at bedtime (Patient taking differently: Take 100 mg by mouth daily at bedtime Usually takes half), Disp: 90 tablet, Rfl: 2  •  sertraline (ZOLOFT) 100 mg tablet, Take 2 tablets (200 mg total) by mouth daily, Disp: 180 tablet, Rfl: 0  •  SF 5000 Plus 1.1 % CREA, , Disp: , Rfl:   •  bisacodyl (DULCOLAX) 5 mg EC tablet, Take 2 tablets (10 mg total) by mouth once for 1 dose, Disp: 2 tablet, Rfl: 0  •  polyethylene glycol (GOLYTELY) 4000 mL solution, Take 4,000 mL by mouth once for 1 dose, Disp: 4000 mL, Rfl: 0          Whom besides the patient is providing clinical information about today's encounter? NO ADDITIONAL HISTORIAN (patient alone provided history)    Physical Exam and Assessment/Plan by Diagnosis:    1.  MELANOCYTIC NEVI ("Moles")    Physical Exam:  Anatomic Location Affected:   Mostly on sun-exposed areas of the trunk and extremities  Morphological Description:  Scattered, 1-4mm round to ovoid, symmetrical-appearing, even bordered, skin colored to dark brown macules/papules, mostly in sun-exposed areas    Assessment and Plan:  Based on a thorough discussion of this condition and the management approach to it (including a comprehensive discussion of the known risks, side effects and potential benefits of treatment), the patient (family) agrees to implement the following specific plan:  When outside we recommend using a wide brim hat, sunglasses, long sleeve and pants, sunscreen with SPF 14+ with reapplication every 2 hours, or SPF specific clothing   Benign, reassured  Annual skin check     Melanocytic Nevi  Melanocytic nevi ("moles") are tan or brown, raised or flat areas of the skin which have an increased number of melanocytes. Melanocytes are the cells in our body which make pigment and account for skin color. Some moles are present at birth (I.e., "congenital nevi"), while others come up later in life (i.e., "acquired nevi"). The sun can stimulate the body to make more moles. Sunburns are not the only thing that triggers more moles. Chronic sun exposure can do it too. Clinically distinguishing a healthy mole from melanoma may be difficult, even for experienced dermatologists. The "ABCDE's" of moles have been suggested as a means of helping to alert a person to a suspicious mole and the possible increased risk of melanoma. The suggestions for raising alert are as follows:    Asymmetry: Healthy moles tend to be symmetric, while melanomas are often asymmetric. Asymmetry means if you draw a line through the mole, the two halves do not match in color, size, shape, or surface texture. Asymmetry can be a result of rapid enlargement of a mole, the development of a raised area on a previously flat lesion, scaling, ulceration, bleeding or scabbing within the mole.   Any mole that starts to demonstrate "asymmetry" should be examined promptly by a board certified dermatologist.     Alvin Hint: Healthy moles tend to have discrete, even borders. The border of a melanoma often blends into the normal skin and does not sharply delineate the mole from normal skin. Any mole that starts to demonstrate "uneven borders" should be examined promptly by a board certified dermatologist.     Color: Healthy moles tend to be one color throughout. Melanomas tend to be made up of different colors ranging from dark black, blue, white, or red. Any mole that demonstrates a color change should be examined promptly by a board certified dermatologist.     Diameter: Healthy moles tend to be smaller than 0.6 cm in size; an exception are "congenital nevi" that can be larger. Melanomas tend to grow and can often be greater than 0.6 cm (1/4 of an inch, or the size of a pencil eraser). This is only a guideline, and many normal moles may be larger than 0.6 cm without being unhealthy. Any mole that starts to change in size (small to bigger or bigger to smaller) should be examined promptly by a board certified dermatologist.     Evolving: Healthy moles tend to "stay the same."  Melanomas may often show signs of change or evolution such as a change in size, shape, color, or elevation.   Any mole that starts to itch, bleed, crust, burn, hurt, or ulcerate or demonstrate a change or evolution should be examined promptly by a board certified dermatologist.        2. LENTIGO    Physical Exam:  Anatomic Location Affected:  trunk, arms  Morphological Description:  Light brown macules    Assessment and Plan:  Based on a thorough discussion of this condition and the management approach to it (including a comprehensive discussion of the known risks, side effects and potential benefits of treatment), the patient (family) agrees to implement the following specific plan:  When outside we recommend using a wide brim hat, sunglasses, long sleeve and pants, sunscreen with SPF 30+ with reapplication every 2 hours, or SPF specific clothing       What is a lentigo? A lentigo is a pigmented flat or slightly raised lesion with a clearly defined edge. Unlike an ephelis (freckle), it does not fade in the winter months. There are several kinds of lentigo. The name lentigo originally referred to its appearance resembling a small lentil. The plural of lentigo is lentigines, although “lentigos” is also in common use. Who gets lentigines? Lentigines can affect males and females of all ages and races. Solar lentigines are especially prevalent in fair skinned adults. Lentigines associated with syndromes are present at birth or arise during childhood. What causes lentigines? Common forms of lentigo are due to exposure to ultraviolet radiation:  Sun damage including sunburn   Indoor tanning   Phototherapy, especially photochemotherapy (PUVA)    Ionizing radiation, eg radiation therapy, can also cause lentigines. Several familial syndromes associated with widespread lentigines originate from mutations in Moses-MAP kinase, mTOR signaling and PTEN pathways. What is the treatment for lentigines? Most lentigines are left alone. Attempts to lighten them may not be successful. The following approaches are used:  SPF 50+ broad-spectrum sunscreen   Hydroquinone bleaching cream   Alpha hydroxy acids   Vitamin C   Retinoids   Azelaic acid   Chemical peels  Individual lesions can be permanently removed using:  Cryotherapy   Intense pulsed light   Pigment lasers    How can lentigines be prevented? Lentigines associated with exposure ultraviolet radiation can be prevented by very careful sun protection. Clothing is more successful at preventing new lentigines than are sunscreens. What is the outlook for lentigines? Lentigines usually persist. They may increase in number with age and sun exposure. Some in sun-protected sites may fade and disappear.     3. KENT ANGIOMAS    Physical Exam:  Anatomic Location Affected:  trunk  Morphological Description:  Scattered cherry red, 1-4 mm papules. Assessment and Plan:  Based on a thorough discussion of this condition and the management approach to it (including a comprehensive discussion of the known risks, side effects and potential benefits of treatment), the patient (family) agrees to implement the following specific plan:  Monitor for changes  Benign, reassured    Assessment and Plan:    Cherry angioma, also known as Ramon Smart spots, are benign vascular skin lesions. A "cherry angioma" is a firm red, blue or purple papule, 0.1-1 cm in diameter. When thrombosed, they can appear black in colour until evaluated with a dermatoscope when the red or purple colour is more easily seen. Cherry angioma may develop on any part of the body but most often appear on the scalp, face, lips and trunk. An angioma is due to proliferating endothelial cells; these are the cells that line the inside of a blood vessel. Angiomas can arise in early life or later in life; the most common type of angioma is a cherry angioma. Cherry angiomas are very common in males and females of any age or race. They are more noticeable in white skin than in skin of colour. They markedly increase in number from about the age of 36. There may be a family history of similar lesions. Eruptive cherry angiomas have been rarely reported to be associated with internal malignancy. The cause of angiomas is unknown. Genetic analysis of cherry angiomas has shown that they frequently carry specific somatic missense mutations in the GNAQ and GNA11 (Q209H) genes, which are involved in other vascular and melanocytic proliferations.     4.  DERMATOFIBROMA    Physical Exam:  Anatomic Location Affected:  Right shoulder x 2  Morphological Description:  Firm dermal nodules    Additional History of Present Condition:  Discovered on exam    Assessment and Plan:  Based on a thorough discussion of this condition and the management approach to it (including a comprehensive discussion of the known risks, side effects and potential benefits of treatment), the patient (family) agrees to implement the following specific plan:  Monitor for change  Reassure benign    Assessment and Plan:  A dermatofibroma is a common benign fibrous nodule that most often arises on the skin of the lower legs. A dermatofibroma is also called a "cutaneous fibrous histiocytoma."  Dermatofibromas occur at all ages and in people of every ethnicity. They are more common in women than in men. It is not clear if dermatofibroma is a reactive process or if it is a neoplasm. The lesions are made up of proliferating fibroblasts. Histiocytes may also be involved. They are sometimes attributed to an insect bite or ingrownhair or local trauma, but not consistently. They may be more numerous in patients with altered immunity. Dermatofibromas most often occur on the legs and arms, but may also arise on the trunk or any site of the body. Typical clinical features include the following:  People may have 1 or up to 15 lesions. Size varies from 0.5-1.5 cm diameter; most lesions are 7-10 mm diameter. They are firm nodules tethered to the skin surface and mobile over subcutaneous tissue. The skin "dimples" on pinching the lesion. Color may be pink to light brown in white skin, and dark brown to black in dark skin; some appear paler in the center. They do not usually cause symptoms, but they are sometimes painful or itchy. Because they are often raised lesions, they may be traumatized, for example by a razor. Occasionally dozens may erupt within a few months, usually in the setting of immunosuppression (for example autoimmune disease, cancer or certain medications). Dermatofibroma does not give rise to cancer. However, occasionally, it may be mistaken for dermatofibrosarcoma or desmoplastic melanoma. A dermatofibroma is harmless and seldom causes any symptoms. Usually, only reassurance is needed. If it is nuisance or causing concern, the lesion can be removed surgically, resulting in a scar that is, by definition, usually longer in diameter than the widest portion of the dermatofibroma. Cryotherapy, shave biopsy and laser surgery are rarely completely successful. Skin punch biopsy or incisional biopsy may be undertaken if there is an atypical feature such as recent enlargement, ulceration, or asymmetrical structures and colours on dermatoscopy. 5. SEBORRHEIC KERATOSES    - Relevant exam: Scattered over the lower back are waxy brown to black plaques and papules with stuck on appearance  - Exam and clinical history consistent with seborrheic keratoses  - Counseled that these are benign growths that do not require treatment  - Counseled that removal of lesions is considered cosmetic and so would incur a fee should patient elect to move forward.    - Patient to hold on treatments for now but will inform us should they desire additional treatments      Scribe Attestation    I,:  Faiza Foy MA am acting as a scribe while in the presence of the attending physician.:       I,:  Deidra Murillo MD personally performed the services described in this documentation    as scribed in my presence.:

## 2023-08-04 ENCOUNTER — OFFICE VISIT (OUTPATIENT)
Dept: URGENT CARE | Facility: CLINIC | Age: 58
End: 2023-08-04
Payer: COMMERCIAL

## 2023-08-04 VITALS
TEMPERATURE: 98.4 F | SYSTOLIC BLOOD PRESSURE: 154 MMHG | DIASTOLIC BLOOD PRESSURE: 74 MMHG | OXYGEN SATURATION: 100 % | RESPIRATION RATE: 18 BRPM | HEART RATE: 70 BPM

## 2023-08-04 DIAGNOSIS — L25.9 CONTACT DERMATITIS, UNSPECIFIED CONTACT DERMATITIS TYPE, UNSPECIFIED TRIGGER: Primary | ICD-10-CM

## 2023-08-04 PROCEDURE — 99213 OFFICE O/P EST LOW 20 MIN: CPT

## 2023-08-04 RX ORDER — PREDNISONE 10 MG/1
TABLET ORAL
Qty: 30 TABLET | Refills: 0 | Status: SHIPPED | OUTPATIENT
Start: 2023-08-04

## 2023-08-04 NOTE — PROGRESS NOTES
[facility]        NAME: Elma Combs is a 62 y.o. female  : 1965    MRN: [id number]  DATE: 2023  TIME: 5:40 PM    Assessment and Plan   Contact dermatitis, unspecified contact dermatitis type, unspecified trigger [L25.9]  1. Contact dermatitis, unspecified contact dermatitis type, unspecified trigger  predniSONE 10 mg tablet            Patient Instructions     Take prednisone as prescribed. Recommend taking in the morning, with food. If you are taking an anti-inflammatory such as Aleve or ibuprofen/Motrin, recommend stopping or only using over-the-counter doses while you are on higher doses of prednisone (40mg - 60mg). Continue hydrocortisone cream for itch relief. May use Benadryl 25mg - 50mg every 4-6 hours as needing for itching. Be careful of drowsiness, do not take before driving or operating heavy machinery. Use unscented/sensitive formula soaps, lotions, and detergents. Limit hot showers. Follow up with your PCP in 5-7 days. Go to the ED if symptoms significantly worsen. Chief Complaint     Chief Complaint   Patient presents with   • Rash     Patient has a rash that started last night that is around her left eye. Rash is itchy and slightly painful. History of Present Illness       This is a 63yo female who presents for evaluation of a rash to the left side of her face that started last night. Patient states the area is itchy. She has been applying an OTC hydrocortisone cream for itch relief. Also taking hydroxyzine which was previously prescribed to her for itching and anxiety. Denies fevers/chills, body aches, fatigue, eye redness, itchiness, drainage/crusting, blurred vision, floaters/halos, sensitivity to light, headaches, and dizziness/lightheadedness. Unsure of skin irritant. No new soaps, skin care products, or detergents. Has not spent any time gardening / weeding. She does have three dogs that spend time outdoors.        Review of Systems   Review of Systems   Constitutional: Negative for chills, fatigue and fever. Eyes: Negative for photophobia, pain, discharge, redness, itching and visual disturbance. Respiratory: Negative for chest tightness and shortness of breath. Cardiovascular: Negative for chest pain and palpitations. Musculoskeletal: Negative for arthralgias and myalgias. Skin: Positive for rash. Neurological: Negative for dizziness, light-headedness and headaches. Current Medications       Current Outpatient Medications:   •  predniSONE 10 mg tablet, Take 4 tablets (40 mg total) by mouth daily for 3 days, THEN 3 tablets (30 mg total) daily for 3 days, THEN 2 tablets (20 mg total) daily for 3 days, THEN 1 tablet (10 mg total) daily for 3 days. , Disp: 30 tablet, Rfl: 0  •  albuterol (PROVENTIL HFA,VENTOLIN HFA) 90 mcg/act inhaler, Inhale 2 puffs every 6 (six) hours as needed for wheezing or shortness of breath, Disp: 8 g, Rfl: 3  •  bisacodyl (DULCOLAX) 5 mg EC tablet, Take 2 tablets (10 mg total) by mouth once for 1 dose, Disp: 2 tablet, Rfl: 0  •  buPROPion (Wellbutrin SR) 150 mg 12 hr tablet, Take 1 tablet (150 mg total) by mouth in the morning, Disp: 90 tablet, Rfl: 2  •  buPROPion (WELLBUTRIN XL) 300 mg 24 hr tablet, Take 1 tablet (300 mg total) by mouth daily, Disp: 90 tablet, Rfl: 2  •  cholecalciferol (VITAMIN D3) 1,000 units tablet, Take 1,000 Units by mouth daily, Disp: , Rfl:   •  ciclopirox (PENLAC) 8 % solution, Apply topically daily at bedtime, Disp: 6.6 mL, Rfl: 3  •  ferrous sulfate 324 (65 Fe) mg, Take 1 tablet (324 mg total) by mouth daily before breakfast, Disp: 90 tablet, Rfl: 0  •  hydrochlorothiazide (HYDRODIURIL) 12.5 mg tablet, Take 1 tablet (12.5 mg total) by mouth daily, Disp: 90 tablet, Rfl: 0  •  hydrOXYzine HCL (ATARAX) 25 mg tablet, Take 1 tablet (25 mg total) by mouth every 6 (six) hours as needed for itching or anxiety, Disp: 30 tablet, Rfl: 0  •  losartan (COZAAR) 100 MG tablet, TAKE ONE TABLET BY MOUTH EVERY DAY, Disp: 90 tablet, Rfl: 0  •  Multiple Vitamin (multivitamin) tablet, Take 1 tablet by mouth daily, Disp: , Rfl:   •  omeprazole (PriLOSEC) 40 MG capsule, Take 1 capsule (40 mg total) by mouth daily Take before a meal, Disp: 90 capsule, Rfl: 2  •  polyethylene glycol (GOLYTELY) 4000 mL solution, Take 4,000 mL by mouth once for 1 dose, Disp: 4000 mL, Rfl: 0  •  QUEtiapine (SEROquel) 100 mg tablet, Take 1 tablet (100 mg total) by mouth daily at bedtime (Patient taking differently: Take 100 mg by mouth daily at bedtime Usually takes half), Disp: 90 tablet, Rfl: 2  •  sertraline (ZOLOFT) 100 mg tablet, Take 2 tablets (200 mg total) by mouth daily, Disp: 180 tablet, Rfl: 0  •  SF 5000 Plus 1.1 % CREA, , Disp: , Rfl:     Current Allergies     Allergies as of 08/04/2023 - Reviewed 08/04/2023   Allergen Reaction Noted   • Latex Rash 06/14/2021            The following portions of the patient's history were reviewed and updated as appropriate: allergies, current medications, past family history, past medical history, past social history, past surgical history and problem list.     Past Medical History:   Diagnosis Date   • ADHD (attention deficit hyperactivity disorder)    • Anxiety    • Arthritis    • Depression    • Fibromyalgia, primary    • GERD (gastroesophageal reflux disease)    • History of stomach ulcers    • Hypertension    • Panic attack    • Papanicolaou smear 2020   • Psychiatric disorder        Past Surgical History:   Procedure Laterality Date   • HYSTEROSCOPY  2015   • MAMMO (HISTORICAL)  2020       Family History   Problem Relation Age of Onset   • Hypertension Mother    • Lung cancer Mother    • Skin cancer Father          Medications have been verified. Objective   /74   Pulse 70   Temp 98.4 °F (36.9 °C)   Resp 18   SpO2 100%        Physical Exam     Physical Exam  Vitals and nursing note reviewed.    Constitutional:       General: She is not in acute distress. HENT:      Head: Normocephalic. Right Ear: External ear normal.      Left Ear: External ear normal.      Nose: Nose normal.      Mouth/Throat:      Mouth: Mucous membranes are moist.   Eyes:      General: Lids are normal.         Right eye: No discharge. Left eye: No discharge. Extraocular Movements: Extraocular movements intact. Conjunctiva/sclera: Conjunctivae normal.      Pupils: Pupils are equal, round, and reactive to light. Cardiovascular:      Rate and Rhythm: Normal rate and regular rhythm. Pulses: Normal pulses. Heart sounds: Normal heart sounds. Pulmonary:      Effort: Pulmonary effort is normal.      Breath sounds: Normal breath sounds. Musculoskeletal:         General: Normal range of motion. Cervical back: Normal range of motion and neck supple. Skin:     General: Skin is warm and dry. Capillary Refill: Capillary refill takes less than 2 seconds. Findings: Rash present. Neurological:      Mental Status: She is alert and oriented to person, place, and time.

## 2023-08-04 NOTE — PATIENT INSTRUCTIONS
Take prednisone as prescribed. Recommend taking in the morning, with food. If you are taking an anti-inflammatory such as Aleve or ibuprofen/Motrin, recommend stopping or only using over-the-counter doses while you are on higher doses of prednisone (40mg - 60mg). Continue hydrocortisone cream for itch relief. May use Benadryl 25mg - 50mg every 4-6 hours as needing for itching. Be careful of drowsiness, do not take before driving or operating heavy machinery. Use unscented/sensitive formula soaps, lotions, and detergents. Limit hot showers. Follow up with your PCP in 5-7 days. Go to the ED if symptoms significantly worsen.

## 2023-08-07 ENCOUNTER — OFFICE VISIT (OUTPATIENT)
Dept: FAMILY MEDICINE CLINIC | Facility: CLINIC | Age: 58
End: 2023-08-07
Payer: COMMERCIAL

## 2023-08-07 VITALS
BODY MASS INDEX: 34.39 KG/M2 | HEART RATE: 90 BPM | WEIGHT: 214 LBS | DIASTOLIC BLOOD PRESSURE: 82 MMHG | RESPIRATION RATE: 15 BRPM | TEMPERATURE: 98.1 F | OXYGEN SATURATION: 98 % | HEIGHT: 66 IN | SYSTOLIC BLOOD PRESSURE: 134 MMHG

## 2023-08-07 DIAGNOSIS — L25.9 CONTACT DERMATITIS, UNSPECIFIED CONTACT DERMATITIS TYPE, UNSPECIFIED TRIGGER: Primary | ICD-10-CM

## 2023-08-07 PROCEDURE — 99213 OFFICE O/P EST LOW 20 MIN: CPT | Performed by: NURSE PRACTITIONER

## 2023-08-07 NOTE — PROGRESS NOTES
Assessment/Plan:     Diagnoses and all orders for this visit:    Contact dermatitis, unspecified contact dermatitis type, unspecified trigger      Pt to continue her Prednisone as prescribed by Urgent Care. She may also continue her daily antihistamine. Patient is encouraged to call our office for any questions/concerns, persistent or worsening symptoms. Patient states they understand and agree with treatment plan. Pt to f/u PRN. Subjective:      Patient ID: Amanda Anthony is a 62 y.o. female. Pt presents today for hx of redness and itchiness around both eyes L>R. She did go to an Urgent Care on 08/04 and was diagnosed w/ contact dermatitis. She was placed on Prednisone taper. Pt notes that she also takes a daily antihistamine. She cannot recall being outside among weeds/plants, nor can recall and new soaps, lotions or facial products. Pt notes her symptoms are doing well since starting the Prednisone. She denies any change in vision or eye discharge. The following portions of the patient's history were reviewed and updated as appropriate: allergies, current medications, past family history, past medical history, past social history, past surgical history and problem list.    Review of Systems    As noted per HPI. Objective:      /82   Pulse 90   Temp 98.1 °F (36.7 °C) (Temporal)   Resp 15   Ht 5' 6" (1.676 m)   Wt 97.1 kg (214 lb)   SpO2 98%   BMI 34.54 kg/m²          Physical Exam  Vitals reviewed. Constitutional:       Appearance: Normal appearance. Eyes:      General: Lids are normal. Vision grossly intact. Right eye: No foreign body, discharge or hordeolum. Left eye: No foreign body, discharge or hordeolum. Conjunctiva/sclera:      Right eye: Right conjunctiva is not injected. Left eye: Left conjunctiva is not injected. Pupils: Pupils are equal, round, and reactive to light.    Pulmonary:      Effort: Pulmonary effort is normal. Neurological:      Mental Status: She is alert and oriented to person, place, and time. Mental status is at baseline. Psychiatric:         Mood and Affect: Mood normal.         Behavior: Behavior normal.         Thought Content:  Thought content normal.         Judgment: Judgment normal.

## 2023-08-09 RX ORDER — SODIUM CHLORIDE, SODIUM LACTATE, POTASSIUM CHLORIDE, CALCIUM CHLORIDE 600; 310; 30; 20 MG/100ML; MG/100ML; MG/100ML; MG/100ML
125 INJECTION, SOLUTION INTRAVENOUS CONTINUOUS
Status: CANCELLED | OUTPATIENT
Start: 2023-08-09

## 2023-08-09 RX ORDER — METOCLOPRAMIDE HYDROCHLORIDE 5 MG/ML
10 INJECTION INTRAMUSCULAR; INTRAVENOUS ONCE AS NEEDED
Status: CANCELLED | OUTPATIENT
Start: 2023-08-09

## 2023-08-09 RX ORDER — ONDANSETRON 2 MG/ML
4 INJECTION INTRAMUSCULAR; INTRAVENOUS ONCE AS NEEDED
Status: CANCELLED | OUTPATIENT
Start: 2023-08-09

## 2023-08-09 RX ORDER — LIDOCAINE HYDROCHLORIDE 10 MG/ML
0.5 INJECTION, SOLUTION EPIDURAL; INFILTRATION; INTRACAUDAL; PERINEURAL ONCE AS NEEDED
Status: CANCELLED | OUTPATIENT
Start: 2023-08-09

## 2023-08-09 RX ORDER — DIPHENHYDRAMINE HYDROCHLORIDE 50 MG/ML
12.5 INJECTION INTRAMUSCULAR; INTRAVENOUS ONCE AS NEEDED
Status: CANCELLED | OUTPATIENT
Start: 2023-08-09

## 2023-08-10 ENCOUNTER — HOSPITAL ENCOUNTER (OUTPATIENT)
Dept: GASTROENTEROLOGY | Facility: HOSPITAL | Age: 58
Setting detail: OUTPATIENT SURGERY
Discharge: HOME | End: 2023-08-10
Payer: COMMERCIAL

## 2023-08-10 ENCOUNTER — ANESTHESIA (OUTPATIENT)
Dept: GASTROENTEROLOGY | Facility: HOSPITAL | Age: 58
End: 2023-08-10

## 2023-08-10 ENCOUNTER — ANESTHESIA EVENT (OUTPATIENT)
Dept: GASTROENTEROLOGY | Facility: HOSPITAL | Age: 58
End: 2023-08-10

## 2023-08-10 VITALS
RESPIRATION RATE: 16 BRPM | HEIGHT: 66 IN | OXYGEN SATURATION: 99 % | BODY MASS INDEX: 33.65 KG/M2 | DIASTOLIC BLOOD PRESSURE: 55 MMHG | SYSTOLIC BLOOD PRESSURE: 107 MMHG | TEMPERATURE: 97 F | WEIGHT: 209.4 LBS | HEART RATE: 66 BPM

## 2023-08-10 DIAGNOSIS — D50.9 IRON DEFICIENCY ANEMIA, UNSPECIFIED IRON DEFICIENCY ANEMIA TYPE: ICD-10-CM

## 2023-08-10 PROCEDURE — 88342 IMHCHEM/IMCYTCHM 1ST ANTB: CPT | Performed by: STUDENT IN AN ORGANIZED HEALTH CARE EDUCATION/TRAINING PROGRAM

## 2023-08-10 PROCEDURE — 45385 COLONOSCOPY W/LESION REMOVAL: CPT | Performed by: STUDENT IN AN ORGANIZED HEALTH CARE EDUCATION/TRAINING PROGRAM

## 2023-08-10 PROCEDURE — 88305 TISSUE EXAM BY PATHOLOGIST: CPT | Performed by: STUDENT IN AN ORGANIZED HEALTH CARE EDUCATION/TRAINING PROGRAM

## 2023-08-10 PROCEDURE — 43239 EGD BIOPSY SINGLE/MULTIPLE: CPT | Performed by: STUDENT IN AN ORGANIZED HEALTH CARE EDUCATION/TRAINING PROGRAM

## 2023-08-10 RX ORDER — LIDOCAINE HYDROCHLORIDE 20 MG/ML
INJECTION, SOLUTION EPIDURAL; INFILTRATION; INTRACAUDAL; PERINEURAL AS NEEDED
Status: DISCONTINUED | OUTPATIENT
Start: 2023-08-10 | End: 2023-08-10

## 2023-08-10 RX ORDER — SODIUM CHLORIDE, SODIUM LACTATE, POTASSIUM CHLORIDE, CALCIUM CHLORIDE 600; 310; 30; 20 MG/100ML; MG/100ML; MG/100ML; MG/100ML
125 INJECTION, SOLUTION INTRAVENOUS CONTINUOUS
Status: DISCONTINUED | OUTPATIENT
Start: 2023-08-10 | End: 2023-08-14 | Stop reason: HOSPADM

## 2023-08-10 RX ORDER — LIDOCAINE HYDROCHLORIDE 10 MG/ML
0.5 INJECTION, SOLUTION EPIDURAL; INFILTRATION; INTRACAUDAL; PERINEURAL ONCE AS NEEDED
Status: DISCONTINUED | OUTPATIENT
Start: 2023-08-10 | End: 2023-08-14 | Stop reason: HOSPADM

## 2023-08-10 RX ORDER — PROPOFOL 10 MG/ML
INJECTION, EMULSION INTRAVENOUS AS NEEDED
Status: DISCONTINUED | OUTPATIENT
Start: 2023-08-10 | End: 2023-08-10

## 2023-08-10 RX ADMIN — PROPOFOL 50 MG: 10 INJECTION, EMULSION INTRAVENOUS at 07:51

## 2023-08-10 RX ADMIN — SODIUM CHLORIDE, SODIUM LACTATE, POTASSIUM CHLORIDE, AND CALCIUM CHLORIDE: .6; .31; .03; .02 INJECTION, SOLUTION INTRAVENOUS at 07:20

## 2023-08-10 RX ADMIN — PROPOFOL 50 MG: 10 INJECTION, EMULSION INTRAVENOUS at 08:00

## 2023-08-10 RX ADMIN — PROPOFOL 50 MG: 10 INJECTION, EMULSION INTRAVENOUS at 08:05

## 2023-08-10 RX ADMIN — PROPOFOL 50 MG: 10 INJECTION, EMULSION INTRAVENOUS at 07:45

## 2023-08-10 RX ADMIN — LIDOCAINE HYDROCHLORIDE 100 MG: 20 INJECTION, SOLUTION EPIDURAL; INFILTRATION; INTRACAUDAL; PERINEURAL at 07:33

## 2023-08-10 RX ADMIN — PROPOFOL 50 MG: 10 INJECTION, EMULSION INTRAVENOUS at 07:34

## 2023-08-10 RX ADMIN — PROPOFOL 50 MG: 10 INJECTION, EMULSION INTRAVENOUS at 07:42

## 2023-08-10 RX ADMIN — PROPOFOL 50 MG: 10 INJECTION, EMULSION INTRAVENOUS at 07:56

## 2023-08-10 RX ADMIN — Medication 40 MG: at 08:07

## 2023-08-10 RX ADMIN — PROPOFOL 50 MG: 10 INJECTION, EMULSION INTRAVENOUS at 08:19

## 2023-08-10 RX ADMIN — PROPOFOL 50 MG: 10 INJECTION, EMULSION INTRAVENOUS at 08:10

## 2023-08-10 RX ADMIN — PROPOFOL 50 MG: 10 INJECTION, EMULSION INTRAVENOUS at 07:39

## 2023-08-10 RX ADMIN — PROPOFOL 50 MG: 10 INJECTION, EMULSION INTRAVENOUS at 08:22

## 2023-08-10 RX ADMIN — PROPOFOL 50 MG: 10 INJECTION, EMULSION INTRAVENOUS at 07:48

## 2023-08-10 RX ADMIN — PROPOFOL 50 MG: 10 INJECTION, EMULSION INTRAVENOUS at 08:15

## 2023-08-10 RX ADMIN — PROPOFOL 50 MG: 10 INJECTION, EMULSION INTRAVENOUS at 07:36

## 2023-08-10 RX ADMIN — PROPOFOL 50 MG: 10 INJECTION, EMULSION INTRAVENOUS at 08:13

## 2023-08-10 RX ADMIN — PROPOFOL 150 MG: 10 INJECTION, EMULSION INTRAVENOUS at 07:33

## 2023-08-10 NOTE — ANESTHESIA PREPROCEDURE EVALUATION
Procedure:  COLONOSCOPY  EGD    Relevant Problems   ANESTHESIA (within normal limits)      CARDIO   (+) Primary hypertension      ENDO (within normal limits)      GI/HEPATIC (within normal limits)      /RENAL (within normal limits)      GYN (within normal limits)      HEMATOLOGY   (+) Iron deficiency anemia      MUSCULOSKELETAL   (+) Chronic bilateral low back pain without sciatica      NEURO/PSYCH   (+) Chronic bilateral low back pain without sciatica   (+) Moderate episode of recurrent major depressive disorder (HCC)      PULMONARY (within normal limits)        Physical Exam    Airway    Mallampati score: II  TM Distance: >3 FB  Neck ROM: full     Dental   No notable dental hx     Cardiovascular  Rhythm: regular, Rate: normal, Cardiovascular exam normal    Pulmonary  Pulmonary exam normal Breath sounds clear to auscultation,     Other Findings        Anesthesia Plan  ASA Score- 2     Anesthesia Type- IV sedation with anesthesia with ASA Monitors. Additional Monitors:   Airway Plan:           Plan Factors-Exercise tolerance (METS): >4 METS. Chart reviewed. EKG reviewed. Imaging results reviewed. Existing labs reviewed. Patient summary reviewed. Induction- intravenous. Postoperative Plan-     Informed Consent- Anesthetic plan and risks discussed with patient. I personally reviewed this patient with the CRNA. Discussed and agreed on the Anesthesia Plan with the CRNA. James Crow

## 2023-08-10 NOTE — H&P
History and Physical - [facility]  Kelvin Rollins 62 y.o. female MRN: [id number]                  HPI: Kelvin Rollins is a 62y.o. year old female who presents for iron deficiency anemia, weight loss, change in bowel habits, colon cancer screening. REVIEW OF SYSTEMS: Per the HPI, and otherwise unremarkable.     Historical Information   Past Medical History:   Diagnosis Date   • ADHD (attention deficit hyperactivity disorder)    • Anxiety    • Arthritis    • Depression    • Fibromyalgia, primary    • GERD (gastroesophageal reflux disease)    • History of stomach ulcers    • Hypertension    • Panic attack    • Papanicolaou smear 2020   • Psychiatric disorder      Past Surgical History:   Procedure Laterality Date   • HYSTEROSCOPY  2015   • MAMMO (HISTORICAL)  2020     Social History   Social History     Substance and Sexual Activity   Alcohol Use Yes    Comment: Socially     Social History     Substance and Sexual Activity   Drug Use No     Social History     Tobacco Use   Smoking Status Former   Smokeless Tobacco Never   Tobacco Comments    teen years     Family History   Problem Relation Age of Onset   • Hypertension Mother    • Lung cancer Mother    • Skin cancer Father        Meds/Allergies       Current Outpatient Medications:   •  albuterol (PROVENTIL HFA,VENTOLIN HFA) 90 mcg/act inhaler  •  buPROPion (Wellbutrin SR) 150 mg 12 hr tablet  •  buPROPion (WELLBUTRIN XL) 300 mg 24 hr tablet  •  cholecalciferol (VITAMIN D3) 1,000 units tablet  •  ciclopirox (PENLAC) 8 % solution  •  ferrous sulfate 324 (65 Fe) mg  •  hydrochlorothiazide (HYDRODIURIL) 12.5 mg tablet  •  hydrOXYzine HCL (ATARAX) 25 mg tablet  •  losartan (COZAAR) 100 MG tablet  •  Multiple Vitamin (multivitamin) tablet  •  omeprazole (PriLOSEC) 40 MG capsule  •  predniSONE 10 mg tablet  •  QUEtiapine (SEROquel) 100 mg tablet  •  sertraline (ZOLOFT) 100 mg tablet  •  bisacodyl (DULCOLAX) 5 mg EC tablet  •  polyethylene glycol (GOLYTELY) 4000 mL solution  •  SF 5000 Plus 1.1 % CREA    Current Facility-Administered Medications:   •  lactated ringers infusion, 125 mL/hr, Intravenous, Continuous  •  lidocaine (PF) (XYLOCAINE-MPF) 1 % injection 0.5 mL, 0.5 mL, Infiltration, Once PRN    Allergies   Allergen Reactions   • Latex Rash       Objective     /70   Pulse 70   Temp 97.6 °F (36.4 °C) (Temporal)   Resp 18   Ht 5' 6" (1.676 m)   Wt 95 kg (209 lb 6.4 oz)   SpO2 99%   BMI 33.80 kg/m²       PHYSICAL EXAM    Gen: NAD  Head: NCAT  CV: RRR  CHEST: Clear  ABD: soft, NT/ND  EXT: no edema      ASSESSMENT/PLAN:  This is a 62y.o. year old female here for EGD/colonoscopy, and she is stable and optimized for her procedure.

## 2023-08-10 NOTE — ANESTHESIA POSTPROCEDURE EVALUATION
Post-Op Assessment Note    CV Status:  Stable  Pain Score: 0    Pain management: adequate     Mental Status:  Sleepy and arousable   Hydration Status:  Stable   PONV Controlled:  Controlled   Airway Patency:  Patent      Post Op Vitals Reviewed: Yes      Staff: CRNA         There were no known notable events for this encounter.     /55   Temp    Pulse 72   Resp 13   SpO2 98

## 2023-08-16 DIAGNOSIS — B35.1 TOENAIL FUNGUS: ICD-10-CM

## 2023-08-16 PROCEDURE — 88342 IMHCHEM/IMCYTCHM 1ST ANTB: CPT | Performed by: STUDENT IN AN ORGANIZED HEALTH CARE EDUCATION/TRAINING PROGRAM

## 2023-08-16 PROCEDURE — 88305 TISSUE EXAM BY PATHOLOGIST: CPT | Performed by: STUDENT IN AN ORGANIZED HEALTH CARE EDUCATION/TRAINING PROGRAM

## 2023-08-17 ENCOUNTER — TELEPHONE (OUTPATIENT)
Dept: GASTROENTEROLOGY | Facility: AMBULARY SURGERY CENTER | Age: 58
End: 2023-08-17

## 2023-08-17 NOTE — TELEPHONE ENCOUNTER
Reviewed pathology with the patient which showed no histologic evidence of H. pylori and celiac disease on the upper biopsies. However, it appears that her polyps were not retrieved and so would recommend 3-year surveillance assuming these polyps were adenomatous.

## 2023-09-06 ENCOUNTER — TELEPHONE (OUTPATIENT)
Dept: FAMILY MEDICINE CLINIC | Facility: CLINIC | Age: 58
End: 2023-09-06

## 2023-09-06 NOTE — TELEPHONE ENCOUNTER
Pt reported her 2 roommates have covid - she has a mild headache - she will try to find a covid test.  I advised her that typically we advise pts to stay isolated for 5 days if they do test positive, followed by 5 days of masking.   If she experiences severe symptoms or has risk factors, she was advised to call back for a virtual.

## 2023-09-12 ENCOUNTER — TELEPHONE (OUTPATIENT)
Dept: FAMILY MEDICINE CLINIC | Facility: CLINIC | Age: 58
End: 2023-09-12

## 2023-09-12 NOTE — TELEPHONE ENCOUNTER
From Folder: Hi, this is Jon Bourgeois. I have an appointment tomorrow morning with Ariadne and I wanted to reschedule it. I was supposed to have blood work done and I didn't get it done yet and it's a physical and a follow up for the blood work. So if you could call me back to reschedule, it's [phone]. Thank you.

## 2023-09-16 ENCOUNTER — APPOINTMENT (OUTPATIENT)
Dept: LAB | Facility: HOSPITAL | Age: 58
End: 2023-09-16
Payer: COMMERCIAL

## 2023-09-16 DIAGNOSIS — D50.9 IRON DEFICIENCY ANEMIA, UNSPECIFIED IRON DEFICIENCY ANEMIA TYPE: ICD-10-CM

## 2023-09-16 LAB
BASOPHILS # BLD AUTO: 0.04 THOUSANDS/ÂΜL (ref 0–0.1)
BASOPHILS NFR BLD AUTO: 1 % (ref 0–1)
EOSINOPHIL # BLD AUTO: 0.2 THOUSAND/ÂΜL (ref 0–0.61)
EOSINOPHIL NFR BLD AUTO: 4 % (ref 0–6)
ERYTHROCYTE [DISTWIDTH] IN BLOOD BY AUTOMATED COUNT: 16.8 % (ref 11.6–15.1)
FERRITIN SERPL-MCNC: 5 NG/ML (ref 11–307)
HCT VFR BLD AUTO: 37 % (ref 34.8–46.1)
HGB BLD-MCNC: 11.5 G/DL (ref 11.5–15.4)
IMM GRANULOCYTES # BLD AUTO: 0.02 THOUSAND/UL (ref 0–0.2)
IMM GRANULOCYTES NFR BLD AUTO: 0 % (ref 0–2)
IRON SATN MFR SERPL: 14 % (ref 15–50)
IRON SERPL-MCNC: 52 UG/DL (ref 50–212)
LYMPHOCYTES # BLD AUTO: 1.01 THOUSANDS/ÂΜL (ref 0.6–4.47)
LYMPHOCYTES NFR BLD AUTO: 18 % (ref 14–44)
MCH RBC QN AUTO: 25.1 PG (ref 26.8–34.3)
MCHC RBC AUTO-ENTMCNC: 31.1 G/DL (ref 31.4–37.4)
MCV RBC AUTO: 81 FL (ref 82–98)
MONOCYTES # BLD AUTO: 0.68 THOUSAND/ÂΜL (ref 0.17–1.22)
MONOCYTES NFR BLD AUTO: 12 % (ref 4–12)
NEUTROPHILS # BLD AUTO: 3.81 THOUSANDS/ÂΜL (ref 1.85–7.62)
NEUTS SEG NFR BLD AUTO: 65 % (ref 43–75)
NRBC BLD AUTO-RTO: 0 /100 WBCS
PLATELET # BLD AUTO: 329 THOUSANDS/UL (ref 149–390)
PMV BLD AUTO: 11.3 FL (ref 8.9–12.7)
RBC # BLD AUTO: 4.59 MILLION/UL (ref 3.81–5.12)
TIBC SERPL-MCNC: 364 UG/DL (ref 250–450)
UIBC SERPL-MCNC: 312 UG/DL (ref 155–355)
WBC # BLD AUTO: 5.76 THOUSAND/UL (ref 4.31–10.16)

## 2023-09-16 PROCEDURE — 85025 COMPLETE CBC W/AUTO DIFF WBC: CPT

## 2023-09-16 PROCEDURE — 83550 IRON BINDING TEST: CPT

## 2023-09-16 PROCEDURE — 36415 COLL VENOUS BLD VENIPUNCTURE: CPT

## 2023-09-16 PROCEDURE — 83540 ASSAY OF IRON: CPT

## 2023-09-16 PROCEDURE — 82728 ASSAY OF FERRITIN: CPT

## 2023-09-18 ENCOUNTER — OFFICE VISIT (OUTPATIENT)
Dept: FAMILY MEDICINE CLINIC | Facility: CLINIC | Age: 58
End: 2023-09-18
Payer: COMMERCIAL

## 2023-09-18 ENCOUNTER — TELEPHONE (OUTPATIENT)
Dept: HEMATOLOGY ONCOLOGY | Facility: CLINIC | Age: 58
End: 2023-09-18

## 2023-09-18 VITALS
TEMPERATURE: 99.6 F | BODY MASS INDEX: 34.84 KG/M2 | HEIGHT: 66 IN | SYSTOLIC BLOOD PRESSURE: 126 MMHG | WEIGHT: 216.8 LBS | OXYGEN SATURATION: 98 % | HEART RATE: 76 BPM | RESPIRATION RATE: 16 BRPM | DIASTOLIC BLOOD PRESSURE: 82 MMHG

## 2023-09-18 DIAGNOSIS — Z23 NEED FOR INFLUENZA VACCINATION: ICD-10-CM

## 2023-09-18 DIAGNOSIS — Z00.00 ANNUAL PHYSICAL EXAM: Primary | ICD-10-CM

## 2023-09-18 DIAGNOSIS — R73.03 PREDIABETES: ICD-10-CM

## 2023-09-18 DIAGNOSIS — Z13.228 SCREENING FOR METABOLIC DISORDER: ICD-10-CM

## 2023-09-18 DIAGNOSIS — D50.9 MICROCYTIC ANEMIA: ICD-10-CM

## 2023-09-18 DIAGNOSIS — E78.2 MIXED HYPERLIPIDEMIA: ICD-10-CM

## 2023-09-18 PROCEDURE — 99396 PREV VISIT EST AGE 40-64: CPT | Performed by: NURSE PRACTITIONER

## 2023-09-18 PROCEDURE — 99213 OFFICE O/P EST LOW 20 MIN: CPT | Performed by: NURSE PRACTITIONER

## 2023-09-18 RX ORDER — TERBINAFINE HYDROCHLORIDE 250 MG/1
TABLET ORAL
COMMUNITY
Start: 2023-09-14

## 2023-09-18 NOTE — TELEPHONE ENCOUNTER
I called Michelle Moscoso in response to a referral that was received for patient to establish care with Hematology. Outreach was made to schedule a consultation. I left a voicemail explaining the reason for my call and advised patient to call [facility] at [phone]. Another attempt will be made to contact patient.

## 2023-09-18 NOTE — PROGRESS NOTES
ADULT ANNUAL PHYSICAL  [address] [facility]ILY PRACTICE    NAME: Nahid Ryan  AGE: 62 y.o. SEX: female  : 1965     DATE: 2023     Assessment and Plan:  1. Pt will think about Shingrix. We are also currently out of the Flu vaccine patient is eligible for so our office staff will contact her once we receive them. 2. Labs ordered. 3. Mammo UTD. 4. PAP through GYN. 5. Colonoscopy UTD. 6. F/u in 6 month for recheck or sooner PRN. Problem List Items Addressed This Visit        Other    Iron deficiency anemia   Other Visit Diagnoses     Annual physical exam    -  Primary    Prediabetes        Relevant Orders    Hemoglobin A1C    Mixed hyperlipidemia        Relevant Orders    Lipid Panel with Direct LDL reflex    Screening for metabolic disorder        Relevant Orders    Comprehensive metabolic panel    Microcytic anemia        Relevant Orders    CBC and differential    Iron Panel (Includes Ferritin, Iron Sat%, Iron, and TIBC)    Ambulatory Referral to Hematology / Oncology    Need for influenza vaccination              Immunizations and preventive care screenings were discussed with patient today. Appropriate education was printed on patient's after visit summary. Counseling:  Alcohol/drug use: discussed moderation in alcohol intake, the recommendations for healthy alcohol use, and avoidance of illicit drug use. Dental Health: discussed importance of regular tooth brushing, flossing, and dental visits. Injury prevention: discussed safety/seat belts, safety helmets, smoke detectors, carbon dioxide detectors, and smoking near bedding or upholstery. Sexual health: discussed sexually transmitted diseases, partner selection, use of condoms, avoidance of unintended pregnancy, and contraceptive alternatives. · Exercise: the importance of regular exercise/physical activity was discussed.  Recommend exercise 3-5 times per week for at least 30 minutes. BMI Counseling: Body mass index is 35.26 kg/m². The BMI is above normal. Nutrition recommendations include decreasing portion sizes, encouraging healthy choices of fruits and vegetables, decreasing fast food intake, consuming healthier snacks, limiting drinks that contain sugar, moderation in carbohydrate intake, increasing intake of lean protein, reducing intake of saturated and trans fat and reducing intake of cholesterol. Exercise recommendations include moderate physical activity 150 minutes/week. No pharmacotherapy was ordered. Rationale for BMI follow-up plan is due to patient being overweight or obese. Return in about 6 months (around 3/18/2024) for Recheck. Chief Complaint:     Chief Complaint   Patient presents with   • Physical Exam      History of Present Illness:     Adult Annual Physical   Patient here for a comprehensive physical exam. The patient reports no problems. Diet and Physical Activity  · Diet/Nutrition: poor diet. · Exercise: no formal exercise. Depression Screening  PHQ-2/9 Depression Screening    Little interest or pleasure in doing things: 0 - not at all  Feeling down, depressed, or hopeless: 1 - several days  Trouble falling or staying asleep, or sleeping too much: 0 - not at all  Feeling tired or having little energy: 3 - nearly every day  Poor appetite or overeatin - more than half the days  Feeling bad about yourself - or that you are a failure or have let yourself or your family down: 3 - nearly every day  Trouble concentrating on things, such as reading the newspaper or watching television: 3 - nearly every day  Moving or speaking so slowly that other people could have noticed.  Or the opposite - being so fidgety or restless that you have been moving around a lot more than usual: 0 - not at all  Thoughts that you would be better off dead, or of hurting yourself in some way: 0 - not at all  PHQ-9 Score: 12   PHQ-9 Interpretation: Moderate depression        General Health  · Sleep: sleeps well and gets 7-8 hours of sleep on average. · Hearing: normal - bilateral.  · Vision: most recent eye exam <1 year ago and wears glasses. · Dental: regular dental visits and brushes teeth twice daily. /GYN Health  · Patient is: postmenopausal       Review of Systems:     Review of Systems   Constitutional: Negative. Negative for chills and fatigue. HENT: Negative. Respiratory: Negative. Negative for cough and shortness of breath. Cardiovascular: Negative. Negative for chest pain. Gastrointestinal: Negative. Genitourinary: Negative. Musculoskeletal: Negative. Negative for myalgias. Neurological: Negative. Past Medical History:     Past Medical History:   Diagnosis Date   • ADHD (attention deficit hyperactivity disorder)    • Anxiety    • Arthritis    • Depression    • Fibromyalgia, primary    • GERD (gastroesophageal reflux disease)    • History of stomach ulcers    • Hypertension    • Panic attack    • Papanicolaou smear 2020   • Psychiatric disorder       Past Surgical History:     Past Surgical History:   Procedure Laterality Date   • HYSTEROSCOPY  2015   • MAMMO (HISTORICAL)  2020      Social History:     Social History     Socioeconomic History   • Marital status: Single     Spouse name: None   • Number of children: None   • Years of education: None   • Highest education level: None   Occupational History   • Occupation: Tech. Tobacco Use   • Smoking status: Former   • Smokeless tobacco: Never   • Tobacco comments:     teen years   Vaping Use   • Vaping Use: Never used   Substance and Sexual Activity   • Alcohol use: Yes     Comment: Socially   • Drug use: No   • Sexual activity: Not Currently     Birth control/protection: Post-menopausal   Other Topics Concern   • None   Social History Narrative    Caffeine Intake: 1-2 cups per day    Do you smoke marijuana? Denies    Do you drink alcohol?  Socially Exercise: Occassionally    Domestic violence: No    History of drug/alcohol abuse denies     Social Determinants of Health     Financial Resource Strain: Not on file   Food Insecurity: Not on file   Transportation Needs: Not on file   Physical Activity: Not on file   Stress: Not on file   Social Connections: Not on file   Intimate Partner Violence: Not on file   Housing Stability: Not on file      Family History:     Family History   Problem Relation Age of Onset   • Hypertension Mother    • Lung cancer Mother    • Skin cancer Father       Current Medications:     Current Outpatient Medications   Medication Sig Dispense Refill   • albuterol (PROVENTIL HFA,VENTOLIN HFA) 90 mcg/act inhaler Inhale 2 puffs every 6 (six) hours as needed for wheezing or shortness of breath 8 g 3   • buPROPion (Wellbutrin SR) 150 mg 12 hr tablet Take 1 tablet (150 mg total) by mouth in the morning 90 tablet 2   • buPROPion (WELLBUTRIN XL) 300 mg 24 hr tablet Take 1 tablet (300 mg total) by mouth daily 90 tablet 2   • cholecalciferol (VITAMIN D3) 1,000 units tablet Take 1,000 Units by mouth daily     • ciclopirox (PENLAC) 8 % solution Apply topically daily at bedtime 6.6 mL 0   • ferrous sulfate 324 (65 Fe) mg Take 1 tablet (324 mg total) by mouth daily before breakfast 90 tablet 0   • hydrochlorothiazide (HYDRODIURIL) 12.5 mg tablet Take 1 tablet (12.5 mg total) by mouth daily 90 tablet 0   • hydrOXYzine HCL (ATARAX) 25 mg tablet Take 1 tablet (25 mg total) by mouth every 6 (six) hours as needed for itching or anxiety 30 tablet 0   • losartan (COZAAR) 100 MG tablet TAKE ONE TABLET BY MOUTH EVERY DAY 90 tablet 0   • Multiple Vitamin (multivitamin) tablet Take 1 tablet by mouth daily     • omeprazole (PriLOSEC) 40 MG capsule Take 1 capsule (40 mg total) by mouth daily Take before a meal 90 capsule 2   • QUEtiapine (SEROquel) 100 mg tablet Take 1 tablet (100 mg total) by mouth daily at bedtime (Patient taking differently: Take 100 mg by mouth daily at bedtime Usually takes half) 90 tablet 2   • sertraline (ZOLOFT) 100 mg tablet Take 2 tablets (200 mg total) by mouth daily 180 tablet 0   • SF 5000 Plus 1.1 % CREA      • bisacodyl (DULCOLAX) 5 mg EC tablet Take 2 tablets (10 mg total) by mouth once for 1 dose 2 tablet 0   • terbinafine (LamISIL) 250 mg tablet        No current facility-administered medications for this visit. Allergies: Allergies   Allergen Reactions   • Latex Rash      Physical Exam:     /82   Pulse 76   Temp 99.6 °F (37.6 °C) (Oral)   Resp 16   Ht 5' 5.75" (1.67 m)   Wt 98.3 kg (216 lb 12.8 oz)   SpO2 98%   BMI 35.26 kg/m²     Physical Exam  Vitals and nursing note reviewed. Constitutional:       General: She is not in acute distress. Appearance: Normal appearance. She is well-developed. She is not ill-appearing. HENT:      Head: Normocephalic and atraumatic. Right Ear: Tympanic membrane, ear canal and external ear normal.      Left Ear: Tympanic membrane, ear canal and external ear normal.   Eyes:      Conjunctiva/sclera: Conjunctivae normal.   Neck:      Vascular: No carotid bruit. Cardiovascular:      Rate and Rhythm: Normal rate and regular rhythm. Pulses: Normal pulses. Heart sounds: Normal heart sounds. No murmur heard. Pulmonary:      Effort: Pulmonary effort is normal. No respiratory distress. Breath sounds: Normal breath sounds. No wheezing. Abdominal:      General: There is no distension. Palpations: Abdomen is soft. There is no mass. Tenderness: There is no abdominal tenderness. There is no guarding or rebound. Hernia: No hernia is present. Musculoskeletal:         General: Normal range of motion. Cervical back: Normal range of motion and neck supple. Right lower leg: No edema. Left lower leg: No edema. Skin:     General: Skin is warm and dry. Capillary Refill: Capillary refill takes less than 2 seconds.    Neurological:      Mental Status: She is alert and oriented to person, place, and time. Mental status is at baseline. Motor: No weakness. Gait: Gait normal.   Psychiatric:         Mood and Affect: Mood normal.         Behavior: Behavior normal.         Thought Content:  Thought content normal.         Judgment: Judgment normal.          Heaven Luu, [address] [place], [place] [address]

## 2023-09-18 NOTE — PROGRESS NOTES
Assessment/Plan:     Diagnoses and all orders for this visit:    Prediabetes  -     Hemoglobin A1C; Future    Last A1c at 5.7% Repeat A1c. Mixed hyperlipidemia  -     Lipid Panel with Direct LDL reflex; Future    Last lipid panel in February. Repeat LP. Screening for metabolic disorder  -     Comprehensive metabolic panel; Future    Microcytic anemia  -     CBC and differential; Future  -     Iron Panel (Includes Ferritin, Iron Sat%, Iron, and TIBC); Future  -     Ambulatory Referral to Hematology / Oncology; Future    Pt to continue her iron supplements daily as she is tolerating them. Will refer to Hematology for further evaluation of microcytic anemia given hx of negative urine and colonoscopy revealing no evidence of bleeding. Patient is encouraged to call our office for any questions/concerns, persistent or worsening symptoms. Patient states they understand and agree with treatment plan. Pt to RTO in 6 months for BP recheck or sooner PRN. Subjective:      Patient ID: Erna Kerr is a 62 y.o. female. Pt here today for annual physical.  Overall she is feeling well. She did have recent labs which show her hgb and iron slowly improving on iron supplements. Pt denies any active bleeding or blood in stool, urine or from her vagina. Pt did have colonoscopy revealing small polyps, no evidence of bleeding. The following portions of the patient's history were reviewed and updated as appropriate: allergies, current medications, past family history, past medical history, past social history, past surgical history and problem list.    Review of Systems    As noted per HPI. Objective:      /82   Pulse 76   Temp 99.6 °F (37.6 °C) (Oral)   Resp 16   Ht 5' 5.75" (1.67 m)   Wt 98.3 kg (216 lb 12.8 oz)   SpO2 98%   BMI 35.26 kg/m²          Physical Exam  Vitals reviewed. Constitutional:       Appearance: Normal appearance. HENT:      Head: Normocephalic.    Cardiovascular: Rate and Rhythm: Normal rate and regular rhythm. Pulses: Normal pulses. Heart sounds: Normal heart sounds. Pulmonary:      Effort: Pulmonary effort is normal.      Breath sounds: Normal breath sounds. Abdominal:      General: Bowel sounds are normal.      Palpations: Abdomen is soft. Musculoskeletal:         General: Normal range of motion. Skin:     General: Skin is warm and dry. Neurological:      Mental Status: She is alert and oriented to person, place, and time. Mental status is at baseline.    Psychiatric:         Mood and Affect: Mood normal.         Behavior: Behavior normal.

## 2023-09-21 ENCOUNTER — TELEPHONE (OUTPATIENT)
Dept: HEMATOLOGY ONCOLOGY | Facility: CLINIC | Age: 58
End: 2023-09-21

## 2023-09-21 NOTE — TELEPHONE ENCOUNTER
I called Sebastian Le in response to a referral that was received for patient to establish care with Hematology. Outreach was made to schedule a consultation. I left a voicemail explaining the reason for my call and advised patient to call [facility] at [phone]. Another attempt will be made to contact patient.

## 2023-09-25 ENCOUNTER — TELEPHONE (OUTPATIENT)
Dept: HEMATOLOGY ONCOLOGY | Facility: CLINIC | Age: 58
End: 2023-09-25

## 2023-09-25 NOTE — TELEPHONE ENCOUNTER
I called Dario Steel in response to a referral that was received for patient to establish care with Hematology. Outreach was made to schedule a consultation. I left a voicemail explaining the reason for my call and advised patient to call [facility] at [phone]. This is the third attempt to schedule patient unsuccessfully. The referral has been closed, a [employer] message has been sent to patient (if applicable) and a letter has been sent to the address on file.

## 2023-09-30 ENCOUNTER — APPOINTMENT (OUTPATIENT)
Dept: LAB | Facility: HOSPITAL | Age: 58
End: 2023-09-30
Payer: COMMERCIAL

## 2023-09-30 DIAGNOSIS — Z13.228 SCREENING FOR METABOLIC DISORDER: ICD-10-CM

## 2023-09-30 DIAGNOSIS — R73.03 PREDIABETES: ICD-10-CM

## 2023-09-30 DIAGNOSIS — E78.2 MIXED HYPERLIPIDEMIA: ICD-10-CM

## 2023-09-30 LAB
ALBUMIN SERPL BCP-MCNC: 3.7 G/DL (ref 3.5–5)
ALP SERPL-CCNC: 94 U/L (ref 34–104)
ALT SERPL W P-5'-P-CCNC: 21 U/L (ref 7–52)
ANION GAP SERPL CALCULATED.3IONS-SCNC: 4 MMOL/L
AST SERPL W P-5'-P-CCNC: 21 U/L (ref 13–39)
BILIRUB SERPL-MCNC: 0.27 MG/DL (ref 0.2–1)
BUN SERPL-MCNC: 15 MG/DL (ref 5–25)
CALCIUM SERPL-MCNC: 9 MG/DL (ref 8.4–10.2)
CHLORIDE SERPL-SCNC: 106 MMOL/L (ref 96–108)
CHOLEST SERPL-MCNC: 182 MG/DL
CO2 SERPL-SCNC: 29 MMOL/L (ref 21–32)
CREAT SERPL-MCNC: 0.62 MG/DL (ref 0.6–1.3)
EST. AVERAGE GLUCOSE BLD GHB EST-MCNC: 131 MG/DL
GFR SERPL CREATININE-BSD FRML MDRD: 99 ML/MIN/1.73SQ M
GLUCOSE P FAST SERPL-MCNC: 87 MG/DL (ref 65–99)
HBA1C MFR BLD: 6.2 %
HDLC SERPL-MCNC: 88 MG/DL
LDLC SERPL CALC-MCNC: 77 MG/DL (ref 0–100)
POTASSIUM SERPL-SCNC: 4.2 MMOL/L (ref 3.5–5.3)
PROT SERPL-MCNC: 6.7 G/DL (ref 6.4–8.4)
SODIUM SERPL-SCNC: 139 MMOL/L (ref 135–147)
TRIGL SERPL-MCNC: 85 MG/DL

## 2023-09-30 PROCEDURE — 83036 HEMOGLOBIN GLYCOSYLATED A1C: CPT

## 2023-09-30 PROCEDURE — 80061 LIPID PANEL: CPT

## 2023-09-30 PROCEDURE — 36415 COLL VENOUS BLD VENIPUNCTURE: CPT

## 2023-09-30 PROCEDURE — 80053 COMPREHEN METABOLIC PANEL: CPT

## 2023-10-02 DIAGNOSIS — R73.03 PREDIABETES: Primary | ICD-10-CM

## 2023-10-05 ENCOUNTER — TELEPHONE (OUTPATIENT)
Dept: HEMATOLOGY ONCOLOGY | Facility: CLINIC | Age: 58
End: 2023-10-05

## 2023-10-05 NOTE — TELEPHONE ENCOUNTER
Appointment Schedule   Who are you speaking with? Patient   If it is not the patient, are they listed on an active communication consent form? N/A   Which provider is the appointment scheduled with? Dr. Shayla Payton   At which location is the appointment scheduled for? [place]   When is the appointment scheduled? Please list date and time 10/16/23 @ 9   What is the reason for this appointment? Microcytic anemia   Did patient voice understanding of the details of this appointment? Yes   Was the no show policy reviewed with patient?  Yes

## 2023-10-06 DIAGNOSIS — I10 PRIMARY HYPERTENSION: ICD-10-CM

## 2023-10-06 RX ORDER — HYDROCHLOROTHIAZIDE 12.5 MG/1
12.5 TABLET ORAL DAILY
Qty: 90 TABLET | Refills: 0 | Status: SHIPPED | OUTPATIENT
Start: 2023-10-06

## 2023-10-16 ENCOUNTER — OFFICE VISIT (OUTPATIENT)
Age: 58
End: 2023-10-16
Payer: COMMERCIAL

## 2023-10-16 ENCOUNTER — TELEPHONE (OUTPATIENT)
Age: 58
End: 2023-10-16

## 2023-10-16 VITALS
WEIGHT: 217.6 LBS | HEIGHT: 66 IN | SYSTOLIC BLOOD PRESSURE: 144 MMHG | RESPIRATION RATE: 16 BRPM | HEART RATE: 89 BPM | OXYGEN SATURATION: 99 % | DIASTOLIC BLOOD PRESSURE: 82 MMHG | BODY MASS INDEX: 34.97 KG/M2

## 2023-10-16 DIAGNOSIS — D50.9 IRON DEFICIENCY ANEMIA, UNSPECIFIED IRON DEFICIENCY ANEMIA TYPE: Primary | ICD-10-CM

## 2023-10-16 DIAGNOSIS — D50.9 MICROCYTIC ANEMIA: ICD-10-CM

## 2023-10-16 PROCEDURE — 99244 OFF/OP CNSLTJ NEW/EST MOD 40: CPT | Performed by: INTERNAL MEDICINE

## 2023-10-16 RX ORDER — SODIUM CHLORIDE 9 MG/ML
20 INJECTION, SOLUTION INTRAVENOUS ONCE
OUTPATIENT
Start: 2023-10-24

## 2023-10-16 NOTE — PATIENT INSTRUCTIONS
We will arrange for intravenous iron. I will plan to see 6-8 weeks after your last infusion. Pls have labs done prior to that visit with me.

## 2023-10-16 NOTE — TELEPHONE ENCOUNTER
Please schedule iron infusions. Please schedule f/u with Dr. Gregorio Khan 6-8 weeks after last infusion. Thanks!

## 2023-10-16 NOTE — PROGRESS NOTES
[address] [place] [address] HEMATOLOGY ONCOLOGY [facility]  [place] [address]   [phone]  [phone]     Date of Visit: 10/16/2023  Name: Jon Palmre   YOB: 1965         Assessment/Plan  This is a 80-year-old lady presenting with severe iron deficiency anemia, despite being on oral iron. She is also on a PPI which may sometimes affect iron absorption, but I doubt to this degree of iron deficiency. She has had a negative GI work-up including an EGD and colonoscopy. She is post menopausal and gives no history of abnormal vaginal bleeding. I think we should pursue a capsule endoscopy as the next step and I will reach out to her gastroenterologist about this. If this is negative, we could do CT c/a/p to look for other source of bleeding. In the meantime, for her iron deficiency, we will arrange for Iv iron infusions as she has not responded sufficiently to PO iron and is also not tolerating this well, leading to non-compliance as noted below. Pros/cons of IV iron was discussed with the patient today, and she is agreeable to proceed with this. I will plan to see her back 6 to 8 weeks after the last iron infusion with repeat iron studies, CBC and reticulocyte count prior to that visit. She may stop taking the oral iron at this time. The patient and their family had many questions that were answered to their apparent satisfaction. Patient has been strongly urged to call with any questions or concerns. I spent 30 minutes in chart review, face to face counseling, coordination of care, and documentation      HISTORY OF PRESENT ILLNESS:   Jon Palmer is a 62 y.o. female  who has been referred for her anemia. Patient states that she has had anemia 4 to 5 years ago, but has never been told to take iron supplements. More recently, she has noticed worsening fatigue and shortness of breath on exertion. CBC and iron studies from May and September are as follows. Hemoglobin of 10.5/11.5. Normal platelet counts. Normal WBC. Ferritin 3/5. Iron levels 34/52. Iron saturation 10% / 14%. TIBC 351/364. Hb in Feb 2023 was 11.3 and previously within normal limits starting 2021. CMP is within normal limits. On July 13 she underwent an EGD and colonoscopy. EGD showed 2 columns of dilated veins in the esophagus from 30 and 33 cm. H. Pylori and celiac ds was ruled out. There were 3 polyps that were not retrieved and a 3-year repeat surveillance was recommended by GI. She is on a PPI. She has been taking oral iron for the last 6 months. She stopped taking it a couple of weeks ago, as it was causing abdominal discomfort and constipation. She has been taking Metamucil for the constipation, which helps. She denies any bleeding, including blood in the urine, stools, dark stools. She has a history of uterine fibroids, and underwent a hysteroscopy and ablation in the past.  She has been menopausal since and has not had any vaginal bleeding      Subjective  As noted above. REVIEW OF SYSTEMS:  No fevers or chills. No unintentional weight loss. No B symptoms. Denies any headaches  Normal appetite. No nausea or vomiting. No swelling of the extremities. No lymphadenopathy.          Current Outpatient Medications:     albuterol (PROVENTIL HFA,VENTOLIN HFA) 90 mcg/act inhaler, Inhale 2 puffs every 6 (six) hours as needed for wheezing or shortness of breath, Disp: 8 g, Rfl: 3    buPROPion (Wellbutrin SR) 150 mg 12 hr tablet, Take 1 tablet (150 mg total) by mouth in the morning, Disp: 90 tablet, Rfl: 2    buPROPion (WELLBUTRIN XL) 300 mg 24 hr tablet, TAKE ONE TABLET BY MOUTH EVERY DAY, Disp: 90 tablet, Rfl: 2    cholecalciferol (VITAMIN D3) 1,000 units tablet, Take 1,000 Units by mouth daily, Disp: , Rfl:     ciclopirox (PENLAC) 8 % solution, Apply topically daily at bedtime, Disp: 6.6 mL, Rfl: 0    ferrous sulfate 324 (65 Fe) mg, Take 1 tablet (324 mg total) by mouth daily before breakfast, Disp: 90 tablet, Rfl: 0    hydrochlorothiazide (HYDRODIURIL) 12.5 mg tablet, Take 1 tablet (12.5 mg total) by mouth daily, Disp: 90 tablet, Rfl: 0    hydrOXYzine HCL (ATARAX) 25 mg tablet, Take 1 tablet (25 mg total) by mouth every 6 (six) hours as needed for itching or anxiety, Disp: 30 tablet, Rfl: 0    losartan (COZAAR) 100 MG tablet, TAKE ONE TABLET BY MOUTH EVERY DAY, Disp: 90 tablet, Rfl: 0    Multiple Vitamin (multivitamin) tablet, Take 1 tablet by mouth daily, Disp: , Rfl:     omeprazole (PriLOSEC) 40 MG capsule, Take 1 capsule (40 mg total) by mouth daily Take before a meal, Disp: 90 capsule, Rfl: 2    QUEtiapine (SEROquel) 100 mg tablet, Take 1 tablet (100 mg total) by mouth daily at bedtime (Patient taking differently: Take 100 mg by mouth daily at bedtime Usually takes half), Disp: 90 tablet, Rfl: 2    sertraline (ZOLOFT) 100 mg tablet, Take 2 tablets (200 mg total) by mouth daily, Disp: 180 tablet, Rfl: 0    SF 5000 Plus 1.1 % CREA, , Disp: , Rfl:     terbinafine (LamISIL) 250 mg tablet, , Disp: , Rfl:     bisacodyl (DULCOLAX) 5 mg EC tablet, Take 2 tablets (10 mg total) by mouth once for 1 dose (Patient not taking: Reported on 10/16/2023), Disp: 2 tablet, Rfl: 0     Allergies   Allergen Reactions    Latex Rash        ACTIVE PROBLEMS:  Patient Active Problem List   Diagnosis    Lumbar disc herniation    Spinal stenosis of lumbar region with neurogenic claudication    Chronic bilateral low back pain without sciatica    Lumbar radiculopathy    Primary hypertension    Moderate episode of recurrent major depressive disorder (HCC)    Low iron    Hand injury    Cellulitis of right thumb    Dog bite    Carpal tunnel syndrome, bilateral    Peroneal tendonitis of right lower leg    Iron deficiency anemia          Past Medical History:   Diagnosis Date    ADHD (attention deficit hyperactivity disorder)     Anxiety     Arthritis     Depression     Fibromyalgia, primary     GERD (gastroesophageal reflux disease)     History of stomach ulcers     Hypertension     Panic attack     Papanicolaou smear 2020    Psychiatric disorder         Past Surgical History:   Procedure Laterality Date    HYSTEROSCOPY  2015    MAMMO (HISTORICAL)  2020        Social History     Socioeconomic History    Marital status: Single     Spouse name: None    Number of children: None    Years of education: None    Highest education level: None   Occupational History    Occupation: Tech. Tobacco Use    Smoking status: Former    Smokeless tobacco: Never    Tobacco comments:     teen years   Vaping Use    Vaping Use: Never used   Substance and Sexual Activity    Alcohol use: Yes     Comment: Socially    Drug use: No    Sexual activity: Not Currently     Birth control/protection: Post-menopausal   Other Topics Concern    None   Social History Narrative    Caffeine Intake: 1-2 cups per day    Do you smoke marijuana? Denies    Do you drink alcohol? Socially    Exercise: Occassionally    Domestic violence: No    History of drug/alcohol abuse denies     Social Determinants of Health     Financial Resource Strain: Not on file   Food Insecurity: Not on file   Transportation Needs: Not on file   Physical Activity: Not on file   Stress: Not on file   Social Connections: Not on file   Intimate Partner Violence: Not on file   Housing Stability: Not on file        Family History   Problem Relation Age of Onset    Hypertension Mother     Lung cancer Mother     Skin cancer Father         Objective        Physical Exam  ECOG performance status: 0  Blood pressure 144/82, pulse 89, resp. rate 16, height 5' 5.75" (1.67 m), weight 98.7 kg (217 lb 9.6 oz), SpO2 99 %, not currently breastfeeding. General appearance: Not in acute distress, well appearing    Alert and oriented. She appears pale. Normal breath sounds bilaterally. Clear to auscultation without any added sounds  Heart sounds are normal.  No murmurs noted. Abdomen is soft and nontender. No rebound. No evidence of ascites. No evidence of hepatosplenomegaly. Extremities without any edema. I reviewed lab data in the chart as noted above.   Additional labs as noted below    WBC   Date Value Ref Range Status   09/16/2023 5.76 4.31 - 10.16 Thousand/uL Final   05/27/2023 5.12 4.31 - 10.16 Thousand/uL Final   02/18/2023 5.56 4.31 - 10.16 Thousand/uL Final     Hemoglobin   Date Value Ref Range Status   09/16/2023 11.5 11.5 - 15.4 g/dL Final   05/27/2023 10.5 (L) 11.5 - 15.4 g/dL Final   02/18/2023 11.3 (L) 11.5 - 15.4 g/dL Final     Platelets   Date Value Ref Range Status   09/16/2023 329 149 - 390 Thousands/uL Final   05/27/2023 369 149 - 390 Thousands/uL Final   02/18/2023 295 149 - 390 Thousands/uL Final     MCV   Date Value Ref Range Status   09/16/2023 81 (L) 82 - 98 fL Final   05/27/2023 82 82 - 98 fL Final   02/18/2023 83 82 - 98 fL Final      Potassium   Date Value Ref Range Status   09/30/2023 4.2 3.5 - 5.3 mmol/L Final   05/27/2023 4.0 3.5 - 5.3 mmol/L Final   02/18/2023 4.2 3.5 - 5.3 mmol/L Final     Chloride   Date Value Ref Range Status   09/30/2023 106 96 - 108 mmol/L Final   05/27/2023 105 96 - 108 mmol/L Final   02/18/2023 104 96 - 108 mmol/L Final     CO2   Date Value Ref Range Status   09/30/2023 29 21 - 32 mmol/L Final   05/27/2023 28 21 - 32 mmol/L Final   02/18/2023 29 21 - 32 mmol/L Final     CO2, i-STAT   Date Value Ref Range Status   10/01/2016 24 21 - 32 mmol/L Final     BUN   Date Value Ref Range Status   09/30/2023 15 5 - 25 mg/dL Final   05/27/2023 14 5 - 25 mg/dL Final   02/18/2023 13 5 - 25 mg/dL Final     Creatinine   Date Value Ref Range Status   09/30/2023 0.62 0.60 - 1.30 mg/dL Final     Comment:     Standardized to IDMS reference method   05/27/2023 0.81 0.60 - 1.30 mg/dL Final     Comment:     Standardized to IDMS reference method   02/18/2023 0.69 0.60 - 1.30 mg/dL Final     Comment:     Standardized to IDMS reference method     Glucose   Date Value Ref Range Status   08/12/2022 94 65 - 140 mg/dL Final     Comment:     If the patient is fasting, the ADA then defines impaired fasting glucose as > 100 mg/dL and diabetes as > or equal to 123 mg/dL. Specimen collection should occur prior to Sulfasalazine administration due to the potential for falsely depressed results. Specimen collection should occur prior to Sulfapyridine administration due to the potential for falsely elevated results. 08/11/2022 92 65 - 140 mg/dL Final     Comment:     If the patient is fasting, the ADA then defines impaired fasting glucose as > 100 mg/dL and diabetes as > or equal to 123 mg/dL. Specimen collection should occur prior to Sulfasalazine administration due to the potential for falsely depressed results. Specimen collection should occur prior to Sulfapyridine administration due to the potential for falsely elevated results. 06/26/2022 117 65 - 140 mg/dL Final     Comment:     If the patient is fasting, the ADA then defines impaired fasting glucose as > 100 mg/dL and diabetes as > or equal to 123 mg/dL. Specimen collection should occur prior to Sulfasalazine administration due to the potential for falsely depressed results. Specimen collection should occur prior to Sulfapyridine administration due to the potential for falsely elevated results. Calcium   Date Value Ref Range Status   09/30/2023 9.0 8.4 - 10.2 mg/dL Final   05/27/2023 9.3 8.4 - 10.2 mg/dL Final   02/18/2023 9.1 8.3 - 10.1 mg/dL Final     Albumin   Date Value Ref Range Status   09/30/2023 3.7 3.5 - 5.0 g/dL Final   05/27/2023 3.9 3.5 - 5.0 g/dL Final   02/18/2023 3.6 3.5 - 5.0 g/dL Final     Total Bilirubin   Date Value Ref Range Status   09/30/2023 0.27 0.20 - 1.00 mg/dL Final     Comment:     Use of this assay is not recommended for patients undergoing treatment with eltrombopag due to the potential for falsely elevated results.   N-acetyl-p-benzoquinone imine (metabolite of Acetaminophen) will generate erroneously low results in samples for patients that have taken an overdose of Acetaminophen.   05/27/2023 0.30 0.20 - 1.00 mg/dL Final     Comment:     Use of this assay is not recommended for patients undergoing treatment with eltrombopag due to the potential for falsely elevated results. N-acetyl-p-benzoquinone imine (metabolite of Acetaminophen) will generate erroneously low results in samples for patients that have taken an overdose of Acetaminophen.   02/18/2023 0.28 0.20 - 1.00 mg/dL Final     Comment:     Use of this assay is not recommended for patients undergoing treatment with eltrombopag due to the potential for falsely elevated results. Alkaline Phosphatase   Date Value Ref Range Status   09/30/2023 94 34 - 104 U/L Final   05/27/2023 84 34 - 104 U/L Final   02/18/2023 93 46 - 116 U/L Final     AST   Date Value Ref Range Status   09/30/2023 21 13 - 39 U/L Final   05/27/2023 17 13 - 39 U/L Final   02/18/2023 29 5 - 45 U/L Final     Comment:     Specimen collection should occur prior to Sulfasalazine administration due to the potential for falsely depressed results. ALT   Date Value Ref Range Status   09/30/2023 21 7 - 52 U/L Final     Comment:     Specimen collection should occur prior to Sulfasalazine administration due to the potential for falsely depressed results. 05/27/2023 14 7 - 52 U/L Final     Comment:     Specimen collection should occur prior to Sulfasalazine administration due to the potential for falsely depressed results. 02/18/2023 38 12 - 78 U/L Final     Comment:     Specimen collection should occur prior to Sulfasalazine administration due to the potential for falsely depressed results.        No results found for: "LDH"  No results found for: "TSH"  No results found for: "D1DWUDU"   Free T4   Date Value Ref Range Status   07/17/2021 0.66 (L) 0.76 - 1.46 ng/dL Final     Comment:     Specimen collection should occur prior to Sulfasalazine administration due to the potential for falsely elevated results. RECENT IMAGING:  No results found. Portions of the record may have been created with voice recognition software. Occasional wrong word or "sound a like" substitutions may have occurred due to the inherent limitations of voice recognition software. Read the chart carefully and recognize, using context, where substitutions have occurred.

## 2023-10-17 ENCOUNTER — TELEPHONE (OUTPATIENT)
Dept: GASTROENTEROLOGY | Facility: CLINIC | Age: 58
End: 2023-10-17

## 2023-10-17 NOTE — TELEPHONE ENCOUNTER
----- Message from Mirta Beverly MD sent at 10/16/2023  9:51 AM EDT -----  Hi can we please schedule her for follow up with Dr. Anselmo Estevez to review EGD/COY results and whether further w/u with VCE is necessary?

## 2023-10-17 NOTE — TELEPHONE ENCOUNTER
Chelly Joe: Can you please assist with an appointment with Dr. Ulises Jewell  as per Dr. Martin Ordaz ?  (follow up with Dr. Ulises Jewell to review EGD/COY results and whether further w/u with VCE is necessary? ) Thank you !

## 2023-10-18 ENCOUNTER — PREP FOR PROCEDURE (OUTPATIENT)
Dept: GASTROENTEROLOGY | Facility: CLINIC | Age: 58
End: 2023-10-18

## 2023-10-18 DIAGNOSIS — D50.8 OTHER IRON DEFICIENCY ANEMIA: Primary | ICD-10-CM

## 2023-10-23 ENCOUNTER — DOCUMENTATION (OUTPATIENT)
Dept: HEMATOLOGY ONCOLOGY | Facility: CLINIC | Age: 58
End: 2023-10-23

## 2023-10-23 ENCOUNTER — OFFICE VISIT (OUTPATIENT)
Dept: PODIATRY | Facility: CLINIC | Age: 58
End: 2023-10-23
Payer: COMMERCIAL

## 2023-10-23 VITALS
HEART RATE: 87 BPM | HEIGHT: 66 IN | DIASTOLIC BLOOD PRESSURE: 77 MMHG | WEIGHT: 217 LBS | SYSTOLIC BLOOD PRESSURE: 122 MMHG | BODY MASS INDEX: 34.87 KG/M2

## 2023-10-23 DIAGNOSIS — B35.1 ONYCHOMYCOSIS: Primary | ICD-10-CM

## 2023-10-23 PROCEDURE — 99213 OFFICE O/P EST LOW 20 MIN: CPT | Performed by: PODIATRIST

## 2023-10-23 NOTE — PROGRESS NOTES
Oncology Finance Advocacy Intake and Intervention  Oncology Finance Counselor/Advocate placed call to patient. This writer informed patient that this writer is here to assist patient with billing questions, financial assistance, payment/payment plans, quotes, copayment assistance, insurance optimization, and insurance navigation.    This writer conducted a thorough benefit review of copayment, deductible, and out of pocket cost. This information is documented below and has been reviewed with patient.     Copayment: N/A  Deductible: N/A  Out of Pocket Cost: N/A  Insurance optimization (Limited benefit vs self-pay): N/A  Patient assistance status: N/A  Free Drug Applications: N/A  Interventions:  Pt has active cap blue ins thru [facility]  Added to [facility]  Pt outreach to follow        Information above was review thoroughly with patient and patient was advise of possible assistance programs/interventions. If any question arise patient can contact this writer at below information. This information was given to patient at time of contact.      Ruth Rodriguez  Phone:[phone]  Email: [email or web address]

## 2023-10-23 NOTE — PROGRESS NOTES
PATIENT:  Jay Granados  1965       ASSESSMENT:     1. Onychomycosis                PLAN:  1. Reviewed medical records. Patient was counseled and educated on the condition and the diagnosis. 2. The diagnosis, treatment options and prognosis were discussed with the patient. 3. Finish pulse dose Lamisil as prescribed. Take once a day for 2 weeks every 2 months. Continue Penlac. Nails debrided. 4. Patient will return in 6 months for re-evaluation. Imaging: I have personally reviewed pertinent films in PACS  Labs, pathology, and Other Studies: I have personally reviewed pertinent reports. Subjective:       HPI  The patient presents for follow-up on nail fungus. She tolerated Lamisil well with no side effects. Nails look little better. No new complaint. The following portions of the patient's history were reviewed and updated as appropriate: allergies, current medications, past family history, past medical history, past social history, past surgical history and problem list.  All pertinent labs and images were reviewed.       Past Medical History  Past Medical History:   Diagnosis Date   • ADHD (attention deficit hyperactivity disorder)    • Anxiety    • Arthritis    • Depression    • Fibromyalgia, primary    • GERD (gastroesophageal reflux disease)    • History of stomach ulcers    • Hypertension    • Panic attack    • Papanicolaou smear 2020   • Psychiatric disorder        Past Surgical History  Past Surgical History:   Procedure Laterality Date   • HYSTEROSCOPY  2015   • MAMMO (HISTORICAL)  2020        Allergies:  Latex    Medications:  Current Outpatient Medications   Medication Sig Dispense Refill   • albuterol (PROVENTIL HFA,VENTOLIN HFA) 90 mcg/act inhaler Inhale 2 puffs every 6 (six) hours as needed for wheezing or shortness of breath 8 g 3   • buPROPion (Wellbutrin SR) 150 mg 12 hr tablet Take 1 tablet (150 mg total) by mouth in the morning 90 tablet 2   • buPROPion (WELLBUTRIN XL) 300 mg 24 hr tablet TAKE ONE TABLET BY MOUTH EVERY DAY 90 tablet 2   • cholecalciferol (VITAMIN D3) 1,000 units tablet Take 1,000 Units by mouth daily     • ciclopirox (PENLAC) 8 % solution Apply topically daily at bedtime 6.6 mL 0   • ferrous sulfate 324 (65 Fe) mg Take 1 tablet (324 mg total) by mouth daily before breakfast 90 tablet 0   • hydrochlorothiazide (HYDRODIURIL) 12.5 mg tablet Take 1 tablet (12.5 mg total) by mouth daily 90 tablet 0   • hydrOXYzine HCL (ATARAX) 25 mg tablet Take 1 tablet (25 mg total) by mouth every 6 (six) hours as needed for itching or anxiety 30 tablet 0   • losartan (COZAAR) 100 MG tablet TAKE ONE TABLET BY MOUTH EVERY DAY 90 tablet 0   • Multiple Vitamin (multivitamin) tablet Take 1 tablet by mouth daily     • omeprazole (PriLOSEC) 40 MG capsule Take 1 capsule (40 mg total) by mouth daily Take before a meal 90 capsule 2   • QUEtiapine (SEROquel) 100 mg tablet Take 1 tablet (100 mg total) by mouth daily at bedtime (Patient taking differently: Take 100 mg by mouth daily at bedtime Usually takes half) 90 tablet 2   • sertraline (ZOLOFT) 100 mg tablet Take 2 tablets (200 mg total) by mouth daily 180 tablet 0   • SF 5000 Plus 1.1 % CREA      • terbinafine (LamISIL) 250 mg tablet      • bisacodyl (DULCOLAX) 5 mg EC tablet Take 2 tablets (10 mg total) by mouth once for 1 dose (Patient not taking: Reported on 10/16/2023) 2 tablet 0     No current facility-administered medications for this visit. Social History:  Social History     Socioeconomic History   • Marital status: Single     Spouse name: None   • Number of children: None   • Years of education: None   • Highest education level: None   Occupational History   • Occupation: Tech.     Tobacco Use   • Smoking status: Former   • Smokeless tobacco: Never   • Tobacco comments:     teen years   Vaping Use   • Vaping Use: Never used   Substance and Sexual Activity   • Alcohol use: Yes     Comment: Socially   • Drug use: No   • Sexual activity: Not Currently     Birth control/protection: Post-menopausal   Other Topics Concern   • None   Social History Narrative    Caffeine Intake: 1-2 cups per day    Do you smoke marijuana? Denies    Do you drink alcohol? Socially    Exercise: Occassionally    Domestic violence: No    History of drug/alcohol abuse denies     Social Determinants of Health     Financial Resource Strain: Not on file   Food Insecurity: Not on file   Transportation Needs: Not on file   Physical Activity: Not on file   Stress: Not on file   Social Connections: Not on file   Intimate Partner Violence: Not on file   Housing Stability: Not on file          Review of Systems   Constitutional:  Negative for chills and fever. Respiratory:  Negative for cough and shortness of breath. Cardiovascular:  Negative for chest pain. Gastrointestinal:  Negative for nausea and vomiting. Musculoskeletal:  Negative for gait problem. Neurological:  Negative for weakness and numbness. Hematological: Negative. Psychiatric/Behavioral:  Negative for confusion. Objective:      /77   Pulse 87   Ht 5' 5.75" (1.67 m)   Wt 98.4 kg (217 lb)   BMI 35.29 kg/m²          Physical Exam  Vitals reviewed. Constitutional:       General: She is not in acute distress. Appearance: She is not toxic-appearing or diaphoretic. Cardiovascular:      Rate and Rhythm: Normal rate and regular rhythm. Pulses: Normal pulses. Dorsalis pedis pulses are 2+ on the right side and 2+ on the left side. Posterior tibial pulses are 2+ on the right side and 2+ on the left side. Pulmonary:      Effort: Pulmonary effort is normal. No respiratory distress. Musculoskeletal:         General: No swelling, tenderness or signs of injury. Right lower leg: No edema. Left lower leg: No edema. Right foot: No foot drop. Left foot: No foot drop. Skin:     General: Skin is warm.       Capillary Refill: Capillary refill takes less than 2 seconds. Coloration: Skin is not cyanotic or mottled. Findings: No abscess, ecchymosis or wound. Nails: There is no clubbing. Comments: Decreased thickening and discoloration toenails bilateral great toes and 2nd toes. Other nails are clear. Neurological:      General: No focal deficit present. Mental Status: She is alert. Cranial Nerves: No cranial nerve deficit. Sensory: No sensory deficit. Motor: No weakness. Coordination: Coordination normal.   Psychiatric:         Mood and Affect: Mood normal.         Behavior: Behavior normal.         Thought Content:  Thought content normal.         Judgment: Judgment normal.

## 2023-10-25 ENCOUNTER — DOCUMENTATION (OUTPATIENT)
Dept: HEMATOLOGY ONCOLOGY | Facility: CLINIC | Age: 58
End: 2023-10-25

## 2023-10-26 ENCOUNTER — DOCUMENTATION (OUTPATIENT)
Dept: HEMATOLOGY ONCOLOGY | Facility: CLINIC | Age: 58
End: 2023-10-26

## 2023-10-26 NOTE — PROGRESS NOTES
Pt called me back & I went over her benefits   Then she asked me for the benefit for the out pt infusion & the drug feraheme told her I will reach out to the ins co & get back to her  Called the ins & spoke to ger call ref# [id number]  She sd that for outpt facility since the deduct of $250 was met her services are covered 100% no pt resp  For the drug as long as there is a prior auth on file that will be covered 100% she sd that she doesn't see any auth on file. I told her that I am seeing that 1 is pending & she sd that I will need to talk to another dept. Tnsfrd there w/zee she sd that I'm at the wrong dept. She sd she can transfr me to the correct dept. There is no# because its an internal transfer. Trnsfrd there tape sd dept is closed try back another day.  Called pt back & gave her that info & that I will try to call the auth dept again & she thanked me for the call & the info

## 2023-10-27 ENCOUNTER — TELEPHONE (OUTPATIENT)
Age: 58
End: 2023-10-27

## 2023-10-27 DIAGNOSIS — D50.9 IRON DEFICIENCY ANEMIA, UNSPECIFIED IRON DEFICIENCY ANEMIA TYPE: Primary | ICD-10-CM

## 2023-10-27 RX ORDER — SODIUM CHLORIDE 9 MG/ML
20 INJECTION, SOLUTION INTRAVENOUS ONCE
Status: CANCELLED | OUTPATIENT
Start: 2023-10-31

## 2023-10-27 NOTE — TELEPHONE ENCOUNTER
Called patient to schedule F/U after her infusion treatment, LVM. HOPELINE: PLEASE SCHEDULE PATINET FOR A F/U WITH DR Rosita Espinoza BETWEEN 12/12 AND 12/26. Thank you!

## 2023-10-31 ENCOUNTER — HOSPITAL ENCOUNTER (OUTPATIENT)
Dept: INFUSION CENTER | Facility: HOSPITAL | Age: 58
Discharge: HOME | End: 2023-10-31
Attending: INTERNAL MEDICINE
Payer: COMMERCIAL

## 2023-10-31 ENCOUNTER — ANNUAL EXAM (OUTPATIENT)
Dept: OBGYN CLINIC | Facility: CLINIC | Age: 58
End: 2023-10-31
Payer: COMMERCIAL

## 2023-10-31 ENCOUNTER — TELEPHONE (OUTPATIENT)
Dept: HEMATOLOGY ONCOLOGY | Facility: CLINIC | Age: 58
End: 2023-10-31

## 2023-10-31 VITALS
BODY MASS INDEX: 36.22 KG/M2 | DIASTOLIC BLOOD PRESSURE: 68 MMHG | SYSTOLIC BLOOD PRESSURE: 122 MMHG | HEIGHT: 65 IN | WEIGHT: 217.4 LBS

## 2023-10-31 VITALS
OXYGEN SATURATION: 99 % | TEMPERATURE: 96.7 F | HEART RATE: 83 BPM | DIASTOLIC BLOOD PRESSURE: 63 MMHG | SYSTOLIC BLOOD PRESSURE: 136 MMHG | RESPIRATION RATE: 18 BRPM

## 2023-10-31 DIAGNOSIS — M54.16 LUMBAR RADICULOPATHY: ICD-10-CM

## 2023-10-31 DIAGNOSIS — Z01.419 ENCOUNTER FOR GYNECOLOGICAL EXAMINATION WITHOUT ABNORMAL FINDING: Primary | ICD-10-CM

## 2023-10-31 DIAGNOSIS — D50.9 IRON DEFICIENCY ANEMIA, UNSPECIFIED IRON DEFICIENCY ANEMIA TYPE: Primary | ICD-10-CM

## 2023-10-31 DIAGNOSIS — D50.9 IRON DEFICIENCY ANEMIA, UNSPECIFIED IRON DEFICIENCY ANEMIA TYPE: ICD-10-CM

## 2023-10-31 DIAGNOSIS — Z12.31 BREAST CANCER SCREENING BY MAMMOGRAM: ICD-10-CM

## 2023-10-31 DIAGNOSIS — Z12.4 ENCOUNTER FOR PAPANICOLAOU SMEAR FOR CERVICAL CANCER SCREENING: ICD-10-CM

## 2023-10-31 PROCEDURE — 96365 THER/PROPH/DIAG IV INF INIT: CPT

## 2023-10-31 PROCEDURE — G0476 HPV COMBO ASSAY CA SCREEN: HCPCS | Performed by: NURSE PRACTITIONER

## 2023-10-31 PROCEDURE — 96366 THER/PROPH/DIAG IV INF ADDON: CPT

## 2023-10-31 PROCEDURE — S0612 ANNUAL GYNECOLOGICAL EXAMINA: HCPCS | Performed by: NURSE PRACTITIONER

## 2023-10-31 PROCEDURE — G0145 SCR C/V CYTO,THINLAYER,RESCR: HCPCS | Performed by: NURSE PRACTITIONER

## 2023-10-31 RX ORDER — SODIUM CHLORIDE 9 MG/ML
20 INJECTION, SOLUTION INTRAVENOUS ONCE
Status: CANCELLED | OUTPATIENT
Start: 2023-10-31

## 2023-10-31 RX ORDER — SODIUM CHLORIDE 9 MG/ML
20 INJECTION, SOLUTION INTRAVENOUS ONCE
Status: COMPLETED | OUTPATIENT
Start: 2023-10-31 | End: 2023-10-31

## 2023-10-31 RX ADMIN — FERUMOXYTOL 1020 MG: 510 INJECTION INTRAVENOUS at 08:35

## 2023-10-31 RX ADMIN — SODIUM CHLORIDE 20 ML/HR: 9 INJECTION, SOLUTION INTRAVENOUS at 08:29

## 2023-10-31 NOTE — TELEPHONE ENCOUNTER
Appointment Schedule   Who are you speaking with? Patient   If it is not the patient, are they listed on an active communication consent form? N/A   Which provider is the appointment scheduled with? Dr. Mendez Xavier   At which location is the appointment scheduled for? [place]   When is the appointment scheduled? Please list date and time 12/14/2023 @ 8AM    What is the reason for this appointment? Requested by the office. Did patient voice understanding of the details of this appointment? Yes   Was the no show policy reviewed with patient?  Yes

## 2023-10-31 NOTE — PROGRESS NOTES
Rec'd pt for first Feraheme infusion. Pt medication education provided, all questions answered to pt's satisfaction. PIV easily obtained. Awaiting med.

## 2023-10-31 NOTE — PROGRESS NOTES
Assessment/Plan:  Calcium 1200-1500mg + 600-1000 IU Vit D daily. Exercise 150 minutes per week minimum including weight bearing exercises. Pap with high risk HPV Q 3-5 years. Annual mammogram and monthly breast self exam recommended. Colonoscopy-  normal             Diagnoses and all orders for this visit:    Encounter for gynecological examination without abnormal finding  -     Liquid-based pap, screening  -     HPV High Risk    Breast cancer screening by mammogram  -     Mammo screening bilateral w 3d & cad; Future    Encounter for Papanicolaou smear for cervical cancer screening  -     Liquid-based pap, screening  -     HPV High Risk    Iron deficiency anemia, unspecified iron deficiency anemia type  Comments:  follow with hem onc    Lumbar radiculopathy    Other orders  -     Liquid-based pap, diagnostic          Subjective:      Patient ID: Aden Boateng is a 62 y.o. female. Here for annual gyn  Last pap 1/2020. PAP neg/neg HPV. NSA x 10 years. G0.H/o mixed UI. Hannah Angles Get up to pee in middle of night and is incont Failed meds didipelvic PT  has not kept up with exercises Has spine issues probably contributing  Does exercise for that  Recently saw hematology for iron def anemia  Had Iron infusion this morning  Work up Colonoscopy EGD neg  Has upcoming small bowel follow thru Prev frequent blood donor Denies abd or pelvic pain  Denies vaginal bleeding      [facility] Mammo 12/29/2022 normal         The following portions of the patient's history were reviewed and updated as appropriate: allergies, current medications, past family history, past medical history, past social history, past surgical history, and problem list.    Review of Systems   Constitutional:  Negative for fatigue and unexpected weight change. Gastrointestinal:  Negative for abdominal distention, abdominal pain, blood in stool, constipation and diarrhea.    Genitourinary:  Negative for difficulty urinating, dyspareunia, dysuria, frequency, genital sores, menstrual problem, pelvic pain, urgency, vaginal bleeding, vaginal discharge and vaginal pain. Neurological:  Negative for headaches. Psychiatric/Behavioral: Negative. Negative for dysphoric mood. The patient is not nervous/anxious. Objective:      /68 (BP Location: Left arm, Patient Position: Sitting, Cuff Size: Large)   Ht 5' 5.25" (1.657 m)   Wt 98.6 kg (217 lb 6.4 oz)   BMI 35.90 kg/m²          Physical Exam  Vitals and nursing note reviewed. Constitutional:       General: She is not in acute distress. Appearance: Normal appearance. HENT:      Head: Normocephalic and atraumatic. Pulmonary:      Effort: Pulmonary effort is normal.   Chest:   Breasts:     Breasts are symmetrical.      Right: Normal. No mass, nipple discharge, skin change or tenderness. Left: Normal. No mass, nipple discharge, skin change or tenderness. Abdominal:      General: There is no distension. Palpations: Abdomen is soft. Tenderness: There is no abdominal tenderness. There is no guarding or rebound. Genitourinary:     General: Normal vulva. Exam position: Lithotomy position. Labia:         Right: No rash, tenderness, lesion or injury. Left: No rash, tenderness, lesion or injury. Urethra: No prolapse, urethral pain, urethral swelling or urethral lesion. Vagina: Normal. No erythema or lesions. Cervix: No cervical motion tenderness, discharge, lesion or cervical bleeding. Uterus: Normal.       Adnexa: Right adnexa normal and left adnexa normal.        Right: No mass or tenderness. Left: No mass or tenderness. Rectum: No mass or external hemorrhoid. Musculoskeletal:         General: Normal range of motion. Lymphadenopathy:      Upper Body:      Right upper body: No axillary adenopathy. Left upper body: No axillary adenopathy. Lower Body: No right inguinal adenopathy.  No left inguinal adenopathy. Skin:     General: Skin is warm and dry. Neurological:      Mental Status: She is alert and oriented to person, place, and time. Psychiatric:         Mood and Affect: Mood normal.         Behavior: Behavior normal.         Thought Content:  Thought content normal.         Judgment: Judgment normal.

## 2023-10-31 NOTE — PROGRESS NOTES
Infusion tolerated well. No complaints offered. PIV removed and Coban pressure bandage applied. AVS declined. Discharged with steady gait.

## 2023-11-02 LAB
HPV HR 12 DNA CVX QL NAA+PROBE: NEGATIVE
HPV16 DNA CVX QL NAA+PROBE: NEGATIVE
HPV18 DNA CVX QL NAA+PROBE: NEGATIVE

## 2023-11-03 DIAGNOSIS — I10 PRIMARY HYPERTENSION: ICD-10-CM

## 2023-11-03 DIAGNOSIS — F33.1 MODERATE EPISODE OF RECURRENT MAJOR DEPRESSIVE DISORDER (HCC): ICD-10-CM

## 2023-11-03 RX ORDER — LOSARTAN POTASSIUM 100 MG/1
100 TABLET ORAL DAILY
Qty: 90 TABLET | Refills: 0 | Status: SHIPPED | OUTPATIENT
Start: 2023-11-03

## 2023-11-03 RX ORDER — SERTRALINE HYDROCHLORIDE 100 MG/1
200 TABLET, FILM COATED ORAL DAILY
Qty: 180 TABLET | Refills: 0 | Status: SHIPPED | OUTPATIENT
Start: 2023-11-03

## 2023-11-06 LAB
LAB AP GYN PRIMARY INTERPRETATION: NORMAL
Lab: NORMAL

## 2023-11-08 NOTE — PATIENT INSTRUCTIONS
Calcium 1200-1500mg + 600-1000 IU Vit D daily. Exercise 150 minutes per week minimum including weight bearing exercises. Pap with high risk HPV Q 3-5 years. Annual mammogram and monthly breast self exam recommended.   Colonoscopy-  normal

## 2023-11-13 ENCOUNTER — TELEPHONE (OUTPATIENT)
Age: 58
End: 2023-11-13

## 2023-11-13 NOTE — TELEPHONE ENCOUNTER
Patients GI provider:  Dr. Tong Vidal    Number to return call: [phone]    Reason for call: Pt calling w/questions regarding capsule endoscopy, did not receive any instructions. Transferred to Rain Rodriguez from the GI lab in [address] [place] [address] for further assistance.     Scheduled procedure/appointment date if applicable: Procedure 85/69/1282

## 2023-11-28 ENCOUNTER — TELEPHONE (OUTPATIENT)
Dept: HEMATOLOGY ONCOLOGY | Facility: CLINIC | Age: 58
End: 2023-11-28

## 2023-11-28 NOTE — TELEPHONE ENCOUNTER
Patient contacted me through Teams:    Hi I was wondering if you could help me. I had a capsule endoscopy scheduled for today but I was sick over the weekend and just wasn't up to the prep. I'm scheduled to see Dr. Lian Tobar on 12/14 after I have my labs. I won't get the endoscopy rescheduled before that. Do I still keep my appt with Dr Lian Tobar? Please advise. Thanks!

## 2023-11-28 NOTE — TELEPHONE ENCOUNTER
Sent patient a message to let her know we will keep her appointment as scheduled and to have labs done prior.

## 2023-12-09 ENCOUNTER — APPOINTMENT (OUTPATIENT)
Dept: LAB | Facility: HOSPITAL | Age: 58
End: 2023-12-09
Payer: COMMERCIAL

## 2023-12-09 DIAGNOSIS — D50.9 MICROCYTIC ANEMIA: ICD-10-CM

## 2023-12-09 DIAGNOSIS — D50.9 IRON DEFICIENCY ANEMIA, UNSPECIFIED IRON DEFICIENCY ANEMIA TYPE: ICD-10-CM

## 2023-12-09 LAB
RETICS # AUTO: ABNORMAL 10*3/UL (ref 14097–95744)
RETICS # CALC: 1.9 % (ref 0.37–1.87)

## 2023-12-09 PROCEDURE — 36415 COLL VENOUS BLD VENIPUNCTURE: CPT

## 2023-12-09 PROCEDURE — 85045 AUTOMATED RETICULOCYTE COUNT: CPT

## 2023-12-11 DIAGNOSIS — D50.9 IRON DEFICIENCY ANEMIA, UNSPECIFIED IRON DEFICIENCY ANEMIA TYPE: Primary | ICD-10-CM

## 2023-12-11 NOTE — PROGRESS NOTES
[facility] HEMATOLOGY ONCOLOGY Anna Angel  [address]  [place] [address]   [phone]  [phone]     Date of Visit: 12/14/2023  Name: Viktoria Flores   YOB: 1965         Assessment/Plan  This is a 51-year-old lady presenting with severe iron deficiency anemia. Now s/p Feraheme 1020mg on 10/31/23. Repeat labs from 12/12 shows improvement of ferritin from 5 to 101. Hb up from 10 to 13. Trans sat up from 14% to 33%. Upper and lower Gi scopes - negative  Capsule endoscopy - pending    Hold off on further IV iron at this time. Recheck labs in 2 months or sooner if clinically indicated. The patient and their family had many questions that were answered to their apparent satisfaction. Patient has been strongly urged to call with any questions or concerns. I spent 20 minutes in chart review, face to face counseling, coordination of care, and documentation      HISTORY OF PRESENT ILLNESS:   Viktoria Flores is a 62 y.o. female  who has been referred for her anemia. Patient states that she has had anemia 4 to 5 years ago, but has never been told to take iron supplements. More recently, she has noticed worsening fatigue and shortness of breath on exertion. CBC and iron studies from May and September are as follows. Hemoglobin of 10.5/11.5. Normal platelet counts. Normal WBC. Ferritin 3/5. Iron levels 34/52. Iron saturation 10% / 14%. TIBC 351/364. Hb in Feb 2023 was 11.3 and previously within normal limits starting 2021. CMP is within normal limits. On July 13, 2023 she underwent an EGD and colonoscopy. EGD showed 2 columns of dilated veins in the esophagus from 30 and 33 cm. H. Pylori and celiac ds was ruled out. There were 3 polyps that were not retrieved and a 3-year repeat surveillance was recommended by GI. She is on a PPI. She has been taking oral iron for the last 6 months.   She stopped taking it as it was causing abdominal discomfort and constipation. She denies any bleeding, including blood in the urine, stools, dark stools. She has a history of uterine fibroids, and underwent a hysteroscopy and ablation in the past.  She has been menopausal since and has not had any vaginal bleeding      Subjective  Very anxious. Overall feels well.     REVIEW OF SYSTEMS:  No new complaints      Current Outpatient Medications:     albuterol (PROVENTIL HFA,VENTOLIN HFA) 90 mcg/act inhaler, Inhale 2 puffs every 6 (six) hours as needed for wheezing or shortness of breath, Disp: 8 g, Rfl: 3    buPROPion (Wellbutrin SR) 150 mg 12 hr tablet, Take 1 tablet (150 mg total) by mouth in the morning, Disp: 90 tablet, Rfl: 2    buPROPion (WELLBUTRIN XL) 300 mg 24 hr tablet, TAKE ONE TABLET BY MOUTH EVERY DAY, Disp: 90 tablet, Rfl: 2    cholecalciferol (VITAMIN D3) 1,000 units tablet, Take 1,000 Units by mouth daily, Disp: , Rfl:     ciclopirox (PENLAC) 8 % solution, Apply topically daily at bedtime, Disp: 6.6 mL, Rfl: 0    hydrochlorothiazide (HYDRODIURIL) 12.5 mg tablet, Take 1 tablet (12.5 mg total) by mouth daily, Disp: 90 tablet, Rfl: 0    hydrOXYzine HCL (ATARAX) 25 mg tablet, Take 1 tablet (25 mg total) by mouth every 6 (six) hours as needed for itching or anxiety, Disp: 30 tablet, Rfl: 0    losartan (COZAAR) 100 MG tablet, Take 1 tablet (100 mg total) by mouth daily, Disp: 90 tablet, Rfl: 0    Multiple Vitamin (multivitamin) tablet, Take 1 tablet by mouth daily, Disp: , Rfl:     omeprazole (PriLOSEC) 40 MG capsule, Take 1 capsule (40 mg total) by mouth daily Take before a meal, Disp: 90 capsule, Rfl: 2    QUEtiapine (SEROquel) 100 mg tablet, Take 1 tablet (100 mg total) by mouth daily at bedtime (Patient taking differently: Take 100 mg by mouth daily at bedtime Usually takes half), Disp: 90 tablet, Rfl: 2    sertraline (ZOLOFT) 100 mg tablet, Take 2 tablets (200 mg total) by mouth daily, Disp: 180 tablet, Rfl: 0    terbinafine (LamISIL) 250 mg tablet, , Disp: , Rfl:     bisacodyl (DULCOLAX) 5 mg EC tablet, Take 2 tablets (10 mg total) by mouth once for 1 dose (Patient not taking: Reported on 10/16/2023), Disp: 2 tablet, Rfl: 0    ferrous sulfate 324 (65 Fe) mg, Take 1 tablet (324 mg total) by mouth daily before breakfast (Patient not taking: Reported on 10/31/2023), Disp: 90 tablet, Rfl: 0    SF 5000 Plus 1.1 % CREA, , Disp: , Rfl:      Allergies   Allergen Reactions    Latex Rash        ACTIVE PROBLEMS:  Patient Active Problem List   Diagnosis    Lumbar disc herniation    Spinal stenosis of lumbar region with neurogenic claudication    Chronic bilateral low back pain without sciatica    Lumbar radiculopathy    Primary hypertension    Moderate episode of recurrent major depressive disorder (HCC)    Low iron    Hand injury    Cellulitis of right thumb    Dog bite    Carpal tunnel syndrome, bilateral    Peroneal tendonitis of right lower leg    Iron deficiency anemia          Past Medical History:   Diagnosis Date    ADHD (attention deficit hyperactivity disorder)     Anxiety     Arthritis     Depression     Fibromyalgia, primary     GERD (gastroesophageal reflux disease)     History of stomach ulcers     Hypertension     Panic attack     Papanicolaou smear 2020    Psychiatric disorder         Past Surgical History:   Procedure Laterality Date    HYSTEROSCOPY  2015    MAMMO (HISTORICAL) Bilateral 03/05/2020    [facility] BI-RADS 1 Negative Scattered areas fibroglandular density; 25% to 50% glandular breast tissue        Social History     Socioeconomic History    Marital status: Single     Spouse name: Not on file    Number of children: Not on file    Years of education: Not on file    Highest education level: Not on file   Occupational History    Occupation: Tech.     Tobacco Use    Smoking status: Former    Smokeless tobacco: Never    Tobacco comments:     teen years   Vaping Use    Vaping status: Never Used   Substance and Sexual Activity    Alcohol use: Yes     Comment: Socially    Drug use: No    Sexual activity: Not Currently     Birth control/protection: Post-menopausal   Other Topics Concern    Not on file   Social History Narrative    Caffeine Intake: 1-2 cups per day    Do you smoke marijuana? Denies    Do you drink alcohol? Socially    Exercise: Occassionally    Domestic violence: No    History of drug/alcohol abuse denies     Social Determinants of Health     Financial Resource Strain: Not on file   Food Insecurity: Not on file   Transportation Needs: Not on file   Physical Activity: Not on file   Stress: Not on file   Social Connections: Not on file   Intimate Partner Violence: Not on file   Housing Stability: Not on file        Family History   Problem Relation Age of Onset    Hypertension Mother     Lung cancer Mother     Skin cancer Father     Hypertension Brother     No Known Problems Brother     No Known Problems Brother     Hypertension Maternal Grandmother     Multiple sclerosis Maternal Grandmother     Heart attack Maternal Grandfather     Heart disease Maternal Grandfather     Alcohol abuse Maternal Grandfather     Asthma Paternal Grandmother     Heart disease Paternal Grandfather         Objective        Physical Exam  ECOG performance status: 0  Blood pressure 122/82, pulse 96, temperature 98 °F (36.7 °C), temperature source Temporal, resp. rate 18, height 5' 5.25" (1.657 m), weight 99.3 kg (219 lb), SpO2 98%, not currently breastfeeding. Very anxious. I reviewed lab data in the chart as noted above.   Additional labs as noted below    WBC   Date Value Ref Range Status   12/12/2023 7.00 4.31 - 10.16 Thousand/uL Final   09/16/2023 5.76 4.31 - 10.16 Thousand/uL Final   05/27/2023 5.12 4.31 - 10.16 Thousand/uL Final     Hemoglobin   Date Value Ref Range Status   12/12/2023 13.3 11.5 - 15.4 g/dL Final   09/16/2023 11.5 11.5 - 15.4 g/dL Final   05/27/2023 10.5 (L) 11.5 - 15.4 g/dL Final     Platelets   Date Value Ref Range Status   12/12/2023 300 149 - 390 Thousands/uL Final   09/16/2023 329 149 - 390 Thousands/uL Final   05/27/2023 369 149 - 390 Thousands/uL Final     MCV   Date Value Ref Range Status   12/12/2023 88 82 - 98 fL Final   09/16/2023 81 (L) 82 - 98 fL Final   05/27/2023 82 82 - 98 fL Final      Potassium   Date Value Ref Range Status   09/30/2023 4.2 3.5 - 5.3 mmol/L Final   05/27/2023 4.0 3.5 - 5.3 mmol/L Final   02/18/2023 4.2 3.5 - 5.3 mmol/L Final     Chloride   Date Value Ref Range Status   09/30/2023 106 96 - 108 mmol/L Final   05/27/2023 105 96 - 108 mmol/L Final   02/18/2023 104 96 - 108 mmol/L Final     CO2   Date Value Ref Range Status   09/30/2023 29 21 - 32 mmol/L Final   05/27/2023 28 21 - 32 mmol/L Final   02/18/2023 29 21 - 32 mmol/L Final     CO2, i-STAT   Date Value Ref Range Status   10/01/2016 24 21 - 32 mmol/L Final     BUN   Date Value Ref Range Status   09/30/2023 15 5 - 25 mg/dL Final   05/27/2023 14 5 - 25 mg/dL Final   02/18/2023 13 5 - 25 mg/dL Final     Creatinine   Date Value Ref Range Status   09/30/2023 0.62 0.60 - 1.30 mg/dL Final     Comment:     Standardized to IDMS reference method   05/27/2023 0.81 0.60 - 1.30 mg/dL Final     Comment:     Standardized to IDMS reference method   02/18/2023 0.69 0.60 - 1.30 mg/dL Final     Comment:     Standardized to IDMS reference method     Glucose   Date Value Ref Range Status   08/12/2022 94 65 - 140 mg/dL Final     Comment:     If the patient is fasting, the ADA then defines impaired fasting glucose as > 100 mg/dL and diabetes as > or equal to 123 mg/dL. Specimen collection should occur prior to Sulfasalazine administration due to the potential for falsely depressed results. Specimen collection should occur prior to Sulfapyridine administration due to the potential for falsely elevated results.    08/11/2022 92 65 - 140 mg/dL Final     Comment:     If the patient is fasting, the ADA then defines impaired fasting glucose as > 100 mg/dL and diabetes as > or equal to 123 mg/dL. Specimen collection should occur prior to Sulfasalazine administration due to the potential for falsely depressed results. Specimen collection should occur prior to Sulfapyridine administration due to the potential for falsely elevated results. 06/26/2022 117 65 - 140 mg/dL Final     Comment:     If the patient is fasting, the ADA then defines impaired fasting glucose as > 100 mg/dL and diabetes as > or equal to 123 mg/dL. Specimen collection should occur prior to Sulfasalazine administration due to the potential for falsely depressed results. Specimen collection should occur prior to Sulfapyridine administration due to the potential for falsely elevated results. Calcium   Date Value Ref Range Status   09/30/2023 9.0 8.4 - 10.2 mg/dL Final   05/27/2023 9.3 8.4 - 10.2 mg/dL Final   02/18/2023 9.1 8.3 - 10.1 mg/dL Final     Albumin   Date Value Ref Range Status   09/30/2023 3.7 3.5 - 5.0 g/dL Final   05/27/2023 3.9 3.5 - 5.0 g/dL Final   02/18/2023 3.6 3.5 - 5.0 g/dL Final     Total Bilirubin   Date Value Ref Range Status   09/30/2023 0.27 0.20 - 1.00 mg/dL Final     Comment:     Use of this assay is not recommended for patients undergoing treatment with eltrombopag due to the potential for falsely elevated results. N-acetyl-p-benzoquinone imine (metabolite of Acetaminophen) will generate erroneously low results in samples for patients that have taken an overdose of Acetaminophen.   05/27/2023 0.30 0.20 - 1.00 mg/dL Final     Comment:     Use of this assay is not recommended for patients undergoing treatment with eltrombopag due to the potential for falsely elevated results.   N-acetyl-p-benzoquinone imine (metabolite of Acetaminophen) will generate erroneously low results in samples for patients that have taken an overdose of Acetaminophen.   02/18/2023 0.28 0.20 - 1.00 mg/dL Final     Comment:     Use of this assay is not recommended for patients undergoing treatment with eltrombopag due to the potential for falsely elevated results. Alkaline Phosphatase   Date Value Ref Range Status   09/30/2023 94 34 - 104 U/L Final   05/27/2023 84 34 - 104 U/L Final   02/18/2023 93 46 - 116 U/L Final     AST   Date Value Ref Range Status   09/30/2023 21 13 - 39 U/L Final   05/27/2023 17 13 - 39 U/L Final   02/18/2023 29 5 - 45 U/L Final     Comment:     Specimen collection should occur prior to Sulfasalazine administration due to the potential for falsely depressed results. ALT   Date Value Ref Range Status   09/30/2023 21 7 - 52 U/L Final     Comment:     Specimen collection should occur prior to Sulfasalazine administration due to the potential for falsely depressed results. 05/27/2023 14 7 - 52 U/L Final     Comment:     Specimen collection should occur prior to Sulfasalazine administration due to the potential for falsely depressed results. 02/18/2023 38 12 - 78 U/L Final     Comment:     Specimen collection should occur prior to Sulfasalazine administration due to the potential for falsely depressed results. No results found for: "LDH"  No results found for: "TSH"  No results found for: "O2WPUON"   Free T4   Date Value Ref Range Status   07/17/2021 0.66 (L) 0.76 - 1.46 ng/dL Final     Comment:     Specimen collection should occur prior to Sulfasalazine administration due to the potential for falsely elevated results. RECENT IMAGING:  No results found. Portions of the record may have been created with voice recognition software. Occasional wrong word or "sound a like" substitutions may have occurred due to the inherent limitations of voice recognition software. Read the chart carefully and recognize, using context, where substitutions have occurred.

## 2023-12-12 ENCOUNTER — APPOINTMENT (OUTPATIENT)
Dept: LAB | Facility: HOSPITAL | Age: 58
End: 2023-12-12
Payer: COMMERCIAL

## 2023-12-12 DIAGNOSIS — D50.9 IRON DEFICIENCY ANEMIA, UNSPECIFIED IRON DEFICIENCY ANEMIA TYPE: ICD-10-CM

## 2023-12-12 LAB
BASOPHILS # BLD AUTO: 0.05 THOUSANDS/ÂΜL (ref 0–0.1)
BASOPHILS NFR BLD AUTO: 1 % (ref 0–1)
EOSINOPHIL # BLD AUTO: 0.26 THOUSAND/ÂΜL (ref 0–0.61)
EOSINOPHIL NFR BLD AUTO: 4 % (ref 0–6)
ERYTHROCYTE [DISTWIDTH] IN BLOOD BY AUTOMATED COUNT: 17.3 % (ref 11.6–15.1)
FERRITIN SERPL-MCNC: 101 NG/ML (ref 11–307)
HCT VFR BLD AUTO: 41.1 % (ref 34.8–46.1)
HGB BLD-MCNC: 13.3 G/DL (ref 11.5–15.4)
IMM GRANULOCYTES # BLD AUTO: 0.02 THOUSAND/UL (ref 0–0.2)
IMM GRANULOCYTES NFR BLD AUTO: 0 % (ref 0–2)
IRON SATN MFR SERPL: 33 % (ref 15–50)
IRON SERPL-MCNC: 84 UG/DL (ref 50–212)
LYMPHOCYTES # BLD AUTO: 1.41 THOUSANDS/ÂΜL (ref 0.6–4.47)
LYMPHOCYTES NFR BLD AUTO: 20 % (ref 14–44)
MCH RBC QN AUTO: 28.5 PG (ref 26.8–34.3)
MCHC RBC AUTO-ENTMCNC: 32.4 G/DL (ref 31.4–37.4)
MCV RBC AUTO: 88 FL (ref 82–98)
MONOCYTES # BLD AUTO: 0.9 THOUSAND/ÂΜL (ref 0.17–1.22)
MONOCYTES NFR BLD AUTO: 13 % (ref 4–12)
NEUTROPHILS # BLD AUTO: 4.36 THOUSANDS/ÂΜL (ref 1.85–7.62)
NEUTS SEG NFR BLD AUTO: 62 % (ref 43–75)
NRBC BLD AUTO-RTO: 0 /100 WBCS
PLATELET # BLD AUTO: 300 THOUSANDS/UL (ref 149–390)
PMV BLD AUTO: 11.2 FL (ref 8.9–12.7)
RBC # BLD AUTO: 4.67 MILLION/UL (ref 3.81–5.12)
TIBC SERPL-MCNC: 257 UG/DL (ref 250–450)
UIBC SERPL-MCNC: 173 UG/DL (ref 155–355)
WBC # BLD AUTO: 7 THOUSAND/UL (ref 4.31–10.16)

## 2023-12-12 PROCEDURE — 85025 COMPLETE CBC W/AUTO DIFF WBC: CPT

## 2023-12-12 PROCEDURE — 83550 IRON BINDING TEST: CPT

## 2023-12-12 PROCEDURE — 36415 COLL VENOUS BLD VENIPUNCTURE: CPT

## 2023-12-12 PROCEDURE — 82728 ASSAY OF FERRITIN: CPT

## 2023-12-12 PROCEDURE — 83540 ASSAY OF IRON: CPT

## 2023-12-13 DIAGNOSIS — B35.1 TOENAIL FUNGUS: ICD-10-CM

## 2023-12-14 ENCOUNTER — OFFICE VISIT (OUTPATIENT)
Age: 58
End: 2023-12-14
Payer: COMMERCIAL

## 2023-12-14 VITALS
RESPIRATION RATE: 18 BRPM | OXYGEN SATURATION: 98 % | HEIGHT: 65 IN | BODY MASS INDEX: 36.49 KG/M2 | WEIGHT: 219 LBS | SYSTOLIC BLOOD PRESSURE: 122 MMHG | DIASTOLIC BLOOD PRESSURE: 82 MMHG | TEMPERATURE: 98 F | HEART RATE: 96 BPM

## 2023-12-14 DIAGNOSIS — D50.9 IRON DEFICIENCY ANEMIA, UNSPECIFIED IRON DEFICIENCY ANEMIA TYPE: Primary | ICD-10-CM

## 2023-12-14 PROCEDURE — 99213 OFFICE O/P EST LOW 20 MIN: CPT | Performed by: INTERNAL MEDICINE

## 2023-12-15 DIAGNOSIS — F33.1 MODERATE EPISODE OF RECURRENT MAJOR DEPRESSIVE DISORDER (HCC): ICD-10-CM

## 2023-12-15 RX ORDER — BUPROPION HYDROCHLORIDE 150 MG/1
150 TABLET, EXTENDED RELEASE ORAL DAILY
Qty: 90 TABLET | Refills: 0 | Status: SHIPPED | OUTPATIENT
Start: 2023-12-15

## 2023-12-15 RX ORDER — BUPROPION HYDROCHLORIDE 300 MG/1
300 TABLET ORAL DAILY
Qty: 90 TABLET | Refills: 0 | Status: SHIPPED | OUTPATIENT
Start: 2023-12-15

## 2024-01-02 DIAGNOSIS — I10 PRIMARY HYPERTENSION: ICD-10-CM

## 2024-01-02 RX ORDER — HYDROCHLOROTHIAZIDE 12.5 MG/1
12.5 TABLET ORAL DAILY
Qty: 90 TABLET | Refills: 0 | Status: SHIPPED | OUTPATIENT
Start: 2024-01-02

## 2024-01-03 ENCOUNTER — OFFICE VISIT (OUTPATIENT)
Dept: URGENT CARE | Facility: CLINIC | Age: 59
End: 2024-01-03
Payer: COMMERCIAL

## 2024-01-03 ENCOUNTER — HOSPITAL ENCOUNTER (EMERGENCY)
Facility: HOSPITAL | Age: 59
Discharge: HOME | End: 2024-01-03
Attending: EMERGENCY MEDICINE
Payer: COMMERCIAL

## 2024-01-03 ENCOUNTER — APPOINTMENT (OUTPATIENT)
Dept: RADIOLOGY | Facility: HOSPITAL | Age: 59
End: 2024-01-03
Payer: COMMERCIAL

## 2024-01-03 VITALS
OXYGEN SATURATION: 97 % | SYSTOLIC BLOOD PRESSURE: 124 MMHG | WEIGHT: 216 LBS | HEART RATE: 87 BPM | TEMPERATURE: 97.9 F | DIASTOLIC BLOOD PRESSURE: 76 MMHG | BODY MASS INDEX: 35.67 KG/M2 | RESPIRATION RATE: 18 BRPM

## 2024-01-03 VITALS
DIASTOLIC BLOOD PRESSURE: 64 MMHG | HEART RATE: 89 BPM | OXYGEN SATURATION: 95 % | SYSTOLIC BLOOD PRESSURE: 133 MMHG | TEMPERATURE: 99.6 F | RESPIRATION RATE: 20 BRPM

## 2024-01-03 DIAGNOSIS — J06.9 VIRAL URI WITH COUGH: Primary | ICD-10-CM

## 2024-01-03 DIAGNOSIS — J06.9 URI (UPPER RESPIRATORY INFECTION): ICD-10-CM

## 2024-01-03 DIAGNOSIS — R07.9 CHEST PAIN, UNSPECIFIED TYPE: ICD-10-CM

## 2024-01-03 DIAGNOSIS — R07.9 CHEST PAIN: Primary | ICD-10-CM

## 2024-01-03 LAB
ALBUMIN SERPL BCP-MCNC: 4.1 G/DL (ref 3.5–5)
ALP SERPL-CCNC: 97 U/L (ref 34–104)
ALT SERPL W P-5'-P-CCNC: 28 U/L (ref 7–52)
ANION GAP SERPL CALCULATED.3IONS-SCNC: 8 MMOL/L
AST SERPL W P-5'-P-CCNC: 20 U/L (ref 13–39)
ATRIAL RATE: 79 BPM
BASOPHILS # BLD AUTO: 0.06 THOUSANDS/ÂΜL (ref 0–0.1)
BASOPHILS NFR BLD AUTO: 1 % (ref 0–1)
BILIRUB SERPL-MCNC: 0.25 MG/DL (ref 0.2–1)
BUN SERPL-MCNC: 17 MG/DL (ref 5–25)
CALCIUM SERPL-MCNC: 9.1 MG/DL (ref 8.4–10.2)
CARDIAC TROPONIN I PNL SERPL HS: <2 NG/L
CHLORIDE SERPL-SCNC: 102 MMOL/L (ref 96–108)
CO2 SERPL-SCNC: 25 MMOL/L (ref 21–32)
CREAT SERPL-MCNC: 0.72 MG/DL (ref 0.6–1.3)
EOSINOPHIL # BLD AUTO: 0.36 THOUSAND/ÂΜL (ref 0–0.61)
EOSINOPHIL NFR BLD AUTO: 4 % (ref 0–6)
ERYTHROCYTE [DISTWIDTH] IN BLOOD BY AUTOMATED COUNT: 16.2 % (ref 11.6–15.1)
FLUAV RNA RESP QL NAA+PROBE: NEGATIVE
FLUBV RNA RESP QL NAA+PROBE: NEGATIVE
GFR SERPL CREATININE-BSD FRML MDRD: 92 ML/MIN/1.73SQ M
GLUCOSE SERPL-MCNC: 116 MG/DL (ref 65–140)
HCT VFR BLD AUTO: 40.5 % (ref 34.8–46.1)
HGB BLD-MCNC: 13.1 G/DL (ref 11.5–15.4)
IMM GRANULOCYTES # BLD AUTO: 0.06 THOUSAND/UL (ref 0–0.2)
IMM GRANULOCYTES NFR BLD AUTO: 1 % (ref 0–2)
LYMPHOCYTES # BLD AUTO: 1.43 THOUSANDS/ÂΜL (ref 0.6–4.47)
LYMPHOCYTES NFR BLD AUTO: 15 % (ref 14–44)
MCH RBC QN AUTO: 28.2 PG (ref 26.8–34.3)
MCHC RBC AUTO-ENTMCNC: 32.3 G/DL (ref 31.4–37.4)
MCV RBC AUTO: 87 FL (ref 82–98)
MONOCYTES # BLD AUTO: 0.91 THOUSAND/ÂΜL (ref 0.17–1.22)
MONOCYTES NFR BLD AUTO: 9 % (ref 4–12)
NEUTROPHILS # BLD AUTO: 6.91 THOUSANDS/ÂΜL (ref 1.85–7.62)
NEUTS SEG NFR BLD AUTO: 70 % (ref 43–75)
NRBC BLD AUTO-RTO: 0 /100 WBCS
P AXIS: 47 DEGREES
PLATELET # BLD AUTO: 269 THOUSANDS/UL (ref 149–390)
PMV BLD AUTO: 10.4 FL (ref 8.9–12.7)
POTASSIUM SERPL-SCNC: 3.7 MMOL/L (ref 3.5–5.3)
PR INTERVAL: 170 MS
PROT SERPL-MCNC: 7.2 G/DL (ref 6.4–8.4)
QRS AXIS: 38 DEGREES
QRSD INTERVAL: 86 MS
QT INTERVAL: 372 MS
QTC INTERVAL: 426 MS
RBC # BLD AUTO: 4.65 MILLION/UL (ref 3.81–5.12)
RSV RNA RESP QL NAA+PROBE: NEGATIVE
SARS-COV-2 RNA RESP QL NAA+PROBE: NEGATIVE
SODIUM SERPL-SCNC: 135 MMOL/L (ref 135–147)
T WAVE AXIS: 52 DEGREES
VENTRICULAR RATE: 79 BPM
WBC # BLD AUTO: 9.73 THOUSAND/UL (ref 4.31–10.16)

## 2024-01-03 PROCEDURE — 93005 ELECTROCARDIOGRAM TRACING: CPT

## 2024-01-03 PROCEDURE — 0241U HB NFCT DS VIR RESP RNA 4 TRGT: CPT | Performed by: EMERGENCY MEDICINE

## 2024-01-03 PROCEDURE — 85025 COMPLETE CBC W/AUTO DIFF WBC: CPT | Performed by: EMERGENCY MEDICINE

## 2024-01-03 PROCEDURE — 99213 OFFICE O/P EST LOW 20 MIN: CPT

## 2024-01-03 PROCEDURE — 71046 X-RAY EXAM CHEST 2 VIEWS: CPT

## 2024-01-03 PROCEDURE — 80053 COMPREHEN METABOLIC PANEL: CPT | Performed by: EMERGENCY MEDICINE

## 2024-01-03 PROCEDURE — 84484 ASSAY OF TROPONIN QUANT: CPT | Performed by: EMERGENCY MEDICINE

## 2024-01-03 PROCEDURE — 36415 COLL VENOUS BLD VENIPUNCTURE: CPT

## 2024-01-03 PROCEDURE — 99285 EMERGENCY DEPT VISIT HI MDM: CPT

## 2024-01-03 PROCEDURE — 99285 EMERGENCY DEPT VISIT HI MDM: CPT | Performed by: EMERGENCY MEDICINE

## 2024-01-03 NOTE — PROGRESS NOTES
"  [facility]        NAME: Anamaria Rivera is a 58 y.o. female  : 1965    MRN: [id number]  DATE: January 3, 2024  TIME: 4:20 PM    Assessment and Plan   Viral URI with cough [J06.9]  1. Viral URI with cough        2. Chest pain, unspecified type  ECG 12 lead    Transfer to other facility          Patient medically stable to go to the ED via private vehicle for further evaluation of chest pain.      Patient Instructions     You are to go to the ED for further evaluation of your chest pain.    Follow-up with your PCP after the ED.      Chief Complaint     Chief Complaint   Patient presents with    Cough    Nasal Congestion    chest congestion     Patient reports having symptoms for the past 2-3 days. Yesterday she had some chest pain that she got concerned about. Biological father had a hx of heart problems.         History of Present Illness       This is a 58-year-old female presenting with cold-like symptoms x2-3 days. Patient reports nasal congestion, rhinorrhea, and an intermittent dry cough. No known fevers. Had an episode of chest pain yesterday lasting \"from the time I got up until I went to bed.\" Pain located in the middle of her chest. Unable to describe it, maybe a \"squeezing\" sensation. Reports associated arm pain that did resolve. States she always has back pain so is unsure if that is related. Denies headaches, dizziness/lightheadedness, shortness of breath, and nausea/vomiting. Patient states the pain went away last night after she took NyQuil for her cough. No further occurrences of chest pain. History of hypertension. Compliant with HCTZ and losartan daily. No other cardiac or murmur history. Non-smoker. Father had MI age 60's.         Review of Systems   Review of Systems   Constitutional:  Negative for chills and fever.   HENT:  Positive for congestion and rhinorrhea. Negative for ear pain and sore throat.    Eyes:  Negative for discharge and redness.   Respiratory:  Positive for " cough. Negative for chest tightness, shortness of breath and wheezing.    Cardiovascular:  Positive for chest pain. Negative for palpitations and leg swelling.   Gastrointestinal:  Negative for abdominal pain, diarrhea, nausea and vomiting.   Skin:  Negative for pallor and rash.   Neurological:  Negative for dizziness, light-headedness and headaches.         Current Medications       Current Outpatient Medications:     albuterol (PROVENTIL HFA,VENTOLIN HFA) 90 mcg/act inhaler, Inhale 2 puffs every 6 (six) hours as needed for wheezing or shortness of breath, Disp: 8 g, Rfl: 3    bisacodyl (DULCOLAX) 5 mg EC tablet, Take 2 tablets (10 mg total) by mouth once for 1 dose (Patient not taking: Reported on 10/16/2023), Disp: 2 tablet, Rfl: 0    buPROPion (Wellbutrin SR) 150 mg 12 hr tablet, Take 1 tablet (150 mg total) by mouth in the morning, Disp: 90 tablet, Rfl: 0    buPROPion (WELLBUTRIN XL) 300 mg 24 hr tablet, Take 1 tablet (300 mg total) by mouth daily, Disp: 90 tablet, Rfl: 0    cholecalciferol (VITAMIN D3) 1,000 units tablet, Take 1,000 Units by mouth daily, Disp: , Rfl:     ciclopirox (PENLAC) 8 % solution, Apply topically daily at bedtime, Disp: 6.6 mL, Rfl: 0    ferrous sulfate 324 (65 Fe) mg, Take 1 tablet (324 mg total) by mouth daily before breakfast (Patient not taking: Reported on 10/31/2023), Disp: 90 tablet, Rfl: 0    hydrochlorothiazide (HYDRODIURIL) 12.5 mg tablet, Take 1 tablet (12.5 mg total) by mouth daily, Disp: 90 tablet, Rfl: 0    hydrOXYzine HCL (ATARAX) 25 mg tablet, Take 1 tablet (25 mg total) by mouth every 6 (six) hours as needed for itching or anxiety, Disp: 30 tablet, Rfl: 0    losartan (COZAAR) 100 MG tablet, Take 1 tablet (100 mg total) by mouth daily, Disp: 90 tablet, Rfl: 0    Multiple Vitamin (multivitamin) tablet, Take 1 tablet by mouth daily, Disp: , Rfl:     omeprazole (PriLOSEC) 40 MG capsule, Take 1 capsule (40 mg total) by mouth daily Take before a meal, Disp: 90 capsule, Rfl:  2    QUEtiapine (SEROquel) 100 mg tablet, Take 1 tablet (100 mg total) by mouth daily at bedtime (Patient taking differently: Take 100 mg by mouth daily at bedtime Usually takes half), Disp: 90 tablet, Rfl: 2    sertraline (ZOLOFT) 100 mg tablet, Take 2 tablets (200 mg total) by mouth daily, Disp: 180 tablet, Rfl: 0    SF 5000 Plus 1.1 % CREA, , Disp: , Rfl:     terbinafine (LamISIL) 250 mg tablet, , Disp: , Rfl:     Current Allergies     Allergies as of 01/03/2024 - Reviewed 01/03/2024   Allergen Reaction Noted    Latex Rash 06/14/2021            The following portions of the patient's history were reviewed and updated as appropriate: allergies, current medications, past family history, past medical history, past social history, past surgical history and problem list.     Past Medical History:   Diagnosis Date    ADHD (attention deficit hyperactivity disorder)     Anxiety     Arthritis     Depression     Fibromyalgia, primary     GERD (gastroesophageal reflux disease)     History of stomach ulcers     Hypertension     Panic attack     Papanicolaou smear 2020    Psychiatric disorder        Past Surgical History:   Procedure Laterality Date    HYSTEROSCOPY  2015    MAMMO (HISTORICAL) Bilateral 03/05/2020    [facility] BI-RADS 1 Negative Scattered areas fibroglandular density; 25% to 50% glandular breast tissue       Family History   Problem Relation Age of Onset    Hypertension Mother     Lung cancer Mother     Skin cancer Father     Hypertension Brother     No Known Problems Brother     No Known Problems Brother     Hypertension Maternal Grandmother     Multiple sclerosis Maternal Grandmother     Heart attack Maternal Grandfather     Heart disease Maternal Grandfather     Alcohol abuse Maternal Grandfather     Asthma Paternal Grandmother     Heart disease Paternal Grandfather          Medications have been verified.        Objective   /76   Pulse 87   Temp 97.9 °F (36.6 °C) (Tympanic)   Resp 18   Wt 98 kg (216  lb)   SpO2 97%   BMI 35.67 kg/m²        Physical Exam     Physical Exam  Vitals and nursing note reviewed.   Constitutional:       General: She is not in acute distress.     Appearance: She is not ill-appearing or diaphoretic.   HENT:      Head: Normocephalic.      Right Ear: Tympanic membrane, ear canal and external ear normal.      Left Ear: Tympanic membrane, ear canal and external ear normal.      Nose: Congestion present.      Mouth/Throat:      Mouth: Mucous membranes are moist.      Pharynx: Oropharynx is clear. Posterior oropharyngeal erythema (mild) present. No oropharyngeal exudate.   Eyes:      Conjunctiva/sclera: Conjunctivae normal.      Pupils: Pupils are equal, round, and reactive to light.   Cardiovascular:      Rate and Rhythm: Normal rate and regular rhythm.      Pulses: Normal pulses.      Heart sounds: Normal heart sounds.   Pulmonary:      Effort: Pulmonary effort is normal.      Breath sounds: Normal breath sounds.   Musculoskeletal:         General: Normal range of motion.      Cervical back: Normal range of motion and neck supple.   Skin:     General: Skin is warm and dry.      Capillary Refill: Capillary refill takes less than 2 seconds.   Neurological:      Mental Status: She is alert and oriented to person, place, and time.         EKG: NSR, rate 80, NO STEMI

## 2024-01-03 NOTE — Clinical Note
Anamaria Rivera was seen and treated in our emergency department on 1/3/2024.                Diagnosis:     Anamaria  .    She may return on this date: 01/06/2024         If you have any questions or concerns, please don't hesitate to call.      Kaylan Carlos MD    ______________________________           _______________          _______________  Hospital Representative                              Date                                Time

## 2024-01-03 NOTE — PATIENT INSTRUCTIONS
You are to go to the ED for further evaluation of your chest pain.    Follow-up with your PCP after the ED.

## 2024-01-04 NOTE — ED PROVIDER NOTES
History  Chief Complaint   Patient presents with    Chest Pain     Patient presents to the ED with c/o CP yesterday, denies the CP today. States she has an URI and went to urgent care today and they sent her here today because she had CP yesterday.      58 year old female presents for evaluation of one day of central chest ache associated with URI symptoms yesterday.  Patient states the pain began when she woke yesterday morning and did not go away until she had taken some over-the-counter cough medicine before going to bed last night.  Patient states the pain has not recurred and she denies shortness of breath.  No episodes of wheezing.  She states she has not been diagnosed with asthma, but does have an inhaler at home which she uses sporadically with last use over the weekend.        Chest Pain      Prior to Admission Medications   Prescriptions Last Dose Informant Patient Reported? Taking?   Multiple Vitamin (multivitamin) tablet  Self Yes No   Sig: Take 1 tablet by mouth daily   QUEtiapine (SEROquel) 100 mg tablet  Self No No   Sig: Take 1 tablet (100 mg total) by mouth daily at bedtime   Patient taking differently: Take 100 mg by mouth daily at bedtime Usually takes half   SF 5000 Plus 1.1 % CREA  Self Yes No   albuterol (PROVENTIL HFA,VENTOLIN HFA) 90 mcg/act inhaler  Self No No   Sig: Inhale 2 puffs every 6 (six) hours as needed for wheezing or shortness of breath   bisacodyl (DULCOLAX) 5 mg EC tablet   No No   Sig: Take 2 tablets (10 mg total) by mouth once for 1 dose   Patient not taking: Reported on 10/16/2023   buPROPion (WELLBUTRIN XL) 300 mg 24 hr tablet   No No   Sig: Take 1 tablet (300 mg total) by mouth daily   buPROPion (Wellbutrin SR) 150 mg 12 hr tablet   No No   Sig: Take 1 tablet (150 mg total) by mouth in the morning   cholecalciferol (VITAMIN D3) 1,000 units tablet  Self Yes No   Sig: Take 1,000 Units by mouth daily   ciclopirox (PENLAC) 8 % solution   No No   Sig: Apply topically daily at  bedtime   ferrous sulfate 324 (65 Fe) mg  Self No No   Sig: Take 1 tablet (324 mg total) by mouth daily before breakfast   Patient not taking: Reported on 10/31/2023   hydrOXYzine HCL (ATARAX) 25 mg tablet  Self No No   Sig: Take 1 tablet (25 mg total) by mouth every 6 (six) hours as needed for itching or anxiety   hydrochlorothiazide (HYDRODIURIL) 12.5 mg tablet   No No   Sig: Take 1 tablet (12.5 mg total) by mouth daily   losartan (COZAAR) 100 MG tablet   No No   Sig: Take 1 tablet (100 mg total) by mouth daily   omeprazole (PriLOSEC) 40 MG capsule  Self No No   Sig: Take 1 capsule (40 mg total) by mouth daily Take before a meal   sertraline (ZOLOFT) 100 mg tablet   No No   Sig: Take 2 tablets (200 mg total) by mouth daily   terbinafine (LamISIL) 250 mg tablet  Self Yes No      Facility-Administered Medications: None       Past Medical History:   Diagnosis Date    ADHD (attention deficit hyperactivity disorder)     Anxiety     Arthritis     Depression     Fibromyalgia, primary     GERD (gastroesophageal reflux disease)     History of stomach ulcers     Hypertension     Panic attack     Papanicolaou smear 2020    Psychiatric disorder        Past Surgical History:   Procedure Laterality Date    HYSTEROSCOPY  2015    MAMMO (HISTORICAL) Bilateral 03/05/2020    H BI-RADS 1 Negative Scattered areas fibroglandular density; 25% to 50% glandular breast tissue       Family History   Problem Relation Age of Onset    Hypertension Mother     Lung cancer Mother     Skin cancer Father     Hypertension Brother     No Known Problems Brother     No Known Problems Brother     Hypertension Maternal Grandmother     Multiple sclerosis Maternal Grandmother     Heart attack Maternal Grandfather     Heart disease Maternal Grandfather     Alcohol abuse Maternal Grandfather     Asthma Paternal Grandmother     Heart disease Paternal Grandfather      I have reviewed and agree with the history as documented.    E-Cigarette/Vaping     E-Cigarette Use Never User      E-Cigarette/Vaping Substances    Nicotine No     THC No     CBD No     Flavoring No     Other No     Unknown No      Social History     Tobacco Use    Smoking status: Former    Smokeless tobacco: Never    Tobacco comments:     teen years   Vaping Use    Vaping status: Never Used   Substance Use Topics    Alcohol use: Yes     Comment: Socially    Drug use: No       Review of Systems   Cardiovascular:  Positive for chest pain.       Physical Exam  Physical Exam  Vitals and nursing note reviewed.   HENT:      Head: Normocephalic and atraumatic.   Cardiovascular:      Rate and Rhythm: Normal rate and regular rhythm.      Pulses: Normal pulses.      Heart sounds: Normal heart sounds.   Pulmonary:      Effort: Pulmonary effort is normal. No respiratory distress.      Breath sounds: Normal breath sounds.   Skin:     General: Skin is warm and dry.   Neurological:      Mental Status: She is alert.         Vital Signs  ED Triage Vitals   Temperature Pulse Respirations Blood Pressure SpO2   01/03/24 1817 01/03/24 1813 01/03/24 1813 01/03/24 1813 01/03/24 1813   99.6 °F (37.6 °C) 82 18 142/74 98 %      Temp Source Heart Rate Source Patient Position - Orthostatic VS BP Location FiO2 (%)   01/03/24 1817 -- -- -- --   Temporal          Pain Score       01/03/24 1812       No Pain           Vitals:    01/03/24 1813 01/03/24 2030   BP: 142/74 133/64   Pulse: 82 89         Visual Acuity      ED Medications  Medications - No data to display    Diagnostic Studies  Results Reviewed       Procedure Component Value Units Date/Time    HS Troponin I 4hr [[id number]]     Lab Status: No result Specimen: Blood     [place] draw [[id number]] Collected: 01/03/24 1817    Lab Status: Final result Specimen: Blood from Arm, Right Updated: 01/03/24 2001    Narrative:      The following orders were created for panel order [place] draw.  Procedure                               Abnormality         Status                      ---------                               -----------         ------                     Light Blue Top on hold[[id number]]                           Final result               Gold top on hold[[id number]]                                 Final result                 Please view results for these tests on the individual orders.    FLU/RSV/COVID - if FLU/RSV clinically relevant [[id number]]  (Normal) Collected: 01/03/24 1817    Lab Status: Final result Specimen: Nares from Nose Updated: 01/03/24 1900     SARS-CoV-2 Negative     INFLUENZA A PCR Negative     INFLUENZA B PCR Negative     RSV PCR Negative    Narrative:      FOR PEDIATRIC PATIENTS - copy/paste COVID Guidelines URL to browser: [email or web address]    SARS-CoV-2 assay is a Nucleic Acid Amplification assay intended for the  qualitative detection of nucleic acid from SARS-CoV-2 in nasopharyngeal  swabs. Results are for the presumptive identification of SARS-CoV-2 RNA.    Positive results are indicative of infection with SARS-CoV-2, the virus  causing COVID-19, but do not rule out bacterial infection or co-infection  with other viruses. Laboratories within the [place] and its  territories are required to report all positive results to the appropriate  public health authorities. Negative results do not preclude SARS-CoV-2  infection and should not be used as the sole basis for treatment or other  patient management decisions. Negative results must be combined with  clinical observations, patient history, and epidemiological information.  This test has not been FDA cleared or approved.    This test has been authorized by FDA under an Emergency Use Authorization  (EUA). This test is only authorized for the duration of time the  declaration that circumstances exist justifying the authorization of the  emergency use of an in vitro diagnostic tests for detection of SARS-CoV-2  virus and/or diagnosis of COVID-19  infection under section 564(b)(1) of  the Act, 21 U.S.C. 360bbb-3(b)(1), unless the authorization is terminated  or revoked sooner. The test has been validated but independent review by FDA  and CLIA is pending.    Test performed using [facility]: This RT-PCR assay targets N2,  a region unique to SARS-CoV-2. A conserved region in the E-gene was chosen  for pan-Sarbecovirus detection which includes SARS-CoV-2.    According to CMS-2020-01-R, this platform meets the definition of high-throughput technology.    HS Troponin 0hr (reflex protocol) [[id number]]  (Normal) Collected: 01/03/24 1817    Lab Status: Final result Specimen: Blood from Arm, Right Updated: 01/03/24 1852     hs TnI 0hr <2 ng/L     HS Troponin I 2hr [[id number]]     Lab Status: No result Specimen: Blood     Comprehensive metabolic panel [[id number]] Collected: 01/03/24 1817    Lab Status: Final result Specimen: Blood from Arm, Right Updated: 01/03/24 1847     Sodium 135 mmol/L      Potassium 3.7 mmol/L      Chloride 102 mmol/L      CO2 25 mmol/L      ANION GAP 8 mmol/L      BUN 17 mg/dL      Creatinine 0.72 mg/dL      Glucose 116 mg/dL      Calcium 9.1 mg/dL      AST 20 U/L      ALT 28 U/L      Alkaline Phosphatase 97 U/L      Total Protein 7.2 g/dL      Albumin 4.1 g/dL      Total Bilirubin 0.25 mg/dL      eGFR 92 ml/min/1.73sq m     Narrative:      National Kidney Disease Foundation guidelines for Chronic Kidney Disease (CKD):     Stage 1 with normal or high GFR (GFR > 90 mL/min/1.73 square meters)    Stage 2 Mild CKD (GFR = 60-89 mL/min/1.73 square meters)    Stage 3A Moderate CKD (GFR = 45-59 mL/min/1.73 square meters)    Stage 3B Moderate CKD (GFR = 30-44 mL/min/1.73 square meters)    Stage 4 Severe CKD (GFR = 15-29 mL/min/1.73 square meters)    Stage 5 End Stage CKD (GFR <15 mL/min/1.73 square meters)  Note: GFR calculation is accurate only with a steady state creatinine    CBC and differential [[id number]]  (Abnormal) Collected: 01/03/24 5381     Lab Status: Final result Specimen: Blood from Arm, Right Updated: 01/03/24 1823     WBC 9.73 Thousand/uL      RBC 4.65 Million/uL      Hemoglobin 13.1 g/dL      Hematocrit 40.5 %      MCV 87 fL      MCH 28.2 pg      MCHC 32.3 g/dL      RDW 16.2 %      MPV 10.4 fL      Platelets 269 Thousands/uL      nRBC 0 /100 WBCs      Neutrophils Relative 70 %      Immat GRANS % 1 %      Lymphocytes Relative 15 %      Monocytes Relative 9 %      Eosinophils Relative 4 %      Basophils Relative 1 %      Neutrophils Absolute 6.91 Thousands/µL      Immature Grans Absolute 0.06 Thousand/uL      Lymphocytes Absolute 1.43 Thousands/µL      Monocytes Absolute 0.91 Thousand/µL      Eosinophils Absolute 0.36 Thousand/µL      Basophils Absolute 0.06 Thousands/µL                    XR chest 2 views   ED Interpretation by Kaylan Carlos MD (01/03 2030)   No acute pulmonary pathology                 Procedures  ECG 12 Lead Documentation Only    Date/Time: 1/3/2024 6:19 PM    Performed by: Kaylan Carlos MD  Authorized by: Kaylan Carlos MD    Indications / Diagnosis:  Chest pain  ECG reviewed by me, the ED Provider: yes    Patient location:  ED  Previous ECG:     Previous ECG:  Unavailable  Interpretation:     Interpretation: normal    Rate:     ECG rate:  89    ECG rate assessment: normal    Rhythm:     Rhythm: sinus rhythm    Ectopy:     Ectopy: none    QRS:     QRS axis:  Normal    QRS intervals:  Normal  Conduction:     Conduction: normal    ST segments:     ST segments:  Normal  T waves:     T waves: normal             ED Course             HEART Risk Score      Flowsheet Row Most Recent Value   Heart Score Risk Calculator    History 0 Filed at: 01/03/2024 2039   ECG 0 Filed at: 01/03/2024 2039   Age 1 Filed at: 01/03/2024 2039   Risk Factors 2 Filed at: 01/03/2024 2039   Troponin 0 Filed at: 01/03/2024 2039   HEART Score 3 Filed at: 01/03/2024 2039                          SBIRT 20yo+      Flowsheet  Row Most Recent Value   Initial Alcohol Screen: US AUDIT-C     1. How often do you have a drink containing alcohol? 0 Filed at: 01/03/2024 1813   2. How many drinks containing alcohol do you have on a typical day you are drinking?  0 Filed at: 01/03/2024 1813   3a. Male UNDER 65: How often do you have five or more drinks on one occasion? 0 Filed at: 01/03/2024 1813   3b. FEMALE Any Age, or MALE 65+: How often do you have 4 or more drinks on one occassion? 0 Filed at: 01/03/2024 1813   Audit-C Score 0 Filed at: 01/03/2024 1813   RUTH: How many times in the past year have you...    Used an illegal drug or used a prescription medication for non-medical reasons? Never Filed at: 01/03/2024 1813                      Medical Decision Making  58 year old female presents for evaluation of chest pain associated with URI.  Exam unremarkable.  CXR without infiltrate and EKG normal on my independent interpretation.  Labs unremarkable.  HEART score 3.  Negative COVID/flu/RSV PCR.  Suspect viral URI.  PCP follow up.  Return precautions provided.     Amount and/or Complexity of Data Reviewed  Labs: ordered.  Radiology: ordered and independent interpretation performed.             Disposition  Final diagnoses:   Chest pain   URI (upper respiratory infection)     Time reflects when diagnosis was documented in both MDM as applicable and the Disposition within this note       Time User Action Codes Description Comment    1/3/2024  8:40 PM Kaylan Carlos Add [R07.9] Chest pain     1/3/2024  8:40 PM Kaylan Carlos Add [J06.9] URI (upper respiratory infection)           ED Disposition       ED Disposition   Discharge    Condition   Stable    Date/Time   Wed Isidoro 3, 2024 2040    Comment   Anamaria Rivera discharge to home/self care.                   Follow-up Information       Follow up With Specialties Details Why Contact Info Additional Information    [facility] Schedule an appointment as soon as  possible for a visit in 1 week for re-evaluation [address] [place]  [address]  [facility] [address]  [phone]        [facility] Emergency Department Emergency Medicine Go to  If symptoms worsen [address] [facility] [address] [phone] [facility] Emergency Department, [address] [place], [place] [address]            Discharge Medication List as of 1/3/2024  8:41 PM        CONTINUE these medications which have NOT CHANGED    Details   albuterol (PROVENTIL HFA,VENTOLIN HFA) 90 mcg/act inhaler Inhale 2 puffs every 6 (six) hours as needed for wheezing or shortness of breath, Starting Mon 5/22/2023, Normal      bisacodyl (DULCOLAX) 5 mg EC tablet Take 2 tablets (10 mg total) by mouth once for 1 dose, Starting Thu 7/13/2023, Normal      buPROPion (Wellbutrin SR) 150 mg 12 hr tablet Take 1 tablet (150 mg total) by mouth in the morning, Starting Fri 12/15/2023, Normal      buPROPion (WELLBUTRIN XL) 300 mg 24 hr tablet Take 1 tablet (300 mg total) by mouth daily, Starting Fri 12/15/2023, Normal      cholecalciferol (VITAMIN D3) 1,000 units tablet Take 1,000 Units by mouth daily, Historical Med      ciclopirox (PENLAC) 8 % solution Apply topically daily at bedtime, Starting Wed 12/13/2023, Normal      ferrous sulfate 324 (65 Fe) mg Take 1 tablet (324 mg total) by mouth daily before breakfast, Starting Mon 7/3/2023, Normal      hydrochlorothiazide (HYDRODIURIL) 12.5 mg tablet Take 1 tablet (12.5 mg total) by mouth daily, Starting Tue 1/2/2024, Normal      hydrOXYzine HCL (ATARAX) 25 mg tablet Take 1 tablet (25 mg total) by mouth every 6 (six) hours as needed for itching or anxiety, Starting Tue 9/27/2022, Normal      losartan (COZAAR) 100 MG tablet Take 1 tablet (100 mg total) by mouth daily, Starting Fri 11/3/2023, Normal      Multiple Vitamin (multivitamin) tablet Take 1 tablet by mouth daily, Historical Med      omeprazole  (PriLOSEC) 40 MG capsule Take 1 capsule (40 mg total) by mouth daily Take before a meal, Starting Wed 4/26/2023, Normal      QUEtiapine (SEROquel) 100 mg tablet Take 1 tablet (100 mg total) by mouth daily at bedtime, Starting Mon 1/16/2023, Normal      sertraline (ZOLOFT) 100 mg tablet Take 2 tablets (200 mg total) by mouth daily, Starting Fri 11/3/2023, Normal      SF 5000 Plus 1.1 % CREA Starting Tue 2/1/2022, Historical Med      terbinafine (LamISIL) 250 mg tablet Starting Thu 9/14/2023, Historical Med             No discharge procedures on file.    PDMP Review         Value Time User    PDMP Reviewed  Yes 5/5/2021  3:56 PM Allen Mcmahan III, DO            ED Provider  Electronically Signed by             Kaylan Carlos MD  01/03/24 8669

## 2024-01-05 LAB
ATRIAL RATE: 89 BPM
P AXIS: 70 DEGREES
PR INTERVAL: 170 MS
QRS AXIS: 31 DEGREES
QRSD INTERVAL: 94 MS
QT INTERVAL: 358 MS
QTC INTERVAL: 435 MS
T WAVE AXIS: 56 DEGREES
VENTRICULAR RATE: 89 BPM

## 2024-01-08 ENCOUNTER — OFFICE VISIT (OUTPATIENT)
Dept: FAMILY MEDICINE CLINIC | Facility: CLINIC | Age: 59
End: 2024-01-08
Payer: COMMERCIAL

## 2024-01-08 VITALS
SYSTOLIC BLOOD PRESSURE: 122 MMHG | DIASTOLIC BLOOD PRESSURE: 70 MMHG | HEART RATE: 78 BPM | BODY MASS INDEX: 35.03 KG/M2 | WEIGHT: 218 LBS | RESPIRATION RATE: 17 BRPM | TEMPERATURE: 96.9 F | HEIGHT: 66 IN | OXYGEN SATURATION: 98 %

## 2024-01-08 DIAGNOSIS — R07.9 CHEST PAIN, UNSPECIFIED TYPE: Primary | ICD-10-CM

## 2024-01-08 DIAGNOSIS — F33.1 MODERATE EPISODE OF RECURRENT MAJOR DEPRESSIVE DISORDER (HCC): ICD-10-CM

## 2024-01-08 PROCEDURE — 99213 OFFICE O/P EST LOW 20 MIN: CPT | Performed by: NURSE PRACTITIONER

## 2024-01-09 ENCOUNTER — HOSPITAL ENCOUNTER (OUTPATIENT)
Dept: MAMMOGRAPHY | Facility: CLINIC | Age: 59
Discharge: HOME | End: 2024-01-09
Payer: COMMERCIAL

## 2024-01-09 VITALS — BODY MASS INDEX: 35.03 KG/M2 | WEIGHT: 218 LBS | HEIGHT: 66 IN

## 2024-01-09 DIAGNOSIS — Z12.31 BREAST CANCER SCREENING BY MAMMOGRAM: ICD-10-CM

## 2024-01-09 DIAGNOSIS — K02.9 DENTAL CARIES: Primary | ICD-10-CM

## 2024-01-09 PROCEDURE — 77063 BREAST TOMOSYNTHESIS BI: CPT

## 2024-01-09 PROCEDURE — 77067 SCR MAMMO BI INCL CAD: CPT

## 2024-01-10 RX ORDER — 1.1% SODIUM FLUORIDE PRESCRIPTION DENTAL CREAM 5 MG/G
CREAM DENTAL 2 TIMES DAILY
Qty: 51 G | Refills: 0 | Status: SHIPPED | OUTPATIENT
Start: 2024-01-10

## 2024-01-11 DIAGNOSIS — F33.1 MODERATE EPISODE OF RECURRENT MAJOR DEPRESSIVE DISORDER (HCC): ICD-10-CM

## 2024-01-12 RX ORDER — QUETIAPINE FUMARATE 100 MG/1
100 TABLET, FILM COATED ORAL
Qty: 90 TABLET | Refills: 0 | Status: SHIPPED | OUTPATIENT
Start: 2024-01-12

## 2024-01-29 ENCOUNTER — TELEPHONE (OUTPATIENT)
Dept: PSYCHIATRY | Facility: CLINIC | Age: 59
End: 2024-01-29

## 2024-01-29 NOTE — TELEPHONE ENCOUNTER
"Behavioral Health Outpatient Intake Questions    Referred By   : self    Please advise interviewee that they need to answer all questions truthfully to allow for best care, and any misrepresentations of information may affect their ability to be seen at this clinic   => Was this discussed? Yes     If Minor Child (under age 18)    Who is/are the legal guardian(s) of the child?     Is there a custody agreement?      If \"YES\"- Custody orders must be obtained prior to scheduling the first appointment  In addition, Consent to Treatment must be signed by all legal guardians prior to scheduling the first appointment    If \"NO\"- Consent to Treatment must be signed by all legal guardians prior to scheduling the first appointment    Behavioral Health Outpatient Intake History -     Presenting Problem (in patient's own words): depression    Are there any communication barriers for this patient?     No                                               If yes, please describe barriers:   If there is a unique situation, please refer to Chidi Cherry/Angela Harrison for final determination.    Are you taking any psychiatric medications? Yes     If \"YES\" -What are they Zoloft, Wellbutrin SR, Wellbutrin XL, Seroquel, Atarax     If \"YES\" -Who prescribes?   PCP    Has the Patient previously received outpatient Talk Therapy or Medication Management from [facility]  No        If \"YES\"- When, Where and with Whom?         If \"NO\" -Has Patient received these services elsewhere?       If \"YES\" -When, Where, and with Whom?  [facility]   Med mgmt (Stopped march 2022)    Has the Patient abused alcohol or other substances in the last 6 months ? No  No concerns of substance abuse are reported.     If \"YES\" -What substance, How much, How often?     If illegal substance: Refer to [facility] (for FRANCO) or [facility] Offices.   If Alcohol in excess of 10 drinks per week:  Refer to [facility] (for FRANCO) or [facility] Offices    Legal History-     Is " "this treatment court ordered? No   If \"yes \"send to :  Talk Therapy : Send to Chidi Cherry/Angela Harrison for final determination   Med Management: Send to Dr Mckeon for final determination     Has the Patient been convicted of a felony?  No   If \"Yes\" send to -When, What?  Talk Therapy : Send to Chidi Harrison for final determination   Med Management: Send to Dr Mckeon for final determination     ACCEPTED as a patient Yes  If \"Yes\" Appointment Date: Alaina Arias 2/14 @ 10a    Referred Elsewhere? No  If “Yes” - (Where? Ex: [facility], [facility], [facility], [facility], etc.)     Name of Insurance Co:[facility]   Insurance ID# [id number]   Insurance Phone #  If ins is primary or secondary?  If patient is a minor, parents information such as Name, D.O.B of guarantor.  "

## 2024-01-29 NOTE — TELEPHONE ENCOUNTER
Contacted patient off of Medication Management  to verify needs of services in attempts to offer patient an appointment at [facility]. spoke with patient whom stated they are interested in services

## 2024-01-31 DIAGNOSIS — F33.1 MODERATE EPISODE OF RECURRENT MAJOR DEPRESSIVE DISORDER (HCC): ICD-10-CM

## 2024-01-31 DIAGNOSIS — I10 PRIMARY HYPERTENSION: ICD-10-CM

## 2024-01-31 RX ORDER — SERTRALINE HYDROCHLORIDE 100 MG/1
200 TABLET, FILM COATED ORAL DAILY
Qty: 180 TABLET | Refills: 0 | Status: SHIPPED | OUTPATIENT
Start: 2024-01-31

## 2024-01-31 RX ORDER — LOSARTAN POTASSIUM 100 MG/1
100 TABLET ORAL DAILY
Qty: 90 TABLET | Refills: 0 | Status: SHIPPED | OUTPATIENT
Start: 2024-01-31

## 2024-02-02 ENCOUNTER — OFFICE VISIT (OUTPATIENT)
Dept: OBGYN CLINIC | Facility: HOSPITAL | Age: 59
End: 2024-02-02
Payer: COMMERCIAL

## 2024-02-02 ENCOUNTER — HOSPITAL ENCOUNTER (OUTPATIENT)
Dept: RADIOLOGY | Facility: HOSPITAL | Age: 59
Discharge: HOME | End: 2024-02-02
Attending: ORTHOPAEDIC SURGERY
Payer: COMMERCIAL

## 2024-02-02 VITALS
HEART RATE: 96 BPM | WEIGHT: 218.03 LBS | BODY MASS INDEX: 35.04 KG/M2 | DIASTOLIC BLOOD PRESSURE: 79 MMHG | SYSTOLIC BLOOD PRESSURE: 116 MMHG | HEIGHT: 66 IN

## 2024-02-02 DIAGNOSIS — R52 PAIN: Primary | ICD-10-CM

## 2024-02-02 DIAGNOSIS — M54.16 LUMBAR RADICULOPATHY: ICD-10-CM

## 2024-02-02 DIAGNOSIS — R52 PAIN: ICD-10-CM

## 2024-02-02 PROCEDURE — 72110 X-RAY EXAM L-2 SPINE 4/>VWS: CPT

## 2024-02-02 PROCEDURE — 99214 OFFICE O/P EST MOD 30 MIN: CPT | Performed by: ORTHOPAEDIC SURGERY

## 2024-02-02 NOTE — PROGRESS NOTES
Assessment & Plan/Medical Decision Makin y.o. female with Back Pain and Neurogenic Claudication and imaging findings most notable for lumbar spondylosis         The clinical, physical and imaging findings were reviewed with the patient.  Anamaria  has a constellation of findings consistent with Neurogenic Claudication in the setting of lumbar degenerative disease.      We discussed the treatment options including physical therapy, at home exercises, activity modifications, chiropractic medicine, oral medications, interventional spine procedures.  At this time recommend continued conservative treatments and evaluation of neural elements with advanced imaging.    Referral placed for COMPREHENSIVE SPINE PHYSICAL THERAPY to work on core strengthening, lumbar ROM, strengthening, and stretching exercises.  We also provided patient handout of local aqua therapies to be considered if land-based therapy is not tolerated.    MRI lumbar spine to further evaluate patient's symptoms. Will review MRI in detail with patient at follow-up visit and discuss further treatment options at that time.     Patient instructed to return to office/ER sooner if symptoms are not improving, getting worse, or new worrisome/neurologic symptoms arise.  Patient will follow up in approx. 3 months for re-evaluation after further conservative treatments.       Subjective:      Chief Complaint: Back Pain    HPI:  Anamaria Rivera is a 59 y.o. female presenting for initial visit with chief complaint of back pain.  Pain began a few years ago.  Patient has decreased standing tolerance with increased pain.  She is unable to walk more than 100 feet.  She has a positive shopping cart sign.  Pain radiates into her bilateral gluteal regions and bilateral legs.  Symptoms are improved with sitting.  Has subjective weakness and concerns for falls given her weakness.  Denies any neva trauma. Denies fever or chills, no night sweats. Denies any bladder or  bowel changes.      Conservative therapy includes the following:   Medications: IBU and Tylenol PRN     Injections: Previously in 2021      Physical Therapy: PT in 2022  Chiropractic Medicine: has not attempted  Accupunture/Massage Therapy: has not attempted   These therapeutic modalities were ineffective at providing sustained pain relief/functional improvement.     Nicotine dependent: No  Occupation: [employer]  Living situation: Lives with family   ADLs: patient is able to perform     Objective:     Family History   Problem Relation Age of Onset    Hypertension Mother     Lung cancer Mother     Skin cancer Father     Hypertension Brother     No Known Problems Brother     No Known Problems Brother     Hypertension Maternal Grandmother     Multiple sclerosis Maternal Grandmother     Heart attack Maternal Grandfather     Heart disease Maternal Grandfather     Alcohol abuse Maternal Grandfather     Asthma Paternal Grandmother     Heart disease Paternal Grandfather        Past Medical History:   Diagnosis Date    ADHD (attention deficit hyperactivity disorder)     Anxiety     Arthritis     Depression     Fibromyalgia, primary     GERD (gastroesophageal reflux disease)     History of stomach ulcers     Hypertension     Panic attack     Papanicolaou smear 2020    Psychiatric disorder        Current Outpatient Medications   Medication Sig Dispense Refill    albuterol (PROVENTIL HFA,VENTOLIN HFA) 90 mcg/act inhaler Inhale 2 puffs every 6 (six) hours as needed for wheezing or shortness of breath 8 g 3    buPROPion (Wellbutrin SR) 150 mg 12 hr tablet Take 1 tablet (150 mg total) by mouth in the morning 90 tablet 0    buPROPion (WELLBUTRIN XL) 300 mg 24 hr tablet Take 1 tablet (300 mg total) by mouth daily 90 tablet 0    cholecalciferol (VITAMIN D3) 1,000 units tablet Take 1,000 Units by mouth daily      ciclopirox (PENLAC) 8 % solution Apply topically daily at bedtime 6.6 mL 0    hydrochlorothiazide (HYDRODIURIL) 12.5  mg tablet Take 1 tablet (12.5 mg total) by mouth daily 90 tablet 0    hydrOXYzine HCL (ATARAX) 25 mg tablet Take 1 tablet (25 mg total) by mouth every 6 (six) hours as needed for itching or anxiety 30 tablet 0    losartan (COZAAR) 100 MG tablet Take 1 tablet (100 mg total) by mouth daily 90 tablet 0    Multiple Vitamin (multivitamin) tablet Take 1 tablet by mouth daily      omeprazole (PriLOSEC) 40 MG capsule Take 1 capsule (40 mg total) by mouth daily Take before a meal 90 capsule 2    QUEtiapine (SEROquel) 100 mg tablet Take 1 tablet (100 mg total) by mouth daily at bedtime 90 tablet 0    sertraline (ZOLOFT) 100 mg tablet Take 2 tablets (200 mg total) by mouth daily 180 tablet 0    SF 5000 Plus 1.1 % CREA Take by mouth 2 (two) times a day 51 g 0    terbinafine (LamISIL) 250 mg tablet       bisacodyl (DULCOLAX) 5 mg EC tablet Take 2 tablets (10 mg total) by mouth once for 1 dose (Patient not taking: Reported on 10/16/2023) 2 tablet 0    ferrous sulfate 324 (65 Fe) mg Take 1 tablet (324 mg total) by mouth daily before breakfast (Patient not taking: Reported on 10/31/2023) 90 tablet 0     No current facility-administered medications for this visit.       Past Surgical History:   Procedure Laterality Date    BREAST CYST ASPIRATION Left 2014    HYSTEROSCOPY  2015    MAMMO (HISTORICAL) Bilateral 03/05/2020    [facility] BI-RADS 1 Negative Scattered areas fibroglandular density; 25% to 50% glandular breast tissue       Social History     Socioeconomic History    Marital status: Single     Spouse name: Not on file    Number of children: Not on file    Years of education: Not on file    Highest education level: Not on file   Occupational History    Occupation: Tech.    Tobacco Use    Smoking status: Former    Smokeless tobacco: Never    Tobacco comments:     teen years   Vaping Use    Vaping status: Never Used   Substance and Sexual Activity    Alcohol use: Yes     Comment: Socially    Drug use: No    Sexual activity: Not  "Currently     Birth control/protection: Post-menopausal   Other Topics Concern    Not on file   Social History Narrative    Caffeine Intake: 1-2 cups per day    Do you smoke marijuana? Denies    Do you drink alcohol? Socially    Exercise: Occassionally    Domestic violence: No    History of drug/alcohol abuse denies     Social Determinants of Health     Financial Resource Strain: Not on file   Food Insecurity: Not on file   Transportation Needs: Not on file   Physical Activity: Not on file   Stress: Not on file   Social Connections: Not on file   Intimate Partner Violence: Not on file   Housing Stability: Not on file       Allergies   Allergen Reactions    Latex Rash       Review of Systems  General- denies fever/chills  HEENT- denies hearing loss or sore throat  Eyes- denies eye pain or visual disturbances, denies red eyes  Respiratory- denies cough or SOB  Cardio- denies chest pain or palpitations  GI- denies abdominal pain  Endocrine- denies urinary frequency  Urinary- denies pain with urination  Musculoskeletal- Negative except noted above  Skin- denies rashes or wounds  Neurological- denies dizziness or headache  Psychiatric- denies anxiety or difficulty concentrating    Physical Exam  /79   Pulse 96   Ht 5' 5.5\" (1.664 m)   Wt 98.9 kg (218 lb 0.6 oz)   BMI 35.73 kg/m²     General/Constitutional: No apparent distress: well-nourished and well developed.  Lymphatic: No appreciable lymphadenopathy  Respiratory: Non-labored breathing  Vascular: No edema, swelling or tenderness, except as noted in detailed exam.  Integumentary: No impressive skin lesions present, except as noted in detailed exam.  Psych: Normal mood and affect, oriented to person, place and time.  MSK: normal other than stated in HPI and exam  Gait & balance: no evidence of myelopathic gait, ambulates Independently     Lumbar spine range of motion:  -Forward flexion to 90  -Extension to neutral  -Lateral bend 25 right, 25 left  -Rotation " "25 right, 25 left  There tenderness with palpation along lumbar paraspinal musculature, no midline tenderness     Neurologic:    Lower Extremity Motor Function    Right  Left    Iliopsoas  5/5  5/5    Quadriceps 5/5 5/5   Tibialis anterior  5/5  5/5    EHL  5/5  5/5    Gastroc. muscle  5/5  5/5    Heel rise  5/5  5/5    Toe rise  5/5  5/5      Sensory: light touch is intact to bilateral upper and lower extremities     Reflexes:    Right Left   Patellar 1+ 1+   Achilles 1+ 1+   Babinski neg neg     Other tests:  Straight Leg Raise: negative  Kenny SI: negative  BRADFORD SI: negative  Greater troch: no tenderness   Internal/external hip ROM: intact, no pain   Flexion/extension knee ROM: intact, no pain   Vascular: WWP extremities, 2+DP bilateral      Diagnostic Tests   IMAGING: I have personally reviewed the images and these are my findings:  Lumbar Spine X-rays from 2/2/24 : multi level lumbar spondylosis with loss of disc height, osteophyte formation and facet hypertrophy, asymmetric disc collapse at L3-4 and L4-5 with degenerative scoliosis    Electronic Medical Records were reviewed including PT notes, radiology reports as well as imaging reviewed including a lumbar MRI from 2021 which does demonstrate L3-4 spinal stenosis    Procedures, if performed today     None performed       Portions of the record may have been created with voice recognition software.  Occasional wrong word or \"sound a like\" substitutions may have occurred due to the inherent limitations of voice recognition software.  Read the chart carefully and recognize, using context, where substitutions have occurred.   "

## 2024-02-11 ENCOUNTER — HOSPITAL ENCOUNTER (OUTPATIENT)
Dept: MRI IMAGING | Facility: HOSPITAL | Age: 59
Discharge: HOME | End: 2024-02-11
Attending: ORTHOPAEDIC SURGERY
Payer: COMMERCIAL

## 2024-02-11 DIAGNOSIS — M54.16 LUMBAR RADICULOPATHY: ICD-10-CM

## 2024-02-11 PROCEDURE — 72148 MRI LUMBAR SPINE W/O DYE: CPT

## 2024-02-11 PROCEDURE — G1004 CDSM NDSC: HCPCS

## 2024-02-12 ENCOUNTER — TELEPHONE (OUTPATIENT)
Dept: GASTROENTEROLOGY | Facility: CLINIC | Age: 59
End: 2024-02-12

## 2024-02-12 NOTE — TELEPHONE ENCOUNTER
----- Message from Rosanna Carl sent at 2/9/2024  3:25 PM EST -----  Regarding: FW: Capsule Endoscopy  Contact: [phone]    ----- Message -----  From: Anamaria Rivera  Sent: 2/9/2024   2:22 PM EST  To: [facility]astro[facility]  Subject: Capsule Endoscopy                                I need to reschedule a Capsule Endoscopy and I would like someone to tell me how long I have to go without water. Could someone there get back to me? Thank you.

## 2024-02-12 NOTE — TELEPHONE ENCOUNTER
I called and lvm for the patient to call us back and schedule a capsule endoscopy. I explained that she will be on a clear liquid diet the day before and will not be able to have anything to eat or drink after midnight the night before her procedure.

## 2024-02-14 ENCOUNTER — OFFICE VISIT (OUTPATIENT)
Dept: PSYCHIATRY | Facility: CLINIC | Age: 59
End: 2024-02-14
Payer: COMMERCIAL

## 2024-02-14 DIAGNOSIS — F33.2 SEVERE EPISODE OF RECURRENT MAJOR DEPRESSIVE DISORDER, WITHOUT PSYCHOTIC FEATURES (HCC): Primary | ICD-10-CM

## 2024-02-14 DIAGNOSIS — F41.1 GAD (GENERALIZED ANXIETY DISORDER): ICD-10-CM

## 2024-02-14 DIAGNOSIS — F33.1 MODERATE EPISODE OF RECURRENT MAJOR DEPRESSIVE DISORDER (HCC): ICD-10-CM

## 2024-02-14 PROCEDURE — 90792 PSYCH DIAG EVAL W/MED SRVCS: CPT | Performed by: PHYSICIAN ASSISTANT

## 2024-02-14 RX ORDER — QUETIAPINE FUMARATE 50 MG/1
50 TABLET, FILM COATED ORAL
Start: 2024-02-14

## 2024-02-14 RX ORDER — HYDROXYZINE HYDROCHLORIDE 25 MG/1
25 TABLET, FILM COATED ORAL EVERY 6 HOURS PRN
Qty: 30 TABLET | Refills: 0 | Status: SHIPPED | OUTPATIENT
Start: 2024-02-14

## 2024-02-14 NOTE — PSYCH
"This note was not shared with the patient due to reasonable likelihood of causing patient harm    Visit Time    Visit Start Time: 0945  Visit Stop Time: 1050  Total Visit Duration:  65 minutes    Reason for visit:   Chief Complaint   Patient presents with    Establish Care       HPI     Anamaria is a 59 y.o. female with a history of Anxiety and Depression who presents for psychiatric evaluation due to need to establish care.  Anamaria states that she has been on medications for depression and anxiety since the age of 18.  She was most recently treated by Dr. Peoples until 2022 when she left the practice.  She was diagnosed in the past with treatment resistant major depression and possibly obsessive compulsive disorder.  She was not referred to any other providers and has been on the wait list for [facility] until now.  Her primary care physician has been prescribing her medications.      States that she has episodes of increased anxiety.  She is prescribed as needed hydroxyzine from her PCP which is helpful.  Typically the lopez in the holidays are very challenging for her.  States that she cleans obsessively at this time due to hosting the holidays at her house.  She currently resides in her parents home with her brother.  States that her brother took care of their parents before they passed to the house was left to him.  She has been living together with her brother for over 10 years.  States that generally they get along okay but she is much neater than he is so this can be frustrating for her.  Reports that she has decreased sleep.  States that the Seroquel is helpful and she sleeps about 5 to 6 hours.  States that she has been taking 50 mg due to 100 mg causing some morning lethargy.  She has been maintained on sertraline 200 mg a day.  States that she has been trialed on multiple other antidepressants but sertraline \"seems to work the best \".  Since her anxiety has been increased recently states that she " has been drinking more alcohol.  States that she only drinks beer and recently has been drinking 3/day.  Prior to the past couple of months she typically drink 1 or 2 on occasional weekends.  Denies any other alcohol use such as wine or liquor.  No history of legal issues such as DUI.    Reports that she has been having increased pain due to arthritis and carpal tunnel.  This has been frustrating for her since she is not able to do things physically as she had been.  States that her ability to clean the house has been affected due to her pain.  She is going to be starting physical therapy soon.  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    She has 3 brothers and states that she has a good relationship with her siblings.  Notes that she had a good upbringing and her parents were very supportive.  States that her parents were encouraging for her to continue in treatment and get help for her depression and anxiety.    Denies any current suicidal or homicidal ideation.  No auditory or visual hallucinations.  States that sometimes her anxiety can escalate and cause some paranoia where she thinks people are out to get her.  States that she is typically able to rationalize that this is not true.  Notes that Seroquel has been very helpful for her anxiety and paranoia.  States that when she feels a lack of control her anxiety is perpetuated.  She has no history of manic episodes.  No history of eating disorder.  Notes that her appetite is currently good and she is trying to watch her diet in an effort to watch her weight.    Primary complaints include: anxiety, chronic pain, depression worse, difficulty sleeping, feeling depressed, and increased irritability. Onset of symptoms was gradual starting  many  years ago with  fluctuating  course since that time. Psychosocial Stressors: family, financial, health, and social.        Review Of  Systems:     Mood Anxiety and Depression   Behavior As noted in hpi   Thought Content Noted in HPI   General Sleep Disturbances and Decreased Functioning   Personality Noted in HPI   Other Psych Symptoms Noted in HPI   Constitutional Normal   ENT Normal   Cardiovascular Normal    Respiratory Normal    Gastrointestinal Normal   Genitourinary Normal    Musculoskeletal Arthralgias and Myalgias   Integumentary Normal    Neurological Normal    Endocrine Normal    Other Symptoms Normal        Past Psychiatric History:      Past Inpatient Psychiatric Treatment:   In Patient in 1989 and 1992  Past Outpatient Psychiatric Treatment:    Seen by psychiatry since age 18, most recently treated by Dr. Peoples from 2014 till 2022  She left the practice and she was not referred to anyone so her primary care physician has been prescribing her psychiatric medications until she can be seen today   Past Suicide Attempts:    yes, reported overdose on her father's does a rule in 1990, states that her mother gave her mustard and she threw up, she was never treated in the hospital after this attempt, denies any other history of suicide attempts  Past Violent Behavior:    no  Past Psychiatric Medication Trials:    Multiple medications over the years, was on several tricyclic antidepressants including Pamelor, MAOI inhibitors, fluoxetine caused heartburn, methylphenidate was no help, Serzone, mirtazapine caused excessive lethargy, Abilify, lithium, possibly Tegretol, sertraline, Wellbutrin    Family Psychiatric History:  Father passed away at age 89, had mild depression and took Desyrel and was seen by therapist  He developed skin cancer, had radiation treatment and then passed away and a nursing home after choking episode  Mother passed away at age 76 from lung cancer    States that her brother has depression and takes Trintellix    Paternal uncle and mother's cousin committed suicide  Family History   Problem Relation Age of Onset     Hypertension Mother     Lung cancer Mother     Skin cancer Father     Hypertension Brother     No Known Problems Brother     No Known Problems Brother     Hypertension Maternal Grandmother     Multiple sclerosis Maternal Grandmother     Heart attack Maternal Grandfather     Heart disease Maternal Grandfather     Alcohol abuse Maternal Grandfather     Asthma Paternal Grandmother     Heart disease Paternal Grandfather        Social History:  Never , no children, born in [facility], moved to [facility] at the age of 2 due to her father getting a job as a  at [employer]  Went to grade school and high school in [place]  College at [employer] and graduated from [employer] with a major in English and history  Worked in the medical field as an ophthalmology tech and   Currently employed by [employer] at [employer] for 3 years  Resides with her brother and her parents house  Has 3 brothers she is the third child  Good relationship with her siblings  Currently drinking 3 beers per day,   No nicotine use, no other substance use or abuse, no history of rehab, no marijuana use  2 cups of coffee per day      Education: college graduate  Learning Disabilities:  None noted  Marital history: single  Living arrangement, social support: The patient lives in home with brother.  Occupational History: Employed by [employer]  at orthopedics  Functioning Relationships: good support system.  Other Pertinent History: None   No access to firearms    Social History     Substance and Sexual Activity   Drug Use No       Traumatic History:       Abuse:  Denies any history of abuse  Other Traumatic Events:  Denies    The following portions of the patient's history were reviewed and updated as appropriate: allergies, current medications, past family history, past medical history, past social history, past surgical  history, and problem list.     Social History     Socioeconomic History    Marital status: Single     Spouse name: Not on file    Number of children: Not on file    Years of education: Not on file    Highest education level: Not on file   Occupational History    Occupation: Tech.    Tobacco Use    Smoking status: Former    Smokeless tobacco: Never    Tobacco comments:     teen years   Vaping Use    Vaping status: Never Used   Substance and Sexual Activity    Alcohol use: Yes     Comment: Socially    Drug use: No    Sexual activity: Not Currently     Birth control/protection: Post-menopausal   Other Topics Concern    Not on file   Social History Narrative    Caffeine Intake: 1-2 cups per day    Do you smoke marijuana? Denies    Do you drink alcohol? Socially    Exercise: Occassionally    Domestic violence: No    History of drug/alcohol abuse denies     Social Determinants of Health     Financial Resource Strain: Not on file   Food Insecurity: Not on file   Transportation Needs: Not on file   Physical Activity: Not on file   Stress: Not on file   Social Connections: Not on file   Intimate Partner Violence: Not on file   Housing Stability: Not on file     Social History     Social History Narrative    Caffeine Intake: 1-2 cups per day    Do you smoke marijuana? Denies    Do you drink alcohol? Socially    Exercise: Occassionally    Domestic violence: No    History of drug/alcohol abuse denies       Mental status:  Appearance adequate hygiene and grooming and restless and fidgety   Mood depressed and anxious   Affect affect was constricted   Speech Normal rate and volume   Thought Processes Goal directed   Hallucinations no hallucinations present    Thought Content no delusions   Abnormal Thoughts no suicidal thoughts  and no homicidal thoughts    Orientation  oriented to person and place and time   Remote Memory short term memory intact and long term memory intact   Attention Span concentration intact   Intellect Not  Formally Assessed   Insight Insight intact   Judgement judgment was intact   Muscle Strength Normal gait    Language no difficulty naming common objects   Fund of Knowledge displays adequate knowledge of current events   Pain Chronic pain   Pain Scale Chronic arthritis pain         Assessment/Plan:  Diagnoses and all orders for this visit:    Severe episode of recurrent major depressive disorder, without psychotic features (HCC)    DEISY (generalized anxiety disorder)  -     hydrOXYzine HCL (ATARAX) 25 mg tablet; Take 1 tablet (25 mg total) by mouth every 6 (six) hours as needed for itching or anxiety    Moderate episode of recurrent major depressive disorder (HCC)  -     QUEtiapine (SEROquel) 50 mg tablet; Take 1 tablet (50 mg total) by mouth daily at bedtime         Treatment Recommendations- Risks Benefits    Currently prescribed Wellbutrin  mg every morning and Wellbutrin  mg  Discussed concerns for this exacerbating her anxiety, contribute to insomnia and also lowering seizure threshold    Will continue Wellbutrin  mg every morning and discontinue additional 150 mg    Continue Sertraline 200 mg total per day    Decrease Seroquel to 50 mg at bedtime, states that 100 caused morning sedation    Continue hydroxyzine 25 mg as needed for anxiety    Written instructions given to patient regarding above changes    She is aware of after-hours on-call services and to call our office in between appointments if any questions, concerns  Will follow-up as previously scheduled in 1 month and she will call sooner if any questions or concerns    Immediate Medical/Psychiatric/Psychotherapy Treatments and Any Precautions: Discussed pros and cons of medications with patient and she is in agreement with above treatment plan    Risks, Benefits And Possible Side Effects Of Medications:  Risks, benefits, and possible side effects of medications explained to patient and patient verbalizes understanding    Controlled  Medication Discussion: No records found for controlled prescriptions according to [place] Prescription Drug Monitoring Program.     Treatment plan not completed today due to time constraints

## 2024-02-15 ENCOUNTER — TELEPHONE (OUTPATIENT)
Age: 59
End: 2024-02-15

## 2024-02-15 NOTE — TELEPHONE ENCOUNTER
Called patient to move her appointment out until after her endoscopy appointment.  Scheduled her for 03/07 @ 8:40 and asked that if she can't make it, please call [facility] asap to reschedule.

## 2024-02-22 DIAGNOSIS — K21.9 GASTROESOPHAGEAL REFLUX DISEASE, UNSPECIFIED WHETHER ESOPHAGITIS PRESENT: ICD-10-CM

## 2024-02-23 RX ORDER — OMEPRAZOLE 40 MG/1
40 CAPSULE, DELAYED RELEASE ORAL DAILY
Qty: 90 CAPSULE | Refills: 0 | Status: SHIPPED | OUTPATIENT
Start: 2024-02-23

## 2024-02-27 ENCOUNTER — OFFICE VISIT (OUTPATIENT)
Dept: OBGYN CLINIC | Facility: HOSPITAL | Age: 59
End: 2024-02-27
Payer: COMMERCIAL

## 2024-02-27 VITALS
HEART RATE: 92 BPM | DIASTOLIC BLOOD PRESSURE: 84 MMHG | HEIGHT: 66 IN | SYSTOLIC BLOOD PRESSURE: 127 MMHG | WEIGHT: 218.03 LBS | BODY MASS INDEX: 35.04 KG/M2

## 2024-02-27 DIAGNOSIS — M54.16 LUMBAR RADICULOPATHY: Primary | ICD-10-CM

## 2024-02-27 PROCEDURE — 99213 OFFICE O/P EST LOW 20 MIN: CPT | Performed by: ORTHOPAEDIC SURGERY

## 2024-02-27 NOTE — PROGRESS NOTES
Assessment & Plan/Medical Decision Makin y.o. female with Back Pain and Neurogenic Claudication and imaging findings most notable for lumbar spondylosis         The clinical, physical and imaging findings were reviewed with the patient.  Anamaria  has a constellation of findings consistent with Neurogenic Claudication in the setting of lumbar degenerative disease, degenerative lumbar scoliosis.    We discussed the treatment options including physical therapy, at home exercises, activity modifications, chiropractic medicine, oral medications, interventional spine procedures.  At this time recommend continued conservative treatments.  Anamaria was previously referred to physical therapy and will be initiating treatment next week.  She did have a slight delay due to a household emergency requiring displacement from her home.  We did discuss the role of surgical intervention considering she has tried conservative treatments in the past, however we agreed that reinitiation of conservative treatments with possible interventional spine procedure with physical therapy is the best treatment option at this time.  We will also obtain DEXA scan to evaluate bone density considering surgical intervention would likely require multilevel lumbar fusion surgery.    Patient instructed to return to office/ER sooner if symptoms are not improving, getting worse, or new worrisome/neurologic symptoms arise.  Patient will follow up in approx. 3 months for re-evaluation after further conservative treatments.       Subjective:      Chief Complaint: Back Pain    HPI:  Anamaria Rivera is a 59 y.o. female presenting for initial visit with chief complaint of back pain.  Pain began a few years ago.  Patient has decreased standing tolerance with increased pain.  She is unable to walk more than 100 feet.  She has a positive shopping cart sign.  Pain radiates into her bilateral gluteal regions and bilateral legs.  Symptoms are improved with  sitting.  Has subjective weakness and concerns for falls given her weakness.  Denies any neva trauma. Denies fever or chills, no night sweats. Denies any bladder or bowel changes.      Conservative therapy includes the following:   Medications: IBU and Tylenol PRN     Injections: Previously in 2021      Physical Therapy: PT in 2022  Chiropractic Medicine: has not attempted  Accupunture/Massage Therapy: has not attempted   These therapeutic modalities were ineffective at providing sustained pain relief/functional improvement.     Nicotine dependent: No  Occupation: [employer]  Living situation: Lives with family   ADLs: patient is able to perform       Update 2/27/24  Patient returns for MRI review.  Patient is here for follow-up.  She continues with back pain and left greater than right radicular pain.  She is unable to ambulate any significant distances due to the pain and claudication.  She was unfortunately unable to initiate physical therapy since last visit due to hospital emergency requiring evacuation.  She notes she will be starting PT next week.    Objective:     Family History   Problem Relation Age of Onset    Hypertension Mother     Lung cancer Mother     Skin cancer Father     Hypertension Brother     No Known Problems Brother     No Known Problems Brother     Hypertension Maternal Grandmother     Multiple sclerosis Maternal Grandmother     Heart attack Maternal Grandfather     Heart disease Maternal Grandfather     Alcohol abuse Maternal Grandfather     Asthma Paternal Grandmother     Heart disease Paternal Grandfather        Past Medical History:   Diagnosis Date    ADHD (attention deficit hyperactivity disorder)     Anxiety     Arthritis     Depression     Fibromyalgia, primary     GERD (gastroesophageal reflux disease)     History of stomach ulcers     Hypertension     Panic attack     Papanicolaou smear 2020    Psychiatric disorder        Current Outpatient Medications   Medication Sig  Dispense Refill    albuterol (PROVENTIL HFA,VENTOLIN HFA) 90 mcg/act inhaler Inhale 2 puffs every 6 (six) hours as needed for wheezing or shortness of breath 8 g 3    bisacodyl (DULCOLAX) 5 mg EC tablet Take 2 tablets (10 mg total) by mouth once for 1 dose (Patient not taking: Reported on 10/16/2023) 2 tablet 0    buPROPion (WELLBUTRIN XL) 300 mg 24 hr tablet Take 1 tablet (300 mg total) by mouth daily 90 tablet 0    cholecalciferol (VITAMIN D3) 1,000 units tablet Take 1,000 Units by mouth daily      ciclopirox (PENLAC) 8 % solution Apply topically daily at bedtime 6.6 mL 0    ferrous sulfate 324 (65 Fe) mg Take 1 tablet (324 mg total) by mouth daily before breakfast (Patient not taking: Reported on 10/31/2023) 90 tablet 0    hydrochlorothiazide (HYDRODIURIL) 12.5 mg tablet Take 1 tablet (12.5 mg total) by mouth daily 90 tablet 0    hydrOXYzine HCL (ATARAX) 25 mg tablet Take 1 tablet (25 mg total) by mouth every 6 (six) hours as needed for itching or anxiety 30 tablet 0    losartan (COZAAR) 100 MG tablet Take 1 tablet (100 mg total) by mouth daily 90 tablet 0    Multiple Vitamin (multivitamin) tablet Take 1 tablet by mouth daily      omeprazole (PriLOSEC) 40 MG capsule Take 1 capsule (40 mg total) by mouth daily Take before a meal 90 capsule 0    QUEtiapine (SEROquel) 50 mg tablet Take 1 tablet (50 mg total) by mouth daily at bedtime      sertraline (ZOLOFT) 100 mg tablet Take 2 tablets (200 mg total) by mouth daily 180 tablet 0    SF 5000 Plus 1.1 % CREA Take by mouth 2 (two) times a day 51 g 0    terbinafine (LamISIL) 250 mg tablet        No current facility-administered medications for this visit.       Past Surgical History:   Procedure Laterality Date    BREAST CYST ASPIRATION Left 2014    HYSTEROSCOPY  2015    MAMMO (HISTORICAL) Bilateral 03/05/2020    [facility] BI-RADS 1 Negative Scattered areas fibroglandular density; 25% to 50% glandular breast tissue       Social History     Socioeconomic History    Marital  "status: Single     Spouse name: Not on file    Number of children: Not on file    Years of education: Not on file    Highest education level: Not on file   Occupational History    Occupation: Tech.    Tobacco Use    Smoking status: Former    Smokeless tobacco: Never    Tobacco comments:     teen years   Vaping Use    Vaping status: Never Used   Substance and Sexual Activity    Alcohol use: Yes     Comment: Socially    Drug use: No    Sexual activity: Not Currently     Birth control/protection: Post-menopausal   Other Topics Concern    Not on file   Social History Narrative    Caffeine Intake: 1-2 cups per day    Do you smoke marijuana? Denies    Do you drink alcohol? Socially    Exercise: Occassionally    Domestic violence: No    History of drug/alcohol abuse denies     Social Determinants of Health     Financial Resource Strain: Not on file   Food Insecurity: Not on file   Transportation Needs: Not on file   Physical Activity: Not on file   Stress: Not on file   Social Connections: Not on file   Intimate Partner Violence: Not on file   Housing Stability: Not on file       Allergies   Allergen Reactions    Latex Rash       Review of Systems  General- denies fever/chills  HEENT- denies hearing loss or sore throat  Eyes- denies eye pain or visual disturbances, denies red eyes  Respiratory- denies cough or SOB  Cardio- denies chest pain or palpitations  GI- denies abdominal pain  Endocrine- denies urinary frequency  Urinary- denies pain with urination  Musculoskeletal- Negative except noted above  Skin- denies rashes or wounds  Neurological- denies dizziness or headache  Psychiatric- denies anxiety or difficulty concentrating    Physical Exam  /84   Pulse 92   Ht 5' 5.5\" (1.664 m)   Wt 98.9 kg (218 lb 0.6 oz)   BMI 35.73 kg/m²     General/Constitutional: No apparent distress: well-nourished and well developed.  Lymphatic: No appreciable lymphadenopathy  Respiratory: Non-labored breathing  Vascular: No edema, " swelling or tenderness, except as noted in detailed exam.  Integumentary: No impressive skin lesions present, except as noted in detailed exam.  Psych: Normal mood and affect, oriented to person, place and time.  MSK: normal other than stated in HPI and exam  Gait & balance: no evidence of myelopathic gait, ambulates Independently     Lumbar spine range of motion:  -Forward flexion to 90  -Extension to neutral  -Lateral bend 25 right, 25 left  -Rotation 25 right, 25 left  There tenderness with palpation along lumbar paraspinal musculature, no midline tenderness     Neurologic:    Lower Extremity Motor Function    Right  Left    Iliopsoas  5/5  5/5    Quadriceps 5/5 5/5   Tibialis anterior  5/5  5/5    EHL  5/5  5/5    Gastroc. muscle  5/5  5/5    Heel rise  5/5  5/5    Toe rise  5/5  5/5      Sensory: light touch is intact to bilateral upper and lower extremities     Reflexes:    Right Left   Patellar 1+ 1+   Achilles 1+ 1+   Babinski neg neg     Other tests:  Straight Leg Raise: negative  Kenny SI: negative  BRADFORD SI: negative  Greater troch: no tenderness   Internal/external hip ROM: intact, no pain   Flexion/extension knee ROM: intact, no pain   Vascular: WWP extremities, 2+DP bilateral      Diagnostic Tests   IMAGING: I have personally reviewed the images and these are my findings:  Lumbar Spine X-rays from 2/2/24 : multi level lumbar spondylosis with loss of disc height, osteophyte formation and facet hypertrophy, asymmetric disc collapse at L3-4 and L4-5 with degenerative scoliosis, L2-3 spondylolisthesis    Lumbar MRI from 2/11/2024: Multilevel lumbar disc desiccation, with left greater than right L5-S1 foraminal stenosis, mild/moderate stenosis L4-5, severe spinal stenosis at L3-4 with right-sided L3-4 foraminal stenosis and reduction of L to 3 spondylolisthesis on supine images with moderate bilateral lateral recess stenosis    Electronic Medical Records were reviewed including PT notes, radiology reports  "as well as imaging reviewed including a lumbar MRI from 2021 which does demonstrate L3-4 spinal stenosis    Procedures, if performed today     None performed       Portions of the record may have been created with voice recognition software.  Occasional wrong word or \"sound a like\" substitutions may have occurred due to the inherent limitations of voice recognition software.  Read the chart carefully and recognize, using context, where substitutions have occurred.   "

## 2024-02-29 ENCOUNTER — PROCEDURE VISIT (OUTPATIENT)
Dept: GASTROENTEROLOGY | Facility: CLINIC | Age: 59
End: 2024-02-29

## 2024-02-29 DIAGNOSIS — D50.9 IRON DEFICIENCY ANEMIA, UNSPECIFIED IRON DEFICIENCY ANEMIA TYPE: Primary | ICD-10-CM

## 2024-02-29 DIAGNOSIS — D50.8 OTHER IRON DEFICIENCY ANEMIA: ICD-10-CM

## 2024-02-29 NOTE — PROGRESS NOTES
Patient here for capsule endoscopy.  Tolerated bowel prep.  Remained NPO after midnight.  Reviewed capsule equipment and instructions.  Patient verbalized understanding.  Patient to return at 4:30 PM.

## 2024-03-04 ENCOUNTER — EVALUATION (OUTPATIENT)
Dept: PHYSICAL THERAPY | Facility: CLINIC | Age: 59
End: 2024-03-04
Payer: COMMERCIAL

## 2024-03-04 DIAGNOSIS — M54.16 LUMBAR RADICULOPATHY: ICD-10-CM

## 2024-03-04 PROCEDURE — 97161 PT EVAL LOW COMPLEX 20 MIN: CPT

## 2024-03-04 PROCEDURE — 97110 THERAPEUTIC EXERCISES: CPT

## 2024-03-04 NOTE — PROGRESS NOTES
PT Evaluation     Today's date: 3/4/2024  Patient name: Anamaria Rivera  : 1965  MRN: [id number]  Referring provider: Chencho Oshea MD  Dx:   Encounter Diagnosis     ICD-10-CM    1. Lumbar radiculopathy  M54.16 Ambulatory Referral to Physical Therapy                     Assessment  Assessment details: Anamaria Rivera is a pleasant 59 y.o. female who presents with chronic bilateral LBP with radiating pain into bilateral lower extremities (L>R). She presents with painful lumbar ROM in all planes that is mildly limited, mild BLE strength limitations (L>R), and hip ER/IR ROM WNLs, normal DTR's. These signs and symptoms are limiting her with prolonged sitting, prolonged standing and walking, exercising, grocery shopping, walking after prolonged sitting. These signs and symptoms are consistent with Lumbar radiculopathy and spinal stenosis. She will benefit from skilled PT to address impairments and return to maximum level of function. At this time no referral is necessary based on examination.         Impairments: abnormal gait, abnormal or restricted ROM, activity intolerance, impaired balance, impaired physical strength, lacks appropriate home exercise program and pain with function  Prognosis details: Positive prognostic indicators include positive attitude toward recovery, motivated to improve, good understanding of condition, realistic expectations.  Negative prognostic indicators include chronicity of symptoms, high symptom irritability.    Goals  STG to be achieved in 6 weeks  Patient will have improved lumbar flexion and extension ROM to WNLs with minimal to no pain  Patient will have improved gross BLE strength to 5/5  Patient will have less pain and difficulty with walking to her car at the end of the work day  Patient will be independent with HEP.    LTG to be achieved in 12 weeks  Patient will be able to do exercise with modifications to limit symptom reproduction  Patient will be able to stand for  longer periods of time  Patient will walk in the store with less pain/difficulty      Plan  Patient would benefit from: skilled physical therapy  Planned modality interventions: cryotherapy and thermotherapy: hydrocollator packs  Planned therapy interventions: balance, home exercise program, gait training, functional ROM exercises, body mechanics training, joint mobilization, manual therapy, neuromuscular re-education, therapeutic exercise, therapeutic activities, stretching, strengthening and flexibility  Frequency: 2x/week tapering to 1x/week over the next 12 weeks.  Duration in weeks: 12  Plan of Care beginning date: 3/4/2024  Plan of Care expiration date: 2024  Treatment plan discussed with: patient      Subjective Evaluation    History of Present Illness  Date of onset: 3/4/2015  Mechanism of injury: Patient reports history of bilateral LBP with bilateral leg pain and numbness for 9 years. She reports her legs bother her more than her low back pain (L>R). She has pain and numbness in her legs. 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 Reports pain is worse at the end of the day after prolonged sitting which makes walking to her car at the end of the day difficult. Difficulty with standing in place and walking long distances. Unable to go out shopping - she uses the cart in the grocery store and notes she leans forward on the cart. She has temporary relief in her legs when doing a standing hamstring stretch in a flexed posture.     She had injections years ago which did not provide relief at that time. Dr. Oshea is recommending she try them again. He did discuss surgical option if no relief with conservative treatment.     Patient Goals  Patient goal: Patient would like to be able to walk more, exercise more, and have periods of time without pain.  Pain  Current pain ratin  At best pain ratin  At worst pain rating: 10  Quality: radiating      Diagnostic  "Tests    [facility] comments: X-ray results per chart review:   \"Moderate multilevel spondylosis and facet disease worse in the lower lumbar spine.  Mild anterolisthesis of L2 on L3.\"    MRI results per chart review:   \"1. Advanced spondylotic changes of the lumbar spine including multifactorial moderate to severe central canal stenosis at the L2-3 and L3-4 levels. There is also multifactorial moderate to severe left L5-S1 foraminal stenosis concerning for impingement   of the exiting left L5 nerve root.  2. Mild levoscoliosis.\"  Treatments  Previous treatment: injection treatment and physical therapy      Objective     Neurological Testing     Reflexes   Left   Patellar (L4): normal (2+)  Achilles (S1): normal (2+)    Right   Patellar (L4): normal (2+)  Achilles (S1): normal (2+)    Active Range of Motion     Lumbar   Flexion: Active lumbar flexion: LBP.  WFL  Extension: Active lumbar extension: LBP.  Restriction level: minimal    Additional Active Range of Motion Details  Bilateral sideglide: WNLs, pain in low back and bilateral hips    Passive Range of Motion   Left Hip   External rotation (90/90): WFL  Internal rotation (90/90): WFL    Right Hip   External rotation (90/90): WFL  Internal rotation (90/90): WFL    Additional Passive Range of Motion Details  Patient reported PROM ER \"felt good\"    Strength/Myotome Testing     Left Hip   Planes of Motion   Flexion: 4+    Right Hip   Planes of Motion   Flexion: 4+    Left Knee   Flexion: 4-  Extension: 5    Right Knee   Flexion: 4  Extension: 5    Left Ankle/Foot   Dorsiflexion: 5  Inversion: 5  Eversion: 5    Right Ankle/Foot   Dorsiflexion: 5  Inversion: 5  Eversion: 5    Tests     Lumbar     Left   Negative crossed SLR and passive SLR.     Right   Negative crossed SLR and passive SLR.     Ambulation     Observational Gait   Gait: antalgic              Precautions: HTN  Functional Limitations: prolonged sitting, standing, walking, grocery shopping, " exercising  Impairments: lumbar ROM in all planes (reproduces LBP), BLE strength  Trackway Code: [id number]   POC expiration: 5/27/2024      Manuals 3/4            Lumbar STM nv            Sidelying lumbar flexion mob nv                                      Neuro Re-Ed                                                                                                        Ther Ex             bike nv            Seated lumbar flex HEP            Figure 4 stretch supine or sitting HEP            Seated HS stretch HEP            LTR HEP            clamshell nv            Sidelying hip abd Nv?            squats nv            Seated leg ext machine             Seated leg curl machine             Leg press nv            Fwd lunge                          Education on UE strength exercises she can do nv            Ther Activity             Fwd Step ups nv            Lat step ups             Gait Training                                       Modalities

## 2024-03-09 ENCOUNTER — APPOINTMENT (OUTPATIENT)
Dept: LAB | Facility: HOSPITAL | Age: 59
End: 2024-03-09
Payer: COMMERCIAL

## 2024-03-09 DIAGNOSIS — R73.03 PREDIABETES: ICD-10-CM

## 2024-03-09 DIAGNOSIS — D50.9 IRON DEFICIENCY ANEMIA, UNSPECIFIED IRON DEFICIENCY ANEMIA TYPE: ICD-10-CM

## 2024-03-09 LAB
BASOPHILS # BLD AUTO: 0.04 THOUSANDS/ÂΜL (ref 0–0.1)
BASOPHILS NFR BLD AUTO: 1 % (ref 0–1)
EOSINOPHIL # BLD AUTO: 0.28 THOUSAND/ÂΜL (ref 0–0.61)
EOSINOPHIL NFR BLD AUTO: 5 % (ref 0–6)
ERYTHROCYTE [DISTWIDTH] IN BLOOD BY AUTOMATED COUNT: 12.7 % (ref 11.6–15.1)
EST. AVERAGE GLUCOSE BLD GHB EST-MCNC: 123 MG/DL
FERRITIN SERPL-MCNC: 46 NG/ML (ref 11–307)
HBA1C MFR BLD: 5.9 %
HCT VFR BLD AUTO: 41.4 % (ref 34.8–46.1)
HGB BLD-MCNC: 13.5 G/DL (ref 11.5–15.4)
IMM GRANULOCYTES # BLD AUTO: 0.02 THOUSAND/UL (ref 0–0.2)
IMM GRANULOCYTES NFR BLD AUTO: 0 % (ref 0–2)
IRON SATN MFR SERPL: 53 % (ref 15–50)
IRON SERPL-MCNC: 123 UG/DL (ref 50–212)
LYMPHOCYTES # BLD AUTO: 1.17 THOUSANDS/ÂΜL (ref 0.6–4.47)
LYMPHOCYTES NFR BLD AUTO: 19 % (ref 14–44)
MCH RBC QN AUTO: 29.3 PG (ref 26.8–34.3)
MCHC RBC AUTO-ENTMCNC: 32.6 G/DL (ref 31.4–37.4)
MCV RBC AUTO: 90 FL (ref 82–98)
MONOCYTES # BLD AUTO: 0.7 THOUSAND/ÂΜL (ref 0.17–1.22)
MONOCYTES NFR BLD AUTO: 11 % (ref 4–12)
NEUTROPHILS # BLD AUTO: 3.96 THOUSANDS/ÂΜL (ref 1.85–7.62)
NEUTS SEG NFR BLD AUTO: 64 % (ref 43–75)
NRBC BLD AUTO-RTO: 0 /100 WBCS
PLATELET # BLD AUTO: 253 THOUSANDS/UL (ref 149–390)
PMV BLD AUTO: 10.5 FL (ref 8.9–12.7)
RBC # BLD AUTO: 4.6 MILLION/UL (ref 3.81–5.12)
TIBC SERPL-MCNC: 232 UG/DL (ref 250–450)
UIBC SERPL-MCNC: 109 UG/DL (ref 155–355)
WBC # BLD AUTO: 6.17 THOUSAND/UL (ref 4.31–10.16)

## 2024-03-09 PROCEDURE — 82728 ASSAY OF FERRITIN: CPT

## 2024-03-09 PROCEDURE — 36415 COLL VENOUS BLD VENIPUNCTURE: CPT

## 2024-03-09 PROCEDURE — 83550 IRON BINDING TEST: CPT

## 2024-03-09 PROCEDURE — 85025 COMPLETE CBC W/AUTO DIFF WBC: CPT

## 2024-03-09 PROCEDURE — 83036 HEMOGLOBIN GLYCOSYLATED A1C: CPT

## 2024-03-09 PROCEDURE — 83540 ASSAY OF IRON: CPT

## 2024-03-10 ENCOUNTER — PATIENT MESSAGE (OUTPATIENT)
Dept: GASTROENTEROLOGY | Facility: CLINIC | Age: 59
End: 2024-03-10

## 2024-03-11 ENCOUNTER — OFFICE VISIT (OUTPATIENT)
Dept: PSYCHIATRY | Facility: CLINIC | Age: 59
End: 2024-03-11
Payer: COMMERCIAL

## 2024-03-11 DIAGNOSIS — F41.1 GAD (GENERALIZED ANXIETY DISORDER): ICD-10-CM

## 2024-03-11 DIAGNOSIS — F33.1 MODERATE EPISODE OF RECURRENT MAJOR DEPRESSIVE DISORDER (HCC): Primary | ICD-10-CM

## 2024-03-11 PROCEDURE — 99214 OFFICE O/P EST MOD 30 MIN: CPT | Performed by: PHYSICIAN ASSISTANT

## 2024-03-11 RX ORDER — QUETIAPINE FUMARATE 50 MG/1
TABLET, FILM COATED ORAL
Qty: 135 TABLET | Refills: 1 | Status: SHIPPED | OUTPATIENT
Start: 2024-03-11

## 2024-03-11 RX ORDER — BUPROPION HYDROCHLORIDE 150 MG/1
150 TABLET ORAL DAILY
Qty: 90 TABLET | Refills: 1 | Status: SHIPPED | OUTPATIENT
Start: 2024-03-11

## 2024-03-11 NOTE — PSYCH
"Start 905  End 940  This note was not shared with the patient due to reasonable likelihood of causing patient harm      PROGRESS NOTE        [facility]      Name and Date of Birth:  Anamraia Rivera 59 y.o. 1965    Date of Visit: 03/11/24    SUBJECTIVE:  Anamaria was seen for follow-up of anxiety and depression and for medication management.  At her initial appointment last month Wellbutrin was reduced from 450 mg total per day to 300 mg total per day.  Quetiapine was reduced to 50 mg at bedtime due to 100 mg causing morning sedation.  She was maintained on sertraline 200 mg daily.  Unfortunately she has been having increased external stressors.  States that in the home she shares with her brother, the furnace had a malfunction and cause excessive so throughout the house.  As a result for the past 3 weeks they have been living in a hotel.  They are unable to return yet and house is being cleaned professionally.  Despite external stressors she appears to be managing.  She continues to work full-time for [employer].  She states that she has continued to \"drink more than I should\" but no more than 2 to 3/day.  She has been working on sleep hygiene.  States that she stays awake too late into the night and then feels tired in the morning.  Since taking the 50 mg of Seroquel she does not feel lethargic the next morning but is still only sleeping about 5 hours.  No suicidal or homicidal ideation.  No psychotic signs or symptoms.  Has a good support system of her family and friends.  Taking medications as prescribed.  Medications reviewed and updated.  Had capsule endoscopy and is awaiting results of that.      She denies suicidal ideation, intent or plan at present, has no suicidal ideation, intent or plan at present.    She denies any auditory hallucinations and visual hallucinations, denies any other delusional thinking, denies any delusional " thinking.    She denies any side effects from medications  .  HPI ROS Appetite Changes and Sleep: normal appetite, normal sleep    Review Of Systems:      Constitutional Negative   ENT Negative   Cardiovascular Negative   Respiratory Negative   Gastrointestinal Negative   Genitourinary Negative   Musculoskeletal Negative   Integumentary Negative   Neurological Negative   Endocrine Negative   Other Symptoms Negative and None       Laboratory Results: No results found for this or any previous visit.    Substance Abuse History:    Social History     Substance and Sexual Activity   Drug Use No       Family Psychiatric History:     Family History   Problem Relation Age of Onset    Hypertension Mother     Lung cancer Mother     Skin cancer Father     Hypertension Brother     No Known Problems Brother     No Known Problems Brother     Hypertension Maternal Grandmother     Multiple sclerosis Maternal Grandmother     Heart attack Maternal Grandfather     Heart disease Maternal Grandfather     Alcohol abuse Maternal Grandfather     Asthma Paternal Grandmother     Heart disease Paternal Grandfather        The following portions of the patient's history were reviewed and updated as appropriate: past family history, past medical history, past social history, past surgical history and problem list.    Social History     Socioeconomic History    Marital status: Single     Spouse name: Not on file    Number of children: Not on file    Years of education: Not on file    Highest education level: Not on file   Occupational History    Occupation: Tech.    Tobacco Use    Smoking status: Former    Smokeless tobacco: Never    Tobacco comments:     teen years   Vaping Use    Vaping status: Never Used   Substance and Sexual Activity    Alcohol use: Yes     Comment: Socially    Drug use: No    Sexual activity: Not Currently     Birth control/protection: Post-menopausal   Other Topics Concern    Not on file   Social History Narrative     Caffeine Intake: 1-2 cups per day    Do you smoke marijuana? Denies    Do you drink alcohol? Socially    Exercise: Occassionally    Domestic violence: No    History of drug/alcohol abuse denies     Social Determinants of Health     Financial Resource Strain: Not on file   Food Insecurity: Not on file   Transportation Needs: Not on file   Physical Activity: Not on file   Stress: Not on file   Social Connections: Not on file   Intimate Partner Violence: Not on file   Housing Stability: Not on file     Social History     Social History Narrative    Caffeine Intake: 1-2 cups per day    Do you smoke marijuana? Denies    Do you drink alcohol? Socially    Exercise: Occassionally    Domestic violence: No    History of drug/alcohol abuse denies        Social History       Tobacco History       Smoking Status  Former      Smokeless Tobacco Use  Never      Tobacco Comments  teen years              Alcohol History       Alcohol Use Status  Yes Comment  Socially              Drug Use       Drug Use Status  No              Sexual Activity       Sexually Active  Not Currently Birth Control/Protection  Post-menopausal              Activities of Daily Living    Not Asked                       OBJECTIVE:     Mental Status Evaluation:    Appearance age appropriate, casually dressed   Behavior Calm, cooperative, slightly decreased eye contact   Speech normal volume, normal pitch   Mood Mildly anxious   Affect Congruent   Thought Processes logical   Associations intact associations   Thought Content normal   Perceptual Disturbances: none   Abnormal Thoughts  Risk Potential Suicidal ideation - None  Homicidal ideation - None  Potential for aggression - No   Orientation oriented to person, place, time/date and situation   Memory recent and remote memory grossly intact   Cosciousness alert and awake   Attention Span attention span and concentration are age appropriate   Intellect Not formally assessed   Insight age appropriate    Judgement  good    Muscle Strength and  Gait muscle strength and tone were normal   Language no difficulty naming common objects   Fund of Knowledge displays adequate knowledge of current events   Pain none   Pain Scale 0       Assessment/Plan:       Diagnoses and all orders for this visit:    Moderate episode of recurrent major depressive disorder (HCC)  -     buPROPion (WELLBUTRIN XL) 150 mg 24 hr tablet; Take 1 tablet (150 mg total) by mouth daily  -     QUEtiapine (SEROquel) 50 mg tablet; 1 to 1 1/2 po q hs    DEISY (generalized anxiety disorder)          Treatment Recommendations/Precautions:  Wellbutrin  mg every morning, decreased to 150 mg daily due to concerns for this exacerbating her anxiety, contribute to insomnia and also decreasing seizure threshold  She verbalized understanding and agreement with plan    Continue sertraline 200 mg daily and     Seroquel 50 mg mg nightly, discussed that she may take 75 mg at bedtime    hydroxyzine as needed    We will follow-up in 2 months and she will call me sooner if any questions or concerns    Risks/Benefits      Risks, Benefits And Possible Side Effects Of Medications:    Risks, benefits, and possible side effects of medications explained to patient and patient verbalizes understanding and agreement for treatment.    Controlled Medication Discussion:     Not applicable    Psychotherapy Provided:     Individual psychotherapy provided: No

## 2024-03-11 NOTE — BH TREATMENT PLAN
"TREATMENT PLAN (Medication Management Only)        [facility]    Name and Date of Birth:  Anamaria Rivera 59 y.o. 1965  Date of Treatment Plan: March 11, 2024  Diagnosis/Diagnoses:    1. Severe episode of recurrent major depressive disorder, without psychotic features (HCC)    2. DEISY (generalized anxiety disorder)      Strengths/Personal Resources for Self-Care: supportive family, supportive friends.  Area/Areas of need (in own words): \"sleep and go to bed earlier\"  1. Long Term Goal: improve control ofsleep.  Target Date:3 months - 6/11/2024  Person/Persons responsible for completion of goal: Anamaria  2.  Short Term Objective (s) - How will we reach this goal?:   A. Provider new recommended medication/dosage changes and/or continue medication(s): continue current medications as prescribed.  B. N/A.  C. N/A.  Target Date: 3months  Person/Persons Responsible for Completion of Goal: Anamaria  Progress Towards Goals: stable  Treatment Modality: medication management every 3 months  Review due 180 days from date of this plan: 6 months - 9/11/2024  Expected length of service: maintenance  My Physician/PA/NP and I have developed this plan together and I agree to work on the goals and objectives. I understand the treatment goals that were developed for my treatment.      "

## 2024-03-12 ENCOUNTER — OFFICE VISIT (OUTPATIENT)
Dept: PHYSICAL THERAPY | Facility: CLINIC | Age: 59
End: 2024-03-12
Payer: COMMERCIAL

## 2024-03-12 ENCOUNTER — OFFICE VISIT (OUTPATIENT)
Age: 59
End: 2024-03-12
Payer: COMMERCIAL

## 2024-03-12 VITALS
WEIGHT: 222 LBS | SYSTOLIC BLOOD PRESSURE: 119 MMHG | HEIGHT: 66 IN | DIASTOLIC BLOOD PRESSURE: 79 MMHG | BODY MASS INDEX: 35.68 KG/M2 | HEART RATE: 86 BPM | OXYGEN SATURATION: 99 % | TEMPERATURE: 97.9 F

## 2024-03-12 DIAGNOSIS — D50.9 MICROCYTIC ANEMIA: ICD-10-CM

## 2024-03-12 DIAGNOSIS — M54.16 LUMBAR RADICULOPATHY: Primary | ICD-10-CM

## 2024-03-12 DIAGNOSIS — D50.9 IRON DEFICIENCY ANEMIA, UNSPECIFIED IRON DEFICIENCY ANEMIA TYPE: Primary | ICD-10-CM

## 2024-03-12 PROCEDURE — 97140 MANUAL THERAPY 1/> REGIONS: CPT

## 2024-03-12 PROCEDURE — 97110 THERAPEUTIC EXERCISES: CPT

## 2024-03-12 PROCEDURE — 99213 OFFICE O/P EST LOW 20 MIN: CPT | Performed by: INTERNAL MEDICINE

## 2024-03-12 NOTE — PROGRESS NOTES
"Daily Note     Today's date: 3/12/2024  Patient name: Anamaria Rivera  : 1965  MRN: [id number]  Referring provider: Chencho Oshea MD  Dx:   Encounter Diagnosis     ICD-10-CM    1. Lumbar radiculopathy  M54.16                      Subjective:  Patient states she is in pain today. She notes most of her pain is in left hip.     Objective: See treatment diary below      Assessment: Tolerated treatment fair. Initiated POC as able today. Vcs provided for correct sequencing with TE. No increase in pain was noted throughout session. Good response to manuals.  Encouraged use of MHP. Patient demonstrated fatigue post treatment and would benefit from continued PT      Plan: Continue per plan of care.      Precautions: HTN  Functional Limitations: prolonged sitting, standing, walking, grocery shopping, exercising  Impairments: lumbar ROM in all planes (reproduces LBP), BLE strength  [facility] Code: [email or web address]   POC expiration: 2024      Manuals 3/4 3/12            Lumbar STM nv RA            Sidelying lumbar flexion mob nv                                      Neuro Re-Ed                                                                                                        Ther Ex             bike nv 5 min            Seated lumbar flex HEP            Figure 4 stretch supine or sitting HEP 15\"x4           Seated HS stretch HEP            Piriformis stretch  15\"x 4           LTR HEP            clamshell nv OTB 2x10           Sidelying hip abd Nv?            squats nv 2x10           Seated leg ext machine             Seated leg curl machine             Leg press nv 65# 2x10           Fwd lunge                          Education on UE strength exercises she can do nv            Ther Activity             Fwd Step ups nv 6\" x10 b/l              Lat step ups             Gait Training                                       Modalities               8 min post MHP                             "

## 2024-03-12 NOTE — PROGRESS NOTES
[facility]ATOLOG[facility]  [address]  [facility] [address]  [facility] [id number] [phone]     Date of Visit: 3/12/2024  Name: Anamaria Rivera   YOB: 1965         Assessment/Plan  This is a 59-year-old lady who presented with severe iron deficiency anemia. From ? frequent blood transfusions. Now s/p Feraheme 1020mg on 10/31/23.   We reviewed the most recent labs from March 9, 2024 -hemoglobin is normal at 13.5, with normal RBC indicis.  Iron studies are normal with a trend sat of 53%, iron 123 and ferritin 46. No further IV/PO iron needed at this time.   Advised to stop donating blood for now.    Upper and lower Gi scopes from July 2023- negative  Capsule endoscopy 2/29/24 - results pending - f/u with GI office.  Recheck labs in 3 months and 6 months. RTC in 6 mo      Patient has been strongly urged to call with any questions or concerns.   I spent 20 minutes in chart review, face to face counseling, coordination of care, and documentation      HISTORY OF PRESENT ILLNESS:   Anamaria Rivera is a 59 y.o. female  who had been referred for her anemia.   Patient states that she has had anemia 4 to 5 years ago, but has never been told to take iron supplements.  More recently, she has noticed worsening fatigue and shortness of breath on exertion. She had been donating blood q3 months prior to this.     She denied any bleeding, including blood in the urine, stools, dark stools.  She has a history of uterine fibroids, and underwent a hysteroscopy and ablation in the past.  She has been menopausal since and has not had any vaginal bleeding.    CBC and iron studies from May and September2023 are as follows.  Hemoglobin of 10.5/11.5.  Normal platelet counts.  Normal WBC.   Ferritin 3/5.  Iron levels 34/52.  Iron saturation 10% / 14%.  TIBC 351/364. Hb in Feb 2023 was 11.3 and previously within normal limits starting 2021. CMP is within normal limits.     On July 13, 2023 she  underwent an EGD and colonoscopy.  EGD showed 2 columns of dilated veins in the esophagus from 30 and 33 cm. H. Pylori and celiac ds was ruled out. There were 3 polyps that were not retrieved and a 3-year repeat surveillance was recommended by GI. Was placed on PPI     She had been taking oral iron for the last 6 months prior to presentation.  She stopped taking it as it was causing abdominal discomfort and constipation.      s/p Feraheme 1020mg on 10/31/23.     She is here today for follow-up visit      Subjective  Denies any bleeding from any site.  Overall feels well.    REVIEW OF SYSTEMS:  No new complaints      Current Outpatient Medications:     albuterol (PROVENTIL HFA,VENTOLIN HFA) 90 mcg/act inhaler, Inhale 2 puffs every 6 (six) hours as needed for wheezing or shortness of breath, Disp: 8 g, Rfl: 3    buPROPion (WELLBUTRIN XL) 150 mg 24 hr tablet, Take 1 tablet (150 mg total) by mouth daily, Disp: 90 tablet, Rfl: 1    cholecalciferol (VITAMIN D3) 1,000 units tablet, Take 1,000 Units by mouth daily, Disp: , Rfl:     ciclopirox (PENLAC) 8 % solution, Apply topically daily at bedtime, Disp: 6.6 mL, Rfl: 0    hydrochlorothiazide (HYDRODIURIL) 12.5 mg tablet, Take 1 tablet (12.5 mg total) by mouth daily, Disp: 90 tablet, Rfl: 0    hydrOXYzine HCL (ATARAX) 25 mg tablet, Take 1 tablet (25 mg total) by mouth every 6 (six) hours as needed for itching or anxiety, Disp: 30 tablet, Rfl: 0    losartan (COZAAR) 100 MG tablet, Take 1 tablet (100 mg total) by mouth daily, Disp: 90 tablet, Rfl: 0    Multiple Vitamin (multivitamin) tablet, Take 1 tablet by mouth daily, Disp: , Rfl:     omeprazole (PriLOSEC) 40 MG capsule, Take 1 capsule (40 mg total) by mouth daily Take before a meal, Disp: 90 capsule, Rfl: 0    QUEtiapine (SEROquel) 50 mg tablet, 1 to 1 1/2 po q hs, Disp: 135 tablet, Rfl: 1    sertraline (ZOLOFT) 100 mg tablet, Take 2 tablets (200 mg total) by mouth daily, Disp: 180 tablet, Rfl: 0    SF 5000 Plus 1.1 %  CREA, Take by mouth 2 (two) times a day, Disp: 51 g, Rfl: 0    terbinafine (LamISIL) 250 mg tablet, , Disp: , Rfl:     bisacodyl (DULCOLAX) 5 mg EC tablet, Take 2 tablets (10 mg total) by mouth once for 1 dose (Patient not taking: Reported on 10/16/2023), Disp: 2 tablet, Rfl: 0    ferrous sulfate 324 (65 Fe) mg, Take 1 tablet (324 mg total) by mouth daily before breakfast (Patient not taking: Reported on 10/31/2023), Disp: 90 tablet, Rfl: 0     Allergies   Allergen Reactions    Latex Rash        ACTIVE PROBLEMS:  Patient Active Problem List   Diagnosis    Lumbar disc herniation    Spinal stenosis of lumbar region with neurogenic claudication    Chronic bilateral low back pain without sciatica    Lumbar radiculopathy    Primary hypertension    Moderate episode of recurrent major depressive disorder (HCC)    Low iron    Hand injury    Cellulitis of right thumb    Dog bite    Carpal tunnel syndrome, bilateral    Peroneal tendonitis of right lower leg    Iron deficiency anemia          Past Medical History:   Diagnosis Date    ADHD (attention deficit hyperactivity disorder)     Anxiety     Arthritis     Depression     Fibromyalgia, primary     GERD (gastroesophageal reflux disease)     History of stomach ulcers     Hypertension     Panic attack     Papanicolaou smear 2020    Psychiatric disorder         Past Surgical History:   Procedure Laterality Date    BREAST CYST ASPIRATION Left 2014    HYSTEROSCOPY  2015    MAMMO (HISTORICAL) Bilateral 03/05/2020    [facility] BI-RADS 1 Negative Scattered areas fibroglandular density; 25% to 50% glandular breast tissue        Social History     Socioeconomic History    Marital status: Single     Spouse name: Not on file    Number of children: Not on file    Years of education: Not on file    Highest education level: Not on file   Occupational History    Occupation: Tech.    Tobacco Use    Smoking status: Former    Smokeless tobacco: Never    Tobacco comments:     teen years   Vaping Use  "   Vaping status: Never Used   Substance and Sexual Activity    Alcohol use: Yes     Comment: Socially    Drug use: No    Sexual activity: Not Currently     Birth control/protection: Post-menopausal   Other Topics Concern    Not on file   Social History Narrative    Caffeine Intake: 1-2 cups per day    Do you smoke marijuana? Denies    Do you drink alcohol? Socially    Exercise: Occassionally    Domestic violence: No    History of drug/alcohol abuse denies     Social Determinants of Health     Financial Resource Strain: Not on file   Food Insecurity: Not on file   Transportation Needs: Not on file   Physical Activity: Not on file   Stress: Not on file   Social Connections: Not on file   Intimate Partner Violence: Not on file   Housing Stability: Not on file        Family History   Problem Relation Age of Onset    Hypertension Mother     Lung cancer Mother     Skin cancer Father     Hypertension Brother     No Known Problems Brother     No Known Problems Brother     Hypertension Maternal Grandmother     Multiple sclerosis Maternal Grandmother     Heart attack Maternal Grandfather     Heart disease Maternal Grandfather     Alcohol abuse Maternal Grandfather     Asthma Paternal Grandmother     Heart disease Paternal Grandfather         Objective        Physical Exam  ECOG performance status: 0  Blood pressure 119/79, pulse 86, temperature 97.9 °F (36.6 °C), temperature source Temporal, height 5' 5.5\" (1.664 m), weight 101 kg (222 lb), SpO2 99%, not currently breastfeeding.   Chest - clear on auscultation  Heart - RRR. No murmurs/gallops  Ext- 1+ pitting edema b/l      I reviewed lab data in the chart as noted above.  Additional labs as noted below    WBC   Date Value Ref Range Status   03/09/2024 6.17 4.31 - 10.16 Thousand/uL Final   01/03/2024 9.73 4.31 - 10.16 Thousand/uL Final   12/12/2023 7.00 4.31 - 10.16 Thousand/uL Final     Hemoglobin   Date Value Ref Range Status   03/09/2024 13.5 11.5 - 15.4 g/dL Final "   01/03/2024 13.1 11.5 - 15.4 g/dL Final   12/12/2023 13.3 11.5 - 15.4 g/dL Final     Platelets   Date Value Ref Range Status   03/09/2024 253 149 - 390 Thousands/uL Final   01/03/2024 269 149 - 390 Thousands/uL Final   12/12/2023 300 149 - 390 Thousands/uL Final     MCV   Date Value Ref Range Status   03/09/2024 90 82 - 98 fL Final   01/03/2024 87 82 - 98 fL Final   12/12/2023 88 82 - 98 fL Final      Potassium   Date Value Ref Range Status   01/03/2024 3.7 3.5 - 5.3 mmol/L Final   09/30/2023 4.2 3.5 - 5.3 mmol/L Final   05/27/2023 4.0 3.5 - 5.3 mmol/L Final     Chloride   Date Value Ref Range Status   01/03/2024 102 96 - 108 mmol/L Final   09/30/2023 106 96 - 108 mmol/L Final   05/27/2023 105 96 - 108 mmol/L Final     CO2   Date Value Ref Range Status   01/03/2024 25 21 - 32 mmol/L Final   09/30/2023 29 21 - 32 mmol/L Final   05/27/2023 28 21 - 32 mmol/L Final     CO2, i-STAT   Date Value Ref Range Status   10/01/2016 24 21 - 32 mmol/L Final     BUN   Date Value Ref Range Status   01/03/2024 17 5 - 25 mg/dL Final   09/30/2023 15 5 - 25 mg/dL Final   05/27/2023 14 5 - 25 mg/dL Final     Creatinine   Date Value Ref Range Status   01/03/2024 0.72 0.60 - 1.30 mg/dL Final     Comment:     Standardized to IDMS reference method   09/30/2023 0.62 0.60 - 1.30 mg/dL Final     Comment:     Standardized to IDMS reference method   05/27/2023 0.81 0.60 - 1.30 mg/dL Final     Comment:     Standardized to IDMS reference method     Glucose   Date Value Ref Range Status   01/03/2024 116 65 - 140 mg/dL Final     Comment:     If the patient is fasting, the ADA then defines impaired fasting glucose as > 100 mg/dL and diabetes as > or equal to 123 mg/dL.   08/12/2022 94 65 - 140 mg/dL Final     Comment:     If the patient is fasting, the ADA then defines impaired fasting glucose as > 100 mg/dL and diabetes as > or equal to 123 mg/dL.  Specimen collection should occur prior to Sulfasalazine administration due to the potential for  falsely depressed results. Specimen collection should occur prior to Sulfapyridine administration due to the potential for falsely elevated results.   08/11/2022 92 65 - 140 mg/dL Final     Comment:     If the patient is fasting, the ADA then defines impaired fasting glucose as > 100 mg/dL and diabetes as > or equal to 123 mg/dL.  Specimen collection should occur prior to Sulfasalazine administration due to the potential for falsely depressed results. Specimen collection should occur prior to Sulfapyridine administration due to the potential for falsely elevated results.     Calcium   Date Value Ref Range Status   01/03/2024 9.1 8.4 - 10.2 mg/dL Final   09/30/2023 9.0 8.4 - 10.2 mg/dL Final   05/27/2023 9.3 8.4 - 10.2 mg/dL Final     Albumin   Date Value Ref Range Status   01/03/2024 4.1 3.5 - 5.0 g/dL Final   09/30/2023 3.7 3.5 - 5.0 g/dL Final   05/27/2023 3.9 3.5 - 5.0 g/dL Final     Total Bilirubin   Date Value Ref Range Status   01/03/2024 0.25 0.20 - 1.00 mg/dL Final     Comment:     Use of this assay is not recommended for patients undergoing treatment with eltrombopag due to the potential for falsely elevated results.  N-acetyl-p-benzoquinone imine (metabolite of Acetaminophen) will generate erroneously low results in samples for patients that have taken an overdose of Acetaminophen.   09/30/2023 0.27 0.20 - 1.00 mg/dL Final     Comment:     Use of this assay is not recommended for patients undergoing treatment with eltrombopag due to the potential for falsely elevated results.  N-acetyl-p-benzoquinone imine (metabolite of Acetaminophen) will generate erroneously low results in samples for patients that have taken an overdose of Acetaminophen.   05/27/2023 0.30 0.20 - 1.00 mg/dL Final     Comment:     Use of this assay is not recommended for patients undergoing treatment with eltrombopag due to the potential for falsely elevated results.  N-acetyl-p-benzoquinone imine (metabolite of Acetaminophen) will  "generate erroneously low results in samples for patients that have taken an overdose of Acetaminophen.     Alkaline Phosphatase   Date Value Ref Range Status   01/03/2024 97 34 - 104 U/L Final   09/30/2023 94 34 - 104 U/L Final   05/27/2023 84 34 - 104 U/L Final     AST   Date Value Ref Range Status   01/03/2024 20 13 - 39 U/L Final   09/30/2023 21 13 - 39 U/L Final   05/27/2023 17 13 - 39 U/L Final     ALT   Date Value Ref Range Status   01/03/2024 28 7 - 52 U/L Final     Comment:     Specimen collection should occur prior to Sulfasalazine administration due to the potential for falsely depressed results.    09/30/2023 21 7 - 52 U/L Final     Comment:     Specimen collection should occur prior to Sulfasalazine administration due to the potential for falsely depressed results.    05/27/2023 14 7 - 52 U/L Final     Comment:     Specimen collection should occur prior to Sulfasalazine administration due to the potential for falsely depressed results.       No results found for: \"LDH\"  No results found for: \"TSH\"  No results found for: \"R0ARVFH\"   Free T4   Date Value Ref Range Status   07/17/2021 0.66 (L) 0.76 - 1.46 ng/dL Final     Comment:     Specimen collection should occur prior to Sulfasalazine administration due to the potential for falsely elevated results.         RECENT IMAGING:  Procedure: MRI lumbar spine wo contrast    Result Date: 2/20/2024  Narrative: MRI LUMBAR SPINE WITHOUT CONTRAST INDICATION: M54.16: Radiculopathy, lumbar region. COMPARISON: MRI 5/25/2021. TECHNIQUE:  Multiplanar, multisequence imaging of the lumbar spine was performed. . IMAGE QUALITY:  Diagnostic FINDINGS: VERTEBRAL BODIES:  There are 5 lumbar type vertebral bodies.  Normal alignment of the lumbar spine.  No spondylolysis or spondylolisthesis. Mild scoliosis with apex at L3-4.  No compression fracture.    Normal marrow signal is identified within the visualized bony structures.  No discrete marrow lesion. SACRUM:  Normal signal " "within the sacrum. No evidence of insufficiency or stress fracture. DISTAL CORD AND CONUS:  Normal size and signal within the distal cord and conus. PARASPINAL SOFT TISSUES:  Paraspinal soft tissues are unremarkable. LOWER THORACIC DISC SPACES:  Normal disc height and signal.  No disc herniation, canal stenosis or foraminal narrowing. LUMBAR DISC SPACES: L1-L2: Minor bulge without stenosis. L2-L3: Diffuse disc bulge with severe bilateral facet arthrosis resulting in mild right foraminal narrowing. Left neural foramen remains patent. There is moderate to severe central canal stenosis. L3-L4: Diffuse disc bulge and facet arthrosis resulting in moderate right foraminal narrowing. There is moderate to severe central canal stenosis at this level. Modic type II endplate change noted. L4-L5: Diffuse disc bulge and bilateral facet arthrosis resulting in moderate foraminal encroachment. There is mild to moderate central canal narrowing. Modic type II endplate changes. L5-S1: Disc bulge with bilateral facet arthrosis resulting in moderate to severe left foraminal narrowing. Mild central canal stenosis. OTHER FINDINGS:  None.     Impression: 1. Advanced spondylotic changes of the lumbar spine including multifactorial moderate to severe central canal stenosis at the L2-3 and L3-4 levels. There is also multifactorial moderate to severe left L5-S1 foraminal stenosis concerning for impingement of the exiting left L5 nerve root. 2. Mild levoscoliosis. Workstation performed: [id number]         Portions of the record may have been created with voice recognition software.  Occasional wrong word or \"sound a like\" substitutions may have occurred due to the inherent limitations of voice recognition software.  Read the chart carefully and recognize, using context, where substitutions have occurred.  "

## 2024-03-14 ENCOUNTER — TELEPHONE (OUTPATIENT)
Dept: GASTROENTEROLOGY | Facility: CLINIC | Age: 59
End: 2024-03-14

## 2024-03-14 ENCOUNTER — OFFICE VISIT (OUTPATIENT)
Dept: PHYSICAL THERAPY | Facility: CLINIC | Age: 59
End: 2024-03-14
Payer: COMMERCIAL

## 2024-03-14 DIAGNOSIS — M54.16 LUMBAR RADICULOPATHY: Primary | ICD-10-CM

## 2024-03-14 DIAGNOSIS — E78.2 MIXED HYPERLIPIDEMIA: Primary | ICD-10-CM

## 2024-03-14 PROCEDURE — 97140 MANUAL THERAPY 1/> REGIONS: CPT

## 2024-03-14 PROCEDURE — 97110 THERAPEUTIC EXERCISES: CPT

## 2024-03-14 NOTE — TELEPHONE ENCOUNTER
Reviewed capsule endoscopy.  No evidence of fresh or old blood or mucosal abnormalities to suggest a cause of bleeding or anemia    Recommendation: Follow H&H.  Follow-up with GI    Discussed with patient

## 2024-03-14 NOTE — PROGRESS NOTES
"Daily Note     Today's date: 3/14/2024  Patient name: Anamaria Rivera  : 1965  MRN: [id number]  Referring provider: Chencho Oshea MD  Dx:   Encounter Diagnosis     ICD-10-CM    1. Lumbar radiculopathy  M54.16                      Subjective:  Patient reports she is very sore today after sitting a lot during her work day.     Objective: See treatment diary below      Assessment: Tolerated treatment fair. Pt Continues to present with a lot of discomfort, minimal relief so far with any of the stretches or manual therapy. She has difficulty with walking and with transfers and bed mobility. Patient demonstrated fatigue post treatment and would benefit from continued PT      Plan: Continue per plan of care.      Precautions: HTN  Functional Limitations: prolonged sitting, standing, walking, grocery shopping, exercising  Impairments: lumbar ROM in all planes (reproduces LBP), BLE strength  [facility] Code[facility] [email or web address]   POC expiration: 2024      Manuals 3/4 3/12  3/14          Lumbar STM nv RA  WE          Sidelying lumbar flexion mob nv               10'                       Neuro Re-Ed                          Abdominal Bracing   5\"x10          TA+ Cyndie Add ball squeeze   5\"x10                                                              Ther Ex             bike nv 5 min  5 min          Seated lumbar flex HEP  Pball 3way x10 ea          Figure 4 stretch supine or sitting HEP 15\"x4 5\"x10          Seated HS stretch HEP  5\"x10          Piriformis stretch  15\"x 4 5\"x10          Supine 90/90 sciatic stretch   10x3          LTR HEP            clamshell nv OTB 2x10           Sidelying hip abd Nv?            squats nv 2x10           Seated leg ext machine             Seated leg curl machine             Leg press nv 65# 2x10           Fwd lunge                          Education on UE strength exercises she can do nv            Ther Activity             Fwd Step ups nv 6\" x10 b/l              Lat step ups           "   Gait Training                                       Modalities               8 min post MHP

## 2024-03-14 NOTE — LETTER
March 14, 2024     ANTONY Dukes  [address]  [address]  [place] [id number]    Patient: Anamaria Rivera   YOB: 1965   Date of Visit: 3/14/2024       Dear Dr. Luu:    Thank you for referring Anamaria Rivera to me for evaluation. Below are my notes for this consultation.    If you have questions, please do not hesitate to call me. I look forward to following your patient along with you.         Sincerely,        Cortez Love MD        CC: MD Cortez Gutierrez MD  3/14/2024  5:23 PM  Sign at exiting of workspace  Reviewed capsule endoscopy.  No evidence of fresh or old blood or mucosal abnormalities to suggest a cause of bleeding or anemia    Recommendation: Follow H&H.  Follow-up with GI    Discussed with patient

## 2024-03-15 ENCOUNTER — TELEPHONE (OUTPATIENT)
Dept: GASTROENTEROLOGY | Facility: CLINIC | Age: 59
End: 2024-03-15

## 2024-03-15 NOTE — TELEPHONE ENCOUNTER
----- Message from Chrystal Fink DO sent at 3/11/2024  3:02 PM EDT -----  Regarding: FW: Capsule Endoscopy results  Contact: [phone]  Will someone please reach out to the [place] office as the patient had a capsule endoscopy performed there but it looks like it has not been read as of yet.  ----- Message -----  From: Rosanna Carl  Sent: 3/11/2024   2:45 PM EDT  To: Chrystal Fink DO  Subject: FW: Capsule Endoscopy results                      ----- Message -----  From: Anamaria Rivera  Sent: 3/10/2024   5:37 PM EDT  To: Gastro[facility]ology [facility]  Subject: Capsule Endoscopy results                        Will results be ready by Tuesday March 12? I am seeing Dr Diaz on the 12th and need those results.

## 2024-03-18 ENCOUNTER — OFFICE VISIT (OUTPATIENT)
Dept: FAMILY MEDICINE CLINIC | Facility: CLINIC | Age: 59
End: 2024-03-18
Payer: COMMERCIAL

## 2024-03-18 VITALS
RESPIRATION RATE: 16 BRPM | WEIGHT: 224 LBS | OXYGEN SATURATION: 97 % | BODY MASS INDEX: 36 KG/M2 | SYSTOLIC BLOOD PRESSURE: 120 MMHG | TEMPERATURE: 99.2 F | HEART RATE: 86 BPM | HEIGHT: 66 IN | DIASTOLIC BLOOD PRESSURE: 80 MMHG

## 2024-03-18 DIAGNOSIS — M48.062 SPINAL STENOSIS OF LUMBAR REGION WITH NEUROGENIC CLAUDICATION: ICD-10-CM

## 2024-03-18 DIAGNOSIS — K13.0 ANGULAR CHEILITIS: ICD-10-CM

## 2024-03-18 DIAGNOSIS — D50.9 IRON DEFICIENCY ANEMIA, UNSPECIFIED IRON DEFICIENCY ANEMIA TYPE: ICD-10-CM

## 2024-03-18 DIAGNOSIS — F33.1 MODERATE EPISODE OF RECURRENT MAJOR DEPRESSIVE DISORDER (HCC): ICD-10-CM

## 2024-03-18 DIAGNOSIS — I10 PRIMARY HYPERTENSION: Primary | ICD-10-CM

## 2024-03-18 PROCEDURE — 99214 OFFICE O/P EST MOD 30 MIN: CPT | Performed by: NURSE PRACTITIONER

## 2024-03-18 RX ORDER — CLOCORTOLONE PIVALATE 0 G/G
CREAM TOPICAL 2 TIMES DAILY
Qty: 45 G | Refills: 3 | Status: SHIPPED | OUTPATIENT
Start: 2024-03-18

## 2024-03-18 NOTE — PROGRESS NOTES
"Assessment/Plan:     Diagnoses and all orders for this visit:    Primary hypertension    Managed w/ Cozaar, HCTZ. Recheck in 6 months or sooner PRN.    Angular cheilitis  -     Clocortolone Pivalate 0.1 % cream; Apply topically 2 (two) times a day    Refills prescribed.    Moderate episode of recurrent major depressive disorder (HCC)    Managed w/ Psych. Pt taking Wellbutrin, Seroquel and Sertraline. Patient is encouraged to call our office for any questions/concerns, persistent or worsening symptoms. Patient states they understand and agree with treatment plan.     Iron deficiency anemia, unspecified iron deficiency anemia type    Under care of Hematology. Pt has standing orders for CBC & iron panel.    Spinal stenosis of lumbar region with neurogenic claudication      Under care of Ortho, Dr. Oshea. Pt doing PT.        Pt to RTO in 6 months for annual physical or sooner PRN.    Subjective:      Patient ID: Anamaria Rivera is a 59 y.o. female.    Pt here today for a 6 month recheck of her HTN.  Overall she is feeling well.  She is living in a hotel currently since her furnace at home malfunctioned.  Her dogs are currently living in a kennel.  She is hoping to be back in her home in another 4-6 weeks.  Pt is working w/ Hematology for her anemia.  She is being seen every 6 months.  Pt had a capsule endoscopy that patient noted was negative.        The following portions of the patient's history were reviewed and updated as appropriate: allergies, current medications, past family history, past medical history, past social history, past surgical history, and problem list.    Review of Systems    As noted per HPI.    Objective:      /80   Pulse 86   Temp 99.2 °F (37.3 °C) (Oral)   Resp 16   Ht 5' 5.5\" (1.664 m)   Wt 102 kg (224 lb)   SpO2 97%   BMI 36.71 kg/m²          Physical Exam  Vitals reviewed.   Constitutional:       Appearance: Normal appearance.   Cardiovascular:      Rate and Rhythm: Normal " rate and regular rhythm.      Pulses: Normal pulses.      Heart sounds: Normal heart sounds.   Pulmonary:      Effort: Pulmonary effort is normal.      Breath sounds: Normal breath sounds.   Neurological:      Mental Status: She is alert and oriented to person, place, and time. Mental status is at baseline.   Psychiatric:         Mood and Affect: Mood normal.         Behavior: Behavior normal.         Thought Content: Thought content normal.         Judgment: Judgment normal.

## 2024-03-19 ENCOUNTER — OFFICE VISIT (OUTPATIENT)
Dept: PHYSICAL THERAPY | Facility: CLINIC | Age: 59
End: 2024-03-19
Payer: COMMERCIAL

## 2024-03-19 DIAGNOSIS — M54.16 LUMBAR RADICULOPATHY: Primary | ICD-10-CM

## 2024-03-19 PROCEDURE — 97110 THERAPEUTIC EXERCISES: CPT

## 2024-03-19 PROCEDURE — 97140 MANUAL THERAPY 1/> REGIONS: CPT

## 2024-03-19 NOTE — PROGRESS NOTES
"Daily Note     Today's date: 3/19/2024  Patient name: Anamaria Rivera  : 1965  MRN: [id number]  Referring provider: Chencho Oshea MD  Dx:   Encounter Diagnosis     ICD-10-CM    1. Lumbar radiculopathy  M54.16                      Subjective:  Patient reports continued soreness and minimal relief so far.     Objective: See treatment diary below      Assessment: Tolerated treatment fair. Pt needed a shorter session today so focused on some low back mobility and manual therapy. She responds well to the manual therapy but relief does not last long.  Patient demonstrated fatigue post treatment and would benefit from continued PT      Plan: Continue per plan of care.      Precautions: HTN  Functional Limitations: prolonged sitting, standing, walking, grocery shopping, exercising  Impairments: lumbar ROM in all planes (reproduces LBP), BLE strength  [facility] Code: [email or web address]   POC expiration: 2024      Manuals 3/4 3/12  3/14 3/19         Lumbar STM nv RA  WE WE         Sidelying lumbar flexion mob nv               10' 20'                      Neuro Re-Ed                          Abdominal Bracing   5\"x10 5\"x10         TA+ Cyndie Add ball squeeze   5\"x10 5\"x10                                                             Ther Ex             bike nv 5 min  5 min          Seated lumbar flex HEP  Pball 3way x10 ea Pball 3way x10 ea         Figure 4 stretch supine or sitting HEP 15\"x4 5\"x10          Seated HS stretch HEP  5\"x10          Piriformis stretch  15\"x 4 5\"x10 5\"x10         Supine 90/90 sciatic stretch   10x3 10x3         LTR HEP            clamshell nv OTB 2x10           Sidelying hip abd Nv?            squats nv 2x10           Seated leg ext machine             Seated leg curl machine             Leg press nv 65# 2x10           Fwd lunge                          Education on UE strength exercises she can do nv            Ther Activity             Fwd Step ups nv 6\" x10 b/l              Lat step ups        "      Gait Training                                       Modalities               8 min post MHP

## 2024-03-21 ENCOUNTER — OFFICE VISIT (OUTPATIENT)
Dept: PHYSICAL THERAPY | Facility: CLINIC | Age: 59
End: 2024-03-21
Payer: COMMERCIAL

## 2024-03-21 DIAGNOSIS — M54.16 LUMBAR RADICULOPATHY: Primary | ICD-10-CM

## 2024-03-21 PROCEDURE — 97110 THERAPEUTIC EXERCISES: CPT

## 2024-03-21 PROCEDURE — 97140 MANUAL THERAPY 1/> REGIONS: CPT

## 2024-03-21 NOTE — PROGRESS NOTES
"Daily Note     Today's date: 3/21/2024  Patient name: Anamaria Rivera  : 1965  MRN: [id number]  Referring provider: Chencho Oshea MD  Dx:   Encounter Diagnosis     ICD-10-CM    1. Lumbar radiculopathy  M54.16                      Subjective:  Patient reports no change in symptoms since starting therapy. A few hours of relief after therapy but goes back to the same shortly after.     Objective: See treatment diary below      Assessment: Tolerated treatment fair. Pt still having fait tolerance to TE's with discomfort t/o. She has some benefit from the manual therapy with a few hours of relief.   Patient demonstrated fatigue post treatment and would benefit from continued PT      Plan: Continue per plan of care.      Precautions: HTN  Functional Limitations: prolonged sitting, standing, walking, grocery shopping, exercising  Impairments: lumbar ROM in all planes (reproduces LBP), BLE strength  [email or web address] Code[facility] [email or web address]   POC expiration: 2024      Manuals 3/4 3/12  3/14 3/19 3/21        Lumbar STM nv RA  WE WE         Sidelying lumbar flexion mob nv               10' 20'                      Neuro Re-Ed                          Abdominal Bracing   5\"x10 5\"x10 5\"x10        TA+ Cyndie Add ball squeeze   5\"x10 5\"x10 5\"x10                                                            Ther Ex             bike nv 5 min  5 min  5 min        Seated lumbar flex HEP  Pball 3way x10 ea Pball 3way x10 ea Pball 3way x10 ea        Figure 4 stretch supine or sitting HEP 15\"x4 5\"x10  5\"x10 ea         Seated HS stretch HEP  5\"x10  5\"x10 ea        Piriformis stretch  15\"x 4 5\"x10 5\"x10 5\"x10 ea        Supine 90/90 sciatic stretch   10x3 10x3 10x3        LTR HEP    5\"x10 ea w. OP        clamshell nv OTB 2x10   10x2        SLR     10x2        Sidelying hip abd Nv?            squats nv 2x10           Seated leg ext machine             Seated leg curl machine             Leg press nv 65# 2x10           Fwd lunge                    " "      Education on UE strength exercises she can do nv            Ther Activity             Fwd Step ups nv 6\" x10 b/l              Lat step ups             Gait Training                                       Modalities               8 min post MHP                             "

## 2024-03-26 ENCOUNTER — OFFICE VISIT (OUTPATIENT)
Dept: PHYSICAL THERAPY | Facility: CLINIC | Age: 59
End: 2024-03-26
Payer: COMMERCIAL

## 2024-03-26 DIAGNOSIS — M54.16 LUMBAR RADICULOPATHY: Primary | ICD-10-CM

## 2024-03-26 PROCEDURE — 97140 MANUAL THERAPY 1/> REGIONS: CPT

## 2024-03-26 PROCEDURE — 97112 NEUROMUSCULAR REEDUCATION: CPT

## 2024-03-26 PROCEDURE — 97110 THERAPEUTIC EXERCISES: CPT

## 2024-03-26 NOTE — PROGRESS NOTES
"Daily Note     Today's date: 3/26/2024  Patient name: Anamaria Rivera  : 1965  MRN: [id number]  Referring provider: Chencho Oshea MD  Dx:   Encounter Diagnosis     ICD-10-CM    1. Lumbar radiculopathy  M54.16                      Subjective:  Patient reports have more pain in her left leg today and left side of her low back. Notes having difficulty with doing stretches during the work day. Does note temporary relief after PT    Objective: See treatment diary below      Assessment: Tolerated treatment fair. Note decreased pain in left low back and left leg with lumbar STM and PA mobilizations. She noted overall relief after treatment compared to how she felt when arriving. Will benefit from skilled PT to address impairments and return to PLOF      Plan: Continue per plan of care.      Precautions: HTN  Functional Limitations: prolonged sitting, standing, walking, grocery shopping, exercising  Impairments: lumbar ROM in all planes (reproduces LBP), BLE strength  [facility] Code: [email or web address]   POC expiration: 2024      Manuals 3/4 3/12  3/14 3/19 3/21 3/26       Lumbar STM nv RA  WE WE  MB 15'       Sidelying lumbar flexion mob nv     Grade3/4; 4'           10' 20'                      Neuro Re-Ed                          Abdominal Bracing   5\"x10 5\"x10 5\"x10 5\"x10       TA+ Cyndie Add ball squeeze   5\"x10 5\"x10 5\"x10 5\"x10                                                           Ther Ex             bike nv 5 min  5 min  5 min 5'       Seated lumbar flex HEP  Pball 3way x10 ea Pball 3way x10 ea Pball 3way x10 ea Pball 3 way 10 ea       Figure 4 stretch supine or sitting HEP 15\"x4 5\"x10  5\"x10 ea  30\"x3 ea       Seated HS stretch HEP  5\"x10  5\"x10 ea 30\"x3       Piriformis stretch  15\"x 4 5\"x10 5\"x10 5\"x10 ea        Supine 90/90 sciatic stretch   10x3 10x3 10x3        LTR HEP    5\"x10 ea w. OP 10ea        clamshell nv OTB 2x10   10x2 10ea       SLR     10x2 10ea       Sidelying hip abd Nv?            squats " "nv 2x10           Seated leg ext machine             Seated leg curl machine             Leg press nv 65# 2x10           Fwd lunge                          Education on UE strength exercises she can do nv            Ther Activity             Fwd Step ups nv 6\" x10 b/l              Lat step ups             Gait Training                                       Modalities               8 min post MHP                             "

## 2024-03-28 ENCOUNTER — APPOINTMENT (OUTPATIENT)
Dept: PHYSICAL THERAPY | Facility: CLINIC | Age: 59
End: 2024-03-28
Payer: COMMERCIAL

## 2024-04-02 ENCOUNTER — OFFICE VISIT (OUTPATIENT)
Dept: PHYSICAL THERAPY | Facility: CLINIC | Age: 59
End: 2024-04-02
Payer: COMMERCIAL

## 2024-04-02 DIAGNOSIS — M54.16 LUMBAR RADICULOPATHY: Primary | ICD-10-CM

## 2024-04-02 PROCEDURE — 97110 THERAPEUTIC EXERCISES: CPT

## 2024-04-02 PROCEDURE — 97112 NEUROMUSCULAR REEDUCATION: CPT

## 2024-04-02 PROCEDURE — 97140 MANUAL THERAPY 1/> REGIONS: CPT

## 2024-04-02 NOTE — PROGRESS NOTES
"Daily Note     Today's date: 2024  Patient name: Anamaria Rivera  : 1965  MRN: [id number]  Referring provider: Chencho Oshea MD  Dx:   Encounter Diagnosis     ICD-10-CM    1. Lumbar radiculopathy  M54.16                      Subjective:  Patient reports she continues to have a really had time sitting all day at work which increases her pain, but also can't stand for any length of time or walk any significant distances.     Objective: See treatment diary below      Assessment: Tolerated treatment fair. Continue to be tight and restricted and TTP around L QL and top of L glute. Eases with manual therapy and ambulation improves. Continued to work on core stability and LE strengthening. Would continue to benefit from skilled PT.     Plan: Continue per plan of care.      Precautions: HTN  Functional Limitations: prolonged sitting, standing, walking, grocery shopping, exercising  Impairments: lumbar ROM in all planes (reproduces LBP), BLE strength  [facility] Code: [email or web address]   POC expiration: 2024      Manuals 3/4 3/12  3/14 3/19 3/21 3/26 4/2      Lumbar STM nv RA  WE WE  MB 15' WE  15'      Sidelying lumbar flexion mob nv     Grade3/4; 4'           10' 20'                      Neuro Re-Ed                          Abdominal Bracing   5\"x10 5\"x10 5\"x10 5\"x10 5\"x10      TA+ Cyndie Add ball squeeze   5\"x10 5\"x10 5\"x10 5\"x10 5\"x10`                                                          Ther Ex             bike nv 5 min  5 min  5 min 5' 5'      Seated lumbar flex HEP  Pball 3way x10 ea Pball 3way x10 ea Pball 3way x10 ea Pball 3 way 10 ea Pball 3 way 10 ea      Figure 4 stretch supine or sitting HEP 15\"x4 5\"x10  5\"x10 ea  30\"x3 ea 30\"x3 ea      Seated HS stretch HEP  5\"x10  5\"x10 ea 30\"x3 30\"x3       Piriformis stretch  15\"x 4 5\"x10 5\"x10 5\"x10 ea        Supine 90/90 sciatic stretch   10x3 10x3 10x3        LTR HEP    5\"x10 ea w. OP 10ea  X15 zeeshan leavitt nv OTB 2x10   10x2 10ea X20 ea      SLR     10x2 " "10ea X10 ea      Sidelying hip abd Nv?            squats nv 2x10           Seated leg ext machine             Seated leg curl machine             Leg press nv 65# 2x10           Fwd lunge                          Education on UE strength exercises she can do nv            Ther Activity             Fwd Step ups nv 6\" x10 b/l              Lat step ups             Gait Training                                       Modalities               8 min post MHP     10 min  MHP                        "

## 2024-04-04 DIAGNOSIS — I10 PRIMARY HYPERTENSION: ICD-10-CM

## 2024-04-05 RX ORDER — HYDROCHLOROTHIAZIDE 12.5 MG/1
12.5 TABLET ORAL DAILY
Qty: 90 TABLET | Refills: 0 | Status: SHIPPED | OUTPATIENT
Start: 2024-04-05

## 2024-04-09 ENCOUNTER — APPOINTMENT (OUTPATIENT)
Dept: PHYSICAL THERAPY | Facility: CLINIC | Age: 59
End: 2024-04-09
Payer: COMMERCIAL

## 2024-04-11 ENCOUNTER — OFFICE VISIT (OUTPATIENT)
Dept: PHYSICAL THERAPY | Facility: CLINIC | Age: 59
End: 2024-04-11
Payer: COMMERCIAL

## 2024-04-11 DIAGNOSIS — M54.16 LUMBAR RADICULOPATHY: Primary | ICD-10-CM

## 2024-04-11 PROCEDURE — 97110 THERAPEUTIC EXERCISES: CPT

## 2024-04-11 PROCEDURE — 97112 NEUROMUSCULAR REEDUCATION: CPT

## 2024-04-11 PROCEDURE — 97140 MANUAL THERAPY 1/> REGIONS: CPT

## 2024-04-16 ENCOUNTER — OFFICE VISIT (OUTPATIENT)
Dept: PHYSICAL THERAPY | Facility: CLINIC | Age: 59
End: 2024-04-16
Payer: COMMERCIAL

## 2024-04-16 DIAGNOSIS — M54.16 LUMBAR RADICULOPATHY: Primary | ICD-10-CM

## 2024-04-16 PROCEDURE — 97140 MANUAL THERAPY 1/> REGIONS: CPT

## 2024-04-16 PROCEDURE — 97112 NEUROMUSCULAR REEDUCATION: CPT

## 2024-04-16 PROCEDURE — 97110 THERAPEUTIC EXERCISES: CPT

## 2024-04-16 NOTE — PROGRESS NOTES
"Daily Note     Today's date: 2024  Patient name: Anamaria Rivera  : 1965  MRN: [id number]  Referring provider: Chencho Oshea MD  Dx:   Encounter Diagnosis     ICD-10-CM    1. Lumbar radiculopathy  M54.16                      Subjective:  Patient reports most of the time PT has given her minimal to no relief but last session she received several hours     Objective: See treatment diary below      Assessment: Tolerated treatment fair. Exercises and stretches still painful. Some relief with manual therapy. Would continue to benefit from skilled PT.     Plan: Continue per plan of care.      Precautions: HTN  Functional Limitations: prolonged sitting, standing, walking, grocery shopping, exercising  Impairments: lumbar ROM in all planes (reproduces LBP), BLE strength  [facility] Code: [id number]   POC expiration: 2024      Manuals 3/4 3/12  3/14 3/19 3/21 3/26 4/2 4/11 4/16    Lumbar STM nv RA  WE WE  MB 15' WE  15' MB 15' WE  15'    Sidelying lumbar flexion mob nv     Grade3/4; 4'           10' 20'                      Neuro Re-Ed                          Abdominal Bracing   5\"x10 5\"x10 5\"x10 5\"x10 5\"x10      TA+ Cyndie Add ball squeeze   5\"x10 5\"x10 5\"x10 5\"x10 5\"x10` 10\"x10 10\"x10    TA + marching         X10 ea                                           Ther Ex             bike nv 5 min  5 min  5 min 5' 5' 5' 5'    Seated lumbar flex HEP  Pball 3way x10 ea Pball 3way x10 ea Pball 3way x10 ea Pball 3 way 10 ea Pball 3 way 10 ea Pball 3 way 10ea Pball 3 way 10ea    Figure 4 stretch supine or sitting HEP 15\"x4 5\"x10  5\"x10 ea  30\"x3 ea 30\"x3 ea 30\"x3     Seated HS stretch HEP  5\"x10  5\"x10 ea 30\"x3 30\"x3  30\"x3     Piriformis stretch  15\"x 4 5\"x10 5\"x10 5\"x10 ea        Supine 90/90 sciatic stretch   10x3 10x3 10x3        LTR HEP    5\"x10 ea w. OP 10ea  X15 ea 10ea X10 ea    clamshell nv OTB 2x10   10x2 10ea X20 ea 20x     SLR     10x2 10ea X10 ea 10ea     Sidelying hip abd Nv?            squats nv 2x10   " "        Seated leg ext machine             Seated leg curl machine             Leg press nv 65# 2x10           Fwd lunge                          Education on UE strength exercises she can do nv            Ther Activity             Fwd Step ups nv 6\" x10 b/l              Lat step ups             Gait Training                                       Modalities               8 min post MHP     10 min  MHP 8' post MHP                       "

## 2024-04-18 ENCOUNTER — OFFICE VISIT (OUTPATIENT)
Dept: PHYSICAL THERAPY | Facility: CLINIC | Age: 59
End: 2024-04-18
Payer: COMMERCIAL

## 2024-04-18 DIAGNOSIS — M54.16 LUMBAR RADICULOPATHY: Primary | ICD-10-CM

## 2024-04-18 PROCEDURE — 97140 MANUAL THERAPY 1/> REGIONS: CPT

## 2024-04-18 PROCEDURE — 97110 THERAPEUTIC EXERCISES: CPT

## 2024-04-18 PROCEDURE — 97112 NEUROMUSCULAR REEDUCATION: CPT

## 2024-04-18 NOTE — PROGRESS NOTES
"Daily Note     Today's date: 2024  Patient name: Anamaria Rivera  : 1965  MRN: [id number]  Referring provider: Chencho Oshea MD  Dx:   Encounter Diagnosis     ICD-10-CM    1. Lumbar radiculopathy  M54.16                      Subjective:  Patient reports a little bit longer relief from PT last visit    Objective: See treatment diary below      Assessment: Patient tolerated treatment fair. She has difficulty with clamshells and still has TTP to L lumbar region and glute. Will benefit from skilled PT to address impairments and return to PLOF    Plan: Continue per plan of care.      Precautions: HTN  Functional Limitations: prolonged sitting, standing, walking, grocery shopping, exercising  Impairments: lumbar ROM in all planes (reproduces LBP), BLE strength  [facility] Code: [email or web address]   POC expiration: 2024      Manuals 3/19 3/21 3/26 4/2 4/11 4/16 4/18    Lumbar STM WE  MB 15' WE  15' MB 15' WE  15' MB 15'    Sidelying lumbar flexion mob   Grade3/4; 4'          20'                     Neuro Re-Ed                      Abdominal Bracing 5\"x10 5\"x10 5\"x10 5\"x10       TA+ Cyndie Add ball squeeze 5\"x10 5\"x10 5\"x10 5\"x10` 10\"x10 10\"x10 10\"x10    TA + marching      X10 ea 10ea                                     Ther Ex           bike  5 min 5' 5' 5' 5' 5'    Seated lumbar flex Pball 3way x10 ea Pball 3way x10 ea Pball 3 way 10 ea Pball 3 way 10 ea Pball 3 way 10ea Pball 3 way 10ea Pball 3 way 10ea    Figure 4 stretch supine or sitting  5\"x10 ea  30\"x3 ea 30\"x3 ea 30\"x3      Seated HS stretch  5\"x10 ea 30\"x3 30\"x3  30\"x3  30\"X3    Piriformis stretch 5\"x10 5\"x10 ea         Supine 90/90 sciatic stretch 10x3 10x3         LTR  5\"x10 ea w. OP 10ea  X15 ea 10ea X10 ea 10ea    clamshell  10x2 10ea X20 ea 20x  20x    SLR  10x2 10ea X10 ea 10ea  10ea    LAQ       1.5# 2x10    squats           Seated leg ext machine           Seated leg curl machine           Leg press           Fwharrison menchaca                    "   Education on UE strength exercises she can do           Ther Activity           Fwd Step ups             Lat step ups           Gait Training                                 Modalities               10 min  MHP 8' post MHP  8' post MHP

## 2024-04-23 ENCOUNTER — OFFICE VISIT (OUTPATIENT)
Dept: PHYSICAL THERAPY | Facility: CLINIC | Age: 59
End: 2024-04-23
Payer: COMMERCIAL

## 2024-04-23 DIAGNOSIS — M54.16 LUMBAR RADICULOPATHY: Primary | ICD-10-CM

## 2024-04-23 PROCEDURE — 97140 MANUAL THERAPY 1/> REGIONS: CPT

## 2024-04-23 PROCEDURE — 97110 THERAPEUTIC EXERCISES: CPT

## 2024-04-23 NOTE — PROGRESS NOTES
"Daily Note     Today's date: 2024  Patient name: Anamaria Rivera  : 1965  MRN: [id number]  Referring provider: Chencho Oshea MD  Dx:   Encounter Diagnosis     ICD-10-CM    1. Lumbar radiculopathy  M54.16                      Subjective:  Patient reports overall about the same    Objective: See treatment diary below      Assessment: Patient tolerated treatment fair. Continued relief with bike pre treatment and manual therapy. Moist heat post treatment. Will benefit from skilled PT to address impairments and return to PLOF    Plan: Continue per plan of care.      Precautions: HTN  Functional Limitations: prolonged sitting, standing, walking, grocery shopping, exercising  Impairments: lumbar ROM in all planes (reproduces LBP), BLE strength  [facility] Code: [email or web address]   POC expiration: 2024      Manuals 3/19 3/21 3/26 4/2 4/11 4/16 4/18 4/23    Lumbar STM WE  MB 15' WE  15' MB 15' WE  15' MB 15' MB 15'    Sidelying lumbar flexion mob   Grade3/4; 4'           20'                       Neuro Re-Ed                        Abdominal Bracing 5\"x10 5\"x10 5\"x10 5\"x10        TA+ Cyndie Add ball squeeze 5\"x10 5\"x10 5\"x10 5\"x10` 10\"x10 10\"x10 10\"x10 10\"x10    TA + marching      X10 ea 10ea 10ea                                        Ther Ex            bike  5 min 5' 5' 5' 5' 5' 5'    Seated lumbar flex Pball 3way x10 ea Pball 3way x10 ea Pball 3 way 10 ea Pball 3 way 10 ea Pball 3 way 10ea Pball 3 way 10ea Pball 3 way 10ea Pball 3 way 10ea    Figure 4 stretch supine or sitting  5\"x10 ea  30\"x3 ea 30\"x3 ea 30\"x3       Seated HS stretch  5\"x10 ea 30\"x3 30\"x3  30\"x3  30\"X3 20\"x3    Piriformis stretch 5\"x10 5\"x10 ea          Supine 90/90 sciatic stretch 10x3 10x3          LTR  5\"x10 ea w. OP 10ea  X15 ea 10ea X10 ea 10ea 10ea    clamshell  10x2 10ea X20 ea 20x  20x 20x    SLR  10x2 10ea X10 ea 10ea  10ea 10ea    LAQ       1.5# 2x10 1.5# 2x10    squats            Seated leg ext machine            Seated leg curl " machine            Leg press            Fwd lunge                        Education on UE strength exercises she can do            Ther Activity            Fwd Step ups              Lat step ups            Gait Training                                    Modalities                10 min  MHP 8' post MHP  8' post MHP 8' post MHP

## 2024-04-24 ENCOUNTER — OFFICE VISIT (OUTPATIENT)
Dept: PODIATRY | Facility: CLINIC | Age: 59
End: 2024-04-24
Payer: COMMERCIAL

## 2024-04-24 VITALS
WEIGHT: 225 LBS | BODY MASS INDEX: 36.16 KG/M2 | DIASTOLIC BLOOD PRESSURE: 80 MMHG | SYSTOLIC BLOOD PRESSURE: 130 MMHG | HEIGHT: 66 IN

## 2024-04-24 DIAGNOSIS — B35.1 TINEA UNGUIUM: Primary | ICD-10-CM

## 2024-04-24 PROCEDURE — 99212 OFFICE O/P EST SF 10 MIN: CPT | Performed by: PODIATRIST

## 2024-04-24 RX ORDER — CICLOPIROX 80 MG/ML
SOLUTION TOPICAL
Qty: 6.6 ML | Refills: 3 | Status: SHIPPED | OUTPATIENT
Start: 2024-04-24

## 2024-04-24 NOTE — PROGRESS NOTES
PATIENT:  Anamaria Rivera  1965       ASSESSMENT:     1. Tinea unguium  ciclopirox (PENLAC) 8 % solution              PLAN:  1. Reviewed medical records.  Patient was counseled and educated on the condition and the diagnosis.    2. The diagnosis, treatment options and prognosis were discussed with the patient.    3. Hold oral antifungals for now.  Possible repeat therapy in the future depending on the progress.  Continue Penlac.       Imaging: I have personally reviewed pertinent films in PACS  Labs, pathology, and Other Studies: I have personally reviewed pertinent reports.        Subjective:       HPI  The patient presents for follow-up on nail fungus.  She finished the Lamisil with no side effects.  Nails look little better.  No new complaint.       The following portions of the patient's history were reviewed and updated as appropriate: allergies, current medications, past family history, past medical history, past social history, past surgical history and problem list.  All pertinent labs and images were reviewed.      Past Medical History  Past Medical History:   Diagnosis Date   • ADHD (attention deficit hyperactivity disorder)    • Anxiety    • Arthritis    • Depression    • Fibromyalgia, primary    • GERD (gastroesophageal reflux disease)    • History of stomach ulcers    • Hypertension    • Panic attack    • Papanicolaou smear 2020   • Psychiatric disorder        Past Surgical History  Past Surgical History:   Procedure Laterality Date   • BREAST CYST ASPIRATION Left 2014   • HYSTEROSCOPY  2015   • MAMMO (HISTORICAL) Bilateral 03/05/2020    [facility] BI-RADS 1 Negative Scattered areas fibroglandular density; 25% to 50% glandular breast tissue        Allergies:  Latex    Medications:  Current Outpatient Medications   Medication Sig Dispense Refill   • albuterol (PROVENTIL HFA,VENTOLIN HFA) 90 mcg/act inhaler Inhale 2 puffs every 6 (six) hours as needed for wheezing or shortness of breath 8 g 3    • buPROPion (WELLBUTRIN XL) 150 mg 24 hr tablet Take 1 tablet (150 mg total) by mouth daily 90 tablet 1   • cholecalciferol (VITAMIN D3) 1,000 units tablet Take 1,000 Units by mouth daily     • ciclopirox (PENLAC) 8 % solution Apply topically daily at bedtime 6.6 mL 3   • Clocortolone Pivalate 0.1 % cream Apply topically 2 (two) times a day 45 g 3   • hydroCHLOROthiazide 12.5 mg tablet Take 1 tablet (12.5 mg total) by mouth daily 90 tablet 0   • hydrOXYzine HCL (ATARAX) 25 mg tablet Take 1 tablet (25 mg total) by mouth every 6 (six) hours as needed for itching or anxiety 30 tablet 0   • losartan (COZAAR) 100 MG tablet Take 1 tablet (100 mg total) by mouth daily 90 tablet 0   • Multiple Vitamin (multivitamin) tablet Take 1 tablet by mouth daily     • omeprazole (PriLOSEC) 40 MG capsule Take 1 capsule (40 mg total) by mouth daily Take before a meal 90 capsule 0   • QUEtiapine (SEROquel) 50 mg tablet 1 to 1 1/2 po q hs 135 tablet 1   • sertraline (ZOLOFT) 100 mg tablet Take 2 tablets (200 mg total) by mouth daily 180 tablet 0   • SF 5000 Plus 1.1 % CREA Take by mouth 2 (two) times a day 51 g 0   • terbinafine (LamISIL) 250 mg tablet        No current facility-administered medications for this visit.       Social History:  Social History     Socioeconomic History   • Marital status: Single     Spouse name: None   • Number of children: None   • Years of education: None   • Highest education level: None   Occupational History   • Occupation: Tech.    Tobacco Use   • Smoking status: Former     Passive exposure: Past   • Smokeless tobacco: Never   • Tobacco comments:     teen years   Vaping Use   • Vaping status: Never Used   Substance and Sexual Activity   • Alcohol use: Yes     Comment: Socially   • Drug use: No   • Sexual activity: Not Currently     Birth control/protection: Post-menopausal   Other Topics Concern   • None   Social History Narrative    Caffeine Intake: 1-2 cups per day    Do you smoke marijuana? Denies  "   Do you drink alcohol? Socially    Exercise: Occassionally    Domestic violence: No    History of drug/alcohol abuse denies     Social Determinants of Health     Financial Resource Strain: Not on file   Food Insecurity: Not on file   Transportation Needs: Not on file   Physical Activity: Not on file   Stress: Not on file   Social Connections: Not on file   Intimate Partner Violence: Not on file   Housing Stability: Not on file          Review of Systems   Constitutional:  Negative for chills and fever.   Respiratory:  Negative for cough and shortness of breath.    Cardiovascular:  Negative for chest pain.   Gastrointestinal:  Negative for nausea and vomiting.   Musculoskeletal:  Negative for gait problem.   Neurological:  Negative for weakness and numbness.   Hematological: Negative.    Psychiatric/Behavioral:  Negative for confusion.          Objective:      /80 (BP Location: Left arm, Patient Position: Sitting, Cuff Size: Adult)   Ht 5' 5.5\" (1.664 m) Comment: stated  Wt 102 kg (225 lb)   BMI 36.87 kg/m²          Physical Exam  Vitals reviewed.   Constitutional:       General: She is not in acute distress.     Appearance: She is not toxic-appearing or diaphoretic.   Cardiovascular:      Rate and Rhythm: Normal rate and regular rhythm.      Pulses: Normal pulses.           Dorsalis pedis pulses are 2+ on the right side and 2+ on the left side.        Posterior tibial pulses are 2+ on the right side and 2+ on the left side.   Pulmonary:      Effort: Pulmonary effort is normal. No respiratory distress.   Musculoskeletal:         General: No swelling, tenderness or signs of injury.      Right lower leg: No edema.      Left lower leg: No edema.      Right foot: No foot drop.      Left foot: No foot drop.   Skin:     General: Skin is warm.      Capillary Refill: Capillary refill takes less than 2 seconds.      Coloration: Skin is not cyanotic or mottled.      Findings: No abscess, ecchymosis or wound.      " Nails: There is no clubbing.      Comments: Decreased thickening and discoloration toenails bilateral great toes and 2nd toes.  Other nails are clear.   Neurological:      General: No focal deficit present.      Mental Status: She is alert.      Cranial Nerves: No cranial nerve deficit.      Sensory: No sensory deficit.      Motor: No weakness.      Coordination: Coordination normal.   Psychiatric:         Mood and Affect: Mood normal.         Behavior: Behavior normal.         Thought Content: Thought content normal.         Judgment: Judgment normal.

## 2024-04-25 ENCOUNTER — OFFICE VISIT (OUTPATIENT)
Dept: PHYSICAL THERAPY | Facility: CLINIC | Age: 59
End: 2024-04-25
Payer: COMMERCIAL

## 2024-04-25 DIAGNOSIS — M54.16 LUMBAR RADICULOPATHY: Primary | ICD-10-CM

## 2024-04-25 PROCEDURE — 97112 NEUROMUSCULAR REEDUCATION: CPT

## 2024-04-25 PROCEDURE — 97110 THERAPEUTIC EXERCISES: CPT

## 2024-04-25 PROCEDURE — 97140 MANUAL THERAPY 1/> REGIONS: CPT

## 2024-04-25 NOTE — PROGRESS NOTES
"Daily Note     Today's date: 2024  Patient name: Anamaria Rivera  : 1965  MRN: [id number]  Referring provider: Chencho Oshea MD  Dx:   Encounter Diagnosis     ICD-10-CM    1. Lumbar radiculopathy  M54.16                      Subjective:  Patient reports she still has a lot of pain after work which hasn't changed much but has been able to leave therapy without pain which is an improvement and lasts several hours before pain comes back    Objective: See treatment diary below      Assessment: Patient tolerated treatment fair. Pt continues to have relief with S/L position while manuals are performed. She still has fatigue and discomfort with TE's initially but bike, and stretches are relieving for her as well. Pt would continue to benefit from skilled PT.  Plan: Continue per plan of care.      Precautions: HTN  Functional Limitations: prolonged sitting, standing, walking, grocery shopping, exercising  Impairments: lumbar ROM in all planes (reproduces LBP), BLE strength  [facility] Code: [email or web address]   POC expiration: 2024      Manuals 3/19 3/21 3/26 4/2 4/11 4/16 4/18 4/23 4/25   Lumbar STM WE  MB 15' WE  15' MB 15' WE  15' MB 15' MB 15' WE 15'   Sidelying lumbar flexion mob   Grade3/4; 4'           20'                       Neuro Re-Ed                        Abdominal Bracing 5\"x10 5\"x10 5\"x10 5\"x10        TA+ Cyndie Add ball squeeze 5\"x10 5\"x10 5\"x10 5\"x10` 10\"x10 10\"x10 10\"x10 10\"x10 10\"x10   TA + marching      X10 ea 10ea 10ea 10xea                                       Ther Ex            bike  5 min 5' 5' 5' 5' 5' 5' 6'   Seated lumbar flex Pball 3way x10 ea Pball 3way x10 ea Pball 3 way 10 ea Pball 3 way 10 ea Pball 3 way 10ea Pball 3 way 10ea Pball 3 way 10ea Pball 3 way 10ea Pball 3 way 10ea   Figure 4 stretch supine or sitting  5\"x10 ea  30\"x3 ea 30\"x3 ea 30\"x3       Seated HS stretch  5\"x10 ea 30\"x3 30\"x3  30\"x3  30\"X3 20\"x3 20\"x3 ea   Piriformis stretch 5\"x10 5\"x10 ea          Supine 90/90 " "sciatic stretch 10x3 10x3          LTR  5\"x10 ea w. OP 10ea  X15 ea 10ea X10 ea 10ea 10ea X10 ea   clamshell  10x2 10ea X20 ea 20x  20x 20x X20 ea   SLR  10x2 10ea X10 ea 10ea  10ea 10ea X10 ea   LAQ       1.5# 2x10 1.5# 2x10 2#  10x2   squats            Seated leg ext machine            Seated leg curl machine            Leg press            Fwd lunge                        Education on UE strength exercises she can do            Ther Activity            Fwd Step ups              Lat step ups            Gait Training                                    Modalities                10 min  MHP 8' post MHP  8' post MHP 8' post MHP 8' post MHP                    "

## 2024-04-30 ENCOUNTER — OFFICE VISIT (OUTPATIENT)
Dept: PHYSICAL THERAPY | Facility: CLINIC | Age: 59
End: 2024-04-30
Payer: COMMERCIAL

## 2024-04-30 DIAGNOSIS — M54.16 LUMBAR RADICULOPATHY: Primary | ICD-10-CM

## 2024-04-30 PROCEDURE — 97110 THERAPEUTIC EXERCISES: CPT

## 2024-04-30 PROCEDURE — 97140 MANUAL THERAPY 1/> REGIONS: CPT

## 2024-04-30 PROCEDURE — 97112 NEUROMUSCULAR REEDUCATION: CPT

## 2024-04-30 NOTE — PROGRESS NOTES
"Daily Note     Today's date: 2024  Patient name: Anamaria Rivera  : 1965  MRN: [id number]  Referring provider: Chencho Oshea MD  Dx:   Encounter Diagnosis     ICD-10-CM    1. Lumbar radiculopathy  M54.16                      Subjective:  Patient reports slight improvements with a little more time of relief post therapy sessions.     Objective: See treatment diary below      Assessment: Patient tolerated treatment fair. Pt continues to have relief with S/L position while manuals are performed. Was able to progress some of the TE's with no adverse effects.  Pt would continue to benefit from skilled PT.  Plan: Continue per plan of care.      Precautions: HTN  Functional Limitations: prolonged sitting, standing, walking, grocery shopping, exercising  Impairments: lumbar ROM in all planes (reproduces LBP), BLE strength  [facility] Code: [email or web address]   POC expiration: 2024      Manuals 3/19 3/21 3/26 4/2 4/11 4/16 4/18 4/23 4/25 4/30   Lumbar STM WE  MB 15' WE  15' MB 15' WE  15' MB 15' MB 15' WE 15' WE  15'   Sidelying lumbar flexion mob   Grade3/4; 4'            20'                         Neuro Re-Ed                          Abdominal Bracing 5\"x10 5\"x10 5\"x10 5\"x10         TA+ Cyndie Add ball squeeze 5\"x10 5\"x10 5\"x10 5\"x10` 10\"x10 10\"x10 10\"x10 10\"x10 10\"x10 10\"x10x10 ea   TA + marching      X10 ea 10ea 10ea 10xea X10 ea                                          Ther Ex             bike  5 min 5' 5' 5' 5' 5' 5' 6' 6'   Seated lumbar flex Pball 3way x10 ea Pball 3way x10 ea Pball 3 way 10 ea Pball 3 way 10 ea Pball 3 way 10ea Pball 3 way 10ea Pball 3 way 10ea Pball 3 way 10ea Pball 3 way 10ea Pball 3 way 10ea   Figure 4 stretch supine or sitting  5\"x10 ea  30\"x3 ea 30\"x3 ea 30\"x3        Seated HS stretch  5\"x10 ea 30\"x3 30\"x3  30\"x3  30\"X3 20\"x3 20\"x3 ea Seated  20\"x3   Piriformis stretch 5\"x10 5\"x10 ea           Supine 90/90 sciatic stretch 10x3 10x3           LTR  5\"x10 ea w. OP 10ea  X15 ea 10ea X10 " "ea 10ea 10ea X10 ea X10 ea   clamshell  10x2 10ea X20 ea 20x  20x 20x X20 ea    SLR  10x2 10ea X10 ea 10ea  10ea 10ea X10 ea X10 ea   Bridges with Add ball squeeze          5\"x15   Open Books b/l          X10 ea   LAQ       1.5# 2x10 1.5# 2x10 2#  10x2    squats             Seated leg ext machine             Seated leg curl machine             Leg press             Fwd lunge                          Education on UE strength exercises she can do             Ther Activity             Fwd Step ups               Lat step ups             Gait Training                                       Modalities                 10 min  MHP 8' post MHP  8' post MHP 8' post MHP 8' post MHP 8' post MHP                     "

## 2024-05-02 ENCOUNTER — OFFICE VISIT (OUTPATIENT)
Dept: PHYSICAL THERAPY | Facility: CLINIC | Age: 59
End: 2024-05-02
Payer: COMMERCIAL

## 2024-05-02 ENCOUNTER — TELEPHONE (OUTPATIENT)
Dept: PSYCHIATRY | Facility: CLINIC | Age: 59
End: 2024-05-02

## 2024-05-02 DIAGNOSIS — I10 PRIMARY HYPERTENSION: ICD-10-CM

## 2024-05-02 DIAGNOSIS — M54.16 LUMBAR RADICULOPATHY: Primary | ICD-10-CM

## 2024-05-02 PROCEDURE — 97140 MANUAL THERAPY 1/> REGIONS: CPT

## 2024-05-02 PROCEDURE — 97110 THERAPEUTIC EXERCISES: CPT

## 2024-05-02 NOTE — PROGRESS NOTES
"Daily Note     Today's date: 2024  Patient name: Anamaria Rivera  : 1965  MRN: [id number]  Referring provider: Chencho Oshea MD  Dx:   Encounter Diagnosis     ICD-10-CM    1. Lumbar radiculopathy  M54.16                      Subjective:  Patient reports she is feeling a little worse today but having general body fatigue and soreness, noted she is feeling a little under the weather which could be causing the increased soreness.     Objective: See treatment diary below      Assessment: Patient tolerated treatment fair. Modified session today due to pt not really feeling well. She fatigued quicker and had soreness t/o Te'sd but still benefits from manual therapy. Would continue to benefit form skilled PT.     Plan: Continue per plan of care.      Precautions: HTN  Functional Limitations: prolonged sitting, standing, walking, grocery shopping, exercising  Impairments: lumbar ROM in all planes (reproduces LBP), BLE strength  [facility] Code: [email or web address]   POC expiration: 2024      Manuals 3/19 3/21 3/26 4/2 4/11 4/16 4/18 4/23 4/25 4/30 5/2   Lumbar STM WE  MB 15' WE  15' MB 15' WE  15' MB 15' MB 15' WE 15' WE  15' WE  15'   Sidelying lumbar flexion mob   Grade3/4; 4'             20'                           Neuro Re-Ed                            Abdominal Bracing 5\"x10 5\"x10 5\"x10 5\"x10          TA+ Cyndie Add ball squeeze 5\"x10 5\"x10 5\"x10 5\"x10` 10\"x10 10\"x10 10\"x10 10\"x10 10\"x10 10\"x10x10 ea 10\"x10   TA + marching      X10 ea 10ea 10ea 10xea X10 ea X10 ea                                             Ther Ex              bike  5 min 5' 5' 5' 5' 5' 5' 6' 6' 6'   Seated lumbar flex Pball 3way x10 ea Pball 3way x10 ea Pball 3 way 10 ea Pball 3 way 10 ea Pball 3 way 10ea Pball 3 way 10ea Pball 3 way 10ea Pball 3 way 10ea Pball 3 way 10ea Pball 3 way 10ea Pball 3 way 10ea   Figure 4 stretch supine or sitting  5\"x10 ea  30\"x3 ea 30\"x3 ea 30\"x3         Seated HS stretch  5\"x10 ea 30\"x3 30\"x3  30\"x3  30\"X3 " "20\"x3 20\"x3 ea Seated  20\"x3 Seated  20\"x3   Piriformis stretch 5\"x10 5\"x10 ea            Supine 90/90 sciatic stretch 10x3 10x3            LTR  5\"x10 ea w. OP 10ea  X15 ea 10ea X10 ea 10ea 10ea X10 ea X10 ea X10 ea   clamshell  10x2 10ea X20 ea 20x  20x 20x X20 ea  X10 ea   SLR  10x2 10ea X10 ea 10ea  10ea 10ea X10 ea X10 ea X10 ea   Bridges with Add ball squeeze          5\"x15    Open Books b/l          X10 ea X10 ea   LAQ       1.5# 2x10 1.5# 2x10 2#  10x2     squats              Seated leg ext machine              Seated leg curl machine              Leg press              Fwd lunge                            Education on UE strength exercises she can do              Ther Activity              Fwd Step ups                Lat step ups              Gait Training                                          Modalities                  10 min  MHP 8' post MHP  8' post MHP 8' post MHP 8' post MHP 8' post MHP                       "

## 2024-05-02 NOTE — TELEPHONE ENCOUNTER
Pt called to get her appt for 5/16 at 9am switched to a virtual visit due to n/a.  Writer switched appt.

## 2024-05-04 RX ORDER — LOSARTAN POTASSIUM 100 MG/1
100 TABLET ORAL DAILY
Qty: 90 TABLET | Refills: 0 | Status: SHIPPED | OUTPATIENT
Start: 2024-05-04

## 2024-05-07 ENCOUNTER — OFFICE VISIT (OUTPATIENT)
Dept: PHYSICAL THERAPY | Facility: CLINIC | Age: 59
End: 2024-05-07
Payer: COMMERCIAL

## 2024-05-07 DIAGNOSIS — F33.1 MODERATE EPISODE OF RECURRENT MAJOR DEPRESSIVE DISORDER (HCC): ICD-10-CM

## 2024-05-07 DIAGNOSIS — M54.16 LUMBAR RADICULOPATHY: Primary | ICD-10-CM

## 2024-05-07 DIAGNOSIS — R05.9 COUGH: ICD-10-CM

## 2024-05-07 PROCEDURE — 97110 THERAPEUTIC EXERCISES: CPT

## 2024-05-07 PROCEDURE — 97140 MANUAL THERAPY 1/> REGIONS: CPT

## 2024-05-07 RX ORDER — SERTRALINE HYDROCHLORIDE 100 MG/1
200 TABLET, FILM COATED ORAL DAILY
Qty: 180 TABLET | Refills: 1 | Status: SHIPPED | OUTPATIENT
Start: 2024-05-07

## 2024-05-07 RX ORDER — ALBUTEROL SULFATE 90 UG/1
2 AEROSOL, METERED RESPIRATORY (INHALATION) EVERY 6 HOURS PRN
Qty: 8.5 G | Refills: 5 | Status: SHIPPED | OUTPATIENT
Start: 2024-05-07

## 2024-05-07 NOTE — PROGRESS NOTES
"Daily Note     Today's date: 2024  Patient name: Anamaria Rivera  : 1965  MRN: [id number]  Referring provider: Chencho Oshea MD  Dx:   Encounter Diagnosis     ICD-10-CM    1. Lumbar radiculopathy  M54.16                      Subjective:  Patient reports she is feeling pretty decent today, definitely better than she has on previous therapy days.    Objective: See treatment diary below      Assessment: Patient tolerated treatment fair. Showing improved endurance with exercises with less discomfort. Continues to benefit from manual therapy.  Would continue to benefit form skilled PT.     Plan: Continue per plan of care.      Precautions: HTN  Functional Limitations: prolonged sitting, standing, walking, grocery shopping, exercising  Impairments: lumbar ROM in all planes (reproduces LBP), BLE strength  [facility] Code: [email or web address]   POC expiration: 2024      Manuals    Lumbar STM WE  15' MB 15' MB 15' WE 15' WE  15' WE  15' WE  15'   Sidelying lumbar flexion mob                              Neuro Re-Ed                    Abdominal Bracing          TA+ Cyndie Add ball squeeze 10\"x10 10\"x10 10\"x10 10\"x10 10\"x10x10 ea 10\"x10 10\"x10   TA + hip ABD TB       BTB  10x   TA + marching X10 ea 10ea 10ea 10xea X10 ea X10 ea x10                                           Ther Ex          bike 5' 5' 5' 6' 6' 6' 6'   Seated lumbar flex Pball 3 way 10ea Pball 3 way 10ea Pball 3 way 10ea Pball 3 way 10ea Pball 3 way 10ea Pball 3 way 10ea Pball 3 way 10ea   Figure 4 stretch supine or sitting          Seated HS stretch  30\"X3 20\"x3 20\"x3 ea Seated  20\"x3 Seated  20\"x3 Seated  20\"x3   Piriformis stretch          Supine 90/90 sciatic stretch          LTR X10 ea 10ea 10ea X10 ea X10 ea X10 ea X10 ea   clamshell  20x 20x X20 ea  X10 ea    SLR  10ea 10ea X10 ea X10 ea X10 ea X10 ea   Bridges with Add ball squeeze     5\"x15     Open Books b/l     X10 ea X10 ea X10 ea   LAQ  1.5# 2x10 1.5# 2x10 " 2#  10x2      squats          Seated leg ext machine          Seated leg curl machine          Leg press          Fwd lunge                    Education on UE strength exercises she can do          Ther Activity          Fwd Step ups          Lat step ups          Gait Training                              Modalities            8' post MHP 8' post MHP 8' post MHP 8' post MHP

## 2024-05-09 ENCOUNTER — APPOINTMENT (OUTPATIENT)
Dept: PHYSICAL THERAPY | Facility: CLINIC | Age: 59
End: 2024-05-09
Payer: COMMERCIAL

## 2024-05-10 ENCOUNTER — PATIENT MESSAGE (OUTPATIENT)
Dept: FAMILY MEDICINE CLINIC | Facility: CLINIC | Age: 59
End: 2024-05-10

## 2024-05-10 DIAGNOSIS — I10 PRIMARY HYPERTENSION: ICD-10-CM

## 2024-05-10 RX ORDER — LOSARTAN POTASSIUM 100 MG/1
100 TABLET ORAL DAILY
Qty: 30 TABLET | Refills: 0 | Status: SHIPPED | OUTPATIENT
Start: 2024-05-10 | End: 2024-05-14 | Stop reason: SDUPTHER

## 2024-05-10 NOTE — PATIENT COMMUNICATION
Patient called stated received a text from [employer] stating that prescription is refill too soon. Advised patient would need to contact [employer] and advise that this is an emergency supply to hold over until the mail order arrives, that way pharmacy can call and get an override from insurance.

## 2024-05-10 NOTE — TELEPHONE ENCOUNTER
Patient called stated received a text from [employer] stating that prescription is refill too soon. Advised patient would need to contact [employer] and advise that this is an emergency supply to hold over until the mail order arrives, that way pharmacy can call and get an override from insurance.

## 2024-05-12 ENCOUNTER — NURSE TRIAGE (OUTPATIENT)
Dept: OTHER | Facility: OTHER | Age: 59
End: 2024-05-12

## 2024-05-12 DIAGNOSIS — F33.1 MODERATE EPISODE OF RECURRENT MAJOR DEPRESSIVE DISORDER (HCC): ICD-10-CM

## 2024-05-12 NOTE — TELEPHONE ENCOUNTER
Regarding: Medication refill  ----- Message from Neha Diop sent at 5/12/2024 12:48 PM EDT -----  Medication Name :losartan (COZAAR) 100 MG tablet  Medication Dose and frequency:100 mg Daily   Medication Quantity: 30 tablet   Confirmed ordering Provider Yes [x] No[]  Confirmed pharmacy Yes [x] No []  Does pt have more than a 3 day supply left Yes [] No [x]

## 2024-05-12 NOTE — TELEPHONE ENCOUNTER
Pt losartan was sent in on Friday for 30 tablets. Pt made aware and verbalized understanding.   Reason for Disposition  • Patient has refills remaining on their prescription    Protocols used: Medication Refill and Renewal Call-ADULT-

## 2024-05-14 DIAGNOSIS — I10 PRIMARY HYPERTENSION: ICD-10-CM

## 2024-05-14 RX ORDER — LOSARTAN POTASSIUM 100 MG/1
100 TABLET ORAL DAILY
Qty: 90 TABLET | Refills: 3 | Status: SHIPPED | OUTPATIENT
Start: 2024-05-14

## 2024-05-14 RX ORDER — SERTRALINE HYDROCHLORIDE 100 MG/1
200 TABLET, FILM COATED ORAL DAILY
Qty: 180 TABLET | Refills: 0 | OUTPATIENT
Start: 2024-05-14

## 2024-05-16 ENCOUNTER — OFFICE VISIT (OUTPATIENT)
Dept: PHYSICAL THERAPY | Facility: CLINIC | Age: 59
End: 2024-05-16
Payer: COMMERCIAL

## 2024-05-16 ENCOUNTER — TELEMEDICINE (OUTPATIENT)
Dept: PSYCHIATRY | Facility: CLINIC | Age: 59
End: 2024-05-16

## 2024-05-16 DIAGNOSIS — M54.16 LUMBAR RADICULOPATHY: Primary | ICD-10-CM

## 2024-05-16 DIAGNOSIS — F33.1 MODERATE EPISODE OF RECURRENT MAJOR DEPRESSIVE DISORDER (HCC): ICD-10-CM

## 2024-05-16 DIAGNOSIS — F41.1 GAD (GENERALIZED ANXIETY DISORDER): Primary | ICD-10-CM

## 2024-05-16 PROCEDURE — 97140 MANUAL THERAPY 1/> REGIONS: CPT

## 2024-05-16 PROCEDURE — 97110 THERAPEUTIC EXERCISES: CPT

## 2024-05-16 RX ORDER — HYDROXYZINE HYDROCHLORIDE 25 MG/1
25 TABLET, FILM COATED ORAL EVERY 6 HOURS PRN
Qty: 30 TABLET | Refills: 2 | Status: SHIPPED | OUTPATIENT
Start: 2024-05-16

## 2024-05-16 NOTE — PROGRESS NOTES
"Daily Note     Today's date: 2024  Patient name: Anamaria Rivera  : 1965  MRN: [id number]  Referring provider: Chencho Oshea MD  Dx:   Encounter Diagnosis     ICD-10-CM    1. Lumbar radiculopathy  M54.16                      Subjective:  Patient reports she is very sore after a long day of work today, notes she had been treading in right direction but feels she is back to square one after today.     Objective: See treatment diary below      Assessment: Patient tolerated treatment fair.  Continues to benefit from manual therapy.  Fatigued quicker and had more discomfort t/o TE's today. Would continue to benefit form skilled PT.     Plan: Continue per plan of care.      Precautions: HTN  Functional Limitations: prolonged sitting, standing, walking, grocery shopping, exercising  Impairments: lumbar ROM in all planes (reproduces LBP), BLE strength  [facility] Code: [email or web address]   POC expiration: 2024      Manuals    Lumbar STM WE  15' MB 15' MB 15' WE 15' WE  15' WE  15' WE  15' WE  15'   Sidelying lumbar flexion mob                                 Neuro Re-Ed                      Abdominal Bracing           TA+ Cyndie Add ball squeeze 10\"x10 10\"x10 10\"x10 10\"x10 10\"x10x10 ea 10\"x10 10\"x10 10\"x10   TA + hip ABD TB       BTB  10x BTB  10x   TA + marching X10 ea 10ea 10ea 10xea X10 ea X10 ea x10 x10                                               Ther Ex           bike 5' 5' 5' 6' 6' 6' 6' 6'   Seated lumbar flex Pball 3 way 10ea Pball 3 way 10ea Pball 3 way 10ea Pball 3 way 10ea Pball 3 way 10ea Pball 3 way 10ea Pball 3 way 10ea Pball 3 way 10ea   Figure 4 stretch supine or sitting           Seated HS stretch  30\"X3 20\"x3 20\"x3 ea Seated  20\"x3 Seated  20\"x3 Seated  20\"x3 Seated  20\"x3   Piriformis stretch           Supine 90/90 sciatic stretch           LTR X10 ea 10ea 10ea X10 ea X10 ea X10 ea X10 ea X10 ea   clamshell  20x 20x X20 ea  X10 ea  X10 ea   SLR  10ea 10ea " "X10 ea X10 ea X10 ea X10 ea X10 ea   Bridges with Add ball squeeze     5\"x15      Open Books b/l     X10 ea X10 ea X10 ea X10 ea   LAQ  1.5# 2x10 1.5# 2x10 2#  10x2       squats           Seated leg ext machine           Seated leg curl machine           Leg press           Fwd lunge                      Education on UE strength exercises she can do           Ther Activity           Fwd Step ups           Lat step ups           Gait Training                                 Modalities             8' post MHP 8' post MHP 8' post MHP 8' post MHP                      "

## 2024-05-16 NOTE — PSYCH
This note was not shared with the patient due to reasonable likelihood of causing patient harm    Virtual Regular Visit    Verification of patient location:    Patient is located at Other in the following state in which I hold an active license [place]      Assessment/Plan:    Problem List Items Addressed This Visit          Behavioral Health    Moderate episode of recurrent major depressive disorder (HCC)    Relevant Medications    hydrOXYzine HCL (ATARAX) 25 mg tablet     Other Visit Diagnoses       DEISY (generalized anxiety disorder)    -  Primary    Relevant Medications    hydrOXYzine HCL (ATARAX) 25 mg tablet            Goals addressed in session: Goal 1          Reason for visit is   Chief Complaint   Patient presents with    Follow-up    Medication Management    Virtual Regular Visit          Encounter provider Alaina Arias PA-C      Recent Visits  No visits were found meeting these conditions.  Showing recent visits within past 7 days and meeting all other requirements  Today's Visits  Date Type Provider Dept   05/16/24 Telemedicine Alaina Arias PA-C [facility]   Showing today's visits and meeting all other requirements  Future Appointments  No visits were found meeting these conditions.  Showing future appointments within next 150 days and meeting all other requirements       The patient was identified by name and date of birth. Anamaria Rivera was informed that this is a telemedicine visit and that the visit is being conducted throughthe Epic Embedded platform. She agrees to proceed..  My office door was closed. No one else was in the room.  She acknowledged consent and understanding of privacy and security of the video platform. The patient has agreed to participate and understands they can discontinue the visit at any time.    Patient is aware this is a billable service.     Subjective  Anamaria Rivera is a 59 y.o. female with history of anxiety and depression.      HPI  "  Anamaria was seen for follow-up and for medication management.  At her last visit Wellbutrin was decreased from 300 mg in the morning to 150 mg in the morning.    She was maintained on 200 mg of sertraline, Seroquel 50 to 75 mg at bedtime and hydroxyzine as needed.  States that she has been experiencing what she describes as \"brain zaps\".  Questioning if this is due to reduction in the Wellbutrin.  She decreased Wellbutrin in March so unlikely this is an effect of that.  She continues to reside in a hotel with her brother since their house is being professionally cleaned.  She is hopeful to get back into their home in June.  As a result she states that she has not been following her normal schedule routine.  Her nutrition has been \"worse\" and she is not drinking adequately since she does not like the hotel water.  States that she has reduced her alcohol intake so and is not drinking on a daily basis.  Discussed adequate hydration and this could be causing her symptoms.  Also states that she is feeling more tired during the day.  She continues to work on sleep hygiene and states that she has continued to stay awake late at night.  Has continued to take Seroquel 50 mg in the  Increase to 75.    Past Medical History:   Diagnosis Date    ADHD (attention deficit hyperactivity disorder)     Anxiety     Arthritis     Depression     Fibromyalgia, primary     GERD (gastroesophageal reflux disease)     History of stomach ulcers     Hypertension     Panic attack     Papanicolaou smear 2020    Psychiatric disorder        Past Surgical History:   Procedure Laterality Date    BREAST CYST ASPIRATION Left 2014    HYSTEROSCOPY  2015    MAMMO (HISTORICAL) Bilateral 03/05/2020    [facility] BI-RADS 1 Negative Scattered areas fibroglandular density; 25% to 50% glandular breast tissue       Current Outpatient Medications   Medication Sig Dispense Refill    hydrOXYzine HCL (ATARAX) 25 mg tablet Take 1 tablet (25 mg total) by mouth every 6 " (six) hours as needed for itching or anxiety 30 tablet 2    albuterol (PROVENTIL HFA,VENTOLIN HFA) 90 mcg/act inhaler Inhale 2 puffs every 6 (six) hours as needed for wheezing or shortness of breath 8.5 g 5    buPROPion (WELLBUTRIN XL) 150 mg 24 hr tablet Take 1 tablet (150 mg total) by mouth daily 90 tablet 1    cholecalciferol (VITAMIN D3) 1,000 units tablet Take 1,000 Units by mouth daily      ciclopirox (PENLAC) 8 % solution Apply topically daily at bedtime 6.6 mL 3    Clocortolone Pivalate 0.1 % cream Apply topically 2 (two) times a day 45 g 3    hydroCHLOROthiazide 12.5 mg tablet Take 1 tablet (12.5 mg total) by mouth daily 90 tablet 0    losartan (COZAAR) 100 MG tablet Take 1 tablet (100 mg total) by mouth daily 90 tablet 3    Multiple Vitamin (multivitamin) tablet Take 1 tablet by mouth daily      omeprazole (PriLOSEC) 40 MG capsule Take 1 capsule (40 mg total) by mouth daily Take before a meal 90 capsule 0    QUEtiapine (SEROquel) 50 mg tablet 1 to 1 1/2 po q hs 135 tablet 1    sertraline (ZOLOFT) 100 mg tablet Take 2 tablets (200 mg total) by mouth daily 180 tablet 1    SF 5000 Plus 1.1 % CREA Take by mouth 2 (two) times a day 51 g 0    terbinafine (LamISIL) 250 mg tablet        No current facility-administered medications for this visit.        Allergies   Allergen Reactions    Latex Rash       Review of Systems  As noted in HPI  Video Exam    There were no vitals filed for this visit.    Physical Exam   Mental status exam:  Speech is clear, coherent, normal rate and volume  Adequate hygiene, fair eye contact  Mood is anxious  Affect is congruent  circumstantial thought process  No suicidal or homicidal ideation  No psychotic signs or symptoms noted, no hallucinations and no delusions were voiced  Cognition is intact  Insight and judgment is intact    Medications and treatment plan as follows:  Wellbutrin  mg every morning  Sertraline 200 mg daily  Seroquel 50 to 75 mg at bedtime  Hydroxyzine as  needed    She will follow-up in 2 months, sooner if any questions or concerns    We will follow-up on Monday, July 22, 2024 at 9 AM for virtual appointment    Visit Time    Visit Start Time: 0852  Visit Stop Time: 0922  Total Visit Duration:  30 minutes

## 2024-05-21 ENCOUNTER — EVALUATION (OUTPATIENT)
Dept: PHYSICAL THERAPY | Facility: CLINIC | Age: 59
End: 2024-05-21
Payer: COMMERCIAL

## 2024-05-21 DIAGNOSIS — M54.16 LUMBAR RADICULOPATHY: Primary | ICD-10-CM

## 2024-05-21 PROCEDURE — 97140 MANUAL THERAPY 1/> REGIONS: CPT

## 2024-05-21 PROCEDURE — 97110 THERAPEUTIC EXERCISES: CPT

## 2024-05-21 NOTE — PROGRESS NOTES
PT Re-Evaluation     Today's date: 2024  Patient name: Anamaria Rivera  : 1965  MRN: [id number]  Referring provider: Chencho Oshea MD  Dx:   Encounter Diagnosis     ICD-10-CM    1. Lumbar radiculopathy  M54.16                      Assessment  Impairments: abnormal gait, abnormal or restricted ROM, activity intolerance, impaired balance, impaired physical strength, lacks appropriate home exercise program and pain with function    Assessment details: Anamaria Rivera is a pleasant 59 y.o. female who presents with chronic bilateral LBP with radiating pain into bilateral lower extremities (L>R). She presents with painful lumbar ROM in all planes that is mildly limited, mild BLE strength limitations (L>R), and hip ER/IR ROM WNLs, normal DTR's. These signs and symptoms are limiting her with prolonged sitting, prolonged standing and walking, exercising, grocery shopping, walking after prolonged sitting. These signs and symptoms are consistent with Lumbar radiculopathy and spinal stenosis. She will benefit from skilled PT to address impairments and return to maximum level of function. At this time no referral is necessary based on examination.     2024: Patient has made some improvements since beginning skilled PT. She has improved lumbar ROM in all planes, and also mild strength improvements. She is primarily limited by pain and parasthesias in BLE's that has not significantly improved since beginning PT. She is limited with prolonged sitting at work, prolonged walking and standing. She will benefit from returning to follow up with Dr. Oshea - will hold PT after the next visits she is scheduled for until she sees the surgeon to then determine further plan.      Prognosis details: Positive prognostic indicators include positive attitude toward recovery, motivated to improve, good understanding of condition, realistic expectations.  Negative prognostic indicators include chronicity of symptoms, high  symptom irritability.    Goals  STG to be achieved in 6 weeks  Patient will have improved lumbar flexion and extension ROM to WNLs with minimal to no pain(met)  Patient will have improved gross BLE strength to 5/5(good progress)  Patient will have less pain and difficulty with walking to her car at the end of the work day(some change)  Patient will be independent with HEP.(Met)    LTG to be achieved in 12 weeks  Patient will be able to do exercise with modifications to limit symptom reproduction (some change)  Patient will be able to stand for longer periods of time (some progress)  Patient will walk in the store with less pain/difficulty (some progress)      Plan  Patient would benefit from: skilled physical therapy  Planned modality interventions: cryotherapy and thermotherapy: hydrocollator packs    Planned therapy interventions: balance, home exercise program, gait training, functional ROM exercises, body mechanics training, joint mobilization, manual therapy, neuromuscular re-education, therapeutic exercise, therapeutic activities, stretching, strengthening and flexibility    Frequency: 2x/week tapering to 1x/week over the next 12 weeks.  Duration in weeks: 12  Plan of Care beginning date: 5/21/2024  Plan of Care expiration date: 7/16/2024  Treatment plan discussed with: patient        Subjective Evaluation    History of Present Illness  Date of onset: 3/4/2015  Mechanism of injury: Patient reports history of bilateral LBP with bilateral leg pain and numbness for 9 years. She reports her legs bother her more than her low back pain (L>R). She has pain and numbness in her legs. 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 Reports pain is worse at the end of the day after prolonged sitting which makes walking to her car at the end of the day difficult. Difficulty with standing in place and walking long distances. Unable to go out shopping - she uses the cart in the grocery store  "and notes she leans forward on the cart. She has temporary relief in her legs when doing a standing hamstring stretch in a flexed posture.     She had injections years ago which did not provide relief at that time. Dr. Oshea is recommending she try them again. He did discuss surgical option if no relief with conservative treatment.     2024: Patient noted sometimes she has a longer relief from physical therapy on some days more than other days. Pain and numbness in legs is consistently worse after sitting all day at work. She still has difficulty with prolonged walking as well. She has a follow up with Dr. Oshea on .    Patient Goals  Patient goal: Patient would like to be able to walk more, exercise more, and have periods of time without pain.  Pain  Current pain ratin  At best pain ratin  At worst pain rating: 10  Quality: radiating      Diagnostic Tests    F[facility] comments: X-ray results per chart review:   \"Moderate multilevel spondylosis and facet disease worse in the lower lumbar spine.  Mild anterolisthesis of L2 on L3.\"    MRI results per chart review:   \"1. Advanced spondylotic changes of the lumbar spine including multifactorial moderate to severe central canal stenosis at the L2-3 and L3-4 levels. There is also multifactorial moderate to severe left L5-S1 foraminal stenosis concerning for impingement   of the exiting left L5 nerve root.  2. Mild levoscoliosis.\"  Treatments  Previous treatment: injection treatment and physical therapy        Objective     Neurological Testing     Reflexes   Left   Patellar (L4): normal (2+)  Achilles (S1): normal (2+)    Right   Patellar (L4): normal (2+)  Achilles (S1): normal (2+)    Active Range of Motion     Lumbar   Flexion:  WFL  Extension:  WFL    Additional Active Range of Motion Details  Bilateral sideglide: WNLs, pain in low back and bilateral hips    Passive Range of Motion   Left Hip   External rotation (90/90): WFL  Internal rotation " "(90/90): WFL    Right Hip   External rotation (90/90): WFL  Internal rotation (90/90): WFL    Additional Passive Range of Motion Details  Patient reported PROM ER \"felt good\"    Strength/Myotome Testing     Left Hip   Planes of Motion   Flexion: 4+    Right Hip   Planes of Motion   Flexion: 4+    Left Knee   Flexion: 4-  Extension: 5    Right Knee   Flexion: 4  Extension: 5    Left Ankle/Foot   Dorsiflexion: 5  Inversion: 5  Eversion: 5    Right Ankle/Foot   Dorsiflexion: 5  Inversion: 5  Eversion: 5    Tests     Lumbar     Left   Negative crossed SLR and passive SLR.     Right   Negative crossed SLR and passive SLR.     Ambulation     Observational Gait   Gait: antalgic              Precautions: HTN  Functional Limitations: prolonged sitting, standing, walking, grocery shopping, exercising  Impairments: lumbar ROM in all planes (reproduces LBP), BLE strength  [facility] Code: [email or web address]   POC expiration: 7/16/2024    Manuals 4/30 5/2 5/7 5/16 5/21     Lumbar STM WE  15' WE  15' WE  15' WE  15' MB 10'     Sidelying lumbar flexion mob                              Neuro Re-Ed                    Abdominal Bracing          TA+ Cyndie Add ball squeeze 10\"x10x10 ea 10\"x10 10\"x10 10\"x10 10\"x10     TA + hip ABD TB   BTB  10x BTB  10x BTB 10x     TA + marching X10 ea X10 ea x10 x10 nv                                             Ther Ex          bike 6' 6' 6' 6' 7'     Seated lumbar flex Pball 3 way 10ea Pball 3 way 10ea Pball 3 way 10ea Pball 3 way 10ea nv     DKTC w/ pball     10x     Seated HS stretch Seated  20\"x3 Seated  20\"x3 Seated  20\"x3 Seated  20\"x3      Piriformis stretch          Supine 90/90 sciatic stretch          LTR X10 ea X10 ea X10 ea X10 ea 10ea     clamshell  X10 ea  X10 ea 2x10 ea     SLR X10 ea X10 ea X10 ea X10 ea 10ea     Bridges with Add ball squeeze 5\"x15         Open Books b/l X10 ea X10 ea X10 ea X10 ea 10ea     LAQ          squats          Seated leg ext machine          Seated leg curl machine        "   Leg press          Fwd lunge                    Education on UE strength exercises she can do          Ther Activity          Fwd Step ups          Lat step ups          Gait Training                              Modalities           8' post P

## 2024-05-23 ENCOUNTER — OFFICE VISIT (OUTPATIENT)
Dept: PHYSICAL THERAPY | Facility: CLINIC | Age: 59
End: 2024-05-23
Payer: COMMERCIAL

## 2024-05-23 DIAGNOSIS — M54.16 LUMBAR RADICULOPATHY: Primary | ICD-10-CM

## 2024-05-23 PROCEDURE — 97140 MANUAL THERAPY 1/> REGIONS: CPT

## 2024-05-23 PROCEDURE — 97110 THERAPEUTIC EXERCISES: CPT

## 2024-05-23 NOTE — PROGRESS NOTES
"Daily Note     Today's date: 2024  Patient name: Anamaria Rivera  : 1965  MRN: [id number]  Referring provider: Chencho Oshea MD  Dx:   Encounter Diagnosis     ICD-10-CM    1. Lumbar radiculopathy  M54.16                      Subjective: Patient notes overall about the same - more leg pain after working most of the day      Objective: See treatment diary below      Assessment: Patient tolerated treatment well overall. Noted decreased leg pain after bike for 6 min. Continued manual therapy today. Will hold PT until after next visit on 2024 until she follows up with Dr. Oshea      Plan: hold PT after next visit on 2024 until surgeon follow up     Precautions: HTN  Functional Limitations: prolonged sitting, standing, walking, grocery shopping, exercising  Impairments: lumbar ROM in all planes (reproduces LBP), BLE strength  [facility] Code: [email or web address]   POC expiration: 2024    Manuals     Lumbar STM WE  15' WE  15' WE  15' WE  15' MB 10' MB 10'    Sidelying lumbar flexion mob                              Neuro Re-Ed                    Abdominal Bracing          TA+ Cyndie Add ball squeeze 10\"x10x10 ea 10\"x10 10\"x10 10\"x10 10\"x10 10\"x10    TA + hip ABD TB   BTB  10x BTB  10x BTB 10x BTB 2x10    TA + marching X10 ea X10 ea x10 x10 nv 10                                            Ther Ex          bike 6' 6' 6' 6' 7' 6'    Seated lumbar flex Pball 3 way 10ea Pball 3 way 10ea Pball 3 way 10ea Pball 3 way 10ea nv 5\"x5ea    DKTC w/ pball     10x 15x    Seated HS stretch Seated  20\"x3 Seated  20\"x3 Seated  20\"x3 Seated  20\"x3      Piriformis stretch          Supine 90/90 sciatic stretch          LTR X10 ea X10 ea X10 ea X10 ea 10ea 10 ea    clamshell  X10 ea  X10 ea 2x10 ea 2x10 ea    SLR X10 ea X10 ea X10 ea X10 ea 10ea 10ea    Bridges with Add ball squeeze 5\"x15         Open Books b/l X10 ea X10 ea X10 ea X10 ea 10ea 10 ea    LAQ          squats          Seated leg ext " machine          Seated leg curl machine          Leg press          Fwd lunge                    Education on UE strength exercises she can do          Ther Activity          Fwd Step ups          Lat step ups          Gait Training                              Modalities           8' post P

## 2024-05-30 ENCOUNTER — OFFICE VISIT (OUTPATIENT)
Dept: PHYSICAL THERAPY | Facility: CLINIC | Age: 59
End: 2024-05-30
Payer: COMMERCIAL

## 2024-05-30 DIAGNOSIS — M54.16 LUMBAR RADICULOPATHY: Primary | ICD-10-CM

## 2024-05-30 PROCEDURE — 97140 MANUAL THERAPY 1/> REGIONS: CPT

## 2024-05-30 PROCEDURE — 97110 THERAPEUTIC EXERCISES: CPT

## 2024-05-30 NOTE — PROGRESS NOTES
"Daily Note     Today's date: 2024  Patient name: Anamaria Rivera  : 1965  MRN: [id number]  Referring provider: Chencho Oshea MD  Dx:   Encounter Diagnosis     ICD-10-CM    1. Lumbar radiculopathy  M54.16                      Subjective: Patient to see Dr. Oshea tomorrow and make plan going forward.       Objective: See treatment diary below      Assessment: Patient tolerated treatment fair. 10 mins late arrival- session modified. Continued with core strengthening and lumbar mobility and education on continuing to be consistent going forward. Has continued to find short term relief with PT but nothing sustainable for any long term relief.     Plan: hold on PT for now, returning to PCP.      Precautions: HTN  Functional Limitations: prolonged sitting, standing, walking, grocery shopping, exercising  Impairments: lumbar ROM in all planes (reproduces LBP), BLE strength  [facility] Code: [email or web address]   POC expiration: 2024    Manuals    Lumbar STM WE  15' WE  15' WE  15' WE  15' MB 10' MB 10' WE  10'   Sidelying lumbar flexion mob                              Neuro Re-Ed                    Abdominal Bracing          TA+ Cyndie Add ball squeeze 10\"x10x10 ea 10\"x10 10\"x10 10\"x10 10\"x10 10\"x10 10\"x10   TA + hip ABD TB   BTB  10x BTB  10x BTB 10x BTB 2x10 BTB  10x2   TA + marching X10 ea X10 ea x10 x10 nv 10 x10                                           Ther Ex          bike 6' 6' 6' 6' 7' 6' 5'   Seated lumbar flex Pball 3 way 10ea Pball 3 way 10ea Pball 3 way 10ea Pball 3 way 10ea nv 5\"x5ea    DKTC w/ pball     10x 15x x10   Seated HS stretch Seated  20\"x3 Seated  20\"x3 Seated  20\"x3 Seated  20\"x3      Piriformis stretch          Supine 90/90 sciatic stretch          LTR X10 ea X10 ea X10 ea X10 ea 10ea 10 ea x10   clamshell  X10 ea  X10 ea 2x10 ea 2x10 ea x10   SLR X10 ea X10 ea X10 ea X10 ea 10ea 10ea x10   Bridges with Add ball squeeze 5\"x15         Open Books b/l X10 ea " X10 ea X10 ea X10 ea 10ea 10 ea x10   LAQ          squats          Seated leg ext machine          Seated leg curl machine          Leg press          Fwd lunge                    Education on UE strength exercises she can do          Ther Activity          Fwd Step ups          Lat step ups          Gait Training                              Modalities           8' post P

## 2024-05-31 ENCOUNTER — OFFICE VISIT (OUTPATIENT)
Dept: OBGYN CLINIC | Facility: HOSPITAL | Age: 59
End: 2024-05-31
Payer: COMMERCIAL

## 2024-05-31 VITALS — BODY MASS INDEX: 36.14 KG/M2 | WEIGHT: 224.87 LBS | HEIGHT: 66 IN

## 2024-05-31 DIAGNOSIS — M54.16 LUMBAR RADICULOPATHY: Primary | ICD-10-CM

## 2024-05-31 DIAGNOSIS — K21.9 GASTROESOPHAGEAL REFLUX DISEASE, UNSPECIFIED WHETHER ESOPHAGITIS PRESENT: ICD-10-CM

## 2024-05-31 PROCEDURE — 99212 OFFICE O/P EST SF 10 MIN: CPT | Performed by: ORTHOPAEDIC SURGERY

## 2024-05-31 RX ORDER — OMEPRAZOLE 40 MG/1
40 CAPSULE, DELAYED RELEASE ORAL DAILY
Qty: 90 CAPSULE | Refills: 1 | Status: SHIPPED | OUTPATIENT
Start: 2024-05-31
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
## 2024-06-11 ENCOUNTER — OFFICE VISIT (OUTPATIENT)
Dept: PHYSICAL THERAPY | Facility: CLINIC | Age: 59
End: 2024-06-11
Payer: COMMERCIAL

## 2024-06-11 DIAGNOSIS — M54.16 LUMBAR RADICULOPATHY: Primary | ICD-10-CM

## 2024-06-11 PROCEDURE — 97140 MANUAL THERAPY 1/> REGIONS: CPT

## 2024-06-11 PROCEDURE — 97110 THERAPEUTIC EXERCISES: CPT

## 2024-06-11 NOTE — PROGRESS NOTES
"Daily Note     Today's date: 2024  Patient name: Anamaria Rivera  : 1965  MRN: [id number]  Referring provider: Chencho Oshea MD  Dx:   Encounter Diagnosis     ICD-10-CM    1. Lumbar radiculopathy  M54.16                      Subjective: Patient saw PCP Dr. Oshea who suggested cont PT and referred her to pain management for possible injections.        Objective: See treatment diary below      Assessment: Modified session slight due to late arrival time.   Continued to work on LE strengthening and mobility as well as low back mobility, core strengthening and pain control. She benefits from stretches issued but isnt as consistent as she would like to be with these stretches at home- also due to unfavorable living situation at this time. Pt walking posture much improved during morning session rather than afternoon appointments after a long day of work. Will continue to strengthen as able.     Plan: Continue with PT per POC     Precautions: HTN  Functional Limitations: prolonged sitting, standing, walking, grocery shopping, exercising  Impairments: lumbar ROM in all planes (reproduces LBP), BLE strength  [facility] Code: [email or web address]   POC expiration: 2024    Manuals    Lumbar STM MB 10' MB 10' WE  10' WE  10'   Sidelying lumbar flexion mob                     Neuro Re-Ed              Abdominal Bracing       TA+ Cyndie Add ball squeeze 10\"x10 10\"x10 10\"x10 10\"x10   TA + hip ABD TB BTB 10x BTB 2x10 BTB  10x2    TA + marching nv 10 x10                                Ther Ex       bike 7' 6' 5' 5'   Seated lumbar flex nv 5\"x5ea     DKTC w/ pball 10x 15x x10 5\"x10   Seated HS stretch       Piriformis stretch       Supine 90/90 sciatic stretch       LTR 10ea 10 ea x10 x10   clamshell 2x10 ea 2x10 ea x10 X10 ea   SLR 10ea 10ea x10 X10 ea   Bridges with Add ball squeeze       Open Books b/l 10ea 10 ea x10 x10   LAQ       squats       Seated leg ext machine       Seated leg curl machine     "   Leg press       Fwd lunge              Education on UE strength exercises she can do       Ther Activity       Fwd Step ups       Lat step ups       Gait Training                     Modalities

## 2024-06-13 ENCOUNTER — OFFICE VISIT (OUTPATIENT)
Dept: PHYSICAL THERAPY | Facility: CLINIC | Age: 59
End: 2024-06-13
Payer: COMMERCIAL

## 2024-06-13 DIAGNOSIS — M54.16 LUMBAR RADICULOPATHY: Primary | ICD-10-CM

## 2024-06-13 PROCEDURE — 97140 MANUAL THERAPY 1/> REGIONS: CPT

## 2024-06-13 PROCEDURE — 97110 THERAPEUTIC EXERCISES: CPT

## 2024-06-13 NOTE — PROGRESS NOTES
"Daily Note     Today's date: 2024  Patient name: Anamaria Rivera  : 1965  MRN: [id number]  Referring provider: Chencho Oshea MD  Dx:   Encounter Diagnosis     ICD-10-CM    1. Lumbar radiculopathy  M54.16                      Subjective: Patient reports continued soreness, worse after a long work day.        Objective: See treatment diary below      Assessment:   Continued to work on LE strengthening and mobility as well as low back mobility, core strengthening and pain control. Presented today with LLD which was corrected with an SI MET and pt felt some relief ambulating post.  She benefits from stretches issued but isnt as consistent as she would like to be with these stretches at home- also due to unfavorable living situation at this time. Pt walking posture much improved during morning session rather than afternoon appointments after a long day of work. Will continue to strengthen as able.     Plan: Continue with PT per POC     Precautions: HTN  Functional Limitations: prolonged sitting, standing, walking, grocery shopping, exercising  Impairments: lumbar ROM in all planes (reproduces LBP), BLE strength  [employer] Code: [email or web address]   POC expiration: 2024    Manuals    Lumbar STM MB 10' MB 10' WE  10' WE  10' WE  10'   Sidelying lumbar flexion mob        SI MET     WE           Neuro Re-Ed                Abdominal Bracing        TA+ Cyndie Add ball squeeze 10\"x10 10\"x10 10\"x10 10\"x10 10\"x10   TA + hip ABD TB BTB 10x BTB 2x10 BTB  10x2  BTB  10x2   TA + marching nv 10 x10  x10                                   Ther Ex        bike 7' 6' 5' 5' 6'   Seated lumbar flex nv 5\"x5ea      DKTC w/ pball 10x 15x x10 5\"x10 5\"x10   Seated HS stretch        Piriformis stretch        Supine 90/90 sciatic stretch        LTR 10ea 10 ea x10 x10 x10   clamshell 2x10 ea 2x10 ea x10 X10 ea X10 ea   SLR 10ea 10ea x10 X10 ea X20 ea   Bridges with Add ball squeeze        Open Books b/l 10ea 10 ea " x10 x10 X10 ea   LAQ        squats        Seated leg ext machine        Seated leg curl machine        Leg press        Fwd lunge                Education on UE strength exercises she can do        Ther Activity        Fwd Step ups        Lat step ups        Gait Training                        Modalities

## 2024-06-18 ENCOUNTER — OFFICE VISIT (OUTPATIENT)
Dept: PHYSICAL THERAPY | Facility: CLINIC | Age: 59
End: 2024-06-18
Payer: COMMERCIAL

## 2024-06-18 DIAGNOSIS — M54.16 LUMBAR RADICULOPATHY: Primary | ICD-10-CM

## 2024-06-18 PROCEDURE — 97140 MANUAL THERAPY 1/> REGIONS: CPT

## 2024-06-18 PROCEDURE — 97110 THERAPEUTIC EXERCISES: CPT

## 2024-06-18 NOTE — PROGRESS NOTES
"Daily Note     Today's date: 2024  Patient name: Anamaria Rivera  : 1965  MRN: [id number]  Referring provider: Chencho Oshea MD  Dx:   Encounter Diagnosis     ICD-10-CM    1. Lumbar radiculopathy  M54.16                      Subjective: Patient reports continued soreness, bed at hotel is making issues much worse      Objective: See treatment diary below      Assessment:   Continued to work on LE strengthening and mobility as well as low back mobility, core strengthening and pain control. Again presented with LLD which was corrected with an SI MET with some relief ambulating post. Tolerates continued progressions to program. Benefits from manual therapy. Pt walking posture much improved during morning session rather than afternoon appointments after a long day of work. Will continue to strengthen as able.     Plan: Continue with PT per POC     Precautions: HTN  Functional Limitations: prolonged sitting, standing, walking, grocery shopping, exercising  Impairments: lumbar ROM in all planes (reproduces LBP), BLE strength  [employer] [facility] Code: [email or web address]   POC expiration: 2024    Manuals    Lumbar STM MB 10' MB 10' WE  10' WE  10' WE  10' WE  10'   Sidelying lumbar flexion mob         SI MET     WE WE            Neuro Re-Ed                  Abdominal Bracing         TA+ Cyndie Add ball squeeze 10\"x10 10\"x10 10\"x10 10\"x10 10\"x10 10\"x10   TA + hip ABD TB BTB 10x BTB 2x10 BTB  10x2  BTB  10x2 BTB  10x2   TA + marching nv 10 x10  x10 x10   Bridges with ADD ball squeeze      x10                              Ther Ex         bike 7' 6' 5' 5' 6' 6'   Seated lumbar flex nv 5\"x5ea       DKTC w/ pball 10x 15x x10 5\"x10 5\"x10 5\"x10   Seated HS stretch         Piriformis stretch         Supine 90/90 sciatic stretch         LTR 10ea 10 ea x10 x10 x10 W. Pball  X10 ea   clamshell 2x10 ea 2x10 ea x10 X10 ea X10 ea BTB  X20 ea   SLR 10ea 10ea x10 X10 ea X20 ea X10 ea   Bridges with Add ball " squeeze         Open Books b/l 10ea 10 ea x10 x10 X10 ea X10 ea   LAQ         squats         Seated leg ext machine         Seated leg curl machine         Leg press         Fwd lunge                  Education on UE strength exercises she can do         Ther Activity         Fwd Step ups         Lat step ups         Gait Training                           Modalities

## 2024-06-20 ENCOUNTER — APPOINTMENT (OUTPATIENT)
Dept: PHYSICAL THERAPY | Facility: CLINIC | Age: 59
End: 2024-06-20
Payer: COMMERCIAL

## 2024-06-25 ENCOUNTER — APPOINTMENT (OUTPATIENT)
Dept: PHYSICAL THERAPY | Facility: CLINIC | Age: 59
End: 2024-06-25
Payer: COMMERCIAL

## 2024-06-27 ENCOUNTER — OFFICE VISIT (OUTPATIENT)
Dept: PHYSICAL THERAPY | Facility: CLINIC | Age: 59
End: 2024-06-27
Payer: COMMERCIAL

## 2024-06-27 DIAGNOSIS — M54.16 LUMBAR RADICULOPATHY: Primary | ICD-10-CM

## 2024-06-27 PROCEDURE — 97110 THERAPEUTIC EXERCISES: CPT

## 2024-06-27 PROCEDURE — 97140 MANUAL THERAPY 1/> REGIONS: CPT

## 2024-06-27 NOTE — PROGRESS NOTES
"Daily Note     Today's date: 2024  Patient name: Anamaria Rivera  : 1965  MRN: [id number]  Referring provider: Chencho Oshea MD  Dx:   Encounter Diagnosis     ICD-10-CM    1. Lumbar radiculopathy  M54.16                      Subjective: Patient reports continued soreness, bed at hotel is making issues much worse      Objective: See treatment diary below      Assessment:   Pt presented with different pain today, feels the pain changed and is now radiating around to front of pelvis, groin areas. Trialed some new exercises to target area. B/L BKFO, pubic symphysis gapping with multi distance hip ADD, and a elliot stretch off edge of table  with positive response post. Public pain was feeling much better when ambulating around clinic post session.     Plan: Continue with PT per POC     Precautions: HTN  Functional Limitations: prolonged sitting, standing, walking, grocery shopping, exercising  Impairments: lumbar ROM in all planes (reproduces LBP), BLE strength  [facility] Code: [id number]   POC expiration: 2024    Manuals    Lumbar STM MB 10' MB 10' WE  10' WE  10' WE  10' WE  10' WE  12'   Sidelying lumbar flexion mob          SI MET     WE WE WE             Neuro Re-Ed                    Abdominal Bracing          TA+ Cyndie Add ball squeeze 10\"x10 10\"x10 10\"x10 10\"x10 10\"x10 10\"x10 10\"x10   TA + hip ABD TB BTB 10x BTB 2x10 BTB  10x2  BTB  10x2 BTB  10x2    TA + marching nv 10 x10  x10 x10    Bridges with ADD ball squeeze      x10                                  Ther Ex          bike 7' 6' 5' 5' 6' 6' 6'   Seated lumbar flex nv 5\"x5ea        DKTC w/ pball 10x 15x x10 5\"x10 5\"x10 5\"x10 5\"x10 ea   Seated HS stretch          Piriformis stretch          Supine 90/90 sciatic stretch          LTR 10ea 10 ea x10 x10 x10 W. Pball  X10 ea W. Pball  X10 ea   clamshell 2x10 ea 2x10 ea x10 X10 ea X10 ea BTB  X20 ea    SLR 10ea 10ea x10 X10 ea X20 ea X10 ea    Bridges with Add " "ball squeeze          Open Books b/l 10ea 10 ea x10 x10 X10 ea X10 ea X10 ea   B/L BKFO       10\"x10   Eric stretch off edge of table       10\"x10 ea   LAQ          squats          Seated leg ext machine          Seated leg curl machine          Leg press          Fwd lunge                    Education on UE strength exercises she can do          Ther Activity          Fwd Step ups          Lat step ups          Gait Training                              Modalities                                     "

## 2024-06-28 ENCOUNTER — OFFICE VISIT (OUTPATIENT)
Dept: FAMILY MEDICINE CLINIC | Facility: CLINIC | Age: 59
End: 2024-06-28
Payer: COMMERCIAL

## 2024-06-28 VITALS
BODY MASS INDEX: 35.84 KG/M2 | RESPIRATION RATE: 16 BRPM | SYSTOLIC BLOOD PRESSURE: 132 MMHG | OXYGEN SATURATION: 97 % | HEIGHT: 66 IN | WEIGHT: 223 LBS | TEMPERATURE: 97.3 F | DIASTOLIC BLOOD PRESSURE: 84 MMHG | HEART RATE: 73 BPM

## 2024-06-28 DIAGNOSIS — R10.2 PELVIC PAIN: Primary | ICD-10-CM

## 2024-06-28 PROCEDURE — 99213 OFFICE O/P EST LOW 20 MIN: CPT | Performed by: NURSE PRACTITIONER

## 2024-06-28 NOTE — PROGRESS NOTES
"Ambulatory Visit  Name: Anamaria Rivera      : 1965      MRN: [id number]  Encounter Provider: ANTONY Obrien  Encounter Date: 2024   Encounter department: [facility]    Assessment & Plan   1. Pelvic pain  -     US pelvis complete non OB; Future; Expected date: 2024    Possibly musculoskeletal vs. Uterine fibroid? Pt may use Ibuprofen/Tylenol and heating pad PRN. Check pelvic ultrasound for further evaluation. We will contact pt w/ results once available. Patient is encouraged to call our office for any questions/concerns, persistent or worsening symptoms. Patient states they understand and agree with treatment plan.       Pt to f/u PRN.     History of Present Illness     Pt presents today for midline pelvic pain since .  She denies injury to the area.  She has been walking more than what she is used to and has been moving boxes since she moved.  Denies fever, chills, body aches, blood in the stool, changes in bowels/bladder, NVD.  She has been taking Ibuprofen which did not help the pain.  She feels like the pain is more pressure as well as stabbing.  She notes walking and standing make it worse.  Pt denies any vaginal bleeding.  She does note hx of fibroids.        Review of Systems  As noted per HPI.    Objective     /84   Pulse 73   Temp (!) 97.3 °F (36.3 °C)   Resp 16   Ht 5' 5.5\" (1.664 m)   Wt 101 kg (223 lb)   SpO2 97%   BMI 36.54 kg/m²     Physical Exam  Vitals reviewed.   Constitutional:       Appearance: Normal appearance.   Cardiovascular:      Pulses: Normal pulses.      Heart sounds: Normal heart sounds.   Pulmonary:      Effort: Pulmonary effort is normal. No respiratory distress.      Breath sounds: Normal breath sounds. No wheezing.   Abdominal:      General: There is no distension.      Palpations: Abdomen is soft. There is no mass.      Tenderness: There is no abdominal tenderness. There is no guarding or " rebound.      Hernia: No hernia is present.       Neurological:      Mental Status: She is alert and oriented to person, place, and time. Mental status is at baseline.   Psychiatric:         Mood and Affect: Mood normal.         Behavior: Behavior normal.         Thought Content: Thought content normal.         Judgment: Judgment normal.

## 2024-07-01 ENCOUNTER — APPOINTMENT (OUTPATIENT)
Dept: RADIOLOGY | Facility: CLINIC | Age: 59
End: 2024-07-01
Payer: COMMERCIAL

## 2024-07-01 ENCOUNTER — OFFICE VISIT (OUTPATIENT)
Dept: OBGYN CLINIC | Facility: CLINIC | Age: 59
End: 2024-07-01
Payer: COMMERCIAL

## 2024-07-01 VITALS
BODY MASS INDEX: 35.84 KG/M2 | HEIGHT: 66 IN | SYSTOLIC BLOOD PRESSURE: 126 MMHG | WEIGHT: 223 LBS | DIASTOLIC BLOOD PRESSURE: 78 MMHG

## 2024-07-01 DIAGNOSIS — M25.551 RIGHT HIP PAIN: ICD-10-CM

## 2024-07-01 DIAGNOSIS — R93.7 ABNORMAL X-RAY OF PELVIS: ICD-10-CM

## 2024-07-01 DIAGNOSIS — R10.2 PELVIC PAIN: ICD-10-CM

## 2024-07-01 DIAGNOSIS — M86.9 OSTEITIS PUBIS (HCC): Primary | ICD-10-CM

## 2024-07-01 PROCEDURE — 99203 OFFICE O/P NEW LOW 30 MIN: CPT | Performed by: FAMILY MEDICINE

## 2024-07-01 PROCEDURE — 73502 X-RAY EXAM HIP UNI 2-3 VIEWS: CPT

## 2024-07-01 NOTE — PROGRESS NOTES
1. Osteitis pubis (HCC)  MRI pelvis bony wo contrast      2. Right hip pain  XR hip/pelv 2-3 vws right if performed      3. Pelvic pain  MRI pelvis bony wo contrast      4. Abnormal x-ray of pelvis  MRI pelvis bony wo contrast        Orders Placed This Encounter   Procedures    XR hip/pelv 2-3 vws right if performed    MRI pelvis bony wo contrast        IMAGING STUDIES: (I personally reviewed images in PACS and report):  X-ray AP pelvis right hip 7/1/2024:  Mild to moderate bilateral hip osteoarthritis  Lucency right pubic bone at the symphysis concerning for possible stress fracture    PAST REPORTS:        ASSESSMENT/PLAN:  Pelvic Pain  Lucency right pubic bone at the symphysis concerning for possible stress fracture  Osteitis pubis      Repeat X-ray next visit: None    Return for Follow-up after MRI is completed for review.    Patient instructions below verbally summarized in person during encounter:  Patient Instructions   I explained the patient that she has tenderness along the pubic symphysis indicative of osteitis pubis; however, there is also a lucency on x-ray concerning for stress reaction or fracture.  As such ordered MRI of the pelvic bone.  In the interim I recommended against any further lifting and we will discuss definitive treatment plan once MRI resulted.  I reviewed red flags including development of abdominal pain, nausea, vomiting, diarrhea, change in bowel habits and to alert physician immediately or go to the emergency department.      __________________________________________________________________________    HISTORY OF PRESENT ILLNESS:  Evaluation of anterior pelvic pain right greater than left ongoing for the last 12 days after lifting heavy objects around the house.  Denies any specific injury event however did notice pain began soon after lifting heavy objects and walking as well.  She describes severe pain at times characterized as stabbing and pressure-like sensation.  She did see her  "PCP who ordered ultrasound of the pelvis non-OB which has not been scheduled yet.  Pain is mostly when rising from seated position.    Patient denies any change in bowel movements.  Denies any bloody stools.  Denies any vaginal bleeding, fevers, chills, abdominal pain, vomiting, nausea    Review of Systems      Following history reviewed and update:    Past Medical History:   Diagnosis Date    ADHD (attention deficit hyperactivity disorder)     Anxiety     Arthritis     Depression     Fibromyalgia, primary     GERD (gastroesophageal reflux disease)     History of stomach ulcers     Hypertension     Panic attack     Papanicolaou smear 2020    Psychiatric disorder      Past Surgical History:   Procedure Laterality Date    BREAST CYST ASPIRATION Left 2014    HYSTEROSCOPY  2015    MAMMO (HISTORICAL) Bilateral 03/05/2020    [facility] BI-RADS 1 Negative Scattered areas fibroglandular density; 25% to 50% glandular breast tissue     Social History   Social History     Substance and Sexual Activity   Alcohol Use Yes    Comment: Socially     Social History     Substance and Sexual Activity   Drug Use No     Social History     Tobacco Use   Smoking Status Former    Passive exposure: Past   Smokeless Tobacco Never   Tobacco Comments    teen years     Family History   Problem Relation Age of Onset    Hypertension Mother     Lung cancer Mother     Skin cancer Father     Stroke Brother     Hypertension Brother     Transient ischemic attack Brother     No Known Problems Brother     No Known Problems Brother     Hypertension Maternal Grandmother     Multiple sclerosis Maternal Grandmother     Heart attack Maternal Grandfather     Heart disease Maternal Grandfather     Alcohol abuse Maternal Grandfather     Asthma Paternal Grandmother     Heart disease Paternal Grandfather      Allergies   Allergen Reactions    Latex Rash          Physical Exam  /78 (BP Location: Left arm, Patient Position: Sitting, Cuff Size: Standard)   Ht 5' 5.5\" " (1.664 m)   Wt 101 kg (223 lb)   BMI 36.54 kg/m²         Ortho Exam  BACK EXAM:    DERMATOMAL SENSATION:  L1: normal   L2: normal   L3: normal   L4: normal   L5: normal   S1: normal    STRENGTH (bilateral):  Knee Extension: 5/5  Foot Dorsiflexion: 5/5  Great Toe Extension: 5/5  Foot Plantarflexion: 5/5  Hip Flexion: 5/5  Hip Abduction: 5/5      RIGHT HIP:  LOG ROLL: negative  BRADFORD: negative  FADIR: +    LEFT HIP:  LOG ROLL: negative  BRADFORD: negative  FADIR: +    ++ Tenderness along the pubic symphysis reproduces chief complaint of pain          __________________________________________________________________________  Procedures    Medical assistant Silvia Garza present for encounter

## 2024-07-01 NOTE — PATIENT INSTRUCTIONS
I explained the patient that she has tenderness along the pubic symphysis indicative of osteitis pubis; however, there is also a lucency on x-ray concerning for stress reaction or fracture.  As such ordered MRI of the pelvic bone.  In the interim I recommended against any further lifting and we will discuss definitive treatment plan once MRI resulted.  I reviewed red flags including development of abdominal pain, nausea, vomiting, diarrhea, change in bowel habits and to alert physician immediately or go to the emergency department.   Psych/Behavioral

## 2024-07-02 ENCOUNTER — OFFICE VISIT (OUTPATIENT)
Dept: PHYSICAL THERAPY | Facility: CLINIC | Age: 59
End: 2024-07-02
Payer: COMMERCIAL

## 2024-07-02 DIAGNOSIS — M54.16 LUMBAR RADICULOPATHY: Primary | ICD-10-CM

## 2024-07-02 PROCEDURE — 97140 MANUAL THERAPY 1/> REGIONS: CPT

## 2024-07-02 PROCEDURE — 97110 THERAPEUTIC EXERCISES: CPT

## 2024-07-02 NOTE — PROGRESS NOTES
"Daily Note     Today's date: 2024  Patient name: Anamaria Rivera  : 1965  MRN: [id number]  Referring provider: Chencho Oshea MD  Dx:   Encounter Diagnosis     ICD-10-CM    1. Lumbar radiculopathy  M54.16                      Subjective: Patient reports she saw MD yesterday for continued pain in front of pelvis pain. She notes MD thinks she may have cracked pelvis- xray was performed yesterday and MRI ordered for 3 weeks form now.       Objective: See treatment diary below      Assessment:   Pt still experiencing pubic pain which has been different in the last week or so. MD noted she can still continue with PT but only do things in PT which are pain free. She was able to do some of the TE's but avoided TE's that hurt today. Still benefited from the manual STM to low back.   She would continue to benefit from skilled PT.      Plan: Continue with PT per POC     Precautions: HTN  Functional Limitations: prolonged sitting, standing, walking, grocery shopping, exercising  Impairments: lumbar ROM in all planes (reproduces LBP), BLE strength  [facility] Code: [email or web address]   POC expiration: 2024    Manuals    Lumbar STM MB 10' MB 10' WE  10' WE  10' WE  10' WE  10' WE  12' WE  12'   Sidelying lumbar flexion mob           SI MET     WE WE WE held              Neuro Re-Ed                      Abdominal Bracing           TA+ Cyndie Add ball squeeze 10\"x10 10\"x10 10\"x10 10\"x10 10\"x10 10\"x10 10\"x10 10\"x10   TA + hip ABD TB BTB 10x BTB 2x10 BTB  10x2  BTB  10x2 BTB  10x2  BTB  10x2   TA + marching nv 10 x10  x10 x10  X10 ea   Bridges with ADD ball squeeze      x10  No ADD  x10                                    Ther Ex           bike 7' 6' 5' 5' 6' 6' 6' 6'   Seated lumbar flex nv 5\"x5ea         DKTC w/ pball 10x 15x x10 5\"x10 5\"x10 5\"x10 5\"x10 ea 5\"x10 ea   Seated HS stretch           Piriformis stretch           Supine 90/90 sciatic stretch           LTR 10ea 10 ea x10 x10 x10 " "W. Pball  X10 ea W. Pball  X10 ea W. Pball  X10 ea   clamshell 2x10 ea 2x10 ea x10 X10 ea X10 ea BTB  X20 ea  X10 ea   SLR 10ea 10ea x10 X10 ea X20 ea X10 ea  X10 ea   Bridges with Add ball squeeze           Open Books b/l 10ea 10 ea x10 x10 X10 ea X10 ea X10 ea X10 ea   B/L BKFO       10\"x10 5\"x10   Eric stretch off edge of table       10\"x10 ea    LAQ           squats           Seated leg ext machine           Seated leg curl machine           Leg press           Fwd lunge                      Education on UE strength exercises she can do           Ther Activity           Fwd Step ups           Lat step ups           Gait Training                                 Modalities                                        "

## 2024-07-08 ENCOUNTER — APPOINTMENT (EMERGENCY)
Dept: CT IMAGING | Facility: HOSPITAL | Age: 59
End: 2024-07-08
Payer: COMMERCIAL

## 2024-07-08 ENCOUNTER — HOSPITAL ENCOUNTER (EMERGENCY)
Facility: HOSPITAL | Age: 59
Discharge: HOME | End: 2024-07-08
Attending: EMERGENCY MEDICINE
Payer: COMMERCIAL

## 2024-07-08 DIAGNOSIS — K56.1 SMALL BOWEL INTUSSUSCEPTION (HCC): ICD-10-CM

## 2024-07-08 DIAGNOSIS — R11.2 NAUSEA VOMITING AND DIARRHEA: Primary | ICD-10-CM

## 2024-07-08 DIAGNOSIS — K52.9 GASTROENTERITIS: ICD-10-CM

## 2024-07-08 DIAGNOSIS — R19.7 NAUSEA VOMITING AND DIARRHEA: Primary | ICD-10-CM

## 2024-07-08 LAB
ALBUMIN SERPL BCG-MCNC: 4.3 G/DL (ref 3.5–5)
ALP SERPL-CCNC: 90 U/L (ref 34–104)
ALT SERPL W P-5'-P-CCNC: 21 U/L (ref 7–52)
ANION GAP SERPL CALCULATED.3IONS-SCNC: 6 MMOL/L (ref 4–13)
AST SERPL W P-5'-P-CCNC: 20 U/L (ref 13–39)
BASOPHILS # BLD MANUAL: 0 THOUSAND/UL (ref 0–0.1)
BASOPHILS NFR MAR MANUAL: 0 % (ref 0–1)
BILIRUB SERPL-MCNC: 0.44 MG/DL (ref 0.2–1)
BUN SERPL-MCNC: 17 MG/DL (ref 5–25)
CALCIUM SERPL-MCNC: 9.2 MG/DL (ref 8.4–10.2)
CHLORIDE SERPL-SCNC: 101 MMOL/L (ref 96–108)
CO2 SERPL-SCNC: 28 MMOL/L (ref 21–32)
CREAT SERPL-MCNC: 0.75 MG/DL (ref 0.6–1.3)
EOSINOPHIL # BLD MANUAL: 0 THOUSAND/UL (ref 0–0.4)
EOSINOPHIL NFR BLD MANUAL: 0 % (ref 0–6)
ERYTHROCYTE [DISTWIDTH] IN BLOOD BY AUTOMATED COUNT: 12.1 % (ref 11.6–15.1)
FLUAV RNA RESP QL NAA+PROBE: NEGATIVE
FLUBV RNA RESP QL NAA+PROBE: NEGATIVE
GFR SERPL CREATININE-BSD FRML MDRD: 87 ML/MIN/1.73SQ M
GLUCOSE SERPL-MCNC: 138 MG/DL (ref 65–140)
HCT VFR BLD AUTO: 43 % (ref 34.8–46.1)
HGB BLD-MCNC: 14.1 G/DL (ref 11.5–15.4)
LIPASE SERPL-CCNC: 26 U/L (ref 11–82)
LYMPHOCYTES # BLD AUTO: 0.22 THOUSAND/UL (ref 0.6–4.47)
LYMPHOCYTES # BLD AUTO: 2 % (ref 14–44)
MCH RBC QN AUTO: 28.9 PG (ref 26.8–34.3)
MCHC RBC AUTO-ENTMCNC: 32.8 G/DL (ref 31.4–37.4)
MCV RBC AUTO: 88 FL (ref 82–98)
MONOCYTES # BLD AUTO: 0 THOUSAND/UL (ref 0–1.22)
MONOCYTES NFR BLD: 0 % (ref 4–12)
NEUTROPHILS # BLD MANUAL: 10.54 THOUSAND/UL (ref 1.85–7.62)
NEUTS SEG NFR BLD AUTO: 98 % (ref 43–75)
PLATELET # BLD AUTO: 221 THOUSANDS/UL (ref 149–390)
PLATELET BLD QL SMEAR: ADEQUATE
PMV BLD AUTO: 10.3 FL (ref 8.9–12.7)
POTASSIUM SERPL-SCNC: 3.9 MMOL/L (ref 3.5–5.3)
PROT SERPL-MCNC: 7.3 G/DL (ref 6.4–8.4)
RBC # BLD AUTO: 4.88 MILLION/UL (ref 3.81–5.12)
RBC MORPH BLD: NORMAL
RSV RNA RESP QL NAA+PROBE: NEGATIVE
SARS-COV-2 RNA RESP QL NAA+PROBE: NEGATIVE
SODIUM SERPL-SCNC: 135 MMOL/L (ref 135–147)
WBC # BLD AUTO: 10.76 THOUSAND/UL (ref 4.31–10.16)

## 2024-07-08 PROCEDURE — 85027 COMPLETE CBC AUTOMATED: CPT | Performed by: EMERGENCY MEDICINE

## 2024-07-08 PROCEDURE — 36415 COLL VENOUS BLD VENIPUNCTURE: CPT | Performed by: EMERGENCY MEDICINE

## 2024-07-08 PROCEDURE — 96365 THER/PROPH/DIAG IV INF INIT: CPT

## 2024-07-08 PROCEDURE — 99285 EMERGENCY DEPT VISIT HI MDM: CPT

## 2024-07-08 PROCEDURE — 93005 ELECTROCARDIOGRAM TRACING: CPT

## 2024-07-08 PROCEDURE — 99285 EMERGENCY DEPT VISIT HI MDM: CPT | Performed by: EMERGENCY MEDICINE

## 2024-07-08 PROCEDURE — 96366 THER/PROPH/DIAG IV INF ADDON: CPT

## 2024-07-08 PROCEDURE — 85007 BL SMEAR W/DIFF WBC COUNT: CPT | Performed by: EMERGENCY MEDICINE

## 2024-07-08 PROCEDURE — 80053 COMPREHEN METABOLIC PANEL: CPT | Performed by: EMERGENCY MEDICINE

## 2024-07-08 PROCEDURE — 96375 TX/PRO/DX INJ NEW DRUG ADDON: CPT

## 2024-07-08 PROCEDURE — 74177 CT ABD & PELVIS W/CONTRAST: CPT

## 2024-07-08 PROCEDURE — 96376 TX/PRO/DX INJ SAME DRUG ADON: CPT

## 2024-07-08 PROCEDURE — 0241U HB NFCT DS VIR RESP RNA 4 TRGT: CPT | Performed by: EMERGENCY MEDICINE

## 2024-07-08 PROCEDURE — 83690 ASSAY OF LIPASE: CPT | Performed by: EMERGENCY MEDICINE

## 2024-07-08 RX ORDER — METOCLOPRAMIDE 10 MG/1
10 TABLET ORAL EVERY 6 HOURS PRN
Qty: 12 TABLET | Refills: 0 | Status: SHIPPED | OUTPATIENT
Start: 2024-07-08

## 2024-07-08 RX ORDER — DIPHENHYDRAMINE HYDROCHLORIDE 50 MG/ML
25 INJECTION INTRAMUSCULAR; INTRAVENOUS ONCE
Status: COMPLETED | OUTPATIENT
Start: 2024-07-08 | End: 2024-07-08

## 2024-07-08 RX ORDER — ONDANSETRON 2 MG/ML
4 INJECTION INTRAMUSCULAR; INTRAVENOUS ONCE
Status: COMPLETED | OUTPATIENT
Start: 2024-07-08 | End: 2024-07-08

## 2024-07-08 RX ORDER — METOCLOPRAMIDE HYDROCHLORIDE 5 MG/ML
10 INJECTION INTRAMUSCULAR; INTRAVENOUS ONCE
Status: COMPLETED | OUTPATIENT
Start: 2024-07-08 | End: 2024-07-08

## 2024-07-08 RX ADMIN — IOHEXOL 50 ML: 240 INJECTION, SOLUTION INTRATHECAL; INTRAVASCULAR; INTRAVENOUS; ORAL at 21:03

## 2024-07-08 RX ADMIN — SODIUM CHLORIDE, SODIUM LACTATE, POTASSIUM CHLORIDE, AND CALCIUM CHLORIDE 1000 ML: .6; .31; .03; .02 INJECTION, SOLUTION INTRAVENOUS at 19:17

## 2024-07-08 RX ADMIN — ONDANSETRON 4 MG: 2 INJECTION INTRAMUSCULAR; INTRAVENOUS at 20:52

## 2024-07-08 RX ADMIN — DIPHENHYDRAMINE HYDROCHLORIDE 25 MG: 50 INJECTION, SOLUTION INTRAMUSCULAR; INTRAVENOUS at 22:18

## 2024-07-08 RX ADMIN — SODIUM CHLORIDE, SODIUM LACTATE, POTASSIUM CHLORIDE, AND CALCIUM CHLORIDE 1000 ML: .6; .31; .03; .02 INJECTION, SOLUTION INTRAVENOUS at 22:23

## 2024-07-08 RX ADMIN — METOCLOPRAMIDE 10 MG: 5 INJECTION, SOLUTION INTRAMUSCULAR; INTRAVENOUS at 22:18

## 2024-07-08 RX ADMIN — IOHEXOL 100 ML: 350 INJECTION, SOLUTION INTRAVENOUS at 21:03

## 2024-07-08 RX ADMIN — ONDANSETRON 4 MG: 2 INJECTION INTRAMUSCULAR; INTRAVENOUS at 19:17

## 2024-07-08 NOTE — ED PROVIDER NOTES
History  Chief Complaint   Patient presents with    Vomiting     Pt reports diarrhea and x1 vomiting today.  Denies fevers, chest pain, sob, recent travel.      59 year old female presents for evaluation of nausea, vomiting and diarrhea beginning this morning.  Patient states she felt nauseated around 8:30 am this morning.  She was only able to work about half of her shift with onset of vomiting this afternoon.  She noticed that the vomit consisted of whole pieces of food that she had eaten last night.  Patient has had 4 loose stools throughout the day.  No fevers or chills.  She denies abdominal pain.  No prior surgeries.  She has been having right hip pain for the past 3 weeks that she has been following with orthopedics for and is scheduled for MRI of the area.      Vomiting      Prior to Admission Medications   Prescriptions Last Dose Informant Patient Reported? Taking?   Clocortolone Pivalate 0.1 % cream   No No   Sig: Apply topically 2 (two) times a day   Multiple Vitamin (multivitamin) tablet  Self Yes No   Sig: Take 1 tablet by mouth daily   QUEtiapine (SEROquel) 50 mg tablet   No No   Si to 1 1/2 po q hs   SF 5000 Plus 1.1 % CREA   No No   Sig: Take by mouth 2 (two) times a day   albuterol (PROVENTIL HFA,VENTOLIN HFA) 90 mcg/act inhaler   No No   Sig: Inhale 2 puffs every 6 (six) hours as needed for wheezing or shortness of breath   buPROPion (WELLBUTRIN XL) 150 mg 24 hr tablet   No No   Sig: Take 1 tablet (150 mg total) by mouth daily   cholecalciferol (VITAMIN D3) 1,000 units tablet  Self Yes No   Sig: Take 1,000 Units by mouth daily   Patient not taking: Reported on 2024   ciclopirox (PENLAC) 8 % solution   No No   Sig: Apply topically daily at bedtime   hydrOXYzine HCL (ATARAX) 25 mg tablet   No No   Sig: Take 1 tablet (25 mg total) by mouth every 6 (six) hours as needed for itching or anxiety   hydroCHLOROthiazide 12.5 mg tablet   No No   Sig: Take 1 tablet (12.5 mg total) by mouth daily    losartan (COZAAR) 100 MG tablet   No No   Sig: Take 1 tablet (100 mg total) by mouth daily   omeprazole (PriLOSEC) 40 MG capsule   No No   Sig: Take 1 capsule (40 mg total) by mouth daily Take before a meal   sertraline (ZOLOFT) 100 mg tablet   No No   Sig: Take 2 tablets (200 mg total) by mouth daily   terbinafine (LamISIL) 250 mg tablet  Self Yes No   Patient not taking: Reported on 6/28/2024      Facility-Administered Medications: None       Past Medical History:   Diagnosis Date    ADHD (attention deficit hyperactivity disorder)     Anxiety     Arthritis     Depression     Fibromyalgia, primary     GERD (gastroesophageal reflux disease)     History of stomach ulcers     Hypertension     Panic attack     Papanicolaou smear 2020    Psychiatric disorder        Past Surgical History:   Procedure Laterality Date    BREAST CYST ASPIRATION Left 2014    HYSTEROSCOPY  2015    MAMMO (HISTORICAL) Bilateral 03/05/2020    [facility] BI-RADS 1 Negative Scattered areas fibroglandular density; 25% to 50% glandular breast tissue       Family History   Problem Relation Age of Onset    Hypertension Mother     Lung cancer Mother     Skin cancer Father     Stroke Brother     Hypertension Brother     Transient ischemic attack Brother     No Known Problems Brother     No Known Problems Brother     Hypertension Maternal Grandmother     Multiple sclerosis Maternal Grandmother     Heart attack Maternal Grandfather     Heart disease Maternal Grandfather     Alcohol abuse Maternal Grandfather     Asthma Paternal Grandmother     Heart disease Paternal Grandfather      I have reviewed and agree with the history as documented.    E-Cigarette/Vaping    E-Cigarette Use Never User      E-Cigarette/Vaping Substances    Nicotine No     THC No     CBD No     Flavoring No     Other No     Unknown No      Social History     Tobacco Use    Smoking status: Former     Passive exposure: Past    Smokeless tobacco: Never    Tobacco comments:     teen years    Vaping Use    Vaping status: Never Used   Substance Use Topics    Alcohol use: Yes     Comment: Socially    Drug use: No       Review of Systems   Gastrointestinal:  Positive for vomiting.       Physical Exam  Physical Exam  Vitals and nursing note reviewed.   HENT:      Head: Normocephalic.   Cardiovascular:      Rate and Rhythm: Normal rate and regular rhythm.      Pulses: Normal pulses.   Pulmonary:      Effort: Pulmonary effort is normal. No respiratory distress.   Abdominal:      General: There is no distension.      Palpations: Abdomen is soft.      Tenderness: There is no abdominal tenderness.   Skin:     General: Skin is warm and dry.   Neurological:      Mental Status: She is alert.         Vital Signs  ED Triage Vitals   Temperature Pulse Respirations Blood Pressure SpO2   07/08/24 1901 07/08/24 1901 07/08/24 1901 07/08/24 1901 07/08/24 1901   98.5 °F (36.9 °C) 96 20 149/70 98 %      Temp Source Heart Rate Source Patient Position - Orthostatic VS BP Location FiO2 (%)   07/08/24 1901 07/08/24 1901 07/08/24 2000 07/08/24 1901 --   Oral Monitor Sitting Right arm       Pain Score       07/08/24 1901       No Pain           Vitals:    07/08/24 1901 07/08/24 2000 07/08/24 2030 07/08/24 2230   BP: 149/70 134/64 145/72 150/68   Pulse: 96 90 87 96   Patient Position - Orthostatic VS:  Sitting Sitting Sitting         Visual Acuity      ED Medications  Medications   ondansetron (ZOFRAN) injection 4 mg (4 mg Intravenous Given 7/8/24 1917)   lactated ringers bolus 1,000 mL (0 mL Intravenous Stopped 7/8/24 2107)   ondansetron (ZOFRAN) injection 4 mg (4 mg Intravenous Given 7/8/24 2052)   iohexol (OMNIPAQUE) 350 MG/ML injection (MULTI-DOSE) 100 mL (100 mL Intravenous Given 7/8/24 2103)   iohexol (OMNIPAQUE) 240 MG/ML solution 50 mL (50 mL Oral Given 7/8/24 2103)   metoclopramide (REGLAN) injection 10 mg (10 mg Intravenous Given 7/8/24 2218)   diphenhydrAMINE (BENADRYL) injection 25 mg (25 mg Intravenous Given 7/8/24  2218)   lactated ringers bolus 1,000 mL (1,000 mL Intravenous New Bag 7/8/24 2223)       Diagnostic Studies  Results Reviewed       Procedure Component Value Units Date/Time    RBC Morphology Reflex Test [[id number]] Collected: 07/08/24 1916    Lab Status: Final result Specimen: Blood from Arm, Right Updated: 07/08/24 2101    CBC and differential [[id number]]  (Abnormal) Collected: 07/08/24 1916    Lab Status: Final result Specimen: Blood from Arm, Right Updated: 07/08/24 2027     WBC 10.76 Thousand/uL      RBC 4.88 Million/uL      Hemoglobin 14.1 g/dL      Hematocrit 43.0 %      MCV 88 fL      MCH 28.9 pg      MCHC 32.8 g/dL      RDW 12.1 %      MPV 10.3 fL      Platelets 221 Thousands/uL     Narrative:      This is an appended report.  These results have been appended to a previously verified report.    Manual Differential(PHLEBS Do Not Order) [[id number]]  (Abnormal) Collected: 07/08/24 1916    Lab Status: Final result Specimen: Blood from Arm, Right Updated: 07/08/24 2027     Segmented % 98 %      Lymphocytes % 2 %      Monocytes % 0 %      Eosinophils % 0 %      Basophils % 0 %      Absolute Neutrophils 10.54 Thousand/uL      Absolute Lymphocytes 0.22 Thousand/uL      Absolute Monocytes 0.00 Thousand/uL      Absolute Eosinophils 0.00 Thousand/uL      Absolute Basophils 0.00 Thousand/uL      Total Counted --     RBC Morphology Normal     Platelet Estimate Adequate    FLU/RSV/COVID - if FLU/RSV clinically relevant [[id number]]  (Normal) Collected: 07/08/24 1916    Lab Status: Final result Specimen: Nares from Nose Updated: 07/08/24 2009     SARS-CoV-2 Negative     INFLUENZA A PCR Negative     INFLUENZA B PCR Negative     RSV PCR Negative    Narrative:      FOR PEDIATRIC PATIENTS - copy/paste COVID Guidelines URL to browser: [email or web address]    SARS-CoV-2 assay is a Nucleic Acid Amplification assay intended for the  qualitative detection of nucleic acid from  SARS-CoV-2 in nasopharyngeal  swabs. Results are for the presumptive identification of SARS-CoV-2 RNA.    Positive results are indicative of infection with SARS-CoV-2, the virus  causing COVID-19, but do not rule out bacterial infection or co-infection  with other viruses. Laboratories within the [place] and its  territories are required to report all positive results to the appropriate  public health authorities. Negative results do not preclude SARS-CoV-2  infection and should not be used as the sole basis for treatment or other  patient management decisions. Negative results must be combined with  clinical observations, patient history, and epidemiological information.  This test has not been FDA cleared or approved.    This test has been authorized by FDA under an Emergency Use Authorization  (EUA). This test is only authorized for the duration of time the  declaration that circumstances exist justifying the authorization of the  emergency use of an in vitro diagnostic tests for detection of SARS-CoV-2  virus and/or diagnosis of COVID-19 infection under section 564(b)(1) of  the Act, 21 U.S.C. 360bbb-3(b)(1), unless the authorization is terminated  or revoked sooner. The test has been validated but independent review by FDA  and CLIA is pending.    Test performed using [employer] GeneXpert: This RT-PCR assay targets N2,  a region unique to SARS-CoV-2. A conserved region in the E-gene was chosen  for pan-Sarbecovirus detection which includes SARS-CoV-2.    According to CMS-2020-01-R, this platform meets the definition of high-throughput technology.    Comprehensive metabolic panel [[id number]] Collected: 07/08/24 1916    Lab Status: Final result Specimen: Blood from Arm, Right Updated: 07/08/24 1947     Sodium 135 mmol/L      Potassium 3.9 mmol/L      Chloride 101 mmol/L      CO2 28 mmol/L      ANION GAP 6 mmol/L      BUN 17 mg/dL      Creatinine 0.75 mg/dL      Glucose 138 mg/dL      Calcium 9.2 mg/dL      AST  20 U/L      ALT 21 U/L      Alkaline Phosphatase 90 U/L      Total Protein 7.3 g/dL      Albumin 4.3 g/dL      Total Bilirubin 0.44 mg/dL      eGFR 87 ml/min/1.73sq m     Narrative:      National Kidney Disease Foundation guidelines for Chronic Kidney Disease (CKD):     Stage 1 with normal or high GFR (GFR > 90 mL/min/1.73 square meters)    Stage 2 Mild CKD (GFR = 60-89 mL/min/1.73 square meters)    Stage 3A Moderate CKD (GFR = 45-59 mL/min/1.73 square meters)    Stage 3B Moderate CKD (GFR = 30-44 mL/min/1.73 square meters)    Stage 4 Severe CKD (GFR = 15-29 mL/min/1.73 square meters)    Stage 5 End Stage CKD (GFR <15 mL/min/1.73 square meters)  Note: GFR calculation is accurate only with a steady state creatinine    Lipase [[id number]]  (Normal) Collected: 07/08/24 1916    Lab Status: Final result Specimen: Blood from Arm, Right Updated: 07/08/24 1947     Lipase 26 u/L                    CT abdomen pelvis with contrast   Final Result by Sierra Trujillo MD (07/08 2155)      Small bowel intussusceptions in the left side of the abdomen as described above with no lead point or evidence of bowel obstruction. This is most likely a transient finding of no significance. See above comments.         Workstation performed: [id number]                    Procedures  ECG 12 Lead Documentation Only    Date/Time: 7/8/2024 7:29 PM    Performed by: Kaylan Carlos MD  Authorized by: Kaylan Carlos MD    Indications / Diagnosis:  Nausea  ECG reviewed by me, the ED Provider: yes    Patient location:  ED  Previous ECG:     Previous ECG:  Unavailable  Interpretation:     Interpretation: normal    Rate:     ECG rate:  87    ECG rate assessment: normal    Rhythm:     Rhythm: sinus rhythm    Ectopy:     Ectopy: none    QRS:     QRS axis:  Normal    QRS intervals:  Normal  Conduction:     Conduction: normal    ST segments:     ST segments:  Normal  T waves:     T waves: inverted      Inverted:  AVL           ED  Course  ED Course as of 07/08/24 2357   Mon Jul 08, 2024   2207 Patient continues to feel nauseated.  Reglan ordered.  PO challenge.   2306 Some improvement after reglan.  Attempting PO   2353 Patient tolerating PO                                             Medical Decision Making  59 year old female presents for evaluation of nausea, vomiting and diarrhea.  Patient vomited whole pieces of food from her dinner last night this afternoon.  Labs with mild nonspecific leukocytosis.  CT with incidental small bowel intussesception.  No signs of bowel obstruction.  No significant improvement with zofran in the ED, but did experience improvement in symptoms with reglan.  Reglan prescribed PRN.  PCP follow up if not resolving.  Return precautions provided.    Amount and/or Complexity of Data Reviewed  Labs: ordered.  Radiology: ordered.    Risk  Prescription drug management.             Disposition  Final diagnoses:   Nausea vomiting and diarrhea   Small bowel intussusception (HCC)   Gastroenteritis     Time reflects when diagnosis was documented in both MDM as applicable and the Disposition within this note       Time User Action Codes Description Comment    7/8/2024 10:10 PM Kaylan Carlos Add [R11.2,  R19.7] Nausea vomiting and diarrhea     7/8/2024 10:10 PM Kaylan Carlos Add [K56.1] Small bowel intussusception (HCC)     7/8/2024 11:54 PM Kaylan Carlos Add [K52.9] Gastroenteritis           ED Disposition       ED Disposition   Discharge    Condition   Stable    Date/Time   Mon Jul 8, 2024 11:54 PM    Comment   Anamaria Rivera discharge to home/self care.                   Follow-up Information       Follow up With Specialties Details Why Contact Info Additional Information    [facility] Schedule an appointment as soon as possible for a visit in 3 days for re-evaluation if symtpoms are not improving [address]  [address]  [facility] [id number] [phone]         [facility] Emergency Department Emergency Medicine Go to  If symptoms worsen [address] [facility] [id number] [phone] [facility] Emergency Department, [address] [place], [place] [address]            Patient's Medications   Discharge Prescriptions    METOCLOPRAMIDE (REGLAN) 10 MG TABLET    Take 1 tablet (10 mg total) by mouth every 6 (six) hours as needed (nausea, vomiting)       Start Date: 7/8/2024  End Date: --       Order Dose: 10 mg       Quantity: 12 tablet    Refills: 0       No discharge procedures on file.    PDMP Review         Value Time User    PDMP Reviewed  Yes 5/5/2021  3:56 PM Allen Mcmahan III, DO            ED Provider  Electronically Signed by             Kaylan Carlos MD  07/08/24 3411

## 2024-07-08 NOTE — Clinical Note
Anamaria Rivera was seen and treated in our emergency department on 7/8/2024.                Diagnosis:     Anamaria  .    She may return on this date: 07/12/2024         If you have any questions or concerns, please don't hesitate to call.      Kaylan Carlos MD    ______________________________           _______________          _______________  Hospital Representative                              Date                                Time

## 2024-07-09 ENCOUNTER — APPOINTMENT (OUTPATIENT)
Dept: PHYSICAL THERAPY | Facility: CLINIC | Age: 59
End: 2024-07-09
Payer: COMMERCIAL

## 2024-07-09 ENCOUNTER — TELEPHONE (OUTPATIENT)
Dept: DERMATOLOGY | Facility: CLINIC | Age: 59
End: 2024-07-09

## 2024-07-09 VITALS
HEART RATE: 97 BPM | TEMPERATURE: 98.5 F | OXYGEN SATURATION: 96 % | RESPIRATION RATE: 20 BRPM | SYSTOLIC BLOOD PRESSURE: 146 MMHG | DIASTOLIC BLOOD PRESSURE: 67 MMHG

## 2024-07-09 LAB
ATRIAL RATE: 87 BPM
P AXIS: 70 DEGREES
PR INTERVAL: 174 MS
QRS AXIS: 66 DEGREES
QRSD INTERVAL: 84 MS
QT INTERVAL: 368 MS
QTC INTERVAL: 442 MS
T WAVE AXIS: 76 DEGREES
VENTRICULAR RATE: 87 BPM

## 2024-07-09 PROCEDURE — 93010 ELECTROCARDIOGRAM REPORT: CPT | Performed by: INTERNAL MEDICINE

## 2024-07-09 NOTE — TELEPHONE ENCOUNTER
Called patient regarding her appt with Ally on 8/7. There was a change to provider's schedule and appt had been moved to Monday, 8/5 @ 10:20 with Ally in [facility]. Left a message for patient to call the office to confirm new appt.

## 2024-07-11 ENCOUNTER — APPOINTMENT (OUTPATIENT)
Dept: PHYSICAL THERAPY | Facility: CLINIC | Age: 59
End: 2024-07-11
Payer: COMMERCIAL

## 2024-07-21 ENCOUNTER — HOSPITAL ENCOUNTER (OUTPATIENT)
Dept: MRI IMAGING | Facility: HOSPITAL | Age: 59
Discharge: HOME | End: 2024-07-21
Payer: COMMERCIAL

## 2024-07-21 DIAGNOSIS — R10.2 PELVIC PAIN: ICD-10-CM

## 2024-07-21 DIAGNOSIS — R93.7 ABNORMAL X-RAY OF PELVIS: ICD-10-CM

## 2024-07-21 DIAGNOSIS — M86.9 OSTEITIS PUBIS (HCC): ICD-10-CM

## 2024-07-21 PROCEDURE — 72195 MRI PELVIS W/O DYE: CPT

## 2024-07-22 ENCOUNTER — TELEPHONE (OUTPATIENT)
Dept: PSYCHIATRY | Facility: CLINIC | Age: 59
End: 2024-07-22

## 2024-07-22 ENCOUNTER — TELEMEDICINE (OUTPATIENT)
Dept: PSYCHIATRY | Facility: CLINIC | Age: 59
End: 2024-07-22
Payer: COMMERCIAL

## 2024-07-22 DIAGNOSIS — F33.1 MODERATE EPISODE OF RECURRENT MAJOR DEPRESSIVE DISORDER (HCC): Primary | ICD-10-CM

## 2024-07-22 DIAGNOSIS — F41.1 GAD (GENERALIZED ANXIETY DISORDER): ICD-10-CM

## 2024-07-22 PROCEDURE — 99214 OFFICE O/P EST MOD 30 MIN: CPT | Performed by: PHYSICIAN ASSISTANT

## 2024-07-22 RX ORDER — BUPROPION HYDROCHLORIDE 150 MG/1
150 TABLET ORAL DAILY
Qty: 90 TABLET | Refills: 1 | Status: SHIPPED | OUTPATIENT
Start: 2024-07-22 | End: 2024-07-22 | Stop reason: SDUPTHER

## 2024-07-22 RX ORDER — BUPROPION HYDROCHLORIDE 150 MG/1
150 TABLET ORAL DAILY
Qty: 90 TABLET | Refills: 1 | Status: SHIPPED | OUTPATIENT
Start: 2024-07-22

## 2024-07-22 NOTE — PSYCH
This note was not shared with the patient due to reasonable likelihood of causing patient harm    Virtual Regular Visit    Verification of patient location:    Patient is located at Home in the following state in which I hold an active license [place]      Assessment/Plan:    Problem List Items Addressed This Visit          Behavioral Health    Moderate episode of recurrent major depressive disorder (HCC) - Primary    Relevant Medications    buPROPion (WELLBUTRIN XL) 150 mg 24 hr tablet     Other Visit Diagnoses       DEISY (generalized anxiety disorder)        Relevant Medications    buPROPion (WELLBUTRIN XL) 150 mg 24 hr tablet            Goals addressed in session: Goal 1          Reason for visit is   Chief Complaint   Patient presents with    Follow-up    Medication Management    Virtual Regular Visit          Encounter provider Alaina Arias PA-C      Recent Visits  No visits were found meeting these conditions.  Showing recent visits within past 7 days and meeting all other requirements  Today's Visits  Date Type Provider Dept   07/22/24 Telemedicine Alaina Arias PA-C  [facility]   Showing today's visits and meeting all other requirements  Future Appointments  No visits were found meeting these conditions.  Showing future appointments within next 150 days and meeting all other requirements       The patient was identified by name and date of birth. Anamaria Rivera was informed that this is a telemedicine visit and that the visit is being conducted throughthe Epic Embedded platform. She agrees to proceed..  My office door was closed. No one else was in the room.  She acknowledged consent and understanding of privacy and security of the video platform. The patient has agreed to participate and understands they can discontinue the visit at any time.    Patient is aware this is a billable service.     Subjective  Anamaria Rivera is a 59 y.o. female with MDD, DEISY .      [facility]   Anamaria  "was seen for follow up and for medication management.    She is now in her home after a few months of living in hotel due to furnace malfunction in their home.  Unfortunately she has an inflamed pubic bone and unable to lift heavy boxes, etc. Had an MRI yesterday and awaiting results.  Also was seen in ED 2 weeks ago for nausea, vomiting and diagnosed with GI infection.  This has resolved.  She is \"struggling\" with drinking alcohol and that her brother is worried about.  She has been able to reduce though and drinks 2 bottles of beer and not every day.  Discussed concerns of her MRI and possible bone health.  We did discuss possible correlation of Zoloft and osteopenia  Discussed gradual dose reduction in medications to continue to treat her symptoms.  Since starting in treatment, we have gradually reduced Wellbutrin from 4 to 50 mg to 150 mg.  She has been maintained on Zoloft 200 mg total per day and Seroquel primarily taking 50 mg at bedtime.  Currently having increased external stressors and anxiety but overall managing.  Would like to continue same treatment plan at this time.    Past Medical History:   Diagnosis Date    ADHD (attention deficit hyperactivity disorder)     Anxiety     Arthritis     Depression     Fibromyalgia, primary     GERD (gastroesophageal reflux disease)     History of stomach ulcers     Hypertension     Panic attack     Papanicolaou smear 2020    Psychiatric disorder        Past Surgical History:   Procedure Laterality Date    BREAST CYST ASPIRATION Left 2014    HYSTEROSCOPY  2015    MAMMO (HISTORICAL) Bilateral 03/05/2020    [facility] BI-RADS 1 Negative Scattered areas fibroglandular density; 25% to 50% glandular breast tissue       Current Outpatient Medications   Medication Sig Dispense Refill    buPROPion (WELLBUTRIN XL) 150 mg 24 hr tablet Take 1 tablet (150 mg total) by mouth daily 90 tablet 1    albuterol (PROVENTIL HFA,VENTOLIN HFA) 90 mcg/act inhaler Inhale 2 puffs every 6 (six) hours " as needed for wheezing or shortness of breath 8.5 g 5    cholecalciferol (VITAMIN D3) 1,000 units tablet Take 1,000 Units by mouth daily (Patient not taking: Reported on 6/28/2024)      ciclopirox (PENLAC) 8 % solution Apply topically daily at bedtime 6.6 mL 3    Clocortolone Pivalate 0.1 % cream Apply topically 2 (two) times a day 45 g 3    hydroCHLOROthiazide 12.5 mg tablet Take 1 tablet (12.5 mg total) by mouth daily 90 tablet 0    hydrOXYzine HCL (ATARAX) 25 mg tablet Take 1 tablet (25 mg total) by mouth every 6 (six) hours as needed for itching or anxiety 30 tablet 2    losartan (COZAAR) 100 MG tablet Take 1 tablet (100 mg total) by mouth daily 90 tablet 3    Multiple Vitamin (multivitamin) tablet Take 1 tablet by mouth daily      omeprazole (PriLOSEC) 40 MG capsule Take 1 capsule (40 mg total) by mouth daily Take before a meal 90 capsule 1    QUEtiapine (SEROquel) 50 mg tablet 1 to 1 1/2 po q hs 135 tablet 1    sertraline (ZOLOFT) 100 mg tablet Take 2 tablets (200 mg total) by mouth daily 180 tablet 1    SF 5000 Plus 1.1 % CREA Take by mouth 2 (two) times a day 51 g 0    terbinafine (LamISIL) 250 mg tablet  (Patient not taking: Reported on 6/28/2024)       No current facility-administered medications for this visit.        Allergies   Allergen Reactions    Latex Rash       Review of Systems  As noted in HPI  Video Exam    There were no vitals filed for this visit.    Physical Exam   Mental status exam:  Speech is clear, coherent, normal rate and volume  Adequate hygiene, fair eye contact  Mood is anxious  Affect is congruent, dysthymic  circumstantial thought process  No suicidal or homicidal ideation  No psychotic signs or symptoms noted, no hallucinations and no delusions were voiced  Cognition is intact  Insight and judgment is intact     Medications and treatment plan as follows:  Wellbutrin  mg every morning, has been gradually reduced from 450  Sertraline 200 mg daily  Seroquel 50 to 75 mg at  bedtime typically taking 50 mg at bedtime  Hydroxyzine as needed, not needing often     She will follow-up in 3 months, and will call sooner if any questions or concerns    Aware of after-hours on-call service and [phone] crisis line  Visit Time    Visit Start Time: 930  Visit Stop Time: 1000  Total Visit Duration:  30 minutes

## 2024-07-23 DIAGNOSIS — I10 PRIMARY HYPERTENSION: ICD-10-CM

## 2024-07-23 RX ORDER — HYDROCHLOROTHIAZIDE 12.5 MG/1
12.5 TABLET ORAL DAILY
Qty: 100 TABLET | Refills: 1 | Status: SHIPPED | OUTPATIENT
Start: 2024-07-23

## 2024-07-24 ENCOUNTER — OFFICE VISIT (OUTPATIENT)
Dept: OBGYN CLINIC | Facility: CLINIC | Age: 59
End: 2024-07-24
Payer: COMMERCIAL

## 2024-07-24 ENCOUNTER — TELEPHONE (OUTPATIENT)
Dept: OBGYN CLINIC | Facility: CLINIC | Age: 59
End: 2024-07-24

## 2024-07-24 VITALS — BODY MASS INDEX: 35.84 KG/M2 | WEIGHT: 223 LBS | HEIGHT: 66 IN

## 2024-07-24 DIAGNOSIS — M86.9 OSTEITIS PUBIS (HCC): Primary | ICD-10-CM

## 2024-07-24 PROCEDURE — 99213 OFFICE O/P EST LOW 20 MIN: CPT | Performed by: FAMILY MEDICINE

## 2024-07-24 NOTE — PROGRESS NOTES
1. Osteitis pubis (HCC)   Physical Therapy        Orders Placed This Encounter   Procedures     Physical Therapy        IMAGING STUDIES: (I personally reviewed images in PACS and report):   MRI bony pelvis 7/21/24:  Osteitis pubis with bone marrow edema without fracture line          ASSESSMENT/PLAN:  Osteitis pubis-mildly improving-declines referral for injection    Repeat X-ray next visit: None    Return in about 6 weeks (around 9/4/2024).    Patient instructions below verbally summarized in person during encounter:  Patient Instructions   Since patient has mild improvement and declines referral for osteitis pubis CSI, I recommended trial of conservative treatmetn including anti-inflammatories and physical therapy. We will await approval from her GI doctor for taking anti-inflammatories due to a history of stomach ulcers in her 20s.       __________________________________________________________________________    HISTORY OF PRESENT ILLNESS:  Follow-up anterior pelvic pain after recent relocation to a new home approximately 5 to 6 weeks.  MRI did confirm working diagnosis of osteitis pubis.  Since then patient has had mild improvement.  She declines referral for injection.  She has past medical history significant for gastric ulcers in her 20s but did have recent workup with gastroenterology at [facility].  She is unsure if she should take anti-inflammatory medications.          Review of Systems      Following history reviewed and update:    Past Medical History:   Diagnosis Date    ADHD (attention deficit hyperactivity disorder)     Anxiety     Arthritis     Depression     Fibromyalgia, primary     GERD (gastroesophageal reflux disease)     History of stomach ulcers     Hypertension     Panic attack     Papanicolaou smear 2020    Psychiatric disorder      Past Surgical History:   Procedure Laterality Date    BREAST CYST ASPIRATION Left 2014    HYSTEROSCOPY  2015    MAMMO (HISTORICAL) Bilateral 03/05/2020     "[facility] BI-RADS 1 Negative Scattered areas fibroglandular density; 25% to 50% glandular breast tissue     Social History   Social History     Substance and Sexual Activity   Alcohol Use Yes    Comment: Socially     Social History     Substance and Sexual Activity   Drug Use No     Social History     Tobacco Use   Smoking Status Former    Passive exposure: Past   Smokeless Tobacco Never   Tobacco Comments    teen years     Family History   Problem Relation Age of Onset    Hypertension Mother     Lung cancer Mother     Skin cancer Father     Stroke Brother     Hypertension Brother     Transient ischemic attack Brother     No Known Problems Brother     No Known Problems Brother     Hypertension Maternal Grandmother     Multiple sclerosis Maternal Grandmother     Heart attack Maternal Grandfather     Heart disease Maternal Grandfather     Alcohol abuse Maternal Grandfather     Asthma Paternal Grandmother     Heart disease Paternal Grandfather      Allergies   Allergen Reactions    Latex Rash          Physical Exam  Ht 5' 5.5\" (1.664 m)   Wt 101 kg (223 lb)   BMI 36.54 kg/m²         Ortho Exam  BACK EXAM:  DERMATOMAL SENSATION:  L1: normal   L2: normal   L3: normal   L4: normal   L5: normal   S1: normal    STRENGTH (bilateral):  Knee Extension: 5/5  Foot Dorsiflexion: 5/5  Great Toe Extension: 5/5  Foot Plantarflexion: 5/5  Hip Flexion: 5/5  Hip Abduction: 5/5    RIGHT HIP:  LOG ROLL: negative  BRADFORD: Positive pain pubic bone surfaces  FADIR: negative    LEFT HIP:  LOG ROLL: negative  BRADFORD: + Pain pubic bone symphysis  FADIR: negative    + Tenderness pubic symphysis reproduces she could have pain        __________________________________________________________________________  Procedures      MA Silvia Sanchez present for examination            "

## 2024-07-24 NOTE — PATIENT INSTRUCTIONS
Since patient has mild improvement and declines referral for osteitis pubis CSI, I recommended trial of conservative treatmetn including anti-inflammatories and physical therapy. We will await approval from her GI doctor for taking anti-inflammatories due to a history of stomach ulcers in her 20s.

## 2024-07-24 NOTE — TELEPHONE ENCOUNTER
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 She tells me that she has had recent GI investigations done including EGD and Colonoscopy. Could you please provide me with some guidance as to whether Ms. Rivera's risks with GI bleed with taking either medrol dose brigitte or nsaids. She is currently undergoing inflammatory pain and would benefit from either if she is a candidate to take these medications.

## 2024-07-25 ENCOUNTER — OFFICE VISIT (OUTPATIENT)
Dept: PAIN MEDICINE | Facility: CLINIC | Age: 59
End: 2024-07-25
Payer: COMMERCIAL

## 2024-07-25 VITALS
WEIGHT: 220 LBS | DIASTOLIC BLOOD PRESSURE: 78 MMHG | SYSTOLIC BLOOD PRESSURE: 118 MMHG | BODY MASS INDEX: 35.36 KG/M2 | HEIGHT: 66 IN

## 2024-07-25 DIAGNOSIS — M48.062 SPINAL STENOSIS OF LUMBAR REGION WITH NEUROGENIC CLAUDICATION: ICD-10-CM

## 2024-07-25 DIAGNOSIS — M48.061 LUMBAR FORAMINAL STENOSIS: ICD-10-CM

## 2024-07-25 DIAGNOSIS — M54.16 LUMBAR RADICULOPATHY: Primary | ICD-10-CM

## 2024-07-25 DIAGNOSIS — Z13.29 SCREENING FOR THYROID DISORDER: Primary | ICD-10-CM

## 2024-07-25 DIAGNOSIS — M47.816 LUMBAR SPONDYLOSIS: ICD-10-CM

## 2024-07-25 DIAGNOSIS — R73.03 PREDIABETES: ICD-10-CM

## 2024-07-25 PROCEDURE — 99214 OFFICE O/P EST MOD 30 MIN: CPT | Performed by: PHYSICIAN ASSISTANT

## 2024-07-25 NOTE — TELEPHONE ENCOUNTER
Thank you Dr Fink.    Ms. Vega, please notify patient she may taken Nsaids sparingly as needed. thanks

## 2024-07-25 NOTE — PROGRESS NOTES
Assessment:  1. Lumbar radiculopathy    2. Spinal stenosis of lumbar region with neurogenic claudication    3. Lumbar foraminal stenosis    4. Lumbar spondylosis        Plan:  While the patient was in the office today, I did have a thorough conversation regarding their chronic pain syndrome, medication management, and treatment plan options.    After discussing options, I have recommended the patient proceed with an L5-S1 interlaminar epidural steroid injection to the left to address the radicular component of her pain pattern.  She previously had minimal relief with a transforaminal approach done years ago.  She has a follow-up with Dr. Oshea in the future.    Hopefully in time the pubic pain secondary to a status to this will resolve.  She is awaiting to see if cleared from a GI standpoint if she can take oral steroids as recommended by Dr. Mcmahan.    The patient will follow-up 4 weeks after the procedure for reevaluation. The patient was advised to contact the office should their symptoms worsen in the interim. The patient was agreeable and verbalized an understanding.        History of Present Illness:    The patient is a 59 y.o. female last seen on 8/31/2021 who presents for a follow up office visit in regards to chronic pain secondary to lumbar spondylosis, lumbar degenerative disc disease and lumbar central canal and foraminal stenosis.  The patient currently reports low back pain with radiation to the posterior and lateral aspect of the left lower extremity.  She describes her pain as a constant dull, aching, sharp, throbbing pain with intermittent numbness and paresthesias.  Patient had a left L5 and S1 transforaminal epidural steroid injection done years ago and she reports very little relief.  She recently had a consultation with orthopedic spine surgeon Dr. Oshea who recommended trying injection therapy again as well as other conservative efforts such as physical therapy.  Patient states that  physical therapy is not an ideal option from a financial standpoint.  Unfortunately in the interim, she has developed severe pain in the pubic bone.  She has been evaluated by our orthopedic department, and MRI revealing mild osteitis pubis and mild left hamstring tendinosis.  She is waiting to see if she is cleared from a GI standpoint to start a course of oral steroids as she does have a history of GI ulcers.    I have personally reviewed and/or updated the patient's past medical history, past surgical history, family history, social history, current medications, allergies, and vital signs today.       Review of Systems:    Review of Systems   Gastrointestinal:  Positive for diarrhea.   Musculoskeletal:  Positive for gait problem and myalgias.         Past Medical History:   Diagnosis Date   • ADHD (attention deficit hyperactivity disorder)    • Anxiety    • Arthritis    • Depression    • Fibromyalgia, primary    • GERD (gastroesophageal reflux disease)    • History of stomach ulcers    • Hypertension    • Panic attack    • Papanicolaou smear 2020   • Psychiatric disorder        Past Surgical History:   Procedure Laterality Date   • BREAST CYST ASPIRATION Left 2014   • HYSTEROSCOPY  2015   • MAMMO (HISTORICAL) Bilateral 03/05/2020    [facility] BI-RADS 1 Negative Scattered areas fibroglandular density; 25% to 50% glandular breast tissue       Family History   Problem Relation Age of Onset   • Hypertension Mother    • Lung cancer Mother    • Skin cancer Father    • Stroke Brother    • Hypertension Brother    • Transient ischemic attack Brother    • No Known Problems Brother    • No Known Problems Brother    • Hypertension Maternal Grandmother    • Multiple sclerosis Maternal Grandmother    • Heart attack Maternal Grandfather    • Heart disease Maternal Grandfather    • Alcohol abuse Maternal Grandfather    • Asthma Paternal Grandmother    • Heart disease Paternal Grandfather        Social History     Occupational History    • Occupation: Tech.    Tobacco Use   • Smoking status: Former     Passive exposure: Past   • Smokeless tobacco: Never   • Tobacco comments:     teen years   Vaping Use   • Vaping status: Never Used   Substance and Sexual Activity   • Alcohol use: Yes     Comment: Socially   • Drug use: No   • Sexual activity: Not Currently     Birth control/protection: Post-menopausal         Current Outpatient Medications:   •  albuterol (PROVENTIL HFA,VENTOLIN HFA) 90 mcg/act inhaler, Inhale 2 puffs every 6 (six) hours as needed for wheezing or shortness of breath, Disp: 8.5 g, Rfl: 5  •  buPROPion (WELLBUTRIN XL) 150 mg 24 hr tablet, Take 1 tablet (150 mg total) by mouth daily, Disp: 90 tablet, Rfl: 1  •  cholecalciferol (VITAMIN D3) 1,000 units tablet, Take 1,000 Units by mouth daily (Patient not taking: Reported on 6/28/2024), Disp: , Rfl:   •  ciclopirox (PENLAC) 8 % solution, Apply topically daily at bedtime, Disp: 6.6 mL, Rfl: 3  •  Clocortolone Pivalate 0.1 % cream, Apply topically 2 (two) times a day, Disp: 45 g, Rfl: 3  •  hydroCHLOROthiazide 12.5 mg tablet, Take 1 tablet (12.5 mg total) by mouth daily, Disp: 100 tablet, Rfl: 1  •  hydrOXYzine HCL (ATARAX) 25 mg tablet, Take 1 tablet (25 mg total) by mouth every 6 (six) hours as needed for itching or anxiety, Disp: 30 tablet, Rfl: 2  •  losartan (COZAAR) 100 MG tablet, Take 1 tablet (100 mg total) by mouth daily, Disp: 90 tablet, Rfl: 3  •  Multiple Vitamin (multivitamin) tablet, Take 1 tablet by mouth daily, Disp: , Rfl:   •  omeprazole (PriLOSEC) 40 MG capsule, Take 1 capsule (40 mg total) by mouth daily Take before a meal, Disp: 90 capsule, Rfl: 1  •  QUEtiapine (SEROquel) 50 mg tablet, 1 to 1 1/2 po q hs, Disp: 135 tablet, Rfl: 1  •  sertraline (ZOLOFT) 100 mg tablet, Take 2 tablets (200 mg total) by mouth daily, Disp: 180 tablet, Rfl: 1  •  SF 5000 Plus 1.1 % CREA, Take by mouth 2 (two) times a day, Disp: 51 g, Rfl: 0  •  terbinafine (LamISIL) 250 mg tablet, ,  "Disp: , Rfl:     Allergies   Allergen Reactions   • Latex Rash       Physical Exam:    /78   Ht 5' 5.5\" (1.664 m)   Wt 99.8 kg (220 lb)   BMI 36.05 kg/m²     Constitutional:normal, well developed, well nourished, alert, in no distress and non-toxic and no overt pain behavior. and overweight  Eyes:anicteric  HEENT:grossly intact  Neck:supple, symmetric, trachea midline and no masses   Pulmonary:even and unlabored  Cardiovascular:No edema or pitting edema present  Skin:Normal without rashes or lesions and well hydrated  Psychiatric:Mood and affect appropriate  Neurologic:Cranial Nerves II-XII grossly intact  Musculoskeletal: Antalgic gait, left lumbar paraspinal muscles mildly tender, limited range of motion lumbar spine.      Imaging  Narrative & Impression   MRI LUMBAR SPINE WITHOUT CONTRAST     INDICATION: M54.16: Radiculopathy, lumbar region.     COMPARISON: MRI 5/25/2021.     TECHNIQUE:  Multiplanar, multisequence imaging of the lumbar spine was performed. .        IMAGE QUALITY:  Diagnostic     FINDINGS:     VERTEBRAL BODIES:  There are 5 lumbar type vertebral bodies.  Normal alignment of the lumbar spine.  No spondylolysis or spondylolisthesis. Mild scoliosis with apex at L3-4.  No compression fracture.    Normal marrow signal is identified within the   visualized bony structures.  No discrete marrow lesion.     SACRUM:  Normal signal within the sacrum. No evidence of insufficiency or stress fracture.     DISTAL CORD AND CONUS:  Normal size and signal within the distal cord and conus.     PARASPINAL SOFT TISSUES:  Paraspinal soft tissues are unremarkable.     LOWER THORACIC DISC SPACES:  Normal disc height and signal.  No disc herniation, canal stenosis or foraminal narrowing.     LUMBAR DISC SPACES:     L1-L2: Minor bulge without stenosis.     L2-L3: Diffuse disc bulge with severe bilateral facet arthrosis resulting in mild right foraminal narrowing. Left neural foramen remains patent. There is " moderate to severe central canal stenosis.     L3-L4: Diffuse disc bulge and facet arthrosis resulting in moderate right foraminal narrowing. There is moderate to severe central canal stenosis at this level. Modic type II endplate change noted.     L4-L5: Diffuse disc bulge and bilateral facet arthrosis resulting in moderate foraminal encroachment. There is mild to moderate central canal narrowing. Modic type II endplate changes.     L5-S1: Disc bulge with bilateral facet arthrosis resulting in moderate to severe left foraminal narrowing. Mild central canal stenosis.     OTHER FINDINGS:  None.     IMPRESSION:     1. Advanced spondylotic changes of the lumbar spine including multifactorial moderate to severe central canal stenosis at the L2-3 and L3-4 levels. There is also multifactorial moderate to severe left L5-S1 foraminal stenosis concerning for impingement   of the exiting left L5 nerve root.  2. Mild levoscoliosis.     FL spine and pain procedure    (Results Pending)         Orders Placed This Encounter   Procedures   • FL spine and pain procedure

## 2024-07-31 ENCOUNTER — HOSPITAL ENCOUNTER (OUTPATIENT)
Dept: BONE DENSITY | Facility: IMAGING CENTER | Age: 59
Discharge: HOME | End: 2024-07-31
Payer: COMMERCIAL

## 2024-07-31 VITALS — WEIGHT: 221.8 LBS | HEIGHT: 66 IN | BODY MASS INDEX: 35.65 KG/M2

## 2024-07-31 DIAGNOSIS — M54.16 LUMBAR RADICULOPATHY: ICD-10-CM

## 2024-07-31 PROCEDURE — 77080 DXA BONE DENSITY AXIAL: CPT

## 2024-08-05 ENCOUNTER — OFFICE VISIT (OUTPATIENT)
Dept: DERMATOLOGY | Facility: CLINIC | Age: 59
End: 2024-08-05
Payer: COMMERCIAL

## 2024-08-05 VITALS — BODY MASS INDEX: 36.82 KG/M2 | WEIGHT: 221 LBS | TEMPERATURE: 97 F | HEIGHT: 65 IN

## 2024-08-05 DIAGNOSIS — L82.1 SEBORRHEIC KERATOSIS: Primary | ICD-10-CM

## 2024-08-05 DIAGNOSIS — D18.01 CHERRY ANGIOMA: ICD-10-CM

## 2024-08-05 DIAGNOSIS — D22.60 MULTIPLE BENIGN NEVI OF UPPER EXTREMITY, LOWER EXTREMITY, AND TRUNK: ICD-10-CM

## 2024-08-05 DIAGNOSIS — L81.4 LENTIGO: ICD-10-CM

## 2024-08-05 DIAGNOSIS — D22.5 MULTIPLE BENIGN NEVI OF UPPER EXTREMITY, LOWER EXTREMITY, AND TRUNK: ICD-10-CM

## 2024-08-05 DIAGNOSIS — D22.70 MULTIPLE BENIGN NEVI OF UPPER EXTREMITY, LOWER EXTREMITY, AND TRUNK: ICD-10-CM

## 2024-08-05 DIAGNOSIS — D48.9 NEOPLASM OF UNCERTAIN BEHAVIOR: ICD-10-CM

## 2024-08-05 DIAGNOSIS — D23.9 DERMATOFIBROMA: ICD-10-CM

## 2024-08-05 PROCEDURE — 99213 OFFICE O/P EST LOW 20 MIN: CPT

## 2024-08-05 NOTE — PROGRESS NOTES
"[facility] Note     Patient Name: Anamaria Rivera  Encounter Date: 08/05/24     Have you been cared for by a [facility] Dermatologist in the last 3 years and, if so, which description applies to you?    Yes.  I have been here within the last 3 years, and my medical history has NOT changed since that time.  I am FEMALE/of child-bearing potential.    REVIEW OF SYSTEMS:  Have you recently had or currently have any of the following? No changes in my recent health.   PAST MEDICAL HISTORY:  Have you personally ever had or currently have any of the following?  If \"YES,\" then please provide more detail. No changes in my medical history.   HISTORY OF IMMUNOSUPPRESSION: Do you have a history of any of the following:  Systemic Immunosuppression such as Diabetes, Biologic or Immunotherapy, Chemotherapy, Organ Transplantation, Bone Marrow Transplantation?  No     Answering \"YES\" requires the addition of the dotphrase \"IMMUNOSUPPRESSED\" as the first diagnosis of the patient's visit.   FAMILY HISTORY:  Any \"first degree relatives\" (parent, brother, sister, or child) with the following?    No changes in my family's known health.   PATIENT EXPERIENCE:    Do you want the Dermatologist to perform a COMPLETE skin exam today including a clinical examination under the \"bra and underwear\" areas?  Yes  If necessary, do we have your permission to call and leave a detailed message on your Preferred Phone number that includes your specific medical information?  Yes      Allergies   Allergen Reactions    Latex Rash      Current Outpatient Medications:     albuterol (PROVENTIL HFA,VENTOLIN HFA) 90 mcg/act inhaler, Inhale 2 puffs every 6 (six) hours as needed for wheezing or shortness of breath, Disp: 8.5 g, Rfl: 5    buPROPion (WELLBUTRIN XL) 150 mg 24 hr tablet, Take 1 tablet (150 mg total) by mouth daily, Disp: 90 tablet, Rfl: 1    cholecalciferol (VITAMIN D3) 1,000 units tablet, Take 1,000 Units by mouth daily (Patient not " "taking: Reported on 6/28/2024), Disp: , Rfl:     ciclopirox (PENLAC) 8 % solution, Apply topically daily at bedtime, Disp: 6.6 mL, Rfl: 3    Clocortolone Pivalate 0.1 % cream, Apply topically 2 (two) times a day, Disp: 45 g, Rfl: 3    hydroCHLOROthiazide 12.5 mg tablet, Take 1 tablet (12.5 mg total) by mouth daily, Disp: 100 tablet, Rfl: 1    hydrOXYzine HCL (ATARAX) 25 mg tablet, Take 1 tablet (25 mg total) by mouth every 6 (six) hours as needed for itching or anxiety, Disp: 30 tablet, Rfl: 2    losartan (COZAAR) 100 MG tablet, Take 1 tablet (100 mg total) by mouth daily, Disp: 90 tablet, Rfl: 3    Multiple Vitamin (multivitamin) tablet, Take 1 tablet by mouth daily, Disp: , Rfl:     omeprazole (PriLOSEC) 40 MG capsule, Take 1 capsule (40 mg total) by mouth daily Take before a meal, Disp: 90 capsule, Rfl: 1    QUEtiapine (SEROquel) 50 mg tablet, 1 to 1 1/2 po q hs, Disp: 135 tablet, Rfl: 1    sertraline (ZOLOFT) 100 mg tablet, Take 2 tablets (200 mg total) by mouth daily, Disp: 180 tablet, Rfl: 1    SF 5000 Plus 1.1 % CREA, Take by mouth 2 (two) times a day, Disp: 51 g, Rfl: 0    terbinafine (LamISIL) 250 mg tablet, , Disp: , Rfl:           Whom besides the patient is providing clinical information about today's encounter?   NO ADDITIONAL HISTORIAN (patient alone provided history)    Physical Exam and Assessment/Plan by Diagnosis:       NEOPLASM OF UNCERTAIN BEHAVIOR OF SKIN    Physical Exam:  (Anatomic Location); (Size and Morphological Description); (Differential Diagnosis):  Specimen A; right forehead ;0.4 cm x 0.6 pink papule; DDX: traumatized benign nevus       Additional History of Present Condition:  Patient report she had this mole  for years, but states it has gotten more raised and gets irritated with friction after she washes her face.     Assessment and Plan:  I have discussed with the patient that a sample of skin via a \"skin biopsy” would be potentially helpful to further make a specific diagnosis " "under the microscope.  Based on a thorough discussion of this condition and the management approach to it (including a comprehensive discussion of the known risks, side effects and potential benefits of treatment), the patient (family) agrees to implement the following specific plan:  Given cosmetically sensitive area, recommend patient follow-up for removal during a procedure appointment, as it may require a small excision or punch removal based on physician preference.   Patient schedule with Dr. Summers on 8/15/2024.     MELANOCYTIC NEVI (\"Moles\")    Physical Exam:  Anatomic Location Affected:   trunk and extremities   Morphological Description:  Scattered, 1-4mm round to ovoid, symmetrical-appearing, even bordered, skin colored to dark brown macules/papules      Additional History of Present Condition:  Present on exam. Denies any pain, itch, bleeding. Present for years. Denies actively changing or growing moles.     Assessment and Plan:  Based on a thorough discussion of this condition and the management approach to it (including a comprehensive discussion of the known risks, side effects and potential benefits of treatment), the patient (family) agrees to implement the following specific plan:  Reassured benign.   Monitor for changes. ABCDE's of melanoma handout provided.   Practice sun protection. Apply broad spectrum (UVA and UVB) sunscreen, SPF 30 or higher every 2 hours. Wear sun protective clothing, hats, and sunglasses.     LENTIGO    Physical Exam:  Anatomic Location Affected:  sun exposed areas of trunk and extremities   Morphological Description:  multiple scattered tan to brown evenly pigmented macules      Additional History of Present Condition:  Present on exam.     Assessment and Plan:  Based on a thorough discussion of this condition and the management approach to it (including a comprehensive discussion of the known risks, side effects and potential benefits of treatment), the patient (family) " "agrees to implement the following specific plan:  Reassured benign.   Practice sun protection. Apply broad spectrum (UVA and UVB) sunscreen, SPF 30 or higher every 2 hours. Wear sun protective clothing, hats, and sunglasses.       SEBORRHEIC KERATOSIS; NON-INFLAMED    Physical Exam:  Anatomic Location Affected:  trunk, extremities   Morphological Description:  Flat and raised, waxy, smooth to warty textured, yellow to brownish-grey to dark brown to blackish, discrete, \"stuck-on\" appearing papules.      Additional History of Present Condition:  Present on exam. Patient denies any itching.     Assessment and Plan:  Based on a thorough discussion of this condition and the management approach to it (including a comprehensive discussion of the known risks, side effects and potential benefits of treatment), the patient (family) agrees to implement the following specific plan:  Reassured benign.     ANGIOMA (\"CHERRY ANGIOMA\")     Physical Exam:  Anatomic Location: scattered across trunk and extremities   Morphologic Description: 1-2 mm firm red to maroon to purples to blue discrete papule, on dermoscopy lacunae  by white septae seen       Additional History of Present Condition:  Present on exam.     Plan:  Reassured benign.          DERMATOFIBROMA     Physical Exam:  Anatomic Location Affected:  Right posterior shoulder      Assessment and Plan:  Based on a thorough discussion of this condition and the management approach to it (including a comprehensive discussion of the known risks, side effects and potential benefits of treatment), the patient (family) agrees to implement the following specific plan:  Reassured benign.      Follow-up: 1 year for skin exam.     Scribe Attestation      I,:  Evelin Ponce MA am acting as a scribe while in the presence of the attending physician.:       I,:  Ally Sierra PA-C personally performed the services described in this documentation    as scribed in my presence.:       "

## 2024-08-08 DIAGNOSIS — R05.9 COUGH: ICD-10-CM

## 2024-08-09 RX ORDER — ALBUTEROL SULFATE 90 UG/1
2 AEROSOL, METERED RESPIRATORY (INHALATION) EVERY 6 HOURS PRN
Qty: 8.5 G | Refills: 5 | Status: SHIPPED | OUTPATIENT
Start: 2024-08-09

## 2024-08-13 ENCOUNTER — TELEPHONE (OUTPATIENT)
Dept: DERMATOLOGY | Facility: CLINIC | Age: 59
End: 2024-08-13

## 2024-08-13 NOTE — TELEPHONE ENCOUNTER
Called patient regarding her procedure on Thursday with Dr. Summers. Provider is overbooked and has an opening the following Thursday, 8/22. If patient calls back, please message Pat in CV if she would like to come in on the 22nd.

## 2024-08-14 ENCOUNTER — TELEPHONE (OUTPATIENT)
Age: 59
End: 2024-08-14

## 2024-08-14 NOTE — TELEPHONE ENCOUNTER
Left VM for patient that Dr. Diaz will be at [facility] on [facility]12 and her FU appt can be rescheduled to [facility] same day or to next available at  campus. Provided hopeline number for call back

## 2024-08-17 ENCOUNTER — DOCUMENTATION (OUTPATIENT)
Dept: OBGYN CLINIC | Facility: OTHER | Age: 59
End: 2024-08-17

## 2024-08-17 DIAGNOSIS — M86.9 OSTEITIS PUBIS (HCC): Primary | ICD-10-CM

## 2024-08-17 RX ORDER — METHYLPREDNISOLONE 4 MG
TABLET, DOSE PACK ORAL
Qty: 1 EACH | Refills: 1 | Status: SHIPPED | OUTPATIENT
Start: 2024-08-17

## 2024-08-22 ENCOUNTER — PROCEDURE VISIT (OUTPATIENT)
Dept: DERMATOLOGY | Facility: CLINIC | Age: 59
End: 2024-08-22
Payer: COMMERCIAL

## 2024-08-22 VITALS — TEMPERATURE: 98.1 F

## 2024-08-22 DIAGNOSIS — L82.1 SEBORRHEIC KERATOSIS: Primary | ICD-10-CM

## 2024-08-22 PROCEDURE — 99213 OFFICE O/P EST LOW 20 MIN: CPT | Performed by: DERMATOLOGY

## 2024-08-22 NOTE — PROGRESS NOTES
"[facility] Note     Patient Name: Anamaria Rivera  Encounter Date: 8/22/24     Have you been cared for by a [facility] Dermatologist in the last 3 years and, if so, which description applies to you?    Yes.  I have been here within the last 3 years, and my medical history has NOT changed since that time.  I am FEMALE/of child-bearing potential.    REVIEW OF SYSTEMS:  Have you recently had or currently have any of the following? No changes in my recent health.   PAST MEDICAL HISTORY:  Have you personally ever had or currently have any of the following?  If \"YES,\" then please provide more detail. No changes in my medical history.   HISTORY OF IMMUNOSUPPRESSION: Do you have a history of any of the following:  Systemic Immunosuppression such as Diabetes, Biologic or Immunotherapy, Chemotherapy, Organ Transplantation, Bone Marrow Transplantation?  No     Answering \"YES\" requires the addition of the dotphrase \"IMMUNOSUPPRESSED\" as the first diagnosis of the patient's visit.   FAMILY HISTORY:  Any \"first degree relatives\" (parent, brother, sister, or child) with the following?    No changes in my family's known health.   PATIENT EXPERIENCE:    Do you want the Dermatologist to perform a COMPLETE skin exam today including a clinical examination under the \"bra and underwear\" areas?  NO  If necessary, do we have your permission to call and leave a detailed message on your Preferred Phone number that includes your specific medical information?  Yes      Allergies   Allergen Reactions    Latex Rash      Current Outpatient Medications:     albuterol (PROVENTIL HFA,VENTOLIN HFA) 90 mcg/act inhaler, Inhale 2 puffs every 6 (six) hours as needed for wheezing or shortness of breath, Disp: 8.5 g, Rfl: 5    buPROPion (WELLBUTRIN XL) 150 mg 24 hr tablet, Take 1 tablet (150 mg total) by mouth daily, Disp: 90 tablet, Rfl: 1    ciclopirox (PENLAC) 8 % solution, Apply topically daily at bedtime, Disp: 6.6 mL, Rfl: 3    " Clocortolone Pivalate 0.1 % cream, Apply topically 2 (two) times a day, Disp: 45 g, Rfl: 3    hydroCHLOROthiazide 12.5 mg tablet, Take 1 tablet (12.5 mg total) by mouth daily, Disp: 100 tablet, Rfl: 1    hydrOXYzine HCL (ATARAX) 25 mg tablet, Take 1 tablet (25 mg total) by mouth every 6 (six) hours as needed for itching or anxiety, Disp: 30 tablet, Rfl: 2    losartan (COZAAR) 100 MG tablet, Take 1 tablet (100 mg total) by mouth daily, Disp: 90 tablet, Rfl: 3    methylPREDNISolone 4 MG tablet therapy pack, Use as directed on package, Disp: 1 each, Rfl: 1    Multiple Vitamin (multivitamin) tablet, Take 1 tablet by mouth daily, Disp: , Rfl:     omeprazole (PriLOSEC) 40 MG capsule, Take 1 capsule (40 mg total) by mouth daily Take before a meal, Disp: 90 capsule, Rfl: 1    QUEtiapine (SEROquel) 50 mg tablet, 1 to 1 1/2 po q hs, Disp: 135 tablet, Rfl: 1    sertraline (ZOLOFT) 100 mg tablet, Take 2 tablets (200 mg total) by mouth daily, Disp: 180 tablet, Rfl: 1    SF 5000 Plus 1.1 % CREA, Take by mouth 2 (two) times a day, Disp: 51 g, Rfl: 0    terbinafine (LamISIL) 250 mg tablet, , Disp: , Rfl:     cholecalciferol (VITAMIN D3) 1,000 units tablet, Take 1,000 Units by mouth daily (Patient not taking: Reported on 6/28/2024), Disp: , Rfl:           Whom besides the patient is providing clinical information about today's encounter?   NO ADDITIONAL HISTORIAN (patient alone provided history)    Physical Exam and Assessment/Plan by Diagnosis:    SEBORRHEIC KERATOSIS (PRESUMED); NON-INFLAMED    Physical Exam:  Anatomic Location Affected:  right inferior forehead  Morphological Description: slight residual superficial scale    Additional History of Present Condition:  Patient was seen 8/5 with concerning lesion on her forehead for which she was scheduled for removal today. Patient states in the last few days it fell off. She described it as scaly. Denies picking at site.    Assessment and Plan: Likely SK that resolved/fell off with  "grooming and trauma. Offered cryotherapy to residual slight scale but she prefers to observe. Should it recur or change, she will contact me for a recheck. No indication for surgical intervention today.    Seborrheic Keratosis  A seborrheic keratosis is a harmless warty spot that appears during adult life as a common sign of skin aging.  Seborrheic keratoses can arise on any area of skin, covered or uncovered, with the usual exception of the palms and soles. They do not arise from mucous membranes. Seborrheic keratoses can have highly variable appearance.      Seborrheic keratoses are extremely common. It has been estimated that over 90% of adults over the age of 60 years have one or more of them. They occur in males and females of all races, typically beginning to erupt in the 30s or 40s. They are uncommon under the age of 20 years.  The precise cause of seborrhoeic keratoses is not known.  Seborrhoeic keratoses are considered degenerative in nature. As time goes by, seborrheic keratoses tend to become more numerous. Some people inherit a tendency to develop a very large number of them; some people may have hundreds of them.    The name \"seborrheic keratosis\" is misleading, because these lesions are not limited to a seborrhoeic distribution (scalp, mid-face, chest, upper back), nor are they formed from sebaceous glands, nor are they associated with sebum -- which is greasy.  Seborrheic keratosis may also be called \"SK,\" \"Seb K,\" \"basal cell papilloma,\" \"senile wart,\" or \"barnacle.\"      Researchers have noted:  Eruptive seborrhoeic keratoses can follow sunburn or dermatitis  Skin friction may be the reason they appear in body folds  Viral cause (e.g., human papillomavirus) seems unlikely  Stable and clonal mutations or activation of FRFR3, PIK3CA, VIOLETA, AKT1 and EGFR genes are found in seborrhoeic keratoses  Seborrhoeic keratosis can arise from solar lentigo  FRFR3 mutations also arise in solar lentigines. These " "mutations are associated with increased age and location on the head and neck, suggesting a role of ultraviolet radiation in these lesions  Seborrheic keratoses do not harbour tumour suppressor gene mutations  Epidermal growth factor receptor inhibitors, which are used to treat some cancers, often result in an increase in verrucal (warty) keratoses.    There is no easy way to remove multiple lesions on a single occasion.  Unless a specific lesion is \"inflamed\" and is causing pain or stinging/burning or is bleeding, most insurance companies do not authorize treatment.    Scribe Attestation      I,:  Caron Rodriges MA am acting as a scribe while in the presence of the attending physician.:       I,:  Jerrica Summers MD personally performed the services described in this documentation    as scribed in my presence.:             "

## 2024-08-26 DIAGNOSIS — I10 PRIMARY HYPERTENSION: ICD-10-CM

## 2024-08-27 RX ORDER — LOSARTAN POTASSIUM 100 MG/1
100 TABLET ORAL DAILY
Qty: 90 TABLET | Refills: 1 | Status: SHIPPED | OUTPATIENT
Start: 2024-08-27

## 2024-09-03 ENCOUNTER — TELEPHONE (OUTPATIENT)
Dept: PAIN MEDICINE | Facility: CLINIC | Age: 59
End: 2024-09-03

## 2024-09-03 NOTE — TELEPHONE ENCOUNTER
PRE OP INSTRUCTIONS:  -If you are on prescription blood thinners, you may have to hold the medication for several days before the procedure.    Please call the office to discuss medication holds at [phone].  -Do not eat or drink ONE HOUR prior to your procedure. If you are diabetic, may follow regular breakfast/lunch schedule and take usual    diabetic medications.  -Lumbar( low back) procedure, please wear comfortable slacks/pants.  -Cervical (neck) procedure, please wear a shirt/blouse that is easy to remove.  -A  is required to take you home form your procedure.  -Continue all to take prescribed medication the day of your procedure, including blood pressure medications.  -If you are prescribe antibiotics, have an active infection or have an open wound, please contact the office at [phone].  -Please refrain from any vaccinations two weeks before and two weeks after injection.  -Insurance authorization received in not a guarantee of payment per your insurance company's authorization disclaimer and it is    your responsibility to verify your benefits.          -If you have any questions about the instructions, please call me at [phone]          -PATIENT INSTRUCTED TO STOP ALL NSAID'S 4 DAYS PRIOR TO PROCEDURE            EXCEPT FOR IBUPROFEN IT CAN BE TAKEN UP TO 24 HOURS PRIOR TO PROCEDURE.

## 2024-09-05 ENCOUNTER — TELEPHONE (OUTPATIENT)
Dept: FAMILY MEDICINE CLINIC | Facility: CLINIC | Age: 59
End: 2024-09-05

## 2024-09-05 NOTE — TELEPHONE ENCOUNTER
I was calling the pt in regards to her appt with [facility] on 9/17/24 for her Physical/6 month follow-up. I seen that her last Physical was 9/18/23 and thought she was going to be to soon but then realized with her insurance she can come 1 per calendar year. She can disregard my call.

## 2024-09-06 DIAGNOSIS — K21.9 GASTROESOPHAGEAL REFLUX DISEASE, UNSPECIFIED WHETHER ESOPHAGITIS PRESENT: ICD-10-CM

## 2024-09-06 RX ORDER — OMEPRAZOLE 40 MG/1
40 CAPSULE, DELAYED RELEASE ORAL DAILY
Qty: 90 CAPSULE | Refills: 0 | OUTPATIENT
Start: 2024-09-06

## 2024-09-09 ENCOUNTER — OFFICE VISIT (OUTPATIENT)
Dept: OBGYN CLINIC | Facility: CLINIC | Age: 59
End: 2024-09-09
Payer: COMMERCIAL

## 2024-09-09 VITALS
SYSTOLIC BLOOD PRESSURE: 134 MMHG | DIASTOLIC BLOOD PRESSURE: 80 MMHG | BODY MASS INDEX: 36.82 KG/M2 | WEIGHT: 221 LBS | HEIGHT: 65 IN

## 2024-09-09 DIAGNOSIS — M86.9 OSTEITIS PUBIS (HCC): Primary | ICD-10-CM

## 2024-09-09 PROCEDURE — 99213 OFFICE O/P EST LOW 20 MIN: CPT | Performed by: FAMILY MEDICINE

## 2024-09-09 NOTE — PROGRESS NOTES
1. Osteitis pubis (HCC)   Physical Therapy        Orders Placed This Encounter   Procedures    [facility]        MRI bony pelvis 7/21/24:  Osteitis pubis with bone marrow edema without fracture line             ASSESSMENT/PLAN:  Osteitis pubis-mildly improving-persistent pain   declines referral for injection    Repeat X-ray next visit: None    Return if symptoms worsen or fail to improve.    Patient instructions below verbally summarized in person during encounter:  Patient Instructions   Encouraged physical therapy      __________________________________________________________________________    HISTORY OF PRESENT ILLNESS:  F/u osteitis pubis: 50% improved overall.  She continues to have intermittent pain within the anterior pelvis at the pubic symphysis region.  Did have Medrol Dosepak recently for worsening flare pain now improved.  Patient has not performed any formal physical therapy as of yet.          Review of Systems      Following history reviewed and update:    Past Medical History:   Diagnosis Date    ADHD (attention deficit hyperactivity disorder)     Anxiety     Arthritis     Depression     Fibromyalgia, primary     GERD (gastroesophageal reflux disease)     History of stomach ulcers     Hypertension     Panic attack     Papanicolaou smear 2020    Psychiatric disorder      Past Surgical History:   Procedure Laterality Date    BREAST CYST ASPIRATION Left 2014    HYSTEROSCOPY  2015    MAMMO (HISTORICAL) Bilateral 03/05/2020    [facility] BI-RADS 1 Negative Scattered areas fibroglandular density; 25% to 50% glandular breast tissue     Social History   Social History     Substance and Sexual Activity   Alcohol Use Yes    Comment: Socially     Social History     Substance and Sexual Activity   Drug Use No     Social History     Tobacco Use   Smoking Status Former    Passive exposure: Past   Smokeless Tobacco Never   Tobacco Comments    teen years     Family History   Problem Relation Age of Onset     "Hypertension Mother     Lung cancer Mother     Skin cancer Father     Stroke Brother     Hypertension Brother     Transient ischemic attack Brother     No Known Problems Brother     No Known Problems Brother     Hypertension Maternal Grandmother     Multiple sclerosis Maternal Grandmother     Heart attack Maternal Grandfather     Heart disease Maternal Grandfather     Alcohol abuse Maternal Grandfather     Asthma Paternal Grandmother     Heart disease Paternal Grandfather      Allergies   Allergen Reactions    Latex Rash          Physical Exam  /80 (BP Location: Left arm, Patient Position: Sitting, Cuff Size: Standard)   Ht 5' 5\" (1.651 m)   Wt 100 kg (221 lb)   BMI 36.78 kg/m²         Ortho Exam    __________________________________________________________________________  Procedures                  "

## 2024-09-14 ENCOUNTER — APPOINTMENT (OUTPATIENT)
Dept: LAB | Facility: HOSPITAL | Age: 59
End: 2024-09-14
Payer: COMMERCIAL

## 2024-09-14 DIAGNOSIS — Z13.29 SCREENING FOR THYROID DISORDER: ICD-10-CM

## 2024-09-14 DIAGNOSIS — D50.9 IRON DEFICIENCY ANEMIA, UNSPECIFIED IRON DEFICIENCY ANEMIA TYPE: ICD-10-CM

## 2024-09-14 DIAGNOSIS — D50.9 MICROCYTIC ANEMIA: ICD-10-CM

## 2024-09-14 DIAGNOSIS — R73.03 PREDIABETES: ICD-10-CM

## 2024-09-14 LAB
BASOPHILS # BLD AUTO: 0.05 THOUSANDS/ΜL (ref 0–0.1)
BASOPHILS NFR BLD AUTO: 1 % (ref 0–1)
EOSINOPHIL # BLD AUTO: 0.47 THOUSAND/ΜL (ref 0–0.61)
EOSINOPHIL NFR BLD AUTO: 9 % (ref 0–6)
ERYTHROCYTE [DISTWIDTH] IN BLOOD BY AUTOMATED COUNT: 12.5 % (ref 11.6–15.1)
EST. AVERAGE GLUCOSE BLD GHB EST-MCNC: 120 MG/DL
FERRITIN SERPL-MCNC: 49 NG/ML (ref 11–307)
HBA1C MFR BLD: 5.8 %
HCT VFR BLD AUTO: 42 % (ref 34.8–46.1)
HGB BLD-MCNC: 14.3 G/DL (ref 11.5–15.4)
IMM GRANULOCYTES # BLD AUTO: 0.03 THOUSAND/UL (ref 0–0.2)
IMM GRANULOCYTES NFR BLD AUTO: 1 % (ref 0–2)
IRON SATN MFR SERPL: 40 % (ref 15–50)
IRON SERPL-MCNC: 111 UG/DL (ref 50–212)
LYMPHOCYTES # BLD AUTO: 1.03 THOUSANDS/ΜL (ref 0.6–4.47)
LYMPHOCYTES NFR BLD AUTO: 19 % (ref 14–44)
MCH RBC QN AUTO: 30 PG (ref 26.8–34.3)
MCHC RBC AUTO-ENTMCNC: 34 G/DL (ref 31.4–37.4)
MCV RBC AUTO: 88 FL (ref 82–98)
MONOCYTES # BLD AUTO: 0.6 THOUSAND/ΜL (ref 0.17–1.22)
MONOCYTES NFR BLD AUTO: 11 % (ref 4–12)
NEUTROPHILS # BLD AUTO: 3.18 THOUSANDS/ΜL (ref 1.85–7.62)
NEUTS SEG NFR BLD AUTO: 59 % (ref 43–75)
NRBC BLD AUTO-RTO: 0 /100 WBCS
PLATELET # BLD AUTO: 290 THOUSANDS/UL (ref 149–390)
PMV BLD AUTO: 10.4 FL (ref 8.9–12.7)
RBC # BLD AUTO: 4.77 MILLION/UL (ref 3.81–5.12)
TIBC SERPL-MCNC: 278 UG/DL (ref 250–450)
TSH SERPL DL<=0.05 MIU/L-ACNC: 1.77 UIU/ML (ref 0.45–4.5)
UIBC SERPL-MCNC: 167 UG/DL (ref 155–355)
WBC # BLD AUTO: 5.36 THOUSAND/UL (ref 4.31–10.16)

## 2024-09-14 PROCEDURE — 85025 COMPLETE CBC W/AUTO DIFF WBC: CPT

## 2024-09-14 PROCEDURE — 84443 ASSAY THYROID STIM HORMONE: CPT

## 2024-09-14 PROCEDURE — 83036 HEMOGLOBIN GLYCOSYLATED A1C: CPT

## 2024-09-15 ENCOUNTER — HOSPITAL ENCOUNTER (EMERGENCY)
Facility: HOSPITAL | Age: 59
Discharge: HOME | End: 2024-09-16
Attending: EMERGENCY MEDICINE | Admitting: EMERGENCY MEDICINE
Payer: COMMERCIAL

## 2024-09-15 ENCOUNTER — APPOINTMENT (EMERGENCY)
Dept: CT IMAGING | Facility: HOSPITAL | Age: 59
End: 2024-09-15
Payer: COMMERCIAL

## 2024-09-15 DIAGNOSIS — G43.109 OCULAR MIGRAINE: Primary | ICD-10-CM

## 2024-09-15 LAB
ALBUMIN SERPL BCG-MCNC: 3.7 G/DL (ref 3.5–5)
ALP SERPL-CCNC: 92 U/L (ref 34–104)
ALT SERPL W P-5'-P-CCNC: 17 U/L (ref 7–52)
ANION GAP SERPL CALCULATED.3IONS-SCNC: 6 MMOL/L (ref 4–13)
AST SERPL W P-5'-P-CCNC: 20 U/L (ref 13–39)
BASOPHILS # BLD AUTO: 0.05 THOUSANDS/ΜL (ref 0–0.1)
BASOPHILS NFR BLD AUTO: 1 % (ref 0–1)
BILIRUB SERPL-MCNC: 0.27 MG/DL (ref 0.2–1)
BUN SERPL-MCNC: 19 MG/DL (ref 5–25)
CALCIUM SERPL-MCNC: 8.9 MG/DL (ref 8.4–10.2)
CARDIAC TROPONIN I PNL SERPL HS: 3 NG/L
CHLORIDE SERPL-SCNC: 104 MMOL/L (ref 96–108)
CO2 SERPL-SCNC: 27 MMOL/L (ref 21–32)
CREAT SERPL-MCNC: 0.7 MG/DL (ref 0.6–1.3)
EOSINOPHIL # BLD AUTO: 0.6 THOUSAND/ΜL (ref 0–0.61)
EOSINOPHIL NFR BLD AUTO: 8 % (ref 0–6)
ERYTHROCYTE [DISTWIDTH] IN BLOOD BY AUTOMATED COUNT: 12.7 % (ref 11.6–15.1)
GFR SERPL CREATININE-BSD FRML MDRD: 95 ML/MIN/1.73SQ M
GLUCOSE SERPL-MCNC: 145 MG/DL (ref 65–140)
HCT VFR BLD AUTO: 37.7 % (ref 34.8–46.1)
HGB BLD-MCNC: 12.7 G/DL (ref 11.5–15.4)
IMM GRANULOCYTES # BLD AUTO: 0.02 THOUSAND/UL (ref 0–0.2)
IMM GRANULOCYTES NFR BLD AUTO: 0 % (ref 0–2)
LYMPHOCYTES # BLD AUTO: 1.59 THOUSANDS/ΜL (ref 0.6–4.47)
LYMPHOCYTES NFR BLD AUTO: 22 % (ref 14–44)
MAGNESIUM SERPL-MCNC: 2 MG/DL (ref 1.9–2.7)
MCH RBC QN AUTO: 29.6 PG (ref 26.8–34.3)
MCHC RBC AUTO-ENTMCNC: 33.7 G/DL (ref 31.4–37.4)
MCV RBC AUTO: 88 FL (ref 82–98)
MONOCYTES # BLD AUTO: 0.76 THOUSAND/ΜL (ref 0.17–1.22)
MONOCYTES NFR BLD AUTO: 11 % (ref 4–12)
NEUTROPHILS # BLD AUTO: 4.13 THOUSANDS/ΜL (ref 1.85–7.62)
NEUTS SEG NFR BLD AUTO: 58 % (ref 43–75)
NRBC BLD AUTO-RTO: 0 /100 WBCS
PLATELET # BLD AUTO: 254 THOUSANDS/UL (ref 149–390)
PMV BLD AUTO: 10.3 FL (ref 8.9–12.7)
POTASSIUM SERPL-SCNC: 3.7 MMOL/L (ref 3.5–5.3)
PROT SERPL-MCNC: 6.5 G/DL (ref 6.4–8.4)
RBC # BLD AUTO: 4.29 MILLION/UL (ref 3.81–5.12)
SODIUM SERPL-SCNC: 137 MMOL/L (ref 135–147)
WBC # BLD AUTO: 7.15 THOUSAND/UL (ref 4.31–10.16)

## 2024-09-15 PROCEDURE — 96361 HYDRATE IV INFUSION ADD-ON: CPT

## 2024-09-15 PROCEDURE — 70498 CT ANGIOGRAPHY NECK: CPT

## 2024-09-15 PROCEDURE — 80053 COMPREHEN METABOLIC PANEL: CPT | Performed by: EMERGENCY MEDICINE

## 2024-09-15 PROCEDURE — 36415 COLL VENOUS BLD VENIPUNCTURE: CPT | Performed by: EMERGENCY MEDICINE

## 2024-09-15 PROCEDURE — 85025 COMPLETE CBC W/AUTO DIFF WBC: CPT | Performed by: EMERGENCY MEDICINE

## 2024-09-15 PROCEDURE — 84484 ASSAY OF TROPONIN QUANT: CPT | Performed by: EMERGENCY MEDICINE

## 2024-09-15 PROCEDURE — 93005 ELECTROCARDIOGRAM TRACING: CPT

## 2024-09-15 PROCEDURE — 99285 EMERGENCY DEPT VISIT HI MDM: CPT

## 2024-09-15 PROCEDURE — 99285 EMERGENCY DEPT VISIT HI MDM: CPT | Performed by: EMERGENCY MEDICINE

## 2024-09-15 PROCEDURE — 70496 CT ANGIOGRAPHY HEAD: CPT

## 2024-09-15 PROCEDURE — 81003 URINALYSIS AUTO W/O SCOPE: CPT | Performed by: EMERGENCY MEDICINE

## 2024-09-15 PROCEDURE — 83735 ASSAY OF MAGNESIUM: CPT | Performed by: EMERGENCY MEDICINE

## 2024-09-15 RX ORDER — DIPHENHYDRAMINE HYDROCHLORIDE 50 MG/ML
25 INJECTION INTRAMUSCULAR; INTRAVENOUS ONCE
Status: DISCONTINUED | OUTPATIENT
Start: 2024-09-15 | End: 2024-09-16

## 2024-09-15 RX ORDER — MAGNESIUM SULFATE HEPTAHYDRATE 40 MG/ML
2 INJECTION, SOLUTION INTRAVENOUS ONCE
Status: COMPLETED | OUTPATIENT
Start: 2024-09-15 | End: 2024-09-16

## 2024-09-15 RX ORDER — DEXAMETHASONE SODIUM PHOSPHATE 10 MG/ML
10 INJECTION, SOLUTION INTRAMUSCULAR; INTRAVENOUS ONCE
Status: COMPLETED | OUTPATIENT
Start: 2024-09-15 | End: 2024-09-16

## 2024-09-15 RX ORDER — METOCLOPRAMIDE HYDROCHLORIDE 5 MG/ML
10 INJECTION INTRAMUSCULAR; INTRAVENOUS ONCE
Status: COMPLETED | OUTPATIENT
Start: 2024-09-15 | End: 2024-09-16

## 2024-09-15 RX ADMIN — IOHEXOL 85 ML: 350 INJECTION, SOLUTION INTRAVENOUS at 23:52

## 2024-09-15 RX ADMIN — SODIUM CHLORIDE 1000 ML: 0.9 INJECTION, SOLUTION INTRAVENOUS at 22:53

## 2024-09-16 ENCOUNTER — NEW PATIENT COMPREHENSIVE (OUTPATIENT)
Dept: URBAN - METROPOLITAN AREA CLINIC 6 | Facility: CLINIC | Age: 59
End: 2024-09-16

## 2024-09-16 VITALS
HEIGHT: 66 IN | DIASTOLIC BLOOD PRESSURE: 68 MMHG | BODY MASS INDEX: 35.36 KG/M2 | SYSTOLIC BLOOD PRESSURE: 132 MMHG | TEMPERATURE: 97.8 F | HEART RATE: 67 BPM | OXYGEN SATURATION: 97 % | WEIGHT: 220 LBS | RESPIRATION RATE: 22 BRPM

## 2024-09-16 DIAGNOSIS — H53.19: ICD-10-CM

## 2024-09-16 DIAGNOSIS — H25.13: ICD-10-CM

## 2024-09-16 DIAGNOSIS — H43.392: ICD-10-CM

## 2024-09-16 LAB
BILIRUB UR QL STRIP: NEGATIVE
CLARITY UR: CLEAR
COLOR UR: NORMAL
GLUCOSE UR STRIP-MCNC: NEGATIVE MG/DL
HGB UR QL STRIP.AUTO: NEGATIVE
KETONES UR STRIP-MCNC: NEGATIVE MG/DL
LEUKOCYTE ESTERASE UR QL STRIP: NEGATIVE
NITRITE UR QL STRIP: NEGATIVE
PH UR STRIP.AUTO: 5.5 [PH]
PROT UR STRIP-MCNC: NEGATIVE MG/DL
SP GR UR STRIP.AUTO: 1.01 (ref 1–1.03)
UROBILINOGEN UR STRIP-ACNC: <2 MG/DL

## 2024-09-16 PROCEDURE — 99204 OFFICE O/P NEW MOD 45 MIN: CPT

## 2024-09-16 PROCEDURE — 96375 TX/PRO/DX INJ NEW DRUG ADDON: CPT

## 2024-09-16 PROCEDURE — 96365 THER/PROPH/DIAG IV INF INIT: CPT

## 2024-09-16 RX ADMIN — METOCLOPRAMIDE 10 MG: 5 INJECTION, SOLUTION INTRAMUSCULAR; INTRAVENOUS at 00:41

## 2024-09-16 RX ADMIN — MAGNESIUM SULFATE HEPTAHYDRATE 2 G: 2 INJECTION, SOLUTION INTRAVENOUS at 00:41

## 2024-09-16 RX ADMIN — DEXAMETHASONE SODIUM PHOSPHATE 10 MG: 10 INJECTION, SOLUTION INTRAMUSCULAR; INTRAVENOUS at 00:39

## 2024-09-16 ASSESSMENT — VISUAL ACUITY
OD_CC: 20/30+2
OS_CC: 20/25

## 2024-09-16 ASSESSMENT — TONOMETRY
OD_IOP_MMHG: 12
OS_IOP_MMHG: 13

## 2024-09-16 NOTE — ED PROVIDER NOTES
1. Ocular migraine      ED Disposition       ED Disposition   Discharge    Condition   Stable    Date/Time   Mon Sep 16, 2024 12:35 AM    Comment   Anamaria Rivera discharge to home/self care.                   Assessment & Plan       Medical Decision Making  Obtain blood work, UA, CTA head/neck  Give IV fluids, migraine cocktail and continue to monitor patient for any worsening symptoms    Patient presented to the ED with acute on side waviness in her vision on the left lateral visual field from the left eye only.  Symptoms started to subside by the time patient arrived to the ED.  CTA head/neck was essentially unremarkable.  Lab work did not show any acute abnormalities.  Patient is currently receiving migraine cocktail.  Patient is starting to feel better.  At this time patient symptoms may be a component of ocular migraine.  I discussed with patient that she will need to follow-up with ophthalmology as well as neurology.  Care patient signed to the next attending who will reassess patient after migraine cocktail is completed and then dispo patient appropriately.    Amount and/or Complexity of Data Reviewed  Labs: ordered. Decision-making details documented in ED Course.  Radiology: ordered. Decision-making details documented in ED Course.  ECG/medicine tests: ordered and independent interpretation performed. Decision-making details documented in ED Course.    Risk  Prescription drug management.                       Medications   dexamethasone (PF) (DECADRON) injection 10 mg (10 mg Intravenous Not Given 9/15/24 2254)   metoclopramide (REGLAN) injection 10 mg (10 mg Intravenous Not Given 9/15/24 2254)   magnesium sulfate 2 g/50 mL IVPB (premix) 2 g (2 g Intravenous Not Given 9/15/24 2254)   sodium chloride 0.9 % bolus 1,000 mL (1,000 mL Intravenous New Bag 9/15/24 2253)   iohexol (OMNIPAQUE) 350 MG/ML injection (MULTI-DOSE) 85 mL (85 mL Intravenous Given 9/15/24 6439)       History of Present Illness        59-year-old female with past history of hypertension, depression, ADHD, panic attack, anxiety, fibromyalgia, GERD, arthritis, presents to the ED for sudden onset flashes in the left peripheral visual field from her left eye that started around 7 PM this evening.  Patient was sitting at rest when symptoms started.  Symptoms persisted for few hours and patient subsequently came to the ED for further evaluation.  By the time patient was roomed flashes have started to resolve.  Patient has no other focal neurodeficits.  Patient denies any headaches, weakness, dizziness, or any other focal neurodeficits.  Patient has no history of migraines.  Patient states that she does wear glasses however her vision is better on the left eye compared to right.      History provided by:  Patient  Eye Problem  Associated symptoms: no vomiting            Review of Systems   Constitutional:  Negative for chills and fever.   HENT:  Negative for ear pain and sore throat.    Eyes:  Positive for visual disturbance. Negative for pain.   Respiratory:  Negative for cough and shortness of breath.    Cardiovascular:  Negative for chest pain and palpitations.   Gastrointestinal:  Negative for abdominal pain and vomiting.   Genitourinary:  Negative for dysuria and hematuria.   Musculoskeletal:  Negative for arthralgias and back pain.   Skin:  Negative for color change and rash.   Neurological:  Negative for seizures and syncope.   All other systems reviewed and are negative.          Objective     ED Triage Vitals [09/15/24 2200]   Temperature Pulse Blood Pressure Respirations SpO2 Patient Position - Orthostatic VS   97.8 °F (36.6 °C) 91 160/90 18 98 % Sitting      Temp Source Heart Rate Source BP Location FiO2 (%) Pain Score    Temporal Monitor Right arm -- No Pain        Physical Exam  Vitals and nursing note reviewed.   Constitutional:       General: She is not in acute distress.     Appearance: She is well-developed.   HENT:      Head:  Normocephalic and atraumatic.   Eyes:      Extraocular Movements: Extraocular movements intact.      Conjunctiva/sclera: Conjunctivae normal.      Pupils: Pupils are equal, round, and reactive to light.   Cardiovascular:      Rate and Rhythm: Normal rate and regular rhythm.      Heart sounds: No murmur heard.  Pulmonary:      Effort: Pulmonary effort is normal. No respiratory distress.      Breath sounds: Normal breath sounds.   Abdominal:      Palpations: Abdomen is soft.      Tenderness: There is no abdominal tenderness.   Musculoskeletal:         General: No swelling.      Cervical back: Neck supple.   Skin:     General: Skin is warm and dry.      Capillary Refill: Capillary refill takes less than 2 seconds.   Neurological:      General: No focal deficit present.      Mental Status: She is alert and oriented to person, place, and time.      Comments: Patient is alert and oriented x3.  Visual field intact bilateral.  Finger-to-nose intact bilateral.  Heel-to-shin intact bilateral.  Sensory and motor strength intact bilateral.  No focal neuro deficits noted.     Psychiatric:         Mood and Affect: Mood normal.         Labs Reviewed   CBC AND DIFFERENTIAL - Abnormal       Result Value    WBC 7.15      RBC 4.29      Hemoglobin 12.7      Hematocrit 37.7      MCV 88      MCH 29.6      MCHC 33.7      RDW 12.7      MPV 10.3      Platelets 254      nRBC 0      Segmented % 58      Immature Grans % 0      Lymphocytes % 22      Monocytes % 11      Eosinophils Relative 8 (*)     Basophils Relative 1      Absolute Neutrophils 4.13      Absolute Immature Grans 0.02      Absolute Lymphocytes 1.59      Absolute Monocytes 0.76      Eosinophils Absolute 0.60      Basophils Absolute 0.05     COMPREHENSIVE METABOLIC PANEL - Abnormal    Sodium 137      Potassium 3.7      Chloride 104      CO2 27      ANION GAP 6      BUN 19      Creatinine 0.70      Glucose 145 (*)     Calcium 8.9      AST 20      ALT 17      Alkaline Phosphatase 92       Total Protein 6.5      Albumin 3.7      Total Bilirubin 0.27      eGFR 95      Narrative:     National Kidney Disease Foundation guidelines for Chronic Kidney Disease (CKD):     Stage 1 with normal or high GFR (GFR > 90 mL/min/1.73 square meters)    Stage 2 Mild CKD (GFR = 60-89 mL/min/1.73 square meters)    Stage 3A Moderate CKD (GFR = 45-59 mL/min/1.73 square meters)    Stage 3B Moderate CKD (GFR = 30-44 mL/min/1.73 square meters)    Stage 4 Severe CKD (GFR = 15-29 mL/min/1.73 square meters)    Stage 5 End Stage CKD (GFR <15 mL/min/1.73 square meters)  Note: GFR calculation is accurate only with a steady state creatinine   MAGNESIUM - Normal    Magnesium 2.0     HS TROPONIN I 0HR - Normal    hs TnI 0hr 3     UA W REFLEX TO MICROSCOPIC WITH REFLEX TO CULTURE    Color, UA Light Yellow      Clarity, UA Clear      Specific Gravity, UA 1.010      pH, UA 5.5      Leukocytes, UA Negative      Nitrite, UA Negative      Protein, UA Negative      Glucose, UA Negative      Ketones, UA Negative      Urobilinogen, UA <2.0      Bilirubin, UA Negative      Occult Blood, UA Negative     HS TROPONIN I 2HR   HS TROPONIN I 4HR     CTA head and neck with and without contrast   Final Interpretation by Terry Guidry MD (09/16 0014)      CT Brain:  No acute intracranial abnormality. Sinus disease as above. Correlate for acute component.      CT Angiography: No evidence of hemodynamically significant stenosis or large vessel occlusive disease within the major vessels of the [place]. No aneurysm identified. No significant stenosis of the cervical carotid or vertebral arteries.                  Workstation performed: [id number]             ECG 12 Lead Documentation Only    Date/Time: 9/15/2024 11:08 PM    Performed by: Isela Casey DO  Authorized by: Isela Casey DO    Indications / Diagnosis:  Visual disturbance  ECG reviewed by me, the ED Provider: yes    Patient location:  ED  Previous ECG:     Previous ECG:   Compared to current    Similarity:  No change    Comparison to cardiac monitor: Yes    Interpretation:     Interpretation: normal    Comments:      Sinus rhythm, rate 72, normal axis, normal intervals, no acute ST/T wave abnormalities noted, otherwise unremarkable EKG, unchanged from previous study.         Isela Casey DO  09/16/24 0037

## 2024-09-17 ENCOUNTER — OFFICE VISIT (OUTPATIENT)
Dept: FAMILY MEDICINE CLINIC | Facility: CLINIC | Age: 59
End: 2024-09-17
Payer: COMMERCIAL

## 2024-09-17 VITALS
DIASTOLIC BLOOD PRESSURE: 72 MMHG | BODY MASS INDEX: 36.22 KG/M2 | OXYGEN SATURATION: 96 % | TEMPERATURE: 96.8 F | HEIGHT: 66 IN | RESPIRATION RATE: 15 BRPM | HEART RATE: 85 BPM | WEIGHT: 225.4 LBS | SYSTOLIC BLOOD PRESSURE: 118 MMHG

## 2024-09-17 DIAGNOSIS — E55.9 VITAMIN D DEFICIENCY: ICD-10-CM

## 2024-09-17 DIAGNOSIS — M54.16 LUMBAR RADICULOPATHY: ICD-10-CM

## 2024-09-17 DIAGNOSIS — D50.9 IRON DEFICIENCY ANEMIA, UNSPECIFIED IRON DEFICIENCY ANEMIA TYPE: ICD-10-CM

## 2024-09-17 DIAGNOSIS — Z00.00 ANNUAL PHYSICAL EXAM: Primary | ICD-10-CM

## 2024-09-17 DIAGNOSIS — I10 PRIMARY HYPERTENSION: ICD-10-CM

## 2024-09-17 DIAGNOSIS — Z12.31 ENCOUNTER FOR SCREENING MAMMOGRAM FOR MALIGNANT NEOPLASM OF BREAST: ICD-10-CM

## 2024-09-17 DIAGNOSIS — R73.03 PREDIABETES: ICD-10-CM

## 2024-09-17 DIAGNOSIS — K21.9 GASTROESOPHAGEAL REFLUX DISEASE, UNSPECIFIED WHETHER ESOPHAGITIS PRESENT: ICD-10-CM

## 2024-09-17 DIAGNOSIS — F33.1 MODERATE EPISODE OF RECURRENT MAJOR DEPRESSIVE DISORDER (HCC): ICD-10-CM

## 2024-09-17 PROCEDURE — 99396 PREV VISIT EST AGE 40-64: CPT | Performed by: NURSE PRACTITIONER

## 2024-09-17 RX ORDER — OMEPRAZOLE 40 MG/1
40 CAPSULE, DELAYED RELEASE ORAL DAILY
Qty: 90 CAPSULE | Refills: 3 | Status: SHIPPED | OUTPATIENT
Start: 2024-09-17

## 2024-09-17 NOTE — ASSESSMENT & PLAN NOTE
Managed w/ Seroquel, Sertraline & Bupropion.        Nursing Note by Rima Doshi LPN at 07/26/17 10:01 AM     Author:  Rima Doshi LPN Service:  (none) Author Type:  Licensed Nurse     Filed:  07/26/17 10:02 AM Encounter Date:  7/26/2017 Status:  Signed     :  Rima Doshi LPN (Licensed Nurse)            Roomed by: Rima Puri LPN    If provider orders tests at today's visit, patient would like to be contacted via[MM1.1T] telephone[MM1.1M] ([employer] or by telephone).  If to contact patient by phone, patient's preferred phone # is[MM1.1T] [phone] (cell)[MM1.2T]  and it is[MM1.1T] OK[MM1.1M] to leave message on voice mail or with family member.  If medications are ordered at today's visit, the pharmacy name/location patient would like them to be sent to is [facility] [address] .[MM1.1T]               Revision History        User Key Date/Time User Provider Type Action    > MM1.2 07/26/17 10:02 AM Rima Doshi LPN Licensed Nurse Sign     MM1.1 07/26/17 10:01 AM Rima Doshi LPN Licensed Nurse     M - Manual, T - Template

## 2024-09-17 NOTE — PROGRESS NOTES
Adult Annual Physical  Name: Anamaria Rivera      : 1965      MRN: [id number]  Encounter Provider: ANTONY Obrien  Encounter Date: 2024   Encounter department: [facility]    Pt declining immunizations.  Labs UTD. A1c & Vitamin D due in 6 months.  Mammo UTD.  PAP UTD.  Colonoscopy UTD.  Dexa UTD.  F/u in 6 months for recheck or sooner PRN.  Assessment & Plan  Annual physical exam         Vitamin D deficiency    Orders:    Vitamin D 25 hydroxy; Future    Prediabetes    Orders:    Hemoglobin A1C; Future    Last A1c at 5.8%. Goal to keep A1c <6% for now. Pt was on medrol pack for her hip earlier this summer. Recheck A1c in 6 months.  Encounter for screening mammogram for malignant neoplasm of breast    Orders:    Mammo screening bilateral w 3d and cad; Future    Gastroesophageal reflux disease, unspecified whether esophagitis present    Orders:    omeprazole (PriLOSEC) 40 MG capsule; Take 1 capsule (40 mg total) by mouth daily Take before a meal    Iron deficiency anemia, unspecified iron deficiency anemia type       Managed w/ Hematology. Pt not on any supplementation at this time.  Moderate episode of recurrent major depressive disorder (HCC)    Managed w/ Seroquel, Sertraline & Bupropion.       Primary hypertension    Managed w/ Losartan and HCTZ. Continue same. Recheck in 6 months or sooner PRN.       Lumbar radiculopathy    Managed w/ pain mgmt. Continue same.        Pt to RTO in 6 months for recheck or sooner PRN.       Immunizations and preventive care screenings were discussed with patient today. Appropriate education was printed on patient's after visit summary.    Counseling:  Alcohol/drug use: discussed moderation in alcohol intake, the recommendations for healthy alcohol use, and avoidance of illicit drug use.  Dental Health: discussed importance of regular tooth brushing, flossing, and dental visits.  Injury prevention: discussed  safety/seat belts, safety helmets, smoke detectors, carbon dioxide detectors, and smoking near bedding or upholstery.  Sexual health: discussed sexually transmitted diseases, partner selection, use of condoms, avoidance of unintended pregnancy, and contraceptive alternatives.  Exercise: the importance of regular exercise/physical activity was discussed. Recommend exercise 3-5 times per week for at least 30 minutes.          History of Present Illness     Adult Annual Physical:  Patient presents for annual physical. Pt here today for her annual physical.  She has had a particularly difficult summer.  She has been dealing w/ osteitis pubis that she is working with Ortho for.  Her plan of care has been physical rest and eventually PT.  She did also have a steroid pack over the summer which had helped the pain some.  Pt will be seeing Hematology soon for her iron deficiency anemia.  She is currently not taking any iron supplements.  Additionally, she is also seeing pain mgmt for her back and gets injections.  She was also seen in the ER earlier this month for changes in vision of her left eye.  She notes she was evaluated by [facility] and was notified that she most likely will have a retinal tear soon (she notes 85% chance).  She has a follow up evaluation with them in 3 weeks.  .     Diet and Physical Activity:  - Diet/Nutrition: well balanced diet and consuming 3-5 servings of fruits/vegetables daily.  - Exercise: walking.    General Health:  - Sleep: sleeps well and 7-8 hours of sleep on average.  - Hearing: normal hearing bilateral ears.  - Vision: vision problems. left eye visual disturbance, under care of [facility] per pt report  - Dental: regular dental visits.    /GYN Health:  - Follows with GYN: yes.     Review of Systems   Constitutional: Negative.  Negative for chills and fatigue.   HENT: Negative.     Respiratory: Negative.  Negative for cough and shortness of breath.   "  Cardiovascular: Negative.  Negative for chest pain.   Gastrointestinal: Negative.    Genitourinary: Negative.    Musculoskeletal: Negative.  Negative for myalgias.   Neurological: Negative.          Objective     /72   Pulse 85   Temp (!) 96.8 °F (36 °C)   Resp 15   Ht 5' 5.5\" (1.664 m)   Wt 102 kg (225 lb 6.4 oz)   SpO2 96%   BMI 36.94 kg/m²     Physical Exam  Vitals and nursing note reviewed.   Constitutional:       General: She is not in acute distress.     Appearance: Normal appearance. She is well-developed. She is not ill-appearing.   HENT:      Head: Normocephalic and atraumatic.      Right Ear: Tympanic membrane, ear canal and external ear normal.      Left Ear: Tympanic membrane, ear canal and external ear normal.   Eyes:      Conjunctiva/sclera: Conjunctivae normal.   Neck:      Vascular: No carotid bruit.   Cardiovascular:      Rate and Rhythm: Normal rate and regular rhythm.      Pulses: Normal pulses.      Heart sounds: Normal heart sounds. No murmur heard.  Pulmonary:      Effort: Pulmonary effort is normal. No respiratory distress.      Breath sounds: Normal breath sounds. No wheezing.   Abdominal:      General: There is no distension.      Palpations: Abdomen is soft. There is no mass.      Tenderness: There is no abdominal tenderness. There is no guarding or rebound.      Hernia: No hernia is present.   Musculoskeletal:         General: Normal range of motion.      Cervical back: Normal range of motion and neck supple.      Right lower leg: No edema.      Left lower leg: No edema.   Skin:     General: Skin is warm and dry.      Capillary Refill: Capillary refill takes less than 2 seconds.   Neurological:      Mental Status: She is alert and oriented to person, place, and time. Mental status is at baseline.      Motor: No weakness.      Gait: Gait normal.   Psychiatric:         Mood and Affect: Mood normal.         Behavior: Behavior normal.         Thought Content: Thought content " normal.         Judgment: Judgment normal.

## 2024-09-17 NOTE — ASSESSMENT & PLAN NOTE
Managed w/ pain mgmt. Continue same.        Pt to RTO in 6 months for recheck or sooner PRN.

## 2024-09-18 LAB
ATRIAL RATE: 72 BPM
P AXIS: 62 DEGREES
PR INTERVAL: 176 MS
QRS AXIS: 68 DEGREES
QRSD INTERVAL: 92 MS
QT INTERVAL: 394 MS
QTC INTERVAL: 431 MS
T WAVE AXIS: 69 DEGREES
VENTRICULAR RATE: 72 BPM

## 2024-09-18 PROCEDURE — 93010 ELECTROCARDIOGRAM REPORT: CPT | Performed by: INTERNAL MEDICINE

## 2024-09-25 ENCOUNTER — HOSPITAL ENCOUNTER (OUTPATIENT)
Dept: RADIOLOGY | Facility: CLINIC | Age: 59
Discharge: HOME | End: 2024-09-25
Payer: COMMERCIAL

## 2024-09-25 VITALS
HEART RATE: 89 BPM | RESPIRATION RATE: 17 BRPM | SYSTOLIC BLOOD PRESSURE: 107 MMHG | DIASTOLIC BLOOD PRESSURE: 68 MMHG | OXYGEN SATURATION: 95 % | TEMPERATURE: 97.5 F

## 2024-09-25 DIAGNOSIS — M54.16 LUMBAR RADICULOPATHY: ICD-10-CM

## 2024-09-25 PROCEDURE — 62323 NJX INTERLAMINAR LMBR/SAC: CPT | Performed by: ANESTHESIOLOGY

## 2024-09-25 RX ORDER — METHYLPREDNISOLONE ACETATE 80 MG/ML
80 INJECTION, SUSPENSION INTRA-ARTICULAR; INTRALESIONAL; INTRAMUSCULAR; PARENTERAL; SOFT TISSUE ONCE
Status: COMPLETED | OUTPATIENT
Start: 2024-09-25 | End: 2024-09-25

## 2024-09-25 RX ADMIN — METHYLPREDNISOLONE ACETATE 80 MG: 80 INJECTION, SUSPENSION INTRA-ARTICULAR; INTRALESIONAL; INTRAMUSCULAR; SOFT TISSUE at 08:27

## 2024-09-25 RX ADMIN — IOHEXOL 1 ML: 300 INJECTION, SOLUTION INTRAVENOUS at 08:27

## 2024-09-25 NOTE — DISCHARGE INSTR - LAB
Epidural Steroid Injection   WHAT YOU NEED TO KNOW:   An epidural steroid injection (NEVILLE) is a procedure to inject steroid medicine into the epidural space. The epidural space is between your spinal cord and vertebrae. Steroids reduce inflammation and fluid buildup in your spine that may be causing pain. You may be given pain medicine along with the steroids.          ACTIVITY  Do not drive or operate machinery today.  No strenuous activity today - bending, lifting, etc.  You may resume normal activites starting tomorrow - start slowly and as tolerated.  You may shower today, but no tub baths or hot tubs.  You may have numbness for several hours from the local anesthetic. Please use caution and common sense, especially with weight-bearing activities.    CARE OF THE INJECTION SITE  If you have soreness or pain, apply ice to the area today (20 minutes on/20 minutes off).  Starting tomorrow, you may use warm, moist heat or ice if needed.  You may have an increase or change in your discomfort for 36-48 hours after your treatment.  Apply ice and continue with any pain medication you have been prescribed.  Notify the [facility] if you have any of the following: redness, drainage, swelling, headache, stiff neck or fever above 100°F.    SPECIAL INSTRUCTIONS  Our office will contact you in approximately 14 days for a progress report.    MEDICATIONS  Continue to take all routine medications.  Our office may have instructed you to hold some medications.    As no general anesthesia was used in today's procedure, you should not experience any side effects related to anesthesia.     If you are diabetic, the steroids used in today's injection may temporarily increase your blood sugar levels after the first few days after your injection. Please keep a close eye on your sugars and alert the doctor who manages your diabetes if your sugars are significantly high from your baseline or you are symptomatic.     If you have a  problem specifically related to your procedure, please call our office at [phone].  Problems not related to your procedure should be directed to your primary care physician.

## 2024-09-25 NOTE — H&P
History of Present Illness: The patient is a 59 y.o. female who presents with complaints of low back and leg pain.    Past Medical History:   Diagnosis Date    ADHD (attention deficit hyperactivity disorder)     Anxiety     Arthritis     Depression     Fibromyalgia, primary     GERD (gastroesophageal reflux disease)     History of stomach ulcers     Hypertension     Panic attack     Papanicolaou smear 2020    Psychiatric disorder        Past Surgical History:   Procedure Laterality Date    BREAST CYST ASPIRATION Left 2014    HYSTEROSCOPY  2015    MAMMO (HISTORICAL) Bilateral 03/05/2020    [facility] BI-RADS 1 Negative Scattered areas fibroglandular density; 25% to 50% glandular breast tissue         Current Outpatient Medications:     albuterol (PROVENTIL HFA,VENTOLIN HFA) 90 mcg/act inhaler, Inhale 2 puffs every 6 (six) hours as needed for wheezing or shortness of breath, Disp: 8.5 g, Rfl: 5    buPROPion (WELLBUTRIN XL) 150 mg 24 hr tablet, Take 1 tablet (150 mg total) by mouth daily, Disp: 90 tablet, Rfl: 1    cholecalciferol (VITAMIN D3) 1,000 units tablet, Take 1,000 Units by mouth daily (Patient not taking: Reported on 6/28/2024), Disp: , Rfl:     ciclopirox (PENLAC) 8 % solution, Apply topically daily at bedtime, Disp: 6.6 mL, Rfl: 3    Clocortolone Pivalate 0.1 % cream, Apply topically 2 (two) times a day, Disp: 45 g, Rfl: 3    hydroCHLOROthiazide 12.5 mg tablet, Take 1 tablet (12.5 mg total) by mouth daily, Disp: 100 tablet, Rfl: 1    hydrOXYzine HCL (ATARAX) 25 mg tablet, Take 1 tablet (25 mg total) by mouth every 6 (six) hours as needed for itching or anxiety, Disp: 30 tablet, Rfl: 2    losartan (COZAAR) 100 MG tablet, Take 1 tablet (100 mg total) by mouth daily, Disp: 90 tablet, Rfl: 1    Multiple Vitamin (multivitamin) tablet, Take 1 tablet by mouth daily, Disp: , Rfl:     omeprazole (PriLOSEC) 40 MG capsule, Take 1 capsule (40 mg total) by mouth daily Take before a meal, Disp: 90 capsule, Rfl: 3    QUEtiapine  (SEROquel) 50 mg tablet, 1 to 1 1/2 po q hs, Disp: 135 tablet, Rfl: 1    sertraline (ZOLOFT) 100 mg tablet, Take 2 tablets (200 mg total) by mouth daily, Disp: 180 tablet, Rfl: 1    SF 5000 Plus 1.1 % CREA, Take by mouth 2 (two) times a day, Disp: 51 g, Rfl: 0    terbinafine (LamISIL) 250 mg tablet, , Disp: , Rfl:     Allergies   Allergen Reactions    Latex Rash       Physical Exam:   Vitals:    09/25/24 0808   BP: 113/77   Pulse: 96   Resp: 17   Temp: 97.5 °F (36.4 °C)   SpO2: 96%     General: Awake, Alert, Oriented x 3, Mood and affect appropriate  Respiratory: Respirations even and unlabored  Cardiovascular: Peripheral pulses intact; no edema  Musculoskeletal Exam: Decreased range of motion lumbar spine    ASA Score: III    Patient/Chart Verification  Patient ID Verified: Verbal  ID Band Applied: No  Consents Confirmed: Procedural  H&P( within 30 days) Verified: To be obtained in the Procedural area  Allergies Reviewed: Yes  Anticoag/NSAID held?: NA  Currently on antibiotics?: No    Assessment:   1. Lumbar radiculopathy        Plan: L5-S1 LESI

## 2024-09-26 ENCOUNTER — OFFICE VISIT (OUTPATIENT)
Age: 59
End: 2024-09-26
Payer: COMMERCIAL

## 2024-09-26 VITALS
RESPIRATION RATE: 18 BRPM | WEIGHT: 223 LBS | DIASTOLIC BLOOD PRESSURE: 71 MMHG | HEART RATE: 76 BPM | OXYGEN SATURATION: 98 % | HEIGHT: 66 IN | TEMPERATURE: 97.7 F | BODY MASS INDEX: 35.84 KG/M2 | SYSTOLIC BLOOD PRESSURE: 140 MMHG

## 2024-09-26 DIAGNOSIS — D50.9 IRON DEFICIENCY ANEMIA, UNSPECIFIED IRON DEFICIENCY ANEMIA TYPE: Primary | ICD-10-CM

## 2024-09-26 PROCEDURE — 99213 OFFICE O/P EST LOW 20 MIN: CPT | Performed by: INTERNAL MEDICINE

## 2024-09-26 NOTE — PROGRESS NOTES
[facility]OLOG[facility]  [address]  [facility] [address]  [facility] [id number] [phone]     Date of Visit: 9/26/2024  Name: Anamaria Rivera   YOB: 1965         Assessment/Plan  This is a 59-year-old lady who presented with severe iron deficiency anemia. From ? frequent blood transfusions. Now s/p Feraheme 1020mg on 10/31/23.   We reviewed the most recent labs from September.   There is no evidence of anemia and iron levels are within normal limits.   No need for IV iron at this time. She takes a MVI and may continue on this.   She had inquired about returning to blood donations, and I cautioned her to limit the number of donations. She will consider twice yearly donation for now.    RTC in 6 mo with labs prior.    Patient has been strongly urged to call with any questions or concerns or if she has any sx suggestive of recurrence of her anemia. These were reviewed with her today    I spent 20 minutes in chart review, face to face counseling, coordination of care, and documentation      HISTORY OF PRESENT ILLNESS:   Anamaria Rivera is a 59 y.o. female  who had been referred for her anemia.   Patient states that she has had anemia 4 to 5 years ago, but has never been told to take iron supplements.  More recently, she has noticed worsening fatigue and shortness of breath on exertion. She had been donating blood q3 months prior to this.     She denied any bleeding, including blood in the urine, stools, dark stools.  She has a history of uterine fibroids, and underwent a hysteroscopy and ablation in the past.  She has been menopausal since and has not had any vaginal bleeding.    CBC and iron studies from May and September2023 are as follows.  Hemoglobin of 10.5/11.5.  Normal platelet counts.  Normal WBC.   Ferritin 3/5.  Iron levels 34/52.  Iron saturation 10% / 14%.  TIBC 351/364. Hb in Feb 2023 was 11.3 and previously within normal limits starting 2021. CMP is  within normal limits.     On July 13, 2023 she underwent an EGD and colonoscopy.  EGD showed 2 columns of dilated veins in the esophagus from 30 and 33 cm. H. Pylori and celiac ds was ruled out. There were 3 polyps that were not retrieved and a 3-year repeat surveillance was recommended by GI. Was placed on PPI   Capsule endoscopy was negative.    She did not tolerate PO iron as it was causing abdominal discomfort and constipation.      s/p Feraheme 1020mg on 10/31/23.     She is here today for follow-up visit      Subjective  Denies any bleeding from any site.  Overall feels well.  Seen in the ER recently for floaters - seeing ophthalmology      REVIEW OF SYSTEMS:  No new complaints      Current Outpatient Medications:     albuterol (PROVENTIL HFA,VENTOLIN HFA) 90 mcg/act inhaler, Inhale 2 puffs every 6 (six) hours as needed for wheezing or shortness of breath, Disp: 8.5 g, Rfl: 5    buPROPion (WELLBUTRIN XL) 150 mg 24 hr tablet, Take 1 tablet (150 mg total) by mouth daily, Disp: 90 tablet, Rfl: 1    ciclopirox (PENLAC) 8 % solution, Apply topically daily at bedtime, Disp: 6.6 mL, Rfl: 3    Clocortolone Pivalate 0.1 % cream, Apply topically 2 (two) times a day, Disp: 45 g, Rfl: 3    hydroCHLOROthiazide 12.5 mg tablet, Take 1 tablet (12.5 mg total) by mouth daily, Disp: 100 tablet, Rfl: 1    hydrOXYzine HCL (ATARAX) 25 mg tablet, Take 1 tablet (25 mg total) by mouth every 6 (six) hours as needed for itching or anxiety, Disp: 30 tablet, Rfl: 2    losartan (COZAAR) 100 MG tablet, Take 1 tablet (100 mg total) by mouth daily, Disp: 90 tablet, Rfl: 1    Multiple Vitamin (multivitamin) tablet, Take 1 tablet by mouth daily, Disp: , Rfl:     omeprazole (PriLOSEC) 40 MG capsule, Take 1 capsule (40 mg total) by mouth daily Take before a meal, Disp: 90 capsule, Rfl: 3    QUEtiapine (SEROquel) 50 mg tablet, 1 to 1 1/2 po q hs, Disp: 135 tablet, Rfl: 1    sertraline (ZOLOFT) 100 mg tablet, Take 2 tablets (200 mg total) by mouth  daily, Disp: 180 tablet, Rfl: 1    SF 5000 Plus 1.1 % CREA, Take by mouth 2 (two) times a day, Disp: 51 g, Rfl: 0    terbinafine (LamISIL) 250 mg tablet, , Disp: , Rfl:     cholecalciferol (VITAMIN D3) 1,000 units tablet, Take 1,000 Units by mouth daily (Patient not taking: Reported on 6/28/2024), Disp: , Rfl:      Allergies   Allergen Reactions    Latex Rash        ACTIVE PROBLEMS:  Patient Active Problem List   Diagnosis    Lumbar disc herniation    Spinal stenosis of lumbar region with neurogenic claudication    Chronic bilateral low back pain without sciatica    Lumbar radiculopathy    Primary hypertension    Moderate episode of recurrent major depressive disorder (HCC)    Low iron    Hand injury    Cellulitis of right thumb    Dog bite    Carpal tunnel syndrome, bilateral    Peroneal tendonitis of right lower leg    Iron deficiency anemia          Past Medical History:   Diagnosis Date    ADHD (attention deficit hyperactivity disorder)     Anxiety     Arthritis     Depression     Fibromyalgia, primary     GERD (gastroesophageal reflux disease)     History of stomach ulcers     Hypertension     Panic attack     Papanicolaou smear 2020    Psychiatric disorder         Past Surgical History:   Procedure Laterality Date    BREAST CYST ASPIRATION Left 2014    HYSTEROSCOPY  2015    MAMMO (HISTORICAL) Bilateral 03/05/2020    [facility] BI-RADS 1 Negative Scattered areas fibroglandular density; 25% to 50% glandular breast tissue        Social History     Socioeconomic History    Marital status: Single     Spouse name: Not on file    Number of children: Not on file    Years of education: Not on file    Highest education level: Not on file   Occupational History    Occupation: Tech.    Tobacco Use    Smoking status: Former     Passive exposure: Past    Smokeless tobacco: Never    Tobacco comments:     teen years   Vaping Use    Vaping status: Never Used   Substance and Sexual Activity    Alcohol use: Yes     Comment: Socially     "Drug use: No    Sexual activity: Not Currently     Birth control/protection: Post-menopausal   Other Topics Concern    Not on file   Social History Narrative    Caffeine Intake: 1-2 cups per day    Do you smoke marijuana? Denies    Do you drink alcohol? Socially    Exercise: Occassionally    Domestic violence: No    History of drug/alcohol abuse denies     Social Determinants of Health     Financial Resource Strain: Not on file   Food Insecurity: Not on file   Transportation Needs: Not on file   Physical Activity: Not on file   Stress: Not on file   Social Connections: Not on file   Intimate Partner Violence: Not on file   Housing Stability: Not on file        Family History   Problem Relation Age of Onset    Hypertension Mother     Lung cancer Mother     Skin cancer Father     Stroke Brother     Hypertension Brother     Transient ischemic attack Brother     No Known Problems Brother     No Known Problems Brother     Hypertension Maternal Grandmother     Multiple sclerosis Maternal Grandmother     Heart attack Maternal Grandfather     Heart disease Maternal Grandfather     Alcohol abuse Maternal Grandfather     Asthma Paternal Grandmother     Heart disease Paternal Grandfather         Objective        Physical Exam  ECOG performance status: 0  Blood pressure 140/71, pulse 76, temperature 97.7 °F (36.5 °C), temperature source Temporal, resp. rate 18, height 5' 5.5\" (1.664 m), weight 101 kg (223 lb), SpO2 98%, not currently breastfeeding.   Chest - clear on auscultation  Heart - RRR. No murmurs/gallops      I reviewed lab data in the chart as noted above.  Additional labs as noted below    WBC   Date Value Ref Range Status   09/15/2024 7.15 4.31 - 10.16 Thousand/uL Final   09/14/2024 5.36 4.31 - 10.16 Thousand/uL Final   07/08/2024 10.76 (H) 4.31 - 10.16 Thousand/uL Final     Hemoglobin   Date Value Ref Range Status   09/15/2024 12.7 11.5 - 15.4 g/dL Final   09/14/2024 14.3 11.5 - 15.4 g/dL Final   07/08/2024 14.1 " 11.5 - 15.4 g/dL Final     Platelets   Date Value Ref Range Status   09/15/2024 254 149 - 390 Thousands/uL Final   09/14/2024 290 149 - 390 Thousands/uL Final   07/08/2024 221 149 - 390 Thousands/uL Final     MCV   Date Value Ref Range Status   09/15/2024 88 82 - 98 fL Final   09/14/2024 88 82 - 98 fL Final   07/08/2024 88 82 - 98 fL Final      Potassium   Date Value Ref Range Status   09/15/2024 3.7 3.5 - 5.3 mmol/L Final   07/08/2024 3.9 3.5 - 5.3 mmol/L Final   01/03/2024 3.7 3.5 - 5.3 mmol/L Final     Chloride   Date Value Ref Range Status   09/15/2024 104 96 - 108 mmol/L Final   07/08/2024 101 96 - 108 mmol/L Final   01/03/2024 102 96 - 108 mmol/L Final     CO2   Date Value Ref Range Status   09/15/2024 27 21 - 32 mmol/L Final   07/08/2024 28 21 - 32 mmol/L Final   01/03/2024 25 21 - 32 mmol/L Final     CO2, i-STAT   Date Value Ref Range Status   10/01/2016 24 21 - 32 mmol/L Final     BUN   Date Value Ref Range Status   09/15/2024 19 5 - 25 mg/dL Final   07/08/2024 17 5 - 25 mg/dL Final   01/03/2024 17 5 - 25 mg/dL Final     Creatinine   Date Value Ref Range Status   09/15/2024 0.70 0.60 - 1.30 mg/dL Final     Comment:     Standardized to IDMS reference method   07/08/2024 0.75 0.60 - 1.30 mg/dL Final     Comment:     Standardized to IDMS reference method   01/03/2024 0.72 0.60 - 1.30 mg/dL Final     Comment:     Standardized to IDMS reference method     Glucose   Date Value Ref Range Status   09/15/2024 145 (H) 65 - 140 mg/dL Final     Comment:     If the patient is fasting, the ADA then defines impaired fasting glucose as > 100 mg/dL and diabetes as > or equal to 123 mg/dL.   07/08/2024 138 65 - 140 mg/dL Final     Comment:     If the patient is fasting, the ADA then defines impaired fasting glucose as > 100 mg/dL and diabetes as > or equal to 123 mg/dL.   01/03/2024 116 65 - 140 mg/dL Final     Comment:     If the patient is fasting, the ADA then defines impaired fasting glucose as > 100 mg/dL and diabetes  as > or equal to 123 mg/dL.     Calcium   Date Value Ref Range Status   09/15/2024 8.9 8.4 - 10.2 mg/dL Final   07/08/2024 9.2 8.4 - 10.2 mg/dL Final   01/03/2024 9.1 8.4 - 10.2 mg/dL Final     Albumin   Date Value Ref Range Status   09/15/2024 3.7 3.5 - 5.0 g/dL Final   07/08/2024 4.3 3.5 - 5.0 g/dL Final   01/03/2024 4.1 3.5 - 5.0 g/dL Final     Total Bilirubin   Date Value Ref Range Status   09/15/2024 0.27 0.20 - 1.00 mg/dL Final     Comment:     Use of this assay is not recommended for patients undergoing treatment with eltrombopag due to the potential for falsely elevated results.  N-acetyl-p-benzoquinone imine (metabolite of Acetaminophen) will generate erroneously low results in samples for patients that have taken an overdose of Acetaminophen.   07/08/2024 0.44 0.20 - 1.00 mg/dL Final     Comment:     Use of this assay is not recommended for patients undergoing treatment with eltrombopag due to the potential for falsely elevated results.  N-acetyl-p-benzoquinone imine (metabolite of Acetaminophen) will generate erroneously low results in samples for patients that have taken an overdose of Acetaminophen.   01/03/2024 0.25 0.20 - 1.00 mg/dL Final     Comment:     Use of this assay is not recommended for patients undergoing treatment with eltrombopag due to the potential for falsely elevated results.  N-acetyl-p-benzoquinone imine (metabolite of Acetaminophen) will generate erroneously low results in samples for patients that have taken an overdose of Acetaminophen.     Alkaline Phosphatase   Date Value Ref Range Status   09/15/2024 92 34 - 104 U/L Final   07/08/2024 90 34 - 104 U/L Final   01/03/2024 97 34 - 104 U/L Final     AST   Date Value Ref Range Status   09/15/2024 20 13 - 39 U/L Final   07/08/2024 20 13 - 39 U/L Final   01/03/2024 20 13 - 39 U/L Final     ALT   Date Value Ref Range Status   09/15/2024 17 7 - 52 U/L Final     Comment:     Specimen collection should occur prior to Sulfasalazine  "administration due to the potential for falsely depressed results.    07/08/2024 21 7 - 52 U/L Final     Comment:     Specimen collection should occur prior to Sulfasalazine administration due to the potential for falsely depressed results.    01/03/2024 28 7 - 52 U/L Final     Comment:     Specimen collection should occur prior to Sulfasalazine administration due to the potential for falsely depressed results.       No results found for: \"LDH\"  No results found for: \"TSH\"  No results found for: \"G7MKOOE\"   Free T4   Date Value Ref Range Status   07/17/2021 0.66 (L) 0.76 - 1.46 ng/dL Final     Comment:     Specimen collection should occur prior to Sulfasalazine administration due to the potential for falsely elevated results.         RECENT IMAGING:  Procedure: CTA head and neck with and without contrast    Result Date: 9/16/2024  Narrative: CTA NECK AND BRAIN WITH AND WITHOUT CONTRAST INDICATION: blurry vision COMPARISON:   None. TECHNIQUE:  Routine CT imaging of the Brain without contrast.Post contrast imaging was performed after administration of iodinated contrast through the neck and brain. Post contrast axial 0.625 mm images timed to opacify the arterial system.  3D rendering was performed on an independent workstation.   MIP reconstructions performed. Coronal and sagittal reconstructions were performed of the non contrast portion of the brain. Radiation dose length product (DLP) for this visit:  1290 mGy-cm .  This examination, like all CT scans performed in the [facility], was performed utilizing techniques to minimize radiation dose exposure, including the use of iterative reconstruction and automated exposure control. IV Contrast:  85 mL of iohexol (OMNIPAQUE) IMAGE QUALITY:   Diagnostic FINDINGS: NONCONTRAST BRAIN PARENCHYMA:No intracranial mass, mass effect or midline shift. No CT signs of acute infarction.  No acute parenchymal hemorrhage. VENTRICLES AND EXTRA-AXIAL SPACES:Normal for " "the patient's age. VISUALIZED ORBITS: Normal. PARANASAL SINUSES: Moderate mucoperiosteal thickening more pronounced in the right maxillary sinus. CTA NECK ARCH AND GREAT VESSELS: Visualized arch and great vessels are normal. VERTEBRAL ARTERIES: Patent extracranial segments. RIGHT CAROTID: No stenosis.    No dissection. LEFT CAROTID: No stenosis.    No dissection. NASCET criteria was used to determine the degree of internal carotid artery diameter stenosis. CTA BRAIN: INTERNAL CAROTID ARTERIES: No stenosis or occlusion. ANTERIOR CEREBRAL ARTERY CIRCULATION:  No stenosis or occlusion. MIDDLE CEREBRAL ARTERY CIRCULATION:  No stenosis or occlusion. DISTAL VERTEBRAL ARTERIES:  No stenosis or occlusion. Normal posterior inferior cerebellar artery origins BASILAR ARTERY:  No stenosis or occlusion. POSTERIOR CEREBRAL ARTERIES: No stenosis or occlusion. Normal posterior communicating arteries. VENOUS STRUCTURES: Patent. NON VASCULAR ANATOMY BONY STRUCTURES:  No acute osseous abnormality. SOFT TISSUES OF THE NECK:  Normal. THORACIC INLET:  Unremarkable.     Impression: CT Brain:  No acute intracranial abnormality. Sinus disease as above. Correlate for acute component. CT Angiography: No evidence of hemodynamically significant stenosis or large vessel occlusive disease within the major vessels of the [place]. No aneurysm identified. No significant stenosis of the cervical carotid or vertebral arteries. Workstation performed: [id number]         Portions of the record may have been created with voice recognition software.  Occasional wrong word or \"sound a like\" substitutions may have occurred due to the inherent limitations of voice recognition software.  Read the chart carefully and recognize, using context, where substitutions have occurred.  "

## 2024-10-02 ENCOUNTER — FOLLOW UP (OUTPATIENT)
Dept: URBAN - METROPOLITAN AREA CLINIC 6 | Facility: CLINIC | Age: 59
End: 2024-10-02

## 2024-10-02 DIAGNOSIS — H25.13: ICD-10-CM

## 2024-10-02 DIAGNOSIS — H43.392: ICD-10-CM

## 2024-10-02 DIAGNOSIS — H43.812: ICD-10-CM

## 2024-10-02 PROCEDURE — 92014 COMPRE OPH EXAM EST PT 1/>: CPT

## 2024-10-02 ASSESSMENT — VISUAL ACUITY
OD_CC: 20/25+
OS_CC: 20/20-

## 2024-10-02 ASSESSMENT — TONOMETRY
OS_IOP_MMHG: 11
OD_IOP_MMHG: 10

## 2024-10-09 ENCOUNTER — TELEPHONE (OUTPATIENT)
Dept: PAIN MEDICINE | Facility: CLINIC | Age: 59
End: 2024-10-09

## 2024-10-14 ENCOUNTER — TELEMEDICINE (OUTPATIENT)
Dept: PSYCHIATRY | Facility: CLINIC | Age: 59
End: 2024-10-14
Payer: COMMERCIAL

## 2024-10-14 ENCOUNTER — TELEPHONE (OUTPATIENT)
Dept: PSYCHIATRY | Facility: CLINIC | Age: 59
End: 2024-10-14

## 2024-10-14 DIAGNOSIS — F33.1 MODERATE EPISODE OF RECURRENT MAJOR DEPRESSIVE DISORDER (HCC): Primary | ICD-10-CM

## 2024-10-14 DIAGNOSIS — F41.1 GAD (GENERALIZED ANXIETY DISORDER): ICD-10-CM

## 2024-10-14 PROCEDURE — 99214 OFFICE O/P EST MOD 30 MIN: CPT | Performed by: PHYSICIAN ASSISTANT

## 2024-10-14 RX ORDER — SERTRALINE HYDROCHLORIDE 100 MG/1
200 TABLET, FILM COATED ORAL DAILY
Qty: 180 TABLET | Refills: 1 | Status: SHIPPED | OUTPATIENT
Start: 2024-10-14

## 2024-10-14 RX ORDER — QUETIAPINE FUMARATE 50 MG/1
TABLET, FILM COATED ORAL
Qty: 135 TABLET | Refills: 1 | Status: SHIPPED | OUTPATIENT
Start: 2024-10-14

## 2024-10-14 RX ORDER — BUPROPION HYDROCHLORIDE 150 MG/1
150 TABLET ORAL DAILY
Qty: 90 TABLET | Refills: 1 | Status: SHIPPED | OUTPATIENT
Start: 2024-10-14

## 2024-10-14 NOTE — PSYCH
Virtual Regular Visit  Assessment & Plan  Moderate episode of recurrent major depressive disorder (HCC)  Continues with mild symptoms but managing with current medications and treatment plan  Orders:    sertraline (ZOLOFT) 100 mg tablet; Take 2 tablets (200 mg total) by mouth daily    QUEtiapine (SEROquel) 50 mg tablet; 1 to 1 1/2 po q hs    buPROPion (WELLBUTRIN XL) 150 mg 24 hr tablet; Take 1 tablet (150 mg total) by mouth daily    DEISY (generalized anxiety disorder)  Taking as needed hydroxyzine as needed which has been helpful, has been taking infrequently  Continue with Zoloft, quetiapine and Wellbutrin as prescribed       She will follow-up in 3 months or call sooner if any questions or concerns  She verbalized understanding agreement with above treatment plan  Verification of patient location:    Patient is located at Other in the following state in which I hold an active license [place]    This note was not shared with the patient due to reasonable likelihood of causing patient harm    Assessment/Plan:    Problem List Items Addressed This Visit          Behavioral Health    Moderate episode of recurrent major depressive disorder (HCC) - Primary    Relevant Medications    sertraline (ZOLOFT) 100 mg tablet    QUEtiapine (SEROquel) 50 mg tablet    buPROPion (WELLBUTRIN XL) 150 mg 24 hr tablet     Other Visit Diagnoses       DEISY (generalized anxiety disorder)        Relevant Medications    sertraline (ZOLOFT) 100 mg tablet    QUEtiapine (SEROquel) 50 mg tablet    buPROPion (WELLBUTRIN XL) 150 mg 24 hr tablet            Goals addressed in session: Goal 1          Reason for visit is   Chief Complaint   Patient presents with    Virtual Regular Visit          Encounter provider Alaina Arias PA-C      Recent Visits  No visits were found meeting these conditions.  Showing recent visits within past 7 days and meeting all other requirements  Today's Visits  Date Type Provider Dept   10/14/24 Telemedicine Alaina  Neelam Arias PA-C [facility]   Showing today's visits and meeting all other requirements  Future Appointments  No visits were found meeting these conditions.  Showing future appointments within next 150 days and meeting all other requirements       The patient was identified by name and date of birth. Anamaria Rivera was informed that this is a telemedicine visit and that the visit is being conducted throughthe Epic Embedded platform. She agrees to proceed..  My office door was closed. No one else was in the room.  She acknowledged consent and understanding of privacy and security of the video platform. The patient has agreed to participate and understands they can discontinue the visit at any time.    Patient is aware this is a billable service.     Subjective  Anamaria Rivera is a 59 y.o. female with history of depression and anxiety.      HPI  Anamaria was seen for follow-up and for medication management.  States that she has external stressors which is exacerbating her anxiety.  Has financial stressors and concerns.  Her dog unfortunately is ill.  States that her best friend's parent also passed away last night.  She and her brother are back in the home and continuing to work on repairs.  States that she recently had 3 days off and went to [place] enjoyed that.  She is concerned with going back to work though since she is the only person who performs her job so will be quite busy.  Supportive psychotherapy provided and she responded well.  States that she also continues to be frustrated with her weight.  She joined weight watcher's but states everything is done now via the haley and she does not have proficiency in this and has not yet been following through.  She is planning to work on this and has this as a goal for her next visit.  Physically she states that her pain after her pubic fracture has been gradually improving.  She is following with orthopedics as scheduled.  Medications  reviewed and updated.  Also states that her alcohol consumption has been less and she is not as concerned about this.  States that due to being on weight watcher's she has switched to drinking light beer which she does not like.  Also states that her work time has switched and she now starts at 8 AM.  As a result she has been going to bed earlier which has also curbed her alcohol.  Reports that she is getting adequate rest.  Appetite is good.  No other physical reports of pain or discomfort.    Past Medical History:   Diagnosis Date    ADHD (attention deficit hyperactivity disorder)     Anxiety     Arthritis     Depression     Fibromyalgia, primary     GERD (gastroesophageal reflux disease)     History of stomach ulcers     Hypertension     Panic attack     Papanicolaou smear 2020    Psychiatric disorder        Past Surgical History:   Procedure Laterality Date    BREAST CYST ASPIRATION Left 2014    HYSTEROSCOPY  2015    MAMMO (HISTORICAL) Bilateral 03/05/2020    [facility] BI-RADS 1 Negative Scattered areas fibroglandular density; 25% to 50% glandular breast tissue       Current Outpatient Medications   Medication Sig Dispense Refill    buPROPion (WELLBUTRIN XL) 150 mg 24 hr tablet Take 1 tablet (150 mg total) by mouth daily 90 tablet 1    QUEtiapine (SEROquel) 50 mg tablet 1 to 1 1/2 po q hs 135 tablet 1    sertraline (ZOLOFT) 100 mg tablet Take 2 tablets (200 mg total) by mouth daily 180 tablet 1    albuterol (PROVENTIL HFA,VENTOLIN HFA) 90 mcg/act inhaler Inhale 2 puffs every 6 (six) hours as needed for wheezing or shortness of breath 8.5 g 5    cholecalciferol (VITAMIN D3) 1,000 units tablet Take 1,000 Units by mouth daily (Patient not taking: Reported on 6/28/2024)      ciclopirox (PENLAC) 8 % solution Apply topically daily at bedtime 6.6 mL 3    Clocortolone Pivalate 0.1 % cream Apply topically 2 (two) times a day 45 g 3    hydroCHLOROthiazide 12.5 mg tablet Take 1 tablet (12.5 mg total) by mouth daily 100 tablet 1     hydrOXYzine HCL (ATARAX) 25 mg tablet Take 1 tablet (25 mg total) by mouth every 6 (six) hours as needed for itching or anxiety 30 tablet 2    losartan (COZAAR) 100 MG tablet Take 1 tablet (100 mg total) by mouth daily 90 tablet 1    Multiple Vitamin (multivitamin) tablet Take 1 tablet by mouth daily      omeprazole (PriLOSEC) 40 MG capsule Take 1 capsule (40 mg total) by mouth daily Take before a meal 90 capsule 3    SF 5000 Plus 1.1 % CREA Take by mouth 2 (two) times a day 51 g 0    terbinafine (LamISIL) 250 mg tablet        No current facility-administered medications for this visit.        Allergies   Allergen Reactions    Latex Rash       Review of Systems  As noted in hpi    Video Exam    There were no vitals filed for this visit.    Physical Exam   Mental status exam:  Speech is clear, coherent, normal rate and volume  Adequate hygiene, good eye contact  Affect is mildly dysthymic, anxious  Mood is congruent  Linear and coherent thought process  No suicidal or homicidal ideation  No psychotic symptoms, no hallucinations and no delusions were Voiceline cognition is intact  Insight and judgment is intact    Aware of after-hours on-call service and [phone] crisis line  Visit Time    Visit Start Time: 835  Visit Stop Time: 900  Total Visit Duration:  25 minutes

## 2024-10-14 NOTE — ASSESSMENT & PLAN NOTE
Continues with mild symptoms but managing with current medications and treatment plan  Orders:    sertraline (ZOLOFT) 100 mg tablet; Take 2 tablets (200 mg total) by mouth daily    QUEtiapine (SEROquel) 50 mg tablet; 1 to 1 1/2 po q hs    buPROPion (WELLBUTRIN XL) 150 mg 24 hr tablet; Take 1 tablet (150 mg total) by mouth daily

## 2024-10-14 NOTE — BH TREATMENT PLAN
"TREATMENT PLAN (Medication Management Only)        [facility]    Name and Date of Birth:  Anamaria Rivera 59 y.o. 1965  Date of Treatment Plan: October 14, 2024  Diagnosis/Diagnoses:    1. Moderate episode of recurrent major depressive disorder (HCC)    2. DEISY (generalized anxiety disorder)      Strengths/Personal Resources for Self-Care: supportive family, supportive friends.  Area/Areas of need (in own words): \"losing weight and doing weight watchers\"  1. Long Term Goal: maintain mood stability.  Target Date:3 months - 1/14/2025  Person/Persons responsible for completion of goal: Anamaria  2.  Short Term Objective (s) - How will we reach this goal?:   A. Provider new recommended medication/dosage changes and/or continue medication(s): continue current medications as prescribed.  B.  Physical activity as tolerated  C. N/A.  Target Date:3 months - 1/14/2025  Person/Persons Responsible for Completion of Goal: Anamaria  Progress Towards Goals: stable  Treatment Modality: medication management every 3 months  Review due 180 days from date of this plan: 6 months - 4/14/2025  Expected length of service: maintenance  My Physician/PA/NP and I have developed this plan together and I agree to work on the goals and objectives. I understand the treatment goals that were developed for my treatment.      "

## 2024-10-15 ENCOUNTER — TELEPHONE (OUTPATIENT)
Age: 59
End: 2024-10-15

## 2024-10-15 NOTE — TELEPHONE ENCOUNTER
Patient contacted the office to schedule a follow up visit with provider. Patient is now scheduled for 1/13  at 8:30 virtually.

## 2024-10-17 ENCOUNTER — OFFICE VISIT (OUTPATIENT)
Dept: PAIN MEDICINE | Facility: CLINIC | Age: 59
End: 2024-10-17
Payer: COMMERCIAL

## 2024-10-17 VITALS
SYSTOLIC BLOOD PRESSURE: 122 MMHG | HEART RATE: 94 BPM | BODY MASS INDEX: 35.84 KG/M2 | WEIGHT: 223 LBS | HEIGHT: 66 IN | DIASTOLIC BLOOD PRESSURE: 80 MMHG | TEMPERATURE: 97.7 F

## 2024-10-17 DIAGNOSIS — M46.1 SACROILIITIS (HCC): Primary | ICD-10-CM

## 2024-10-17 DIAGNOSIS — M47.816 LUMBAR SPONDYLOSIS: ICD-10-CM

## 2024-10-17 DIAGNOSIS — M54.16 LUMBAR RADICULOPATHY: ICD-10-CM

## 2024-10-17 PROCEDURE — 99213 OFFICE O/P EST LOW 20 MIN: CPT | Performed by: PHYSICIAN ASSISTANT

## 2024-10-17 NOTE — PROGRESS NOTES
Assessment:  1. Sacroiliitis (HCC)    2. Lumbar radiculopathy    3. Lumbar spondylosis        Plan:  While the patient was in the office today, I did have a thorough conversation regarding their chronic pain syndrome, medication management, and treatment plan options.      The patient is status post L5-S1 epidural steroid injection that was done on 9/25/2024 and she is able to report approximately 50% reduction in pain which is still ongoing.  She is reporting right-sided low back pain without radicular symptoms.  I told her to keep an eye on the symptoms, I do feel that this pain is more from sacroiliitis/sacroiliac joint dysfunction likely due to her altered gait pattern.  Injection can be performed if indicated in the future.  At this point she feels that something she is able to manage without interventional treatment.  Patient was also made aware the lumbar epidural steroid injection can be repeated ideally waiting 3 months in between procedures.    Continue lumbar home stretching exercises.    The patient will follow-up on a as needed basis. The patient was advised to contact the office should their symptoms worsen in the interim. The patient was agreeable and verbalized an understanding.        History of Present Illness:    The patient is a 59 y.o. female last seen on 9/25/2024 who presents for a follow up office visit in regards to chronic low back pain secondary to lumbar spondylosis, lumbar degenerative disc disease with radiculopathy.  The patient currently reports a pain level of 5 out of 10 and described as an intermittent burning type of pain.  On 9/25/2024 the patient underwent an L5-S1 interlaminar epidural steroid injection and she reports 50% reduction in the pain she has been experiencing in the left.  She states that shortly after the injection she developed pain on the right side however this pain is localized and without radicular features.  The pubic bone pain is gradually improving.  She has  deferred injection in this region.  She had been following with orthopedics regarding the situation.  Overall she is pleased with the results of the injection and does not feel the need for any further interventional treatment.    I have personally reviewed and/or updated the patient's past medical history, past surgical history, family history, social history, current medications, allergies, and vital signs today.       Review of Systems:    Review of Systems   Respiratory:  Negative for shortness of breath.    Cardiovascular:  Negative for chest pain.   Gastrointestinal:  Positive for constipation. Negative for diarrhea, nausea and vomiting.   Musculoskeletal:  Positive for gait problem. Negative for arthralgias and myalgias. Joint swelling: JOint stiffness.  Skin:  Negative for rash.   Neurological:  Positive for weakness. Negative for dizziness and seizures.   All other systems reviewed and are negative.        Past Medical History:   Diagnosis Date    ADHD (attention deficit hyperactivity disorder)     Anxiety     Arthritis     Depression     Fibromyalgia, primary     GERD (gastroesophageal reflux disease)     History of stomach ulcers     Hypertension     Panic attack     Papanicolaou smear 2020    Psychiatric disorder        Past Surgical History:   Procedure Laterality Date    BREAST CYST ASPIRATION Left 2014    HYSTEROSCOPY  2015    MAMMO (HISTORICAL) Bilateral 03/05/2020    H BI-RADS 1 Negative Scattered areas fibroglandular density; 25% to 50% glandular breast tissue       Family History   Problem Relation Age of Onset    Hypertension Mother     Lung cancer Mother     Skin cancer Father     Stroke Brother     Hypertension Brother     Transient ischemic attack Brother     No Known Problems Brother     No Known Problems Brother     Hypertension Maternal Grandmother     Multiple sclerosis Maternal Grandmother     Heart attack Maternal Grandfather     Heart disease Maternal Grandfather     Alcohol abuse  Maternal Grandfather     Asthma Paternal Grandmother     Heart disease Paternal Grandfather        Social History     Occupational History    Occupation: Tech.    Tobacco Use    Smoking status: Former     Passive exposure: Past    Smokeless tobacco: Never    Tobacco comments:     teen years   Vaping Use    Vaping status: Never Used   Substance and Sexual Activity    Alcohol use: Yes     Comment: Socially    Drug use: No    Sexual activity: Not Currently     Birth control/protection: Post-menopausal         Current Outpatient Medications:     albuterol (PROVENTIL HFA,VENTOLIN HFA) 90 mcg/act inhaler, Inhale 2 puffs every 6 (six) hours as needed for wheezing or shortness of breath, Disp: 8.5 g, Rfl: 5    buPROPion (WELLBUTRIN XL) 150 mg 24 hr tablet, Take 1 tablet (150 mg total) by mouth daily, Disp: 90 tablet, Rfl: 1    hydroCHLOROthiazide 12.5 mg tablet, Take 1 tablet (12.5 mg total) by mouth daily, Disp: 100 tablet, Rfl: 1    hydrOXYzine HCL (ATARAX) 25 mg tablet, Take 1 tablet (25 mg total) by mouth every 6 (six) hours as needed for itching or anxiety, Disp: 30 tablet, Rfl: 2    losartan (COZAAR) 100 MG tablet, Take 1 tablet (100 mg total) by mouth daily, Disp: 90 tablet, Rfl: 1    Multiple Vitamin (multivitamin) tablet, Take 1 tablet by mouth daily, Disp: , Rfl:     omeprazole (PriLOSEC) 40 MG capsule, Take 1 capsule (40 mg total) by mouth daily Take before a meal, Disp: 90 capsule, Rfl: 3    QUEtiapine (SEROquel) 50 mg tablet, 1 to 1 1/2 po q hs, Disp: 135 tablet, Rfl: 1    sertraline (ZOLOFT) 100 mg tablet, Take 2 tablets (200 mg total) by mouth daily, Disp: 180 tablet, Rfl: 1    cholecalciferol (VITAMIN D3) 1,000 units tablet, Take 1,000 Units by mouth daily (Patient not taking: Reported on 6/28/2024), Disp: , Rfl:     ciclopirox (PENLAC) 8 % solution, Apply topically daily at bedtime, Disp: 6.6 mL, Rfl: 3    Clocortolone Pivalate 0.1 % cream, Apply topically 2 (two) times a day, Disp: 45 g, Rfl: 3    SF 5000  "Plus 1.1 % CREA, Take by mouth 2 (two) times a day, Disp: 51 g, Rfl: 0    terbinafine (LamISIL) 250 mg tablet, , Disp: , Rfl:     Allergies   Allergen Reactions    Latex Rash       Physical Exam:    /80 (BP Location: Left arm, Patient Position: Sitting, Cuff Size: Large)   Pulse 94   Temp 97.7 °F (36.5 °C)   Ht 5' 5.5\" (1.664 m)   Wt 101 kg (223 lb)   BMI 36.54 kg/m²     Constitutional:normal, well developed, well nourished, alert, in no distress and non-toxic and no overt pain behavior.  Eyes:anicteric  HEENT:grossly intact  Neck:supple, symmetric, trachea midline and no masses   Pulmonary:even and unlabored  Cardiovascular:No edema or pitting edema present  Skin:Normal without rashes or lesions and well hydrated  Psychiatric:Mood and affect appropriate  Neurologic:Cranial Nerves II-XII grossly intact  Musculoskeletal: Tender right SI joint, antalgic gait but stable.      Imaging  No orders to display         No orders of the defined types were placed in this encounter.      "

## 2024-10-18 DIAGNOSIS — I10 PRIMARY HYPERTENSION: ICD-10-CM

## 2024-10-20 RX ORDER — HYDROCHLOROTHIAZIDE 12.5 MG/1
12.5 TABLET ORAL DAILY
Qty: 100 TABLET | Refills: 0 | Status: SHIPPED | OUTPATIENT
Start: 2024-10-20

## 2024-11-04 NOTE — PROGRESS NOTES
Assessment/Plan:  Calcium 1200-1500mg + 600-1000 IU Vit D daily. Exercise 150 minutes per week minimum including weight bearing exercises. Pap with high risk HPV Q 5 years.  Annual mammogram and monthly breast self exam recommended.  Colonoscopy- up to date   Urinary pressure UA dip in office negative Likely related to inflammation pubic bone         Diagnoses and all orders for this visit:    Encounter for gynecological examination without abnormal finding    Encounter for screening mammogram for malignant neoplasm of breast  -     Mammo screening bilateral w 3d and cad; Future    Lumbar radiculopathy    Dysuria          Subjective:      Patient ID: Anamaria Rivera is a 59 y.o. female.    Here for annual gyn G0 Last pap 10/2023 neg/neg HPV prior 1/2020 neg/neg HPV. NSA x over 10 years..H/o mixed UI.. Get up to pee in middle of night and is incont Failed meds did pelvic PT  has not kept up with exercises Has spine issues probably contributing  Does exercise for that She is c/o pressure with urination thought may be getting a UTI this weekend.  Has been having inflammed pubic bone x 4 months No intervention for it Told [facility] get this but they have no idea why she has  She had recent spine injection for her back and to see if would help she notes slight improvement  H/o iron def anemia Saw hematology last year. Required iron infusion. Has been good no further infusions needed Work up Colonoscopy EGD and small bowel follow through were negative. Prev frequent blood donor and think may have caught up with her.  Denies abd or pelvic pain  Denies vaginal bleeding    [facility] Mammo 1/9/2024 normal         The following portions of the patient's history were reviewed and updated as appropriate: allergies, current medications, past family history, past medical history, past social history, past surgical history, and problem list.    Review of Systems   Constitutional:  Negative for fatigue  "and unexpected weight change.   Gastrointestinal:  Negative for abdominal distention, abdominal pain, constipation and diarrhea.   Genitourinary:  Positive for dysuria and pelvic pain (inflammed pubic bone). Negative for difficulty urinating, dyspareunia, frequency, genital sores, urgency, vaginal bleeding, vaginal discharge and vaginal pain.        Mixed incont    Musculoskeletal:  Positive for back pain.   Neurological:  Negative for headaches.   Psychiatric/Behavioral: Negative.  Negative for dysphoric mood. The patient is not nervous/anxious.          Objective:      /74 (BP Location: Left arm, Patient Position: Sitting, Cuff Size: Large)   Ht 5' 5.5\" (1.664 m)   Wt 101 kg (223 lb)   BMI 36.54 kg/m²          Physical Exam  Vitals and nursing note reviewed.   Constitutional:       General: She is not in acute distress.     Appearance: Normal appearance.   HENT:      Head: Normocephalic and atraumatic.   Pulmonary:      Effort: Pulmonary effort is normal.   Chest:   Breasts:     Breasts are symmetrical.      Right: Normal. No mass, nipple discharge, skin change or tenderness.      Left: Normal. No mass, nipple discharge, skin change or tenderness.   Abdominal:      General: There is no distension.      Palpations: Abdomen is soft.      Tenderness: There is no abdominal tenderness. There is no guarding or rebound.   Genitourinary:     General: Normal vulva.      Exam position: Lithotomy position.      Labia:         Right: No rash, tenderness, lesion or injury.         Left: No rash, tenderness, lesion or injury.       Urethra: No prolapse, urethral pain, urethral swelling or urethral lesion.      Vagina: Normal. No erythema or lesions.      Cervix: No cervical motion tenderness, discharge, lesion or cervical bleeding.      Uterus: Normal.       Adnexa: Right adnexa normal and left adnexa normal.        Right: No mass or tenderness.          Left: No mass or tenderness.        Rectum: No mass or external " hemorrhoid.   Musculoskeletal:         General: Normal range of motion.   Lymphadenopathy:      Upper Body:      Right upper body: No axillary adenopathy.      Left upper body: No axillary adenopathy.      Lower Body: No right inguinal adenopathy. No left inguinal adenopathy.   Skin:     General: Skin is warm and dry.   Neurological:      Mental Status: She is alert and oriented to person, place, and time.   Psychiatric:         Mood and Affect: Mood normal.         Behavior: Behavior normal.         Thought Content: Thought content normal.         Judgment: Judgment normal.

## 2024-11-05 ENCOUNTER — ANNUAL EXAM (OUTPATIENT)
Dept: OBGYN CLINIC | Facility: CLINIC | Age: 59
End: 2024-11-05
Payer: COMMERCIAL

## 2024-11-05 VITALS
HEIGHT: 66 IN | SYSTOLIC BLOOD PRESSURE: 114 MMHG | BODY MASS INDEX: 35.84 KG/M2 | WEIGHT: 223 LBS | DIASTOLIC BLOOD PRESSURE: 74 MMHG

## 2024-11-05 DIAGNOSIS — Z12.31 ENCOUNTER FOR SCREENING MAMMOGRAM FOR MALIGNANT NEOPLASM OF BREAST: ICD-10-CM

## 2024-11-05 DIAGNOSIS — R30.0 DYSURIA: ICD-10-CM

## 2024-11-05 DIAGNOSIS — M54.16 LUMBAR RADICULOPATHY: ICD-10-CM

## 2024-11-05 DIAGNOSIS — Z01.419 ENCOUNTER FOR GYNECOLOGICAL EXAMINATION WITHOUT ABNORMAL FINDING: Primary | ICD-10-CM

## 2024-11-05 PROCEDURE — S0612 ANNUAL GYNECOLOGICAL EXAMINA: HCPCS | Performed by: NURSE PRACTITIONER

## 2024-11-05 NOTE — PATIENT INSTRUCTIONS
Calcium 1200-1500mg + 600-1000 IU Vit D daily. Exercise 150 minutes per week minimum including weight bearing exercises. Pap with high risk HPV Q 5 years.  Annual mammogram and monthly breast self exam recommended.  Colonoscopy- up to date   Urinary pressure UA dip in office negative Likely related to inflammation of pubic bone

## 2024-11-15 ENCOUNTER — TELEPHONE (OUTPATIENT)
Dept: OBGYN CLINIC | Facility: HOSPITAL | Age: 59
End: 2024-11-15

## 2024-11-15 NOTE — TELEPHONE ENCOUNTER
Lm for pt to reschedule follow up due to Dr. Oshea being ooo on 11/29. Advised pt that appointment would be cancelled until they call back. Provided call back number. Will also send Dragon [employer] message.

## 2024-11-28 DIAGNOSIS — I10 PRIMARY HYPERTENSION: ICD-10-CM

## 2024-11-29 RX ORDER — LOSARTAN POTASSIUM 100 MG/1
100 TABLET ORAL DAILY
Qty: 90 TABLET | Refills: 1 | Status: SHIPPED | OUTPATIENT
Start: 2024-11-29

## 2024-12-11 DIAGNOSIS — K21.9 GASTROESOPHAGEAL REFLUX DISEASE, UNSPECIFIED WHETHER ESOPHAGITIS PRESENT: ICD-10-CM

## 2024-12-12 RX ORDER — OMEPRAZOLE 40 MG/1
40 CAPSULE, DELAYED RELEASE ORAL DAILY
Qty: 90 CAPSULE | Refills: 1 | Status: SHIPPED | OUTPATIENT
Start: 2024-12-12

## 2024-12-31 ENCOUNTER — OFFICE VISIT (OUTPATIENT)
Dept: FAMILY MEDICINE CLINIC | Facility: CLINIC | Age: 59
End: 2024-12-31
Payer: COMMERCIAL

## 2024-12-31 VITALS
SYSTOLIC BLOOD PRESSURE: 124 MMHG | HEIGHT: 66 IN | BODY MASS INDEX: 35.26 KG/M2 | TEMPERATURE: 97.5 F | DIASTOLIC BLOOD PRESSURE: 82 MMHG | HEART RATE: 82 BPM | RESPIRATION RATE: 15 BRPM | OXYGEN SATURATION: 97 % | WEIGHT: 219.4 LBS

## 2024-12-31 DIAGNOSIS — R09.81 NASAL CONGESTION: ICD-10-CM

## 2024-12-31 DIAGNOSIS — U07.1 COVID-19: Primary | ICD-10-CM

## 2024-12-31 LAB
SARS-COV-2 AG UPPER RESP QL IA: POSITIVE
VALID CONTROL: ABNORMAL

## 2024-12-31 PROCEDURE — 87811 SARS-COV-2 COVID19 W/OPTIC: CPT | Performed by: STUDENT IN AN ORGANIZED HEALTH CARE EDUCATION/TRAINING PROGRAM

## 2024-12-31 PROCEDURE — 99213 OFFICE O/P EST LOW 20 MIN: CPT | Performed by: STUDENT IN AN ORGANIZED HEALTH CARE EDUCATION/TRAINING PROGRAM

## 2024-12-31 NOTE — PROGRESS NOTES
"Name: Anamaria Rivera      : 1965      MRN: [id number]  Encounter Provider: Amanda Rojas MD  Encounter Date: 2024   Encounter department: [facility]  :  Assessment & Plan  Nasal congestion    Orders:    Poct Covid 19 Rapid Antigen Test    COVID-19  Rapid COVID swab in office is positive-discussed results with patient  Patient to follow-up over-the-counter symptom relief DayQuil NyQuil hydration warm salt water gargles    Follow-up as needed if symptoms worsen or do not improve    Discussed with patient to continue masking to help prevent others from getting sick around her            History of Present Illness     Patient is a 59-year-old female who presents to the office today for sick visit.  Patient reports on Saturday she started noticing nasal congestion cough and headache.  Has not taken anything for this.  Unsure of any sick contacts.  Denies any fever.      Review of Systems   Constitutional:  Negative for chills and fever.   HENT:  Positive for congestion.    Respiratory:  Positive for cough. Negative for shortness of breath.    Cardiovascular:  Negative for chest pain.   Neurological:  Positive for headaches.       Objective   /82   Pulse 82   Temp 97.5 °F (36.4 °C)   Resp 15   Ht 5' 5.5\" (1.664 m)   Wt 99.5 kg (219 lb 6.4 oz)   SpO2 97%   BMI 35.95 kg/m²      Physical Exam  Vitals reviewed.   HENT:      Right Ear: Tympanic membrane and ear canal normal.      Left Ear: Tympanic membrane and ear canal normal.      Nose: Congestion present.   Cardiovascular:      Rate and Rhythm: Normal rate.      Pulses: Normal pulses.      Heart sounds: Normal heart sounds.   Pulmonary:      Effort: Pulmonary effort is normal. No respiratory distress.      Breath sounds: Normal breath sounds. No wheezing.   Neurological:      Mental Status: She is alert.       Administrative Statements   I have spent a total time of 20 minutes in caring for this " patient on the day of the visit/encounter including Risks and benefits of tx options, Instructions for management, Importance of tx compliance, Counseling / Coordination of care, and Obtaining or reviewing history  .

## 2025-01-13 ENCOUNTER — TELEPHONE (OUTPATIENT)
Dept: PSYCHIATRY | Facility: CLINIC | Age: 60
End: 2025-01-13

## 2025-01-13 NOTE — TELEPHONE ENCOUNTER
Called and left message notifying patient that her 8:30 Am appt was cancelled due to provider is out of the office.

## 2025-01-15 ENCOUNTER — TELEMEDICINE (OUTPATIENT)
Dept: PSYCHIATRY | Facility: CLINIC | Age: 60
End: 2025-01-15
Payer: COMMERCIAL

## 2025-01-15 DIAGNOSIS — F33.1 MODERATE EPISODE OF RECURRENT MAJOR DEPRESSIVE DISORDER (HCC): Primary | ICD-10-CM

## 2025-01-15 DIAGNOSIS — F41.1 GAD (GENERALIZED ANXIETY DISORDER): ICD-10-CM

## 2025-01-15 PROCEDURE — 99214 OFFICE O/P EST MOD 30 MIN: CPT | Performed by: PHYSICIAN ASSISTANT

## 2025-01-15 NOTE — PSYCH
Virtual Regular Visit    Patient is located at Home in the following state in which I hold an active license [place].    Assessment & Plan  Moderate episode of recurrent major depressive disorder (HCC)  Zoloft 100 mg tablets 2 tablets daily  Quetiapine 50 mg 1-1-1/2 at bedtime  Wellbutrin  mg every morning       DEISY (generalized anxiety disorder)  Continue Zoloft 200 mg total daily  As needed hydroxyzine       Follow-up in 3 to 4 months she will call me sooner if any questions or concerns         Reason for visit is   Chief Complaint   Patient presents with    Follow-up    Medication Management        Visit Time  Visit Start Time: 520  Visit Stop Time: 550  Total Visit Duration: 30 minutes    Encounter provider: Alaina Arias PA-C  Provider located at [address] [place] [id number] [phone]    Recent Visits  Date Type Provider Dept   01/13/25 Telephone Alaina Arias PA-C [facility]   Showing recent visits within past 7 days and meeting all other requirements  Today's Visits  Date Type Provider Dept   01/15/25 Telemedicine Alaina Arias PA-C [facility]   Showing today's visits and meeting all other requirements  Future Appointments  No visits were found meeting these conditions.  Showing future appointments within next 150 days and meeting all other requirements       The patient was identified by name and date of birth. Anamaria Rivera was informed that this is a telemedicine visit and that the visit is being conducted through the Epic Embedded platform. She agrees to proceed. My office door was closed. No one else was in the room. She acknowledged consent and understanding of privacy and security of the video platform. The patient has agreed to participate and understands they can discontinue the visit at any time. Patient is aware this is a billable service.      MEDICATION MANAGEMENT NOTE        [facility]      Name and Date of Birth:  Anamaria Rivera 60 y.o. 1965 MRN: [id number]    Date of Visit: January 15, 2025         - RTC in 3-4 months  - The patient was educated about 24 hour and weekend coverage for urgent situations accessed by calling [facility] main practice number  - Patient was educated to call [facility] Suicide Prevention Lifeline (0-524-120-JWCE [3102]) for behavioral crisis at anytime or 911 for any safety concerns, or go to nearest ER if the symptoms become overwhelming or unmanageable.         Subjective        Anamaria Rivera is a 60 y.o. female, visited for Follow-up and Medication Management, who was virtually seen and evaluated today at the [facility] outpatient clinic for follow-up and medication management. Completes psychiatric assessment without difficulty.     At previous outpatient psychiatric appointment with this writer, maintained on Zoloft, Wellbutrin and quetiapine.   She denies any current adverse medication side effects.      Anamaria  appears at her baseline.  Sleeping adequately at night.  Taking medications as prescribed and no significant depressive episodes noted.  Work has been stable.  State she has not been doing well with her eating despite [employer].  Trying to curb overeating.  When looking at her weight though her weight in July was 223 pounds and most recently was 219 pounds which I pointed out.  Unfortunately has Covid and continues to recuperate so has not been physically active.  Chronic back pain and pubic pain worse with exercise.  Able to recognize the positive things though and was acknowledged for that.  Has good interest and motivation.  States that she has not been drinking alcohol.  Has goals that she wants to accomplish in the future.  Future oriented in her thoughts.  Working on picture books of past  family history. Wants to get back to playing LM[employer]-1.  Supportive relationship with her family.      Review Of Systems:  Pertinent items are noted in HPI; all others are negative; no recent changes in medications or health status reported.    PHQ-2/9 Depression Screening                 Historical Information        Past Medical History:    Past Medical History:   Diagnosis Date    ADHD (attention deficit hyperactivity disorder)     Anxiety     Arthritis     Depression     Fibromyalgia, primary     GERD (gastroesophageal reflux disease)     History of stomach ulcers     Hypertension     Panic attack     Papanicolaou smear 2020    Psychiatric disorder         Past Surgical History:   Procedure Laterality Date    BREAST CYST ASPIRATION Left 2014    HYSTEROSCOPY  2015    MAMMO (HISTORICAL) Bilateral 03/05/2020    GV BI-RADS 1 Negative Scattered areas fibroglandular density; 25% to 50% glandular breast tissue    US BREAST CYST ASPIRATION LEFT INITIAL Left 10/27/2014     Allergies   Allergen Reactions    Latex Rash       Substance Abuse History:    Social History     Substance and Sexual Activity   Alcohol Use Yes    Comment: Socially     Social History     Substance and Sexual Activity   Drug Use No       Social History:    Social History     Socioeconomic History    Marital status: Single     Spouse name: Not on file    Number of children: Not on file    Years of education: Not on file    Highest education level: Not on file   Occupational History    Occupation: Tech.    Tobacco Use    Smoking status: Former     Passive exposure: Past    Smokeless tobacco: Never    Tobacco comments:     teen years   Vaping Use    Vaping status: Never Used   Substance and Sexual Activity    Alcohol use: Yes     Comment: Socially    Drug use: No    Sexual activity: Not Currently     Birth control/protection: Post-menopausal   Other Topics Concern    Not on file   Social History Narrative    Caffeine Intake: 1-2 cups per day    Do you  smoke marijuana? Denies    Do you drink alcohol? Socially    Exercise: Occassionally    Domestic violence: No    History of drug/alcohol abuse denies     Social Drivers of Health     Financial Resource Strain: Not on file   Food Insecurity: Not on file   Transportation Needs: Not on file   Physical Activity: Not on file   Stress: Not on file   Social Connections: Not on file   Intimate Partner Violence: Not on file   Housing Stability: Not on file       Family Psychiatric History:     Family History   Problem Relation Age of Onset    Hypertension Mother     Lung cancer Mother     Skin cancer Father     Stroke Brother     Hypertension Brother     Transient ischemic attack Brother     No Known Problems Brother     No Known Problems Brother     Hypertension Maternal Grandmother     Multiple sclerosis Maternal Grandmother     Heart attack Maternal Grandfather     Heart disease Maternal Grandfather     Alcohol abuse Maternal Grandfather     Asthma Paternal Grandmother     Heart disease Paternal Grandfather        History Review: The following portions of the patient's history were reviewed and updated as appropriate: allergies, current medications, past family history, past medical history, past social history, past surgical history and problem list.           Objective      Vital signs in last 24 hours:  There were no vitals filed for this visit.    Mental Status Evaluation:    Appearance age appropriate, casually dressed   Behavior cooperative, calm   Speech normal rate, normal volume, normal pitch   Mood dysphoric   Affect mood-congruent   Thought Processes organized, goal directed   Associations intact associations   Thought Content no overt delusions   Perceptual Disturbances: no auditory hallucinations, no visual hallucinations   Abnormal Thoughts  Risk Potential Suicidal ideation - None  Homicidal ideation - None  Potential for aggression - No   Orientation oriented to: person, place, time/date, and situation    Memory recent and remote memory grossly intact   Consciousness alert and awake   Attention Span Concentration Span attention span and concentration are age appropriate   Intellect Not formally assessed   Insight intact   Judgement intact   Muscle Strength and  Gait unable to assess today due to virtual visit   Motor activity unable to assess today due to virtual visit   Language no difficulty naming common objects   Fund of Knowledge adequate knowledge of current events   Pain none   Pain Scale 0     Laboratory Results: I have personally reviewed all pertinent laboratory/tests results  CMP:   Lab Results   Component Value Date    SODIUM 137 09/15/2024    K 3.7 09/15/2024     09/15/2024    CO2 27 09/15/2024    AGAP 6 09/15/2024    BUN 19 09/15/2024    CREATININE 0.70 09/15/2024    GLUC 145 (H) 09/15/2024    GLUF 87 09/30/2023    CALCIUM 8.9 09/15/2024    AST 20 09/15/2024    ALT 17 09/15/2024    ALKPHOS 92 09/15/2024    TP 6.5 09/15/2024    ALB 3.7 09/15/2024    TBILI 0.27 09/15/2024    EGFR 95 09/15/2024     Lipid Profile:   Lab Results   Component Value Date    CHOLESTEROL 182 09/14/2024    HDL 84 09/14/2024    TRIG 92 09/14/2024    LDLCALC 80 09/14/2024    NONHDLC 97 02/18/2023     Hemoglobin A1C:   Lab Results   Component Value Date    HGBA1C 5.8 (H) 09/14/2024     09/14/2024               Current Outpatient Medications   Medication Sig Dispense Refill    albuterol (PROVENTIL HFA,VENTOLIN HFA) 90 mcg/act inhaler Inhale 2 puffs every 6 (six) hours as needed for wheezing or shortness of breath 8.5 g 5    buPROPion (WELLBUTRIN XL) 150 mg 24 hr tablet Take 1 tablet (150 mg total) by mouth daily 90 tablet 1    cholecalciferol (VITAMIN D3) 1,000 units tablet Take 1,000 Units by mouth daily (Patient not taking: Reported on 6/28/2024)      ciclopirox (PENLAC) 8 % solution Apply topically daily at bedtime 6.6 mL 3    Clocortolone Pivalate 0.1 % cream Apply topically 2 (two) times a day 45 g 3     hydroCHLOROthiazide 12.5 mg tablet Take 1 tablet (12.5 mg total) by mouth daily 100 tablet 0    hydrOXYzine HCL (ATARAX) 25 mg tablet Take 1 tablet (25 mg total) by mouth every 6 (six) hours as needed for itching or anxiety 30 tablet 2    losartan (COZAAR) 100 MG tablet Take 1 tablet (100 mg total) by mouth daily 90 tablet 1    Multiple Vitamin (multivitamin) tablet Take 1 tablet by mouth daily      omeprazole (PriLOSEC) 40 MG capsule Take 1 capsule (40 mg total) by mouth daily Take before a meal 90 capsule 1    QUEtiapine (SEROquel) 50 mg tablet 1 to 1 1/2 po q hs 135 tablet 1    sertraline (ZOLOFT) 100 mg tablet Take 2 tablets (200 mg total) by mouth daily 180 tablet 1    SF 5000 Plus 1.1 % CREA Take by mouth 2 (two) times a day 51 g 0    terbinafine (LamISIL) 250 mg tablet  (Patient not taking: Reported on 10/17/2024)       No current facility-administered medications for this visit.         Medications Risks/Benefits      Risks, Benefits And Possible Side Effects Of Medications:    Risks, benefits, and possible side effects of medications explained to Anamaria and she verbalizes understanding and agreement for treatment.    Controlled Medication Discussion:     Not applicable    Psychotherapy Provided:     Individual psychotherapy provided: No   Psychoeducation provided to the patient and was educated about the importance of compliance with the medications and psychiatric treatment  Supportive psychotherapy provided to the patient    Treatment Plan:    Completed and signed during the session: Not applicable - Treatment Plan not due at this session    Note Share    This note was not shared with the patient due to reasonable likelihood of causing patient harm    Alaina Arias PA-C 01/15/25    This note was completed in part utilizing Dragon dictation Software. Grammatical, translation, syntax errors, random word insertions, spelling mistakes, and incomplete sentences may be an occasional consequence of this  system secondary to software limitations with voice recognition, ambient noise, and hardware issues. If you have any questions or concerns about the content, text, or information contained within the body of this dictation, please contact the provider for clarification.

## 2025-01-15 NOTE — ASSESSMENT & PLAN NOTE
Zoloft 100 mg tablets 2 tablets daily  Quetiapine 50 mg 1-1-1/2 at bedtime  Wellbutrin  mg every morning

## 2025-01-19 DIAGNOSIS — I10 PRIMARY HYPERTENSION: ICD-10-CM

## 2025-01-20 RX ORDER — HYDROCHLOROTHIAZIDE 12.5 MG/1
12.5 TABLET ORAL DAILY
Qty: 90 TABLET | Refills: 1 | Status: SHIPPED | OUTPATIENT
Start: 2025-01-20

## 2025-01-30 ENCOUNTER — OFFICE VISIT (OUTPATIENT)
Dept: FAMILY MEDICINE CLINIC | Facility: CLINIC | Age: 60
End: 2025-01-30
Payer: COMMERCIAL

## 2025-01-30 VITALS
SYSTOLIC BLOOD PRESSURE: 140 MMHG | HEART RATE: 87 BPM | HEIGHT: 66 IN | BODY MASS INDEX: 35.68 KG/M2 | DIASTOLIC BLOOD PRESSURE: 88 MMHG | WEIGHT: 222 LBS | OXYGEN SATURATION: 97 % | TEMPERATURE: 97.4 F | RESPIRATION RATE: 16 BRPM

## 2025-01-30 DIAGNOSIS — R07.89 STERNUM PAIN: Primary | ICD-10-CM

## 2025-01-30 PROCEDURE — 99213 OFFICE O/P EST LOW 20 MIN: CPT | Performed by: NURSE PRACTITIONER

## 2025-01-30 NOTE — PROGRESS NOTES
"Name: Anamaria Rivera      : 1965      MRN: [id number]  Encounter Provider: ANTONY Obrien  Encounter Date: 2025   Encounter department: [facility]  :  Assessment & Plan  Sternum pain    Orders:    POCT ECG    predniSONE 10 mg tablet; Take 4 tablets with food on days 1 & 2. Then take 3 tablets with food on days 3 &4. Then take 2 tablets with food on days 5 & 6. Then take 1 tablet with food on days 7 & 8.    EKG NSR today. Pt does have tenderness to sternum. Plan to have her start Prednisone for her cough and sternal tenderness. She may use heating pads PRN to affected area. This may take several weeks to improve. Patient is encouraged to call our office for any questions/concerns, persistent or worsening symptoms. Patient states they understand and agree with treatment plan.         Pt to f/u PRN.      Depression Screening and Follow-up Plan: Patient's depression screening was positive with a PHQ-9 score of 9.   Patient with underlying depression and was advised to continue current medications as prescribed.     History of Present Illness   Pt presents today for 3-4 weeks of coughing since being diagnosed w/ Covid in late December.  Now her cough improved some, however, while trying to rinse out her tonsil stones on Tuesday, she triggered her cough and noted mid sternal/right chest pain.  She feels like the pain is along her breast bone.  She notes it feels bruised and sore.  Pt notes the pain is consistent.   Denies SOB, dizziness, lightheadedness.  Pt has been taking Mucinex for her cough as well.  Pt is on Omeprazole for her GERD.  She notes she has been recently eating large meals and \"eating like a pig\".  Pt reminded that the larger meals may aggravate her reflux.            Review of Systems  As noted per HPI.  Objective   /88   Pulse 87   Temp (!) 97.4 °F (36.3 °C)   Resp 16   Ht 5' 5.5\" (1.664 m)   Wt 101 kg (222 lb)   SpO2 97%  "  BMI 36.38 kg/m²      Physical Exam  Vitals reviewed.   Constitutional:       Appearance: Normal appearance.   HENT:      Right Ear: Tympanic membrane, ear canal and external ear normal.      Left Ear: Tympanic membrane, ear canal and external ear normal.   Cardiovascular:      Rate and Rhythm: Normal rate and regular rhythm.      Pulses: Normal pulses.      Heart sounds: Normal heart sounds.   Pulmonary:      Effort: Pulmonary effort is normal.      Breath sounds: Normal breath sounds.   Chest:       Neurological:      Mental Status: She is alert and oriented to person, place, and time. Mental status is at baseline.   Psychiatric:         Mood and Affect: Mood normal.         Behavior: Behavior normal.         Thought Content: Thought content normal.         Judgment: Judgment normal.

## 2025-01-31 PROCEDURE — 93000 ELECTROCARDIOGRAM COMPLETE: CPT | Performed by: NURSE PRACTITIONER

## 2025-01-31 RX ORDER — PREDNISONE 10 MG/1
TABLET ORAL
Qty: 20 TABLET | Refills: 0 | Status: SHIPPED | OUTPATIENT
Start: 2025-01-31

## 2025-02-13 ENCOUNTER — HOSPITAL ENCOUNTER (OUTPATIENT)
Dept: MAMMOGRAPHY | Facility: CLINIC | Age: 60
Discharge: HOME | End: 2025-02-13
Payer: COMMERCIAL

## 2025-02-13 VITALS — WEIGHT: 222 LBS | BODY MASS INDEX: 35.68 KG/M2 | HEIGHT: 66 IN

## 2025-02-13 DIAGNOSIS — Z12.31 ENCOUNTER FOR SCREENING MAMMOGRAM FOR MALIGNANT NEOPLASM OF BREAST: ICD-10-CM

## 2025-02-13 PROCEDURE — 77063 BREAST TOMOSYNTHESIS BI: CPT

## 2025-02-13 PROCEDURE — 77067 SCR MAMMO BI INCL CAD: CPT

## 2025-02-17 ENCOUNTER — RESULTS FOLLOW-UP (OUTPATIENT)
Dept: OBGYN CLINIC | Facility: CLINIC | Age: 60
End: 2025-02-17

## 2025-03-02 DIAGNOSIS — I10 PRIMARY HYPERTENSION: ICD-10-CM

## 2025-03-03 RX ORDER — LOSARTAN POTASSIUM 100 MG/1
100 TABLET ORAL DAILY
Qty: 90 TABLET | Refills: 1 | Status: SHIPPED | OUTPATIENT
Start: 2025-03-03

## 2025-03-05 ENCOUNTER — TELEPHONE (OUTPATIENT)
Age: 60
End: 2025-03-05

## 2025-03-08 ENCOUNTER — APPOINTMENT (OUTPATIENT)
Dept: LAB | Facility: HOSPITAL | Age: 60
End: 2025-03-08
Payer: COMMERCIAL

## 2025-03-08 DIAGNOSIS — R73.03 PREDIABETES: ICD-10-CM

## 2025-03-08 DIAGNOSIS — E55.9 VITAMIN D DEFICIENCY: ICD-10-CM

## 2025-03-08 DIAGNOSIS — D50.9 MICROCYTIC ANEMIA: ICD-10-CM

## 2025-03-08 DIAGNOSIS — D50.9 IRON DEFICIENCY ANEMIA, UNSPECIFIED IRON DEFICIENCY ANEMIA TYPE: ICD-10-CM

## 2025-03-08 LAB
25(OH)D3 SERPL-MCNC: 50.1 NG/ML (ref 30–100)
BASOPHILS # BLD AUTO: 0.03 THOUSANDS/ÂΜL (ref 0–0.1)
BASOPHILS NFR BLD AUTO: 1 % (ref 0–1)
EOSINOPHIL # BLD AUTO: 0.27 THOUSAND/ÂΜL (ref 0–0.61)
EOSINOPHIL NFR BLD AUTO: 5 % (ref 0–6)
ERYTHROCYTE [DISTWIDTH] IN BLOOD BY AUTOMATED COUNT: 12.7 % (ref 11.6–15.1)
EST. AVERAGE GLUCOSE BLD GHB EST-MCNC: 114 MG/DL
FERRITIN SERPL-MCNC: 39 NG/ML (ref 11–307)
HBA1C MFR BLD: 5.6 %
HCT VFR BLD AUTO: 40.4 % (ref 34.8–46.1)
HGB BLD-MCNC: 13.2 G/DL (ref 11.5–15.4)
IMM GRANULOCYTES # BLD AUTO: 0.02 THOUSAND/UL (ref 0–0.2)
IMM GRANULOCYTES NFR BLD AUTO: 0 % (ref 0–2)
IRON SATN MFR SERPL: 31 % (ref 15–50)
IRON SERPL-MCNC: 95 UG/DL (ref 50–212)
LYMPHOCYTES # BLD AUTO: 1.04 THOUSANDS/ÂΜL (ref 0.6–4.47)
LYMPHOCYTES NFR BLD AUTO: 17 % (ref 14–44)
MCH RBC QN AUTO: 29.4 PG (ref 26.8–34.3)
MCHC RBC AUTO-ENTMCNC: 32.7 G/DL (ref 31.4–37.4)
MCV RBC AUTO: 90 FL (ref 82–98)
MONOCYTES # BLD AUTO: 0.71 THOUSAND/ÂΜL (ref 0.17–1.22)
MONOCYTES NFR BLD AUTO: 12 % (ref 4–12)
NEUTROPHILS # BLD AUTO: 3.91 THOUSANDS/ÂΜL (ref 1.85–7.62)
NEUTS SEG NFR BLD AUTO: 65 % (ref 43–75)
NRBC BLD AUTO-RTO: 0 /100 WBCS
PLATELET # BLD AUTO: 260 THOUSANDS/UL (ref 149–390)
PMV BLD AUTO: 10.2 FL (ref 8.9–12.7)
RBC # BLD AUTO: 4.49 MILLION/UL (ref 3.81–5.12)
TIBC SERPL-MCNC: 303.8 UG/DL (ref 250–450)
TRANSFERRIN SERPL-MCNC: 217 MG/DL (ref 203–362)
UIBC SERPL-MCNC: 209 UG/DL (ref 155–355)
WBC # BLD AUTO: 5.98 THOUSAND/UL (ref 4.31–10.16)

## 2025-03-08 PROCEDURE — 85025 COMPLETE CBC W/AUTO DIFF WBC: CPT

## 2025-03-08 PROCEDURE — 82306 VITAMIN D 25 HYDROXY: CPT

## 2025-03-08 PROCEDURE — 83540 ASSAY OF IRON: CPT

## 2025-03-08 PROCEDURE — 83036 HEMOGLOBIN GLYCOSYLATED A1C: CPT

## 2025-03-08 PROCEDURE — 36415 COLL VENOUS BLD VENIPUNCTURE: CPT

## 2025-03-08 PROCEDURE — 83550 IRON BINDING TEST: CPT

## 2025-03-08 PROCEDURE — 82728 ASSAY OF FERRITIN: CPT

## 2025-03-10 ENCOUNTER — RESULTS FOLLOW-UP (OUTPATIENT)
Dept: FAMILY MEDICINE CLINIC | Facility: CLINIC | Age: 60
End: 2025-03-10

## 2025-03-12 DIAGNOSIS — K21.9 GASTROESOPHAGEAL REFLUX DISEASE, UNSPECIFIED WHETHER ESOPHAGITIS PRESENT: ICD-10-CM

## 2025-03-12 RX ORDER — OMEPRAZOLE 40 MG/1
40 CAPSULE, DELAYED RELEASE ORAL DAILY
Qty: 90 CAPSULE | Refills: 1 | Status: SHIPPED | OUTPATIENT
Start: 2025-03-12

## 2025-03-17 ENCOUNTER — TELEPHONE (OUTPATIENT)
Dept: RADIOLOGY | Facility: CLINIC | Age: 60
End: 2025-03-17

## 2025-03-17 DIAGNOSIS — M54.16 LUMBAR RADICULOPATHY: Primary | ICD-10-CM

## 2025-03-17 NOTE — TELEPHONE ENCOUNTER
Crystal Cortes PA-C to Spine & Pain Surgery Coordinator [facility]       3/17/25  8:39 AM  Please schedule L5-S1 NEVILLE and 4-week follow-up after the procedure.  Ruth Gates, MAYANK to Crystal Cortes PA-C       3/17/25  7:56 AM  Last OVS 10/17/24  Last procedure 9/25/24  Please advise on injection  Anamaria Rivera to [facility] (supporting Crystal Cortes PA-C)         3/14/25  4:52 PM  Same pain. The sciatic-like pain in my left leg has been intermittent until about a month ago. Almost overnight my leg was weak and sore after sitting at work every day. So it seems I had a good stretch of my leg being fairly functional. No diabetes, no blood thinners.  Shirley Stokes RN to Anamaria Rivera         3/14/25  8:06 AM  Good morning Anamaria,     You last had a L5-S1 LESI on 9/25/24.  At your office visit you reported 50% improvement in your pain.       Are you experiencing the same pain you had prior to your last injection?    How long did your relief last?   When did your pain return?    Are you diabetic?   Are you on any blood thinners?     Thank you,  MAYANK Dawkins    Last read by Anamaria Rivera at 4:48PM on 3/14/2025.  Anamaria Rivera to [facility] (supporting Crystal Cortes PA-C)         3/13/25  4:38 PM  Can I get an injection like I had with Dr Arenas last September 2024 or does someone have to see me again and refer me? Thank you!

## 2025-03-17 NOTE — TELEPHONE ENCOUNTER
PRE OP INSTRUCTIONS:  -If you are on prescription blood thinners, you may have to hold the medication for several days before the procedure.    Please call the office to discuss medication holds at [phone].  -Do not eat or drink ONE HOUR prior to your procedure. If you are diabetic, may follow regular breakfast/lunch schedule and take usual    diabetic medications.  -Lumbar( low back) procedure, please wear comfortable slacks/pants.  -Cervical (neck) procedure, please wear a shirt/blouse that is easy to remove. PATIENT INSTRUCTED TO STOP ALL NSAID'S 4 DAYS             PRIOR TO PROCEDURE EXCEPT FOR IBUPROFEN IT CAN BE TAKEN UP TO 24 HOURS PRIOR TO PROCEDURE.   -A  is required to take you home form your procedure.  -Continue all to take prescribed medication the day of your procedure, including blood pressure medications.  -If you are prescribe antibiotics, have an active infection or have an open wound, please contact the office at [phone].  -Please refrain from any vaccinations two days before and two days after injection.  -Insurance authorization received in not a guarantee of payment per your insurance company's authorization disclaimer and it is    your responsibility to verify your benefits.          -If you have any questions about the instructions, please call me at [phone]     Explained NEVILLE as an injection of steroids into an affected area to reduce swelling and provide relief. This procedure does not provide instant relief.   Allow 3-5 days for the steroid to begin to work. During this time, there may be changes in your pain, it may even get worse before it gets better.    This office will fu in 7 days to determine the effectiveness of the procedure. Be advised, the procedure may need to be repeated to provide adequate relief.   There is also a chance that the procedures will fail to relieve pain. There are few restrictions after the procedure. The day of the procedure, please go home  and rest. Do not drive.   Ok to resume normal activities 24 - 48 hours after the procedure as tolerated. Do not submerge the site for 2 days after the procedure.    Be aware, steroids will raise blood sugar levels for ~ 2 weeks after the procedure.

## 2025-03-18 ENCOUNTER — OFFICE VISIT (OUTPATIENT)
Dept: FAMILY MEDICINE CLINIC | Facility: CLINIC | Age: 60
End: 2025-03-18
Payer: COMMERCIAL

## 2025-03-18 VITALS
OXYGEN SATURATION: 97 % | RESPIRATION RATE: 16 BRPM | HEART RATE: 85 BPM | DIASTOLIC BLOOD PRESSURE: 74 MMHG | TEMPERATURE: 96.4 F | SYSTOLIC BLOOD PRESSURE: 118 MMHG | BODY MASS INDEX: 36.64 KG/M2 | WEIGHT: 228 LBS | HEIGHT: 66 IN

## 2025-03-18 DIAGNOSIS — F33.1 MODERATE EPISODE OF RECURRENT MAJOR DEPRESSIVE DISORDER (HCC): ICD-10-CM

## 2025-03-18 DIAGNOSIS — L56.8 PHOTOSENSITIVITY: ICD-10-CM

## 2025-03-18 DIAGNOSIS — R73.09 ELEVATED HEMOGLOBIN A1C: ICD-10-CM

## 2025-03-18 DIAGNOSIS — E55.9 VITAMIN D DEFICIENCY: ICD-10-CM

## 2025-03-18 DIAGNOSIS — Z13.220 SCREENING FOR LIPID DISORDERS: ICD-10-CM

## 2025-03-18 DIAGNOSIS — Z13.29 SCREENING FOR THYROID DISORDER: ICD-10-CM

## 2025-03-18 DIAGNOSIS — I10 PRIMARY HYPERTENSION: Primary | ICD-10-CM

## 2025-03-18 DIAGNOSIS — D50.9 IRON DEFICIENCY ANEMIA, UNSPECIFIED IRON DEFICIENCY ANEMIA TYPE: ICD-10-CM

## 2025-03-18 PROCEDURE — 99214 OFFICE O/P EST MOD 30 MIN: CPT | Performed by: NURSE PRACTITIONER

## 2025-03-18 NOTE — PROGRESS NOTES
"Name: Anamaria Rivera      : 1965      MRN: [id number]  Encounter Provider: ANTONY Obrien  Encounter Date: 3/18/2025   Encounter department: [facility]  :  Assessment & Plan  Primary hypertension    Orders:    CBC and differential; Future    Comprehensive metabolic panel; Future  Managed w/ Losartan and HCTZ. BP at goal. Continue medications.  Iron deficiency anemia, unspecified iron deficiency anemia type       Under care of Hematology.  Moderate episode of recurrent major depressive disorder (HCC)  Under care of Psych         Elevated hemoglobin A1c  A1c at 5.6% down from 5.8%. Continue good diet and exercise.  Orders:    Hemoglobin A1C; Future    Photosensitivity       Pt may continue artificial tears. She will add warm compresses to her routine for 15-20 minutes in the evening. This will hopefully help alleviate her photosensitivity by promoting more moisture to her eyes.        Pt to f/u in 2025 for physical w/ fasting labs prior or sooner PRN.  Vitamin D deficiency    Orders:    Vitamin D 25 hydroxy; Future    Screening for lipid disorders    Orders:    Lipid panel; Future    Screening for thyroid disorder    Orders:    TSH, 3rd generation with Free T4 reflex; Future           History of Present Illness   Pt presents today for recheck of her HTN and elevated A1c.  She also reports light sensitivity.  She has been using artificial tears for dry eye.      Review of Systems  As noted per HPI.  Objective   /74   Pulse 85   Temp (!) 96.4 °F (35.8 °C)   Resp 16   Ht 5' 5.5\" (1.664 m)   Wt 103 kg (228 lb)   SpO2 97%   BMI 37.36 kg/m²      Physical Exam  Vitals reviewed.   Constitutional:       Appearance: Normal appearance.   Cardiovascular:      Pulses: Normal pulses.      Heart sounds: Normal heart sounds.   Pulmonary:      Effort: Pulmonary effort is normal.      Breath sounds: Normal breath sounds.   Neurological:      Mental " Status: She is alert and oriented to person, place, and time. Mental status is at baseline.   Psychiatric:         Behavior: Behavior normal.         Thought Content: Thought content normal.         Judgment: Judgment normal.

## 2025-03-18 NOTE — ASSESSMENT & PLAN NOTE
Orders:    CBC and differential; Future    Comprehensive metabolic panel; Future  Managed w/ Losartan and HCTZ. BP at goal. Continue medications.

## 2025-03-24 DIAGNOSIS — K13.0 ANGULAR CHEILITIS: ICD-10-CM

## 2025-03-24 DIAGNOSIS — K02.9 DENTAL CARIES: ICD-10-CM

## 2025-03-25 ENCOUNTER — OFFICE VISIT (OUTPATIENT)
Age: 60
End: 2025-03-25
Payer: COMMERCIAL

## 2025-03-25 VITALS
HEIGHT: 66 IN | RESPIRATION RATE: 16 BRPM | BODY MASS INDEX: 36.16 KG/M2 | DIASTOLIC BLOOD PRESSURE: 80 MMHG | WEIGHT: 225 LBS | TEMPERATURE: 97.9 F | OXYGEN SATURATION: 100 % | HEART RATE: 80 BPM | SYSTOLIC BLOOD PRESSURE: 134 MMHG

## 2025-03-25 DIAGNOSIS — D50.9 IRON DEFICIENCY ANEMIA, UNSPECIFIED IRON DEFICIENCY ANEMIA TYPE: Primary | ICD-10-CM

## 2025-03-25 PROBLEM — E61.1 LOW IRON: Status: RESOLVED | Noted: 2022-04-20 | Resolved: 2025-03-25

## 2025-03-25 PROCEDURE — 99213 OFFICE O/P EST LOW 20 MIN: CPT | Performed by: INTERNAL MEDICINE

## 2025-03-25 NOTE — ASSESSMENT & PLAN NOTE
presented with severe iron deficiency anemia.  Most likely from excessive blood donations  Required IV Feraheme 1020mg on 10/31/23.   Most recent labs from 3/8 reviewed-   CBC and iron panel within normal limits  Again, advised against frequent blood donations.   She knows to call us with any worsening symptoms in the interim.    Orders:    CBC and differential; Future    Iron Panel (Includes Ferritin, Iron Sat%, Iron, and TIBC); Future

## 2025-03-25 NOTE — PROGRESS NOTES
Name: Anamaria Rivera      : 1965      MRN: [id number]  Encounter Provider: Austen Diaz MD  Encounter Date: 3/25/2025   Encounter department: [facility]  :  Assessment & Plan  Iron deficiency anemia, unspecified iron deficiency anemia type  presented with severe iron deficiency anemia.  Most likely from excessive blood donations  Required IV Feraheme 1020mg on 10/31/23.   Most recent labs from 3/8 reviewed-   CBC and iron panel within normal limits  Again, advised against frequent blood donations.   She knows to call us with any worsening symptoms in the interim.    Orders:    CBC and differential; Future    Iron Panel (Includes Ferritin, Iron Sat%, Iron, and TIBC); Future        Return in about 1 year (around 3/25/2026) for Office visit with labs prior.    History of Present Illness   Chief Complaint   Patient presents with    Follow-up     Anamaria Rivera is a 60 y.o. female  who had been referred for her anemia.   Patient states that she has had anemia 4 to 5 years prior to presentation, but has never been told to take iron supplements.  More recently, she has noticed worsening fatigue and shortness of breath on exertion. She had been donating blood q3 months prior to this.      She denied any bleeding, including blood in the urine, stools, dark stools.  She has a history of uterine fibroids, and underwent a hysteroscopy and ablation in the past.  She has been menopausal since and has not had any vaginal bleeding.     CBC and iron studies from May and 2023 are as follows.  Hemoglobin of 10.5/11.5.  Normal platelet counts.  Normal WBC.   Ferritin 3/5.  Iron levels 34/52.  Iron saturation 10% / 14%.  TIBC 351/364. Hb in 2023 was 11.3 and previously within normal limits starting . CMP is within normal limits.      On 2023 she underwent an EGD and colonoscopy.  EGD showed 2 columns of dilated veins in the esophagus from 30 and  33 cm. H. Pylori and celiac ds was ruled out. There were 3 polyps that were not retrieved and a 3-year repeat surveillance was recommended by GI. Was placed on PPI   Capsule endoscopy was negative.     She did not tolerate PO iron as it was causing abdominal discomfort and constipation.       s/p Feraheme 1020mg on 10/31/23.      She is here today for follow-up visit        Review of Systems   Constitutional:  Positive for fatigue.   All other systems reviewed and are negative.    Medical History Reviewed by provider this encounter:     .  Current Outpatient Medications on File Prior to Visit   Medication Sig Dispense Refill    albuterol (PROVENTIL HFA,VENTOLIN HFA) 90 mcg/act inhaler Inhale 2 puffs every 6 (six) hours as needed for wheezing or shortness of breath 8.5 g 5    buPROPion (WELLBUTRIN XL) 150 mg 24 hr tablet Take 1 tablet (150 mg total) by mouth daily 90 tablet 1    cholecalciferol (VITAMIN D3) 1,000 units tablet Take 1,000 Units by mouth daily      ciclopirox (PENLAC) 8 % solution Apply topically daily at bedtime 6.6 mL 3    hydroCHLOROthiazide 12.5 mg tablet Take 1 tablet (12.5 mg total) by mouth daily 90 tablet 1    hydrOXYzine HCL (ATARAX) 25 mg tablet Take 1 tablet (25 mg total) by mouth every 6 (six) hours as needed for itching or anxiety 30 tablet 2    losartan (COZAAR) 100 MG tablet Take 1 tablet (100 mg total) by mouth daily 90 tablet 1    Multiple Vitamin (multivitamin) tablet Take 1 tablet by mouth daily      omeprazole (PriLOSEC) 40 MG capsule Take 1 capsule (40 mg total) by mouth daily Take before a meal 90 capsule 1    QUEtiapine (SEROquel) 50 mg tablet 1 to 1 1/2 po q hs 135 tablet 1    sertraline (ZOLOFT) 100 mg tablet Take 2 tablets (200 mg total) by mouth daily 180 tablet 1    SF 5000 Plus 1.1 % CREA Take by mouth 2 (two) times a day 51 g 0    Clocortolone Pivalate 0.1 % cream Apply topically 2 (two) times a day (Patient not taking: Reported on 3/25/2025) 45 g 3     No current  "facility-administered medications on file prior to visit.          Objective   /80 (BP Location: Left arm, Patient Position: Sitting, Cuff Size: Large)   Pulse 80   Temp 97.9 °F (36.6 °C) (Temporal)   Resp 16   Ht 5' 5.5\" (1.664 m)   Wt 102 kg (225 lb)   SpO2 100%   BMI 36.87 kg/m²     Physical Exam  Vitals reviewed.   Constitutional:       Appearance: Normal appearance.   Cardiovascular:      Rate and Rhythm: Normal rate and regular rhythm.   Pulmonary:      Effort: Pulmonary effort is normal.      Breath sounds: Normal breath sounds.   Neurological:      Mental Status: She is alert.         Labs: I have reviewed the following labs:  Results for orders placed or performed in visit on 03/08/25   CBC and differential   Result Value Ref Range    WBC 5.98 4.31 - 10.16 Thousand/uL    RBC 4.49 3.81 - 5.12 Million/uL    Hemoglobin 13.2 11.5 - 15.4 g/dL    Hematocrit 40.4 34.8 - 46.1 %    MCV 90 82 - 98 fL    MCH 29.4 26.8 - 34.3 pg    MCHC 32.7 31.4 - 37.4 g/dL    RDW 12.7 11.6 - 15.1 %    MPV 10.2 8.9 - 12.7 fL    Platelets 260 149 - 390 Thousands/uL    nRBC 0 /100 WBCs    Segmented % 65 43 - 75 %    Immature Grans % 0 0 - 2 %    Lymphocytes % 17 14 - 44 %    Monocytes % 12 4 - 12 %    Eosinophils Relative 5 0 - 6 %    Basophils Relative 1 0 - 1 %    Absolute Neutrophils 3.91 1.85 - 7.62 Thousands/µL    Absolute Immature Grans 0.02 0.00 - 0.20 Thousand/uL    Absolute Lymphocytes 1.04 0.60 - 4.47 Thousands/µL    Absolute Monocytes 0.71 0.17 - 1.22 Thousand/µL    Eosinophils Absolute 0.27 0.00 - 0.61 Thousand/µL    Basophils Absolute 0.03 0.00 - 0.10 Thousands/µL   TIBC Panel (incl. Iron, TIBC, % Iron Saturation)   Result Value Ref Range    Iron Saturation 31 15 - 50 %    TIBC 303.8 250 - 450 ug/dL    Iron 95 50 - 212 ug/dL    Transferrin 217 203 - 362 mg/dL    UIBC 209 155 - 355 ug/dL   Result Value Ref Range    Ferritin 39 11 - 307 ng/mL   Vitamin D 25 hydroxy   Result Value Ref Range    Vit D, 25-Hydroxy " 50.1 30.0 - 100.0 ng/mL   Hemoglobin A1C   Result Value Ref Range    Hemoglobin A1C 5.6 Normal 4.0-5.6%; PreDiabetic 5.7-6.4%; Diabetic >=6.5%; Glycemic control for adults with diabetes <7.0% %     mg/dl     Lab Results   Component Value Date/Time    Iron 95 03/08/2025 10:52 AM    Iron Saturation 31 03/08/2025 10:52 AM    Ferritin 39 03/08/2025 10:52 AM        Radiology Results Review : No pertinent imaging studies reviewed.

## 2025-03-27 RX ORDER — 1.1% SODIUM FLUORIDE PRESCRIPTION DENTAL CREAM 5 MG/G
CREAM DENTAL 2 TIMES DAILY
Qty: 51 G | Refills: 0 | Status: SHIPPED | OUTPATIENT
Start: 2025-03-27

## 2025-03-27 RX ORDER — CLOCORTOLONE PIVALATE 0 G/G
CREAM TOPICAL 2 TIMES DAILY
Qty: 45 G | Refills: 0 | Status: SHIPPED | OUTPATIENT
Start: 2025-03-27

## 2025-03-31 ENCOUNTER — HOSPITAL ENCOUNTER (OUTPATIENT)
Dept: RADIOLOGY | Facility: CLINIC | Age: 60
Discharge: HOME | End: 2025-03-31
Payer: COMMERCIAL

## 2025-03-31 VITALS
TEMPERATURE: 97.5 F | HEART RATE: 76 BPM | SYSTOLIC BLOOD PRESSURE: 119 MMHG | OXYGEN SATURATION: 96 % | RESPIRATION RATE: 20 BRPM | DIASTOLIC BLOOD PRESSURE: 79 MMHG

## 2025-03-31 DIAGNOSIS — M54.16 LUMBAR RADICULOPATHY: ICD-10-CM

## 2025-03-31 DIAGNOSIS — K13.0 ANGULAR CHEILITIS: Primary | ICD-10-CM

## 2025-03-31 PROCEDURE — 62323 NJX INTERLAMINAR LMBR/SAC: CPT | Performed by: ANESTHESIOLOGY

## 2025-03-31 RX ORDER — MOMETASONE FUROATE 1 MG/G
CREAM TOPICAL DAILY PRN
Qty: 15 G | Refills: 1 | Status: SHIPPED | OUTPATIENT
Start: 2025-03-31

## 2025-03-31 RX ORDER — METHYLPREDNISOLONE ACETATE 80 MG/ML
80 INJECTION, SUSPENSION INTRA-ARTICULAR; INTRALESIONAL; INTRAMUSCULAR; PARENTERAL; SOFT TISSUE ONCE
Status: COMPLETED | OUTPATIENT
Start: 2025-03-31 | End: 2025-03-31

## 2025-03-31 RX ADMIN — IOHEXOL 1 ML: 300 INJECTION, SOLUTION INTRAVENOUS at 08:19

## 2025-03-31 RX ADMIN — METHYLPREDNISOLONE ACETATE 80 MG: 80 INJECTION, SUSPENSION INTRA-ARTICULAR; INTRALESIONAL; INTRAMUSCULAR; SOFT TISSUE at 08:20

## 2025-03-31 NOTE — H&P
History of Present Illness: The patient is a 60 y.o. female who presents with complaints of low back and leg pain.    Past Medical History:   Diagnosis Date    ADHD (attention deficit hyperactivity disorder)     Anxiety     Arthritis     Breast cyst     Depression     Fibromyalgia, primary     GERD (gastroesophageal reflux disease)     History of stomach ulcers     Hypertension     Panic attack     Papanicolaou smear 2020    Psychiatric disorder        Past Surgical History:   Procedure Laterality Date    BREAST CYST ASPIRATION Left 2014    BREAST CYST EXCISION      HYSTEROSCOPY  2015    MAMMO (HISTORICAL) Bilateral 03/05/2020    [facility] BI-RADS 1 Negative Scattered areas fibroglandular density; 25% to 50% glandular breast tissue    US BREAST CYST ASPIRATION LEFT INITIAL Left 10/27/2014         Current Outpatient Medications:     albuterol (PROVENTIL HFA,VENTOLIN HFA) 90 mcg/act inhaler, Inhale 2 puffs every 6 (six) hours as needed for wheezing or shortness of breath, Disp: 8.5 g, Rfl: 5    buPROPion (WELLBUTRIN XL) 150 mg 24 hr tablet, Take 1 tablet (150 mg total) by mouth daily, Disp: 90 tablet, Rfl: 1    cholecalciferol (VITAMIN D3) 1,000 units tablet, Take 1,000 Units by mouth daily, Disp: , Rfl:     ciclopirox (PENLAC) 8 % solution, Apply topically daily at bedtime, Disp: 6.6 mL, Rfl: 3    Clocortolone Pivalate 0.1 % cream, Apply topically 2 (two) times a day, Disp: 45 g, Rfl: 0    hydroCHLOROthiazide 12.5 mg tablet, Take 1 tablet (12.5 mg total) by mouth daily, Disp: 90 tablet, Rfl: 1    hydrOXYzine HCL (ATARAX) 25 mg tablet, Take 1 tablet (25 mg total) by mouth every 6 (six) hours as needed for itching or anxiety, Disp: 30 tablet, Rfl: 2    losartan (COZAAR) 100 MG tablet, Take 1 tablet (100 mg total) by mouth daily, Disp: 90 tablet, Rfl: 1    Multiple Vitamin (multivitamin) tablet, Take 1 tablet by mouth daily, Disp: , Rfl:     omeprazole (PriLOSEC) 40 MG capsule, Take 1 capsule (40 mg total) by mouth daily Take  before a meal, Disp: 90 capsule, Rfl: 1    QUEtiapine (SEROquel) 50 mg tablet, 1 to 1 1/2 po q hs, Disp: 135 tablet, Rfl: 1    sertraline (ZOLOFT) 100 mg tablet, Take 2 tablets (200 mg total) by mouth daily, Disp: 180 tablet, Rfl: 1    SF 5000 Plus 1.1 % CREA, Take by mouth 2 (two) times a day, Disp: 51 g, Rfl: 0    Current Facility-Administered Medications:     iohexol (OMNIPAQUE) 300 mg/mL injection 1 mL, 1 mL, Epidural, Once, Remigio Arenas DO    methylPREDNISolone acetate (DEPO-MEDROL) injection 80 mg, 80 mg, Epidural, Once, Remigio Arenas DO    Allergies   Allergen Reactions    Latex Rash       Physical Exam:   General: Awake, Alert, Oriented x 3, Mood and affect appropriate  Respiratory: Respirations even and unlabored  Cardiovascular: Peripheral pulses intact; no edema  Musculoskeletal Exam: Normal gait    ASA Score: 3    Patient/Chart Verification  Patient ID Verified: Verbal  ID Band Applied: No  Consents Confirmed: To be obtained in the Procedural area  H&P( within 30 days) Verified: To be obtained in the Procedural area  Interval H&P(within 24 hr) Complete (required for Outpatients and Surgery Admit only): To be obtained in the Procedural area  Allergies Reviewed: Yes  Anticoag/NSAID held?: NA  Currently on antibiotics?: No    Assessment:   1. Lumbar radiculopathy        Plan: L5-S1 LESI

## 2025-03-31 NOTE — DISCHARGE INSTR - LAB
Epidural Steroid Injection   WHAT YOU NEED TO KNOW:   An epidural steroid injection (NEVILLE) is a procedure to inject steroid medicine into the epidural space. The epidural space is between your spinal cord and vertebrae. Steroids reduce inflammation and fluid buildup in your spine that may be causing pain. You may be given pain medicine along with the steroids.          ACTIVITY  Do not drive or operate machinery today.  No strenuous activity today - bending, lifting, etc.  You may resume normal activites starting tomorrow - start slowly and as tolerated.  You may shower today, but no tub baths or hot tubs.  You may have numbness for several hours from the local anesthetic. Please use caution and common sense, especially with weight-bearing activities.    CARE OF THE INJECTION SITE  If you have soreness or pain, apply ice to the area today (20 minutes on/20 minutes off).  Starting tomorrow, you may use warm, moist heat or ice if needed.  You may have an increase or change in your discomfort for 36-48 hours after your treatment.  Apply ice and continue with any pain medication you have been prescribed.  Notify the [facility] if you have any of the following: redness, drainage, swelling, headache, stiff neck or fever above 100°F.    SPECIAL INSTRUCTIONS  Our office will contact you in approximately 14 days for a progress report.    MEDICATIONS  Continue to take all routine medications.  Our office may have instructed you to hold some medications.    As no general anesthesia was used in today's procedure, you should not experience any side effects related to anesthesia.     If you are diabetic, the steroids used in today's injection may temporarily increase your blood sugar levels after the first few days after your injection. Please keep a close eye on your sugars and alert the doctor who manages your diabetes if your sugars are significantly high from your baseline or you are symptomatic.     If you have a  problem specifically related to your procedure, please call our office at [phone].  Problems not related to your procedure should be directed to your primary care physician.

## 2025-04-14 ENCOUNTER — TELEPHONE (OUTPATIENT)
Dept: PAIN MEDICINE | Facility: CLINIC | Age: 60
End: 2025-04-14

## 2025-04-15 NOTE — TELEPHONE ENCOUNTER
Caller: Anamaria     Doctor/office: Dr Arenas    CB#: [phone]      % of improvement: 60%    Pain Scale (1-10): 0

## 2025-04-16 ENCOUNTER — TELEMEDICINE (OUTPATIENT)
Dept: PSYCHIATRY | Facility: CLINIC | Age: 60
End: 2025-04-16
Payer: COMMERCIAL

## 2025-04-16 DIAGNOSIS — R63.5 WEIGHT GAIN: ICD-10-CM

## 2025-04-16 DIAGNOSIS — F41.1 GAD (GENERALIZED ANXIETY DISORDER): ICD-10-CM

## 2025-04-16 DIAGNOSIS — F33.1 MODERATE EPISODE OF RECURRENT MAJOR DEPRESSIVE DISORDER (HCC): Primary | ICD-10-CM

## 2025-04-16 PROCEDURE — 99214 OFFICE O/P EST MOD 30 MIN: CPT | Performed by: PHYSICIAN ASSISTANT

## 2025-04-16 RX ORDER — QUETIAPINE FUMARATE 50 MG/1
TABLET, FILM COATED ORAL
Qty: 135 TABLET | Refills: 1 | Status: SHIPPED | OUTPATIENT
Start: 2025-04-16

## 2025-04-16 RX ORDER — SERTRALINE HYDROCHLORIDE 100 MG/1
200 TABLET, FILM COATED ORAL DAILY
Qty: 180 TABLET | Refills: 1 | Status: SHIPPED | OUTPATIENT
Start: 2025-04-16

## 2025-04-16 RX ORDER — BUPROPION HYDROCHLORIDE 150 MG/1
150 TABLET ORAL DAILY
Qty: 90 TABLET | Refills: 1 | Status: SHIPPED | OUTPATIENT
Start: 2025-04-16

## 2025-04-16 RX ORDER — TOPIRAMATE 25 MG/1
25 TABLET, FILM COATED ORAL 2 TIMES DAILY
Qty: 180 TABLET | Refills: 1 | Status: SHIPPED | OUTPATIENT
Start: 2025-04-16

## 2025-04-16 NOTE — ASSESSMENT & PLAN NOTE
Appears stable with current treatment plan  Continue Wellbutrin  mg every morning  Continue sertraline 200 mg daily  Continue quetiapine 50 mg 1-1-1/2 at night  Orders:    topiramate (Topamax) 25 mg tablet; Take 1 tablet (25 mg total) by mouth 2 (two) times a day    buPROPion (WELLBUTRIN XL) 150 mg 24 hr tablet; Take 1 tablet (150 mg total) by mouth daily    QUEtiapine (SEROquel) 50 mg tablet; 1 to 1 1/2 po q hs    sertraline (ZOLOFT) 100 mg tablet; Take 2 tablets (200 mg total) by mouth daily

## 2025-04-16 NOTE — PSYCH
Virtual Regular Visit    Patient is located at Home in the following state in which I hold an active license [place].    Assessment & Plan  Moderate episode of recurrent major depressive disorder (HCC)  Appears stable with current treatment plan  Continue Wellbutrin  mg every morning  Continue sertraline 200 mg daily  Continue quetiapine 50 mg 1-1-1/2 at night  Orders:    topiramate (Topamax) 25 mg tablet; Take 1 tablet (25 mg total) by mouth 2 (two) times a day    buPROPion (WELLBUTRIN XL) 150 mg 24 hr tablet; Take 1 tablet (150 mg total) by mouth daily    QUEtiapine (SEROquel) 50 mg tablet; 1 to 1 1/2 po q hs    sertraline (ZOLOFT) 100 mg tablet; Take 2 tablets (200 mg total) by mouth daily    DEISY (generalized anxiety disorder)  Not at baseline  Add Topamax 25 mg twice a day for anxiety and also weight gain/cravings  Orders:    topiramate (Topamax) 25 mg tablet; Take 1 tablet (25 mg total) by mouth 2 (two) times a day    Weight gain  Continue with Wellbutrin  Add Topamax 25 mg twice daily       Follow-up in 3 months and she will call me sooner if any questions or concerns         Reason for visit is   Chief Complaint   Patient presents with    Follow-up    Medication Management    Virtual Regular Visit        Visit Time  Visit Start Time: 500  Visit Stop Time: 530  Total Visit Duration: 30 minutes    Encounter provider: Alaina Arias PA-C  Provider located at [address] [address] [phone]    Recent Visits  No visits were found meeting these conditions.  Showing recent visits within past 7 days and meeting all other requirements  Today's Visits  Date Type Provider Dept   04/16/25 Telemedicine Alaina Arias PA-C  [facility]   Showing today's visits and meeting all other requirements  Future Appointments  No visits were found meeting these conditions.  Showing future appointments  within next 150 days and meeting all other requirements       The patient was identified by name and date of birth. Anamaria Rivera was informed that this is a telemedicine visit and that the visit is being conducted through the Epic Embedded platform. She agrees to proceed. My office door was closed. No one else was in the room. She acknowledged consent and understanding of privacy and security of the video platform. The patient has agreed to participate and understands they can discontinue the visit at any time. Patient is aware this is a billable service.     MEDICATION MANAGEMENT NOTE        [facility]      Name and Date of Birth:  Anamaria Rivera 60 y.o. 1965 MRN: [id number]    Date of Visit: April 16, 2025         - RT in 3 months  - The patient was educated about 24 hour and weekend coverage for urgent situations accessed by calling [facility] main practice number  - Patient was educated to call [place] Suicide Prevention [facility] (5-485-599-BEHC [1438]) for behavioral crisis at anytime or 911 for any safety concerns, or go to nearest ER if the symptoms become overwhelming or unmanageable.         Subjective        Anamaria Rivera is a 60 y.o. female, visited for Follow-up, Medication Management, and Virtual Regular Visit, who was virtually seen and evaluated today at the [facility] outpatient clinic for follow-up and medication management. Completes psychiatric assessment without difficulty.     At previous outpatient psychiatric appointment with this writer, maintained on same medications.   She denies any current adverse medication side effects.      Anamaria overall appears to be doing okay.  Appears bright and in good spirits at times.  States that she has been having continued anxiety which is increased at times.  Multiple worries.  States that she worries about something happening to her brother since  they reside together and share bills.  She also states that she has been eating what she feels is nonstop.  States that she eats constantly has not been going to weight watcher's.  Weight has been a struggle for her and this has been continued to be stressful.  She has not gone to weight watchers although she has paid for it.  Encouraged her to do so.  She has been working which has been stable.  Is also involved with the choir.  Also has good interest and motivation and has continue to work on her family genealogy and also working on songs.  Medication list reviewed.  States that she has also had increased alcohol cravings.  Discussed Topamax as an option to help with anxiety, alcohol cravings and food cravings and she would like to try adding this.            Review Of Systems:  Pertinent items are noted in HPI; all others are negative; no recent changes in medications or health status reported.    PHQ-2/9 Depression Screening                 Historical Information        Past Medical History:    Past Medical History:   Diagnosis Date    ADHD (attention deficit hyperactivity disorder)     Anxiety     Arthritis     Breast cyst     Depression     Fibromyalgia, primary     GERD (gastroesophageal reflux disease)     History of stomach ulcers     Hypertension     Panic attack     Papanicolaou smear 2020    Psychiatric disorder         Past Surgical History:   Procedure Laterality Date    BREAST CYST ASPIRATION Left 2014    BREAST CYST EXCISION      HYSTEROSCOPY  2015    MAMMO (HISTORICAL) Bilateral 03/05/2020    [facility] BI-RADS 1 Negative Scattered areas fibroglandular density; 25% to 50% glandular breast tissue    US BREAST CYST ASPIRATION LEFT INITIAL Left 10/27/2014     Allergies   Allergen Reactions    Latex Rash       Substance Abuse History:    Social History     Substance and Sexual Activity   Alcohol Use Yes    Comment: Socially     Social History     Substance and Sexual Activity   Drug Use No       Social  History:    Social History     Socioeconomic History    Marital status: Single     Spouse name: Not on file    Number of children: Not on file    Years of education: Not on file    Highest education level: Not on file   Occupational History    Occupation: Tech.    Tobacco Use    Smoking status: Former     Passive exposure: Past    Smokeless tobacco: Never    Tobacco comments:     teen years   Vaping Use    Vaping status: Never Used   Substance and Sexual Activity    Alcohol use: Yes     Comment: Socially    Drug use: No    Sexual activity: Not Currently     Birth control/protection: Post-menopausal   Other Topics Concern    Not on file   Social History Narrative    Caffeine Intake: 1-2 cups per day    Do you smoke marijuana? Denies    Do you drink alcohol? Socially    Exercise: Occassionally    Domestic violence: No    History of drug/alcohol abuse denies     Social Drivers of Health     Financial Resource Strain: Not on file   Food Insecurity: Not on file   Transportation Needs: Not on file   Physical Activity: Not on file   Stress: Not on file   Social Connections: Not on file   Intimate Partner Violence: Not on file   Housing Stability: Not on file       Family Psychiatric History:     Family History   Problem Relation Age of Onset    Hypertension Mother     Lung cancer Mother     Skin cancer Father     Stroke Brother     Hypertension Brother     Transient ischemic attack Brother     No Known Problems Brother     No Known Problems Brother     Hypertension Maternal Grandmother     Multiple sclerosis Maternal Grandmother     Heart attack Maternal Grandfather     Heart disease Maternal Grandfather     Alcohol abuse Maternal Grandfather     Asthma Paternal Grandmother     Heart disease Paternal Grandfather        History Review: The following portions of the patient's history were reviewed and updated as appropriate: allergies, current medications, past family history, past medical history, past social history, past  surgical history and problem list.           Objective      Vital signs in last 24 hours:  There were no vitals filed for this visit.    Mental Status Evaluation:    Appearance age appropriate, casually dressed   Behavior cooperative, calm   Speech normal rate, normal volume, normal pitch   Mood depressed, anxious   Affect mood-congruent   Thought Processes circumstantial   Associations circumstantial associations   Thought Content no overt delusions   Perceptual Disturbances: no auditory hallucinations, no visual hallucinations   Abnormal Thoughts  Risk Potential Suicidal ideation - None  Homicidal ideation - None  Potential for aggression - No   Orientation oriented to: person, place, time/date, and situation   Memory recent and remote memory grossly intact   Consciousness alert and awake   Attention Span Concentration Span attention span and concentration are age appropriate   Intellect Not fomrally assessed   Insight fair   Judgement fair   Muscle Strength and  Gait unable to assess today due to virtual visit   Motor activity unable to assess today due to virtual visit   Language no difficulty naming common objects   Fund of Knowledge adequate knowledge of current events   Pain none   Pain Scale 0     Laboratory Results: I have personally reviewed all pertinent laboratory/tests results  Recent Labs (last 2 months):   Appointment on 03/08/2025   Component Date Value    WBC 03/08/2025 5.98     RBC 03/08/2025 4.49     Hemoglobin 03/08/2025 13.2     Hematocrit 03/08/2025 40.4     MCV 03/08/2025 90     MCH 03/08/2025 29.4     MCHC 03/08/2025 32.7     RDW 03/08/2025 12.7     MPV 03/08/2025 10.2     Platelets 03/08/2025 260     nRBC 03/08/2025 0     Segmented % 03/08/2025 65     Immature Grans % 03/08/2025 0     Lymphocytes % 03/08/2025 17     Monocytes % 03/08/2025 12     Eosinophils Relative 03/08/2025 5     Basophils Relative 03/08/2025 1     Absolute Neutrophils 03/08/2025 3.91     Absolute Immature Grans  03/08/2025 0.02     Absolute Lymphocytes 03/08/2025 1.04     Absolute Monocytes 03/08/2025 0.71     Eosinophils Absolute 03/08/2025 0.27     Basophils Absolute 03/08/2025 0.03     Iron Saturation 03/08/2025 31     TIBC 03/08/2025 303.8     Iron 03/08/2025 95     Transferrin 03/08/2025 217     UIBC 03/08/2025 209     Ferritin 03/08/2025 39     Vit D, 25-Hydroxy 03/08/2025 50.1     Hemoglobin A1C 03/08/2025 5.6     EAG 03/08/2025 114                Current Outpatient Medications   Medication Sig Dispense Refill    buPROPion (WELLBUTRIN XL) 150 mg 24 hr tablet Take 1 tablet (150 mg total) by mouth daily 90 tablet 1    QUEtiapine (SEROquel) 50 mg tablet 1 to 1 1/2 po q hs 135 tablet 1    sertraline (ZOLOFT) 100 mg tablet Take 2 tablets (200 mg total) by mouth daily 180 tablet 1    topiramate (Topamax) 25 mg tablet Take 1 tablet (25 mg total) by mouth 2 (two) times a day 180 tablet 1    albuterol (PROVENTIL HFA,VENTOLIN HFA) 90 mcg/act inhaler Inhale 2 puffs every 6 (six) hours as needed for wheezing or shortness of breath 8.5 g 5    cholecalciferol (VITAMIN D3) 1,000 units tablet Take 1,000 Units by mouth daily      ciclopirox (PENLAC) 8 % solution Apply topically daily at bedtime 6.6 mL 3    Clocortolone Pivalate 0.1 % cream Apply topically 2 (two) times a day 45 g 0    hydroCHLOROthiazide 12.5 mg tablet Take 1 tablet (12.5 mg total) by mouth daily 90 tablet 1    hydrOXYzine HCL (ATARAX) 25 mg tablet Take 1 tablet (25 mg total) by mouth every 6 (six) hours as needed for itching or anxiety 30 tablet 2    losartan (COZAAR) 100 MG tablet Take 1 tablet (100 mg total) by mouth daily 90 tablet 1    mometasone (ELOCON) 0.1 % cream Apply topically daily as needed (irritation) 15 g 1    Multiple Vitamin (multivitamin) tablet Take 1 tablet by mouth daily      omeprazole (PriLOSEC) 40 MG capsule Take 1 capsule (40 mg total) by mouth daily Take before a meal 90 capsule 1    SF 5000 Plus 1.1 % CREA Take by mouth 2 (two) times a day  51 g 0     No current facility-administered medications for this visit.         Medications Risks/Benefits      Risks, Benefits And Possible Side Effects Of Medications:    Risks, benefits, and possible side effects of medications explained to Anamaria and she verbalizes understanding and agreement for treatment.    Controlled Medication Discussion:     Not applicable    Psychotherapy Provided:     Individual psychotherapy provided: No   Supportive psychotherapy provided to the patient    Treatment Plan:    Completed and signed during the session: Yes - with Anamaria    Note Share    This note was not shared with the patient due to reasonable likelihood of causing patient harm    Alaina Arias PA-C 04/16/25    This note was completed in part utilizing Dragon dictation Software. Grammatical, translation, syntax errors, random word insertions, spelling mistakes, and incomplete sentences may be an occasional consequence of this system secondary to software limitations with voice recognition, ambient noise, and hardware issues. If you have any questions or concerns about the content, text, or information contained within the body of this dictation, please contact the provider for clarification.

## 2025-04-16 NOTE — BH TREATMENT PLAN
"TREATMENT PLAN (Medication Management Only)        [facility]    Name and Date of Birth:  Anamaria Rivera 60 y.o. 1965  MRN: [id number]  Date of Treatment Plan: April 16, 2025  Diagnosis/Diagnoses:    1. Moderate episode of recurrent major depressive disorder (HCC)    2. DEISY (generalized anxiety disorder)      Strengths/Personal Resources for Self-Care: supportive family, supportive friends.  Area/Areas of need (in own words): \"continue to work on healthy diet\"  1. Long Term Goal:   maintain mood stability.  Target Date:6 months - 10/16/2025  Person/Persons responsible for completion of goal: Anamaria  2.  Short Term Objective (s) - How will we reach this goal?:   A.  Provider new recommended medication/dosage changes and/or continue medication(s):  add topomax 25 mg po bid and cont other meds .  B.  N/A.  C.  N/A.  Target Date:6 months - 10/16/2025  Person/Persons Responsible for Completion of Goal: Anamaria  Progress Towards Goals: stable  Treatment Modality: medication management every 6 months  Review due 180 days from date of this plan: 6 months - 10/16/2025  Expected length of service: maintenance unless revised  My Physician/PA/NP and I have developed this plan together and I agree to work on the goals and objectives. I understand the treatment goals that were developed for my treatment.   Electronic Signatures: on file (unless signed below)    Alaina Arias PA-C 04/16/25  "

## 2025-04-25 ENCOUNTER — TELEPHONE (OUTPATIENT)
Age: 60
End: 2025-04-25

## 2025-04-25 NOTE — TELEPHONE ENCOUNTER
Patient called in to schedule their 3 month medication management appointment.    Patient is now scheduled on 7/16 @5:30pm virtually

## 2025-04-30 ENCOUNTER — OFFICE VISIT (OUTPATIENT)
Dept: FAMILY MEDICINE CLINIC | Facility: CLINIC | Age: 60
End: 2025-04-30
Payer: COMMERCIAL

## 2025-04-30 VITALS
RESPIRATION RATE: 16 BRPM | SYSTOLIC BLOOD PRESSURE: 134 MMHG | BODY MASS INDEX: 36.32 KG/M2 | DIASTOLIC BLOOD PRESSURE: 90 MMHG | HEART RATE: 97 BPM | WEIGHT: 226 LBS | OXYGEN SATURATION: 97 % | HEIGHT: 66 IN | TEMPERATURE: 99.7 F

## 2025-04-30 DIAGNOSIS — R05.1 ACUTE COUGH: Primary | ICD-10-CM

## 2025-04-30 DIAGNOSIS — R52 BODY ACHES: ICD-10-CM

## 2025-04-30 LAB
SARS-COV-2 AG UPPER RESP QL IA: NEGATIVE
VALID CONTROL: NORMAL

## 2025-04-30 PROCEDURE — 87811 SARS-COV-2 COVID19 W/OPTIC: CPT | Performed by: NURSE PRACTITIONER

## 2025-04-30 PROCEDURE — 99213 OFFICE O/P EST LOW 20 MIN: CPT | Performed by: NURSE PRACTITIONER

## 2025-04-30 RX ORDER — PREDNISONE 10 MG/1
TABLET ORAL
Qty: 20 TABLET | Refills: 0 | Status: SHIPPED | OUTPATIENT
Start: 2025-04-30

## 2025-04-30 NOTE — PROGRESS NOTES
"Name: Anamaria Rivera      : 1965      MRN: [id number]  Encounter Provider: ANTONY Obrien  Encounter Date: 2025   Encounter department: [facility]  :  Assessment & Plan  Body aches    Orders:    POCT Rapid Covid Ag           History of Present Illness {?Quick Links Encounters * My Last Note * Last Note in Specialty * Snapshot * Since Last Visit * History :89157}  Pt presents today for       Review of Systems  As   Objective {?Quick Links Trend Vitals * Enter New Vitals * Results Review * Timeline (Adult) * Labs * Imaging * Cardiology * Procedures * Lung Cancer Screening * Surgical eConsent :89956}  /90   Pulse 97   Temp 99.7 °F (37.6 °C)   Resp 16   Ht 5' 5.5\" (1.664 m)   Wt 103 kg (226 lb)   SpO2 97%   BMI 37.04 kg/m²      Physical Exam  {Administrative / Billing Section (Optional):16600}  "

## 2025-04-30 NOTE — PROGRESS NOTES
"Name: Anamaria Rivera      : 1965      MRN: [id number]  Encounter Provider: ANTONY Obrien  Encounter Date: 2025   Encounter department: [facility]  :  Assessment & Plan  Acute cough    Orders:    predniSONE 10 mg tablet; Take 4 tablets with food on days 1 & 2. Then take 3 tablets with food on days 3 &4. Then take 2 tablets with food on days 5 & 6. Then take 1 tablet with food on days 7 & 8.    pseudoephedrine-codeine-guaifenesin (MYTUSSIN DAC) 30- MG/5ML solution; Take 10 mL by mouth daily at bedtime as needed for congestion or cough    Most likely viral in nature and aggravated by allergies. Pt will continue her Claritin daily. Plan to add Prednisone during day and cough syrup at night. Medication and s/e reviewed. Rest and fluids encouraged. If symptoms worse by the end of this week, pt encouraged to call. Patient is encouraged to call our office for any questions/concerns, persistent or worsening symptoms. Patient states they understand and agree with treatment plan.   Body aches    Orders:    POCT Rapid Covid Ag    Rapid covid negative today.      Pt to f/u PRN.       History of Present Illness   Pt presents today for cough, rhinorrhea starting Thursday.  She notes her cough is mostly dry.  No sore throat, ear pain/pressure, sinus pressure.  She notes some low grade fevers at night.  She is taking Claritin daily and Nyquil as well as Albuterol PRN.  She notes a coworker was sick.      Review of Systems  As noted per HPI.  Objective   /90   Pulse 97   Temp 99.7 °F (37.6 °C)   Resp 16   Ht 5' 5.5\" (1.664 m)   Wt 103 kg (226 lb)   SpO2 97%   BMI 37.04 kg/m²      Physical Exam  Vitals reviewed.   Constitutional:       Appearance: She is well-developed.   HENT:      Right Ear: Tympanic membrane, ear canal and external ear normal.      Left Ear: Tympanic membrane, ear canal and external ear normal.      Nose: No congestion or " rhinorrhea.      Mouth/Throat:      Pharynx: No oropharyngeal exudate or posterior oropharyngeal erythema.   Cardiovascular:      Rate and Rhythm: Normal rate and regular rhythm.      Pulses: Normal pulses.      Heart sounds: Normal heart sounds.   Pulmonary:      Effort: Pulmonary effort is normal.      Comments: Coarse breath sounds noted to all elba lung fields  Lymphadenopathy:      Head:      Right side of head: Tonsillar adenopathy present.      Left side of head: Tonsillar adenopathy present.   Neurological:      Mental Status: She is alert and oriented to person, place, and time. Mental status is at baseline.   Psychiatric:         Mood and Affect: Mood normal.         Behavior: Behavior normal.         Thought Content: Thought content normal.         Judgment: Judgment normal.

## 2025-05-02 DIAGNOSIS — R05.1 ACUTE COUGH: Primary | ICD-10-CM

## 2025-05-02 RX ORDER — BENZONATATE 100 MG/1
100 CAPSULE ORAL 3 TIMES DAILY PRN
Qty: 20 CAPSULE | Refills: 0 | Status: SHIPPED | OUTPATIENT
Start: 2025-05-02

## 2025-05-13 ENCOUNTER — OFFICE VISIT (OUTPATIENT)
Dept: FAMILY MEDICINE CLINIC | Facility: CLINIC | Age: 60
End: 2025-05-13
Payer: COMMERCIAL

## 2025-05-13 VITALS
TEMPERATURE: 98.7 F | BODY MASS INDEX: 36.64 KG/M2 | WEIGHT: 228 LBS | HEART RATE: 93 BPM | RESPIRATION RATE: 17 BRPM | HEIGHT: 66 IN | DIASTOLIC BLOOD PRESSURE: 70 MMHG | OXYGEN SATURATION: 96 % | SYSTOLIC BLOOD PRESSURE: 114 MMHG

## 2025-05-13 DIAGNOSIS — R06.02 SHORTNESS OF BREATH: Primary | ICD-10-CM

## 2025-05-13 DIAGNOSIS — M79.642 LEFT HAND PAIN: ICD-10-CM

## 2025-05-13 DIAGNOSIS — M54.9 UPPER BACK PAIN: ICD-10-CM

## 2025-05-13 PROCEDURE — 99214 OFFICE O/P EST MOD 30 MIN: CPT | Performed by: NURSE PRACTITIONER

## 2025-05-13 NOTE — PROGRESS NOTES
"Name: Anamaria Rivera      : 1965      MRN: [id number]  Encounter Provider: ANTONY Obrien  Encounter Date: 2025   Encounter department: [facility]  :  Assessment & Plan  Shortness of breath    Orders:    XR chest pa and lateral; Future    Check CXR to r/o any PNA. Otherwise, I reviewed with patient that coughs can linger 4-8 weeks after illness. She may continue her Albuterol PRN as well as her allergy medication.  Left hand pain       Improved w/ splint application HS per pt report.  Upper back pain       Most likely aggravated by cough and poor posture. Pt may use Advil and heating pads PRN. Patient is encouraged to call our office for any questions/concerns, persistent or worsening symptoms. Patient states they understand and agree with treatment plan.         Pt to f/u PRN.         History of Present Illness   Pt presents today for continued primarily dry cough.  She was last seen on .  She had tessalon perles, Prednisone  She notes some shortness of breath that is random. No association of SOB w/ physical exertion.  Denies fever, chills, body aches.  Pt notes improvement of SOB w/ Albuterol.  She notes the SOB only lasts 5-10 minutes.    She also notes right upper side back pain hear shoulder blade 4-5 weeks ago.  She notes the pain comes and goes.  Denies pain after she eats.    She notes left hand pain for the last several weeks.  Pt admits the pain is better over the last several days.  She does admit she has carpal tunnel and is wearing splints at night.  Her pain is improving per pt report.      Review of Systems  As noted per HPI.  Objective   /70   Pulse 93   Temp 98.7 °F (37.1 °C)   Resp 17   Ht 5' 5.5\" (1.664 m)   Wt 103 kg (228 lb)   SpO2 96%   BMI 37.36 kg/m²      Physical Exam  Vitals reviewed.   Constitutional:       Appearance: She is well-developed.   Cardiovascular:      Rate and Rhythm: Normal rate and " regular rhythm.      Pulses: Normal pulses.      Heart sounds: Normal heart sounds.   Pulmonary:      Effort: Pulmonary effort is normal.      Breath sounds: Normal breath sounds.   Abdominal:      Palpations: Abdomen is soft.      Tenderness: Negative signs include Smith's sign.   Musculoskeletal:        Back:    Neurological:      Mental Status: She is alert and oriented to person, place, and time. Mental status is at baseline.   Psychiatric:         Mood and Affect: Mood normal.         Behavior: Behavior normal.         Thought Content: Thought content normal.         Judgment: Judgment normal.

## 2025-05-20 ENCOUNTER — APPOINTMENT (OUTPATIENT)
Dept: RADIOLOGY | Facility: CLINIC | Age: 60
End: 2025-05-20
Payer: COMMERCIAL

## 2025-05-20 DIAGNOSIS — R06.02 SHORTNESS OF BREATH: ICD-10-CM

## 2025-05-20 PROCEDURE — 71046 X-RAY EXAM CHEST 2 VIEWS: CPT

## 2025-05-21 ENCOUNTER — RESULTS FOLLOW-UP (OUTPATIENT)
Dept: FAMILY MEDICINE CLINIC | Facility: CLINIC | Age: 60
End: 2025-05-21

## 2025-05-23 DIAGNOSIS — R06.02 SHORTNESS OF BREATH: Primary | ICD-10-CM

## 2025-05-23 RX ORDER — BUDESONIDE AND FORMOTEROL FUMARATE DIHYDRATE 80; 4.5 UG/1; UG/1
2 AEROSOL RESPIRATORY (INHALATION) 2 TIMES DAILY
Qty: 10.2 G | Refills: 0 | Status: SHIPPED | OUTPATIENT
Start: 2025-05-23

## 2025-06-03 ENCOUNTER — TELEPHONE (OUTPATIENT)
Dept: PSYCHIATRY | Facility: CLINIC | Age: 60
End: 2025-06-03

## 2025-06-03 NOTE — TELEPHONE ENCOUNTER
Patient reported that she was recently prescribed topamax and she read that once of the side effects are eye problems and patient already has eye problems. Patient has not started the medication due to this concern, she will like to notify Alaina Arias of this and will hold off to take medications until she hears back from provider.

## 2025-06-03 NOTE — TELEPHONE ENCOUNTER
Returned Anamaria's call and informed her of possible but uncommon side effect of Topamax being acute angle-closure glaucoma.  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  She will hold off on taking the Topamax.  She reports talking to her family doctor about GLP-1's.  She said Wegovy was one of the medications that were mentioned, but she forgot the name of the other one.  She wants to know what providers thoughts are on that and if she has any recommendations.    Will refer to Alaina Arias for review.

## 2025-07-01 ENCOUNTER — OFFICE VISIT (OUTPATIENT)
Dept: OBGYN CLINIC | Facility: CLINIC | Age: 60
End: 2025-07-01
Payer: COMMERCIAL

## 2025-07-01 ENCOUNTER — APPOINTMENT (OUTPATIENT)
Dept: RADIOLOGY | Facility: CLINIC | Age: 60
End: 2025-07-01
Attending: ORTHOPAEDIC SURGERY
Payer: COMMERCIAL

## 2025-07-01 VITALS — WEIGHT: 228 LBS | HEIGHT: 66 IN | BODY MASS INDEX: 36.64 KG/M2

## 2025-07-01 DIAGNOSIS — M25.571 CHRONIC PAIN OF RIGHT ANKLE: ICD-10-CM

## 2025-07-01 DIAGNOSIS — M76.71 PERONEAL TENDONITIS OF RIGHT LOWER LEG: Primary | ICD-10-CM

## 2025-07-01 DIAGNOSIS — G89.29 CHRONIC PAIN OF RIGHT ANKLE: ICD-10-CM

## 2025-07-01 PROCEDURE — 73610 X-RAY EXAM OF ANKLE: CPT

## 2025-07-01 PROCEDURE — 99213 OFFICE O/P EST LOW 20 MIN: CPT | Performed by: ORTHOPAEDIC SURGERY

## 2025-07-01 NOTE — ASSESSMENT & PLAN NOTE
Findings consistent with right ankle pain with history of peroneal tendinitis.  Discussed findings and treatment options with the patient.  I reviewed patient's new right ankle x-ray compared to prior showing no change with her ankle joint.  Her pain is mostly localized along the lateral and posterior aspect of her ankle.  Concern for chronic peroneal tendon tear.  I advised patient to use the cam walker again.  Recommend MRI of her right ankle to better assess her peroneal tendon.  I will see patient back after her ankle MRI.  All patient's questions were answered to her satisfaction.  This note is created using dictation transcription.  It may contain typographical errors, grammatical errors, improperly dictated words, background noise and other errors.

## 2025-07-01 NOTE — PROGRESS NOTES
Assessment:     1. Peroneal tendonitis of right lower leg    2. Chronic pain of right ankle        Plan:     Problem List Items Addressed This Visit          Musculoskeletal and Integument    Peroneal tendonitis of right lower leg - Primary    Findings consistent with right ankle pain with history of peroneal tendinitis.  Discussed findings and treatment options with the patient.  I reviewed patient's new right ankle x-ray compared to prior showing no change with her ankle joint.  Her pain is mostly localized along the lateral and posterior aspect of her ankle.  Concern for chronic peroneal tendon tear.  I advised patient to use the cam walker again.  Recommend MRI of her right ankle to better assess her peroneal tendon.  I will see patient back after her ankle MRI.  All patient's questions were answered to her satisfaction.  This note is created using dictation transcription.  It may contain typographical errors, grammatical errors, improperly dictated words, background noise and other errors.         Relevant Orders    MRI ankle/heel right  wo contrast     Other Visit Diagnoses         Chronic pain of right ankle        Relevant Orders    XR ankle 3+ vw right           Subjective:     Patient ID: Anamaria Rivera is a 60 y.o. female.  Chief Complaint:  60-year-old female with history of right ankle peroneal tendinitis that was treated about 2 years ago with a cam walker.  Her symptoms did resolve but recently the her ankle pain has returned.  She is complaining of pain along the lateral aspect of her right ankle when she ambulates.  She noticed swelling in the area.  She denied new injury.  Her symptoms started back in May 2025.  She has been trying to use the cam walker but her pain persist.  She denied new injury.      Allergy:  Allergies[1]  Medications:  all current active meds have been reviewed  Past Medical History:  Past Medical History[2]  Past Surgical History:  Past Surgical History[3]  Family  "History:  Family History[4]  Social History:  Social History     Substance and Sexual Activity   Alcohol Use Yes    Comment: Socially     Social History     Substance and Sexual Activity   Drug Use No     Tobacco Use History[5]  Review of Systems   Constitutional: Negative.    HENT: Negative.     Eyes: Negative.    Respiratory: Negative.     Cardiovascular: Negative.    Gastrointestinal: Negative.    Endocrine: Negative.    Genitourinary: Negative.    Musculoskeletal:  Positive for arthralgias (Right ankle), gait problem (Antalgic) and joint swelling (Right ankle).   Skin: Negative.    Allergic/Immunologic: Negative.    Hematological: Negative.    Psychiatric/Behavioral: Negative.           Objective:  BP Readings from Last 1 Encounters:   05/13/25 114/70      Wt Readings from Last 1 Encounters:   07/01/25 103 kg (228 lb)      BMI:   Estimated body mass index is 37.36 kg/m² as calculated from the following:    Height as of this encounter: 5' 5.5\" (1.664 m).    Weight as of this encounter: 103 kg (228 lb).  BSA:   Estimated body surface area is 2.1 meters squared as calculated from the following:    Height as of this encounter: 5' 5.5\" (1.664 m).    Weight as of this encounter: 103 kg (228 lb).   Physical Exam  Vitals and nursing note reviewed.   Constitutional:       Appearance: Normal appearance. She is well-developed.   HENT:      Head: Normocephalic and atraumatic.      Right Ear: External ear normal.      Left Ear: External ear normal.     Eyes:      Extraocular Movements: Extraocular movements intact.      Conjunctiva/sclera: Conjunctivae normal.     Pulmonary:      Effort: Pulmonary effort is normal.     Musculoskeletal:      Cervical back: Neck supple.     Skin:     General: Skin is warm and dry.     Neurological:      Mental Status: She is alert and oriented to person, place, and time.      Deep Tendon Reflexes: Reflexes are normal and symmetric.     Psychiatric:         Mood and Affect: Mood normal.         " Behavior: Behavior normal.       Right Ankle Exam     Tenderness   The patient is experiencing tenderness in the lateral malleolus (Peroneal tendon inferior to lateral malleolus).  Swelling: moderate    Range of Motion   The patient has normal right ankle ROM.  Right ankle plantar flexion: Pain.   Right ankle eversion: Pain.     Muscle Strength   Dorsiflexion:  5/5  Plantar flexion:  5/5  Anterior tibial:  5/5  Posterior tibial:  5/5  Gastrocsoleus:  5/5  Peroneal muscle:  4/5    Tests   Anterior drawer: negative  Varus tilt: negative    Other   Erythema: absent  Sensation: normal  Pulse: present            I have personally reviewed pertinent films in PACS and my interpretation is right ankle show good joint alignments.  Mortise and syndesmosis are intact.  No degenerative changes over her ankle joint.  There is degenerative changes anterior talonavicular joint.         [1]   Allergies  Allergen Reactions    Latex Rash   [2]   Past Medical History:  Diagnosis Date    ADHD (attention deficit hyperactivity disorder)     Anemia 2021    Anxiety     Arthritis     Breast cyst     Depression     Fibromyalgia, primary     Fractures 1975; 2015    Hairline foot; rib    GERD (gastroesophageal reflux disease)     History of stomach ulcers     Hypertension     Panic attack     Papanicolaou smear 2020    Plantar fasciitis 2005    Infrequent bouts    Psychiatric disorder    [3]   Past Surgical History:  Procedure Laterality Date    BREAST CYST ASPIRATION Left 2014    BREAST CYST EXCISION      HYSTEROSCOPY  2015    MAMMO (HISTORICAL) Bilateral 03/05/2020    [facility] BI-RADS 1 Negative Scattered areas fibroglandular density; 25% to 50% glandular breast tissue    US BREAST CYST ASPIRATION LEFT INITIAL Left 10/27/2014   [4]   Family History  Problem Relation Name Age of Onset    Hypertension Mother Jerrica Rivera     Lung cancer Mother Jerrica Rivera     Cancer Mother Jerrica Rivera         Lung cancer, smoker    Skin cancer Father  Basal cell skin on face     Cancer Father Basal cell skin on face     Heart disease Father Basal cell skin on face     Stroke Brother Korey     Hypertension Brother Korey     Transient ischemic attack Brother Korey     No Known Problems Brother Fred     No Known Problems Brother Ramiro     Hypertension Maternal Grandmother      Multiple sclerosis Maternal Grandmother      Heart attack Maternal Grandfather      Heart disease Maternal Grandfather      Alcohol abuse Maternal Grandfather      Asthma Paternal Grandmother      Heart disease Paternal Grandfather     [5]   Social History  Tobacco Use   Smoking Status Former    Passive exposure: Past   Smokeless Tobacco Never   Tobacco Comments    teen years

## 2025-07-07 DIAGNOSIS — I10 PRIMARY HYPERTENSION: ICD-10-CM

## 2025-07-08 RX ORDER — HYDROCHLOROTHIAZIDE 12.5 MG/1
12.5 TABLET ORAL DAILY
Qty: 90 TABLET | Refills: 1 | Status: SHIPPED | OUTPATIENT
Start: 2025-07-08

## 2025-07-17 ENCOUNTER — TELEMEDICINE (OUTPATIENT)
Dept: PSYCHIATRY | Facility: CLINIC | Age: 60
End: 2025-07-17
Payer: COMMERCIAL

## 2025-07-17 DIAGNOSIS — R63.5 WEIGHT GAIN: ICD-10-CM

## 2025-07-17 DIAGNOSIS — F33.1 MODERATE EPISODE OF RECURRENT MAJOR DEPRESSIVE DISORDER (HCC): Primary | ICD-10-CM

## 2025-07-17 DIAGNOSIS — F41.1 GAD (GENERALIZED ANXIETY DISORDER): ICD-10-CM

## 2025-07-17 PROCEDURE — 99214 OFFICE O/P EST MOD 30 MIN: CPT | Performed by: PHYSICIAN ASSISTANT

## 2025-07-17 RX ORDER — HYDROXYZINE HYDROCHLORIDE 25 MG/1
25 TABLET, FILM COATED ORAL EVERY 6 HOURS PRN
Qty: 30 TABLET | Refills: 2 | Status: SHIPPED | OUTPATIENT
Start: 2025-07-17

## 2025-07-17 NOTE — ASSESSMENT & PLAN NOTE
Not at baseline  Will trial addition of Topamax 25 mg twice daily  Continue Wellbutrin  mg every morning  Continue sertraline 200 mg daily  Continue quetiapine 50 mg 1-1-1/2 at night           Female

## 2025-07-17 NOTE — PSYCH
Virtual Regular Visit    Patient is located at Home in the following state in which I hold an active license [place].    Assessment & Plan  Moderate episode of recurrent major depressive disorder (HCC)  Not at baseline  Will trial addition of Topamax 25 mg twice daily  Continue Wellbutrin  mg every morning  Continue sertraline 200 mg daily  Continue quetiapine 50 mg 1-1-1/2 at night          DEISY (generalized anxiety disorder)  Continue sertraline 200 mg daily  Quetiapine 50 mg 1-1-1/2 at night  Orders:    hydrOXYzine HCL (ATARAX) 25 mg tablet; Take 1 tablet (25 mg total) by mouth every 6 (six) hours as needed for itching or anxiety    Weight gain  Topamax 25 mg twice daily, did not start due to concerns of side effects so will start tonight  Seeing PCP for alternative weight loss medication        Follow up 2 months          Reason for visit is   Chief Complaint   Patient presents with    Follow-up    Medication Management        Visit Time  Visit Start Time: 525  Visit Stop Time: 550  Total Visit Duration: 25 minutes    Encounter provider: Alaina Arias PA-C  Provider located at [facility] [address] [place] [id number] [phone]    Recent Visits  No visits were found meeting these conditions.  Showing recent visits within past 7 days and meeting all other requirements  Today's Visits  Date Type Provider Dept   07/17/25 Telemedicine Alaina Arias PA-C  [facility]   Showing today's visits and meeting all other requirements  Future Appointments  No visits were found meeting these conditions.  Showing future appointments within next 150 days and meeting all other requirements       Administrative Statements   Encounter provider Alaina Arias PA-C    The Patient is located at Home and in the following state in which I hold an active license [place].    The patient was identified by name and date of birth.  Anamaria Rivera was informed that this is a telemedicine visit and that the visit is being conducted through the Epic Embedded platform. She agrees to proceed..  My office door was closed. No one else was in the room.  She acknowledged consent and understanding of privacy and security of the video platform. The patient has agreed to participate and understands they can discontinue the visit at any time.    I have spent a total time of 25 minutes in caring for this patient on the day of the visit/encounter including Risks and benefits of tx options, Instructions for management, Patient and family education, Importance of tx compliance, Risk factor reductions, Impressions, Counseling / Coordination of care, Documenting in the medical record, Reviewing/placing orders in the medical record (including tests, medications, and/or procedures), and Obtaining or reviewing history  , not including the time spent for establishing the audio/video connection.        MEDICATION MANAGEMENT NOTE        [facility]      Name and Date of Birth:  Anamaria Rivera 60 y.o. 1965 MRN: [id number]    Date of Visit: July 17, 2025         Subjective        Anamaria Rivera is a 60 y.o. female, visited for Follow-up and Medication Management, who was virtually seen and evaluated today at the [facility] outpatient clinic for follow-up and medication management. Completes psychiatric assessment without difficulty.     At previous outpatient psychiatric appointment with this writer, she was started on Topamax 25 mg twice daily.  She states that she did not start this though due to concerns of possible side effects with glaucoma.  She was seen by her ophthalmologist who did not feel as though there is any concern for this.    Anamaria has been struggling more with anxiety and feeling more irritable.  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  States that she feels that she has been more labile at work and feeling more difficulty controlling her anger.  Denies any suicidal or homicidal ideation.  States that she is sleeping better at night which has been helping her feel better during the day.  States that she has continued to struggle with her weight and appetite.  She was considering GLP-1 injectables but would like to try the Topamax.  Discussed that this would also likely help with her mood and irritability.  She has been taking her other medications as prescribed and overall at baseline.  Discussed trying to incorporate exercise as tolerated.  She has been having increased back pain so difficulty with exercise.  Discussed consideration of looking into gym with a pool which she will look into.    Review Of Systems:  Pertinent items are noted in HPI; all others are negative; no recent changes in medications or health status reported.    PHQ-2/9 Depression Screening    Little interest or pleasure in doing things: 1 - several days  Feeling down, depressed, or hopeless: 3 - nearly every day  Trouble falling or staying asleep, or sleeping too much: 1 - several days  Feeling tired or having little energy: 3 - nearly every day  Poor appetite or overeating: 3 - nearly every day  Feeling bad about yourself - or that you are a failure or have let yourself or your family down: 3 - nearly every day  Trouble concentrating on things, such as reading the newspaper or watching television: 2 - more than half the days  Moving or speaking so slowly that other people could have noticed. Or the opposite - being so fidgety or restless that you have been moving around a lot more than usual: 0 - not at all  Thoughts that you would be better off dead, or of hurting yourself in some way: 0 - not at all  PHQ-9 Score: 16  PHQ-9 Interpretation: Moderately severe depression             Historical Information        Past Medical  History:    Past Medical History[1]     Past Surgical History[2]  Allergies[3]    Substance Abuse History:    Social History     Substance and Sexual Activity   Alcohol Use Yes    Comment: Socially     Social History     Substance and Sexual Activity   Drug Use No       Social History:    Social History     Socioeconomic History    Marital status: Single     Spouse name: Not on file    Number of children: Not on file    Years of education: Not on file    Highest education level: Not on file   Occupational History    Occupation: Tech.    Tobacco Use    Smoking status: Former     Passive exposure: Past    Smokeless tobacco: Never    Tobacco comments:     teen years   Vaping Use    Vaping status: Never Used   Substance and Sexual Activity    Alcohol use: Yes     Comment: Socially    Drug use: No    Sexual activity: Not Currently     Birth control/protection: Post-menopausal   Other Topics Concern    Not on file   Social History Narrative    Caffeine Intake: 1-2 cups per day    Do you smoke marijuana? Denies    Do you drink alcohol? Socially    Exercise: Occassionally    Domestic violence: No    History of drug/alcohol abuse denies     Social Drivers of Health     Financial Resource Strain: Not on file   Food Insecurity: Not on file   Transportation Needs: Not on file   Physical Activity: Not on file   Stress: Not on file   Social Connections: Not on file   Intimate Partner Violence: Not on file   Housing Stability: Not on file       Family Psychiatric History:     Family History[4]    History Review: The following portions of the patient's history were reviewed and updated as appropriate: allergies, current medications, past family history, past medical history, past social history, past surgical history and problem list.           Objective      Vital signs in last 24 hours:  There were no vitals filed for this visit.    Mental Status Evaluation:    Appearance age appropriate, casually dressed, dressed appropriately    Behavior cooperative, appears anxious   Speech normal rate, normal volume, normal pitch   Mood dysphoric, anxious, labile, irritable   Affect labile   Thought Processes circumstantial   Associations circumstantial associations   Thought Content no overt delusions, negative thinking, negative thoughts   Perceptual Disturbances: no auditory hallucinations, no visual hallucinations   Abnormal Thoughts  Risk Potential Suicidal ideation - None  Homicidal ideation - None  Potential for aggression - No   Orientation oriented to: person, place, time/date, and situation   Memory recent and remote memory grossly intact   Consciousness alert and awake   Attention Span Concentration Span attention span and concentration are age appropriate   Intellect not formally assessed   Insight intact   Judgement intact   Muscle Strength and  Gait unable to assess today due to virtual visit   Motor activity unable to assess today due to virtual visit   Language within normal limits   Fund of Knowledge adequate knowledge of current events             Laboratory Results: I have personally reviewed all pertinent laboratory/tests results  Recent Labs (last 2 months):   No visits with results within 2 Month(s) from this visit.   Latest known visit with results is:   Office Visit on 04/30/2025   Component Date Value    POCT SARS-CoV-2 Ag 04/30/2025 Negative     VALID CONTROL 04/30/2025 Valid                Current Outpatient Medications   Medication Sig Dispense Refill    budesonide-formoterol (Symbicort) 80-4.5 MCG/ACT inhaler Inhale 2 puffs 2 (two) times a day Rinse mouth after use. 10.2 g 0    hydrOXYzine HCL (ATARAX) 25 mg tablet Take 1 tablet (25 mg total) by mouth every 6 (six) hours as needed for itching or anxiety 30 tablet 2    albuterol (PROVENTIL HFA,VENTOLIN HFA) 90 mcg/act inhaler Inhale 2 puffs every 6 (six) hours as needed for wheezing or shortness of breath 8.5 g 5    benzonatate (TESSALON PERLES) 100 mg capsule Take 1 capsule  (100 mg total) by mouth 3 (three) times a day as needed for cough (Patient not taking: Reported on 5/13/2025) 20 capsule 0    buPROPion (WELLBUTRIN XL) 150 mg 24 hr tablet Take 1 tablet (150 mg total) by mouth daily 90 tablet 1    cholecalciferol (VITAMIN D3) 1,000 units tablet Take 1,000 Units by mouth daily      ciclopirox (PENLAC) 8 % solution Apply topically daily at bedtime 6.6 mL 3    Clocortolone Pivalate 0.1 % cream Apply topically 2 (two) times a day 45 g 0    hydroCHLOROthiazide 12.5 mg tablet Take 1 tablet (12.5 mg total) by mouth daily 90 tablet 1    losartan (COZAAR) 100 MG tablet Take 1 tablet (100 mg total) by mouth daily 90 tablet 1    mometasone (ELOCON) 0.1 % cream Apply topically daily as needed (irritation) 15 g 1    Multiple Vitamin (multivitamin) tablet Take 1 tablet by mouth daily      omeprazole (PriLOSEC) 40 MG capsule Take 1 capsule (40 mg total) by mouth daily Take before a meal 90 capsule 1    QUEtiapine (SEROquel) 50 mg tablet 1 to 1 1/2 po q hs 135 tablet 1    sertraline (ZOLOFT) 100 mg tablet Take 2 tablets (200 mg total) by mouth daily 180 tablet 1    SF 5000 Plus 1.1 % CREA Take by mouth 2 (two) times a day 51 g 0    topiramate (Topamax) 25 mg tablet Take 1 tablet (25 mg total) by mouth 2 (two) times a day 180 tablet 1     No current facility-administered medications for this visit.         Medications Risks/Benefits      Risks, Benefits And Possible Side Effects Of Medications:    Risks, benefits, and possible side effects of medications explained to Anamaria and she verbalizes understanding and agreement for treatment.    Controlled Medication Discussion:     Not applicable    Psychotherapy Provided:     Individual psychotherapy provided: No          Treatment Plan:    Completed and signed during the session: Not applicable - Treatment Plan not due at this session    Note Share    This note was not shared with the patient due to reasonable likelihood of causing patient  kevon Arias PA-C 07/17/25    This note was completed in part utilizing Dragon dictation Software. Grammatical, translation, syntax errors, random word insertions, spelling mistakes, and incomplete sentences may be an occasional consequence of this system secondary to software limitations with voice recognition, ambient noise, and hardware issues. If you have any questions or concerns about the content, text, or information contained within the body of this dictation, please contact the provider for clarification.        [1]   Past Medical History:  Diagnosis Date    ADHD (attention deficit hyperactivity disorder)     Anemia 2021    Anxiety     Arthritis     Breast cyst     Depression     Fibromyalgia, primary     Fractures 1975; 2015    Hairline foot; rib    GERD (gastroesophageal reflux disease)     History of stomach ulcers     Hypertension     Panic attack     Papanicolaou smear 2020    Plantar fasciitis 2005    Infrequent bouts    Psychiatric disorder    [2]   Past Surgical History:  Procedure Laterality Date    BREAST CYST ASPIRATION Left 2014    BREAST CYST EXCISION      HYSTEROSCOPY  2015    MAMMO (HISTORICAL) Bilateral 03/05/2020    [facility] BI-RADS 1 Negative Scattered areas fibroglandular density; 25% to 50% glandular breast tissue    US BREAST CYST ASPIRATION LEFT INITIAL Left 10/27/2014   [3]   Allergies  Allergen Reactions    Latex Rash   [4]   Family History  Problem Relation Name Age of Onset    Hypertension Mother Jerrica Rivera     Lung cancer Mother Jerrica Rivera     Cancer Mother Jerrica Rivera         Lung cancer, smoker    Skin cancer Father Basal cell skin on face     Cancer Father Basal cell skin on face     Heart disease Father Basal cell skin on face     Stroke Brother Korey     Hypertension Brother Korey     Transient ischemic attack Brother Korey     No Known Problems Brother Fred     No Known Problems Brother Ramiro     Hypertension Maternal Grandmother      Multiple sclerosis  Maternal Grandmother      Heart attack Maternal Grandfather      Heart disease Maternal Grandfather      Alcohol abuse Maternal Grandfather      Asthma Paternal Grandmother      Heart disease Paternal Grandfather

## 2025-07-31 ENCOUNTER — HOSPITAL ENCOUNTER (OUTPATIENT)
Dept: MRI IMAGING | Facility: HOSPITAL | Age: 60
Discharge: HOME | End: 2025-07-31
Payer: COMMERCIAL

## 2025-07-31 DIAGNOSIS — M76.71 PERONEAL TENDONITIS OF RIGHT LOWER LEG: ICD-10-CM

## 2025-07-31 PROCEDURE — 73721 MRI JNT OF LWR EXTRE W/O DYE: CPT

## 2025-08-08 ENCOUNTER — OFFICE VISIT (OUTPATIENT)
Dept: OBGYN CLINIC | Facility: CLINIC | Age: 60
End: 2025-08-08
Payer: COMMERCIAL

## 2025-08-08 VITALS — WEIGHT: 228 LBS | BODY MASS INDEX: 36.64 KG/M2 | HEIGHT: 66 IN

## 2025-08-08 DIAGNOSIS — M21.6X1 CAVOVARUS DEFORMITY OF FOOT, ACQUIRED, RIGHT: ICD-10-CM

## 2025-08-08 DIAGNOSIS — S86.311A PERONEAL TENDON TEAR, RIGHT, INITIAL ENCOUNTER: Primary | ICD-10-CM

## 2025-08-08 PROCEDURE — 99214 OFFICE O/P EST MOD 30 MIN: CPT | Performed by: ORTHOPAEDIC SURGERY

## 2025-08-16 ENCOUNTER — APPOINTMENT (OUTPATIENT)
Dept: LAB | Facility: HOSPITAL | Age: 60
End: 2025-08-16
Payer: COMMERCIAL

## 2025-08-16 DIAGNOSIS — I10 PRIMARY HYPERTENSION: ICD-10-CM

## 2025-08-16 DIAGNOSIS — Z13.29 SCREENING FOR THYROID DISORDER: ICD-10-CM

## 2025-08-16 DIAGNOSIS — Z13.220 SCREENING FOR LIPID DISORDERS: ICD-10-CM

## 2025-08-16 DIAGNOSIS — E55.9 VITAMIN D DEFICIENCY: ICD-10-CM

## 2025-08-16 DIAGNOSIS — R73.09 ELEVATED HEMOGLOBIN A1C: ICD-10-CM

## 2025-08-16 LAB
25(OH)D3 SERPL-MCNC: 47.4 NG/ML (ref 30–100)
ALBUMIN SERPL BCG-MCNC: 4.1 G/DL (ref 3.5–5)
ALP SERPL-CCNC: 88 U/L (ref 34–104)
ALT SERPL W P-5'-P-CCNC: 19 U/L (ref 7–52)
ANION GAP SERPL CALCULATED.3IONS-SCNC: 5 MMOL/L (ref 4–13)
AST SERPL W P-5'-P-CCNC: 18 U/L (ref 13–39)
BASOPHILS # BLD AUTO: 0.04 THOUSANDS/ÂΜL (ref 0–0.1)
BASOPHILS NFR BLD AUTO: 1 % (ref 0–1)
BILIRUB SERPL-MCNC: 0.35 MG/DL (ref 0.2–1)
BUN SERPL-MCNC: 14 MG/DL (ref 5–25)
CALCIUM SERPL-MCNC: 9.2 MG/DL (ref 8.4–10.2)
CHLORIDE SERPL-SCNC: 106 MMOL/L (ref 96–108)
CHOLEST SERPL-MCNC: 180 MG/DL (ref ?–200)
CO2 SERPL-SCNC: 28 MMOL/L (ref 21–32)
CREAT SERPL-MCNC: 0.68 MG/DL (ref 0.6–1.3)
EOSINOPHIL # BLD AUTO: 0.37 THOUSAND/ÂΜL (ref 0–0.61)
EOSINOPHIL NFR BLD AUTO: 6 % (ref 0–6)
ERYTHROCYTE [DISTWIDTH] IN BLOOD BY AUTOMATED COUNT: 12.7 % (ref 11.6–15.1)
EST. AVERAGE GLUCOSE BLD GHB EST-MCNC: 114 MG/DL
GFR SERPL CREATININE-BSD FRML MDRD: 95 ML/MIN/1.73SQ M
GLUCOSE P FAST SERPL-MCNC: 84 MG/DL (ref 65–99)
HBA1C MFR BLD: 5.6 %
HCT VFR BLD AUTO: 39.9 % (ref 34.8–46.1)
HDLC SERPL-MCNC: 79 MG/DL
HGB BLD-MCNC: 13.6 G/DL (ref 11.5–15.4)
IMM GRANULOCYTES # BLD AUTO: 0.01 THOUSAND/UL (ref 0–0.2)
IMM GRANULOCYTES NFR BLD AUTO: 0 % (ref 0–2)
LDLC SERPL CALC-MCNC: 75 MG/DL (ref 0–100)
LYMPHOCYTES # BLD AUTO: 1.13 THOUSANDS/ÂΜL (ref 0.6–4.47)
LYMPHOCYTES NFR BLD AUTO: 20 % (ref 14–44)
MCH RBC QN AUTO: 29.9 PG (ref 26.8–34.3)
MCHC RBC AUTO-ENTMCNC: 34.1 G/DL (ref 31.4–37.4)
MCV RBC AUTO: 88 FL (ref 82–98)
MONOCYTES # BLD AUTO: 0.64 THOUSAND/ÂΜL (ref 0.17–1.22)
MONOCYTES NFR BLD AUTO: 11 % (ref 4–12)
NEUTROPHILS # BLD AUTO: 3.57 THOUSANDS/ÂΜL (ref 1.85–7.62)
NEUTS SEG NFR BLD AUTO: 62 % (ref 43–75)
NONHDLC SERPL-MCNC: 101 MG/DL
NRBC BLD AUTO-RTO: 0 /100 WBCS
PLATELET # BLD AUTO: 281 THOUSANDS/UL (ref 149–390)
PMV BLD AUTO: 10.6 FL (ref 8.9–12.7)
POTASSIUM SERPL-SCNC: 4.3 MMOL/L (ref 3.5–5.3)
PROT SERPL-MCNC: 6.5 G/DL (ref 6.4–8.4)
RBC # BLD AUTO: 4.55 MILLION/UL (ref 3.81–5.12)
SODIUM SERPL-SCNC: 139 MMOL/L (ref 135–147)
TRIGL SERPL-MCNC: 131 MG/DL (ref ?–150)
TSH SERPL DL<=0.05 MIU/L-ACNC: 1.88 UIU/ML (ref 0.45–4.5)
WBC # BLD AUTO: 5.76 THOUSAND/UL (ref 4.31–10.16)

## 2025-08-16 PROCEDURE — 82306 VITAMIN D 25 HYDROXY: CPT

## 2025-08-16 PROCEDURE — 80061 LIPID PANEL: CPT

## 2025-08-16 PROCEDURE — 80053 COMPREHEN METABOLIC PANEL: CPT

## 2025-08-16 PROCEDURE — 83036 HEMOGLOBIN GLYCOSYLATED A1C: CPT

## 2025-08-16 PROCEDURE — 84443 ASSAY THYROID STIM HORMONE: CPT

## 2025-08-16 PROCEDURE — 85025 COMPLETE CBC W/AUTO DIFF WBC: CPT

## 2025-08-16 PROCEDURE — 36415 COLL VENOUS BLD VENIPUNCTURE: CPT

## 2025-08-20 ENCOUNTER — TELEPHONE (OUTPATIENT)
Age: 60
End: 2025-08-20

## 2025-08-20 ENCOUNTER — EVALUATION (OUTPATIENT)
Dept: PHYSICAL THERAPY | Facility: CLINIC | Age: 60
End: 2025-08-20
Attending: ORTHOPAEDIC SURGERY
Payer: COMMERCIAL

## 2025-08-20 DIAGNOSIS — S86.311D STRAIN OF PERONEAL TENDON, RIGHT, SUBSEQUENT ENCOUNTER: ICD-10-CM

## 2025-08-20 PROCEDURE — 97110 THERAPEUTIC EXERCISES: CPT | Performed by: PHYSICAL THERAPIST

## 2025-08-20 PROCEDURE — 97161 PT EVAL LOW COMPLEX 20 MIN: CPT | Performed by: PHYSICAL THERAPIST

## 2025-08-21 DIAGNOSIS — F33.1 MODERATE EPISODE OF RECURRENT MAJOR DEPRESSIVE DISORDER (HCC): ICD-10-CM

## 2025-08-21 RX ORDER — QUETIAPINE FUMARATE 50 MG/1
TABLET, FILM COATED ORAL
Qty: 10 TABLET | Refills: 0 | Status: SHIPPED | OUTPATIENT
Start: 2025-08-21